# Patient Record
Sex: FEMALE | Race: WHITE | Employment: OTHER | URBAN - METROPOLITAN AREA
[De-identification: names, ages, dates, MRNs, and addresses within clinical notes are randomized per-mention and may not be internally consistent; named-entity substitution may affect disease eponyms.]

---

## 2017-01-16 RX ORDER — LINAGLIPTIN 5 MG/1
TABLET, FILM COATED ORAL
Qty: 90 TABLET | Refills: 0 | Status: SHIPPED | OUTPATIENT
Start: 2017-01-16 | End: 2017-01-18

## 2017-01-18 ENCOUNTER — TELEPHONE (OUTPATIENT)
Dept: FAMILY MEDICINE CLINIC | Age: 77
End: 2017-01-18

## 2017-01-24 ENCOUNTER — TELEPHONE (OUTPATIENT)
Dept: FAMILY MEDICINE CLINIC | Age: 77
End: 2017-01-24

## 2017-03-20 ENCOUNTER — TELEPHONE (OUTPATIENT)
Dept: FAMILY MEDICINE CLINIC | Age: 77
End: 2017-03-20

## 2017-03-27 RX ORDER — CALCITONIN SALMON 200 [IU]/.09ML
SPRAY, METERED NASAL
Qty: 3.7 ML | Refills: 0 | Status: SHIPPED | OUTPATIENT
Start: 2017-03-27 | End: 2017-12-06 | Stop reason: SDUPTHER

## 2017-03-29 ENCOUNTER — OFFICE VISIT (OUTPATIENT)
Dept: FAMILY MEDICINE CLINIC | Age: 77
End: 2017-03-29

## 2017-03-29 VITALS
BODY MASS INDEX: 38.57 KG/M2 | HEART RATE: 76 BPM | HEIGHT: 66 IN | DIASTOLIC BLOOD PRESSURE: 66 MMHG | WEIGHT: 240 LBS | RESPIRATION RATE: 20 BRPM | SYSTOLIC BLOOD PRESSURE: 128 MMHG

## 2017-03-29 DIAGNOSIS — E78.5 HYPERLIPIDEMIA, UNSPECIFIED HYPERLIPIDEMIA TYPE: ICD-10-CM

## 2017-03-29 DIAGNOSIS — M85.80 OSTEOPENIA: ICD-10-CM

## 2017-03-29 DIAGNOSIS — E66.9 OBESITY (BMI 30-39.9): ICD-10-CM

## 2017-03-29 DIAGNOSIS — Z99.89 OBSTRUCTIVE SLEEP APNEA ON CPAP: ICD-10-CM

## 2017-03-29 DIAGNOSIS — I10 ESSENTIAL HYPERTENSION: ICD-10-CM

## 2017-03-29 DIAGNOSIS — K21.9 GASTROESOPHAGEAL REFLUX DISEASE, ESOPHAGITIS PRESENCE NOT SPECIFIED: ICD-10-CM

## 2017-03-29 DIAGNOSIS — E11.9 CONTROLLED TYPE 2 DIABETES MELLITUS WITHOUT COMPLICATION, UNSPECIFIED LONG TERM INSULIN USE STATUS: ICD-10-CM

## 2017-03-29 DIAGNOSIS — J01.90 ACUTE RHINOSINUSITIS: Primary | ICD-10-CM

## 2017-03-29 DIAGNOSIS — G47.33 OBSTRUCTIVE SLEEP APNEA ON CPAP: ICD-10-CM

## 2017-03-29 DIAGNOSIS — Z78.0 POST-MENOPAUSAL: ICD-10-CM

## 2017-03-29 PROCEDURE — 3017F COLORECTAL CA SCREEN DOC REV: CPT | Performed by: FAMILY MEDICINE

## 2017-03-29 PROCEDURE — G8427 DOCREV CUR MEDS BY ELIG CLIN: HCPCS | Performed by: FAMILY MEDICINE

## 2017-03-29 PROCEDURE — 4040F PNEUMOC VAC/ADMIN/RCVD: CPT | Performed by: FAMILY MEDICINE

## 2017-03-29 PROCEDURE — G8417 CALC BMI ABV UP PARAM F/U: HCPCS | Performed by: FAMILY MEDICINE

## 2017-03-29 PROCEDURE — 1036F TOBACCO NON-USER: CPT | Performed by: FAMILY MEDICINE

## 2017-03-29 PROCEDURE — G8399 PT W/DXA RESULTS DOCUMENT: HCPCS | Performed by: FAMILY MEDICINE

## 2017-03-29 PROCEDURE — 1123F ACP DISCUSS/DSCN MKR DOCD: CPT | Performed by: FAMILY MEDICINE

## 2017-03-29 PROCEDURE — 3044F HG A1C LEVEL LT 7.0%: CPT | Performed by: FAMILY MEDICINE

## 2017-03-29 PROCEDURE — 1090F PRES/ABSN URINE INCON ASSESS: CPT | Performed by: FAMILY MEDICINE

## 2017-03-29 PROCEDURE — 99214 OFFICE O/P EST MOD 30 MIN: CPT | Performed by: FAMILY MEDICINE

## 2017-03-29 PROCEDURE — G8484 FLU IMMUNIZE NO ADMIN: HCPCS | Performed by: FAMILY MEDICINE

## 2017-03-29 RX ORDER — METOPROLOL SUCCINATE 25 MG/1
25 TABLET, EXTENDED RELEASE ORAL DAILY
Qty: 90 TABLET | Refills: 3 | Status: SHIPPED | OUTPATIENT
Start: 2017-03-29 | End: 2018-04-26 | Stop reason: SDUPTHER

## 2017-03-29 RX ORDER — AMLODIPINE BESYLATE AND BENAZEPRIL HYDROCHLORIDE 10; 20 MG/1; MG/1
1 CAPSULE ORAL DAILY
Qty: 90 CAPSULE | Refills: 3 | Status: SHIPPED | OUTPATIENT
Start: 2017-03-29 | End: 2018-03-19 | Stop reason: SDUPTHER

## 2017-03-29 RX ORDER — AMOXICILLIN AND CLAVULANATE POTASSIUM 875; 125 MG/1; MG/1
1 TABLET, FILM COATED ORAL 2 TIMES DAILY WITH MEALS
Qty: 14 TABLET | Refills: 0 | Status: SHIPPED | OUTPATIENT
Start: 2017-03-29 | End: 2017-04-05

## 2017-03-29 ASSESSMENT — ENCOUNTER SYMPTOMS
COUGH: 1
GASTROINTESTINAL NEGATIVE: 1
RHINORRHEA: 1
SINUS PRESSURE: 1

## 2017-04-11 RX ORDER — OMEPRAZOLE 40 MG/1
CAPSULE, DELAYED RELEASE ORAL
Qty: 90 CAPSULE | Refills: 0 | Status: SHIPPED | OUTPATIENT
Start: 2017-04-11 | End: 2018-10-01 | Stop reason: SDUPTHER

## 2017-04-13 RX ORDER — ALENDRONATE SODIUM 70 MG/1
70 TABLET ORAL
Qty: 4 TABLET | Refills: 11 | Status: SHIPPED | OUTPATIENT
Start: 2017-04-13 | End: 2017-12-20

## 2017-07-10 RX ORDER — OMEPRAZOLE 40 MG/1
CAPSULE, DELAYED RELEASE ORAL
Qty: 90 CAPSULE | Refills: 0 | Status: SHIPPED | OUTPATIENT
Start: 2017-07-10 | End: 2017-12-20

## 2017-08-16 ENCOUNTER — HOSPITAL ENCOUNTER (OUTPATIENT)
Age: 77
Discharge: HOME | End: 2017-08-16
Payer: MEDICARE

## 2017-08-16 ENCOUNTER — TELEPHONE (OUTPATIENT)
Dept: FAMILY MEDICINE CLINIC | Age: 77
End: 2017-08-16

## 2017-08-16 DIAGNOSIS — E11.9 CONTROLLED TYPE 2 DIABETES MELLITUS WITHOUT COMPLICATION, UNSPECIFIED LONG TERM INSULIN USE STATUS: Primary | ICD-10-CM

## 2017-08-16 DIAGNOSIS — E11.9 CONTROLLED TYPE 2 DIABETES MELLITUS WITHOUT COMPLICATION, UNSPECIFIED LONG TERM INSULIN USE STATUS: ICD-10-CM

## 2017-08-16 LAB
AVERAGE GLUCOSE: 114 MG/DL (ref 70–126)
HBA1C MFR BLD: 5.8 % (ref 4.4–6.4)

## 2017-08-16 PROCEDURE — 83036 HEMOGLOBIN GLYCOSYLATED A1C: CPT

## 2017-08-16 PROCEDURE — 36415 COLL VENOUS BLD VENIPUNCTURE: CPT

## 2017-09-26 RX ORDER — OMEPRAZOLE 40 MG/1
CAPSULE, DELAYED RELEASE ORAL
Qty: 90 CAPSULE | Refills: 0 | Status: SHIPPED | OUTPATIENT
Start: 2017-09-26 | End: 2017-11-03 | Stop reason: SDUPTHER

## 2017-09-29 ENCOUNTER — OFFICE VISIT (OUTPATIENT)
Dept: FAMILY MEDICINE CLINIC | Age: 77
End: 2017-09-29
Payer: MEDICARE

## 2017-09-29 ENCOUNTER — TELEPHONE (OUTPATIENT)
Dept: FAMILY MEDICINE CLINIC | Age: 77
End: 2017-09-29

## 2017-09-29 VITALS
WEIGHT: 242 LBS | SYSTOLIC BLOOD PRESSURE: 132 MMHG | RESPIRATION RATE: 16 BRPM | BODY MASS INDEX: 38.89 KG/M2 | HEART RATE: 60 BPM | DIASTOLIC BLOOD PRESSURE: 74 MMHG | HEIGHT: 66 IN

## 2017-09-29 DIAGNOSIS — G47.33 OBSTRUCTIVE SLEEP APNEA ON CPAP: ICD-10-CM

## 2017-09-29 DIAGNOSIS — E66.9 OBESITY (BMI 30-39.9): ICD-10-CM

## 2017-09-29 DIAGNOSIS — E11.42 DIABETIC POLYNEUROPATHY ASSOCIATED WITH TYPE 2 DIABETES MELLITUS (HCC): ICD-10-CM

## 2017-09-29 DIAGNOSIS — I10 ESSENTIAL HYPERTENSION: ICD-10-CM

## 2017-09-29 DIAGNOSIS — E78.5 HYPERLIPIDEMIA, UNSPECIFIED HYPERLIPIDEMIA TYPE: ICD-10-CM

## 2017-09-29 DIAGNOSIS — E11.9 CONTROLLED TYPE 2 DIABETES MELLITUS WITHOUT COMPLICATION, WITHOUT LONG-TERM CURRENT USE OF INSULIN (HCC): Primary | ICD-10-CM

## 2017-09-29 DIAGNOSIS — Z23 NEED FOR INFLUENZA VACCINATION: ICD-10-CM

## 2017-09-29 DIAGNOSIS — Z99.89 OBSTRUCTIVE SLEEP APNEA ON CPAP: ICD-10-CM

## 2017-09-29 DIAGNOSIS — K21.9 GASTROESOPHAGEAL REFLUX DISEASE, ESOPHAGITIS PRESENCE NOT SPECIFIED: ICD-10-CM

## 2017-09-29 PROCEDURE — 4040F PNEUMOC VAC/ADMIN/RCVD: CPT | Performed by: FAMILY MEDICINE

## 2017-09-29 PROCEDURE — G8399 PT W/DXA RESULTS DOCUMENT: HCPCS | Performed by: FAMILY MEDICINE

## 2017-09-29 PROCEDURE — 1036F TOBACCO NON-USER: CPT | Performed by: FAMILY MEDICINE

## 2017-09-29 PROCEDURE — G0008 ADMIN INFLUENZA VIRUS VAC: HCPCS | Performed by: FAMILY MEDICINE

## 2017-09-29 PROCEDURE — G8427 DOCREV CUR MEDS BY ELIG CLIN: HCPCS | Performed by: FAMILY MEDICINE

## 2017-09-29 PROCEDURE — 1123F ACP DISCUSS/DSCN MKR DOCD: CPT | Performed by: FAMILY MEDICINE

## 2017-09-29 PROCEDURE — 90662 IIV NO PRSV INCREASED AG IM: CPT | Performed by: FAMILY MEDICINE

## 2017-09-29 PROCEDURE — 99214 OFFICE O/P EST MOD 30 MIN: CPT | Performed by: FAMILY MEDICINE

## 2017-09-29 PROCEDURE — G8417 CALC BMI ABV UP PARAM F/U: HCPCS | Performed by: FAMILY MEDICINE

## 2017-09-29 PROCEDURE — 1090F PRES/ABSN URINE INCON ASSESS: CPT | Performed by: FAMILY MEDICINE

## 2017-09-29 ASSESSMENT — ENCOUNTER SYMPTOMS
GASTROINTESTINAL NEGATIVE: 1
RESPIRATORY NEGATIVE: 1

## 2017-09-29 NOTE — TELEPHONE ENCOUNTER
Pt requesting prescription for Nitro tablets, the ones she had are . Pt was not sure who initially prescribed them for her.  ([facility])

## 2017-10-01 RX ORDER — NITROGLYCERIN 0.4 MG/1
0.4 TABLET SUBLINGUAL EVERY 5 MIN PRN
Qty: 25 TABLET | Refills: 3 | Status: SHIPPED | OUTPATIENT
Start: 2017-10-01 | End: 2018-12-01 | Stop reason: SDUPTHER

## 2017-10-09 ENCOUNTER — TELEPHONE (OUTPATIENT)
Dept: FAMILY MEDICINE CLINIC | Age: 77
End: 2017-10-09

## 2017-10-10 ENCOUNTER — OFFICE VISIT (OUTPATIENT)
Dept: FAMILY MEDICINE CLINIC | Age: 77
End: 2017-10-10
Payer: MEDICARE

## 2017-10-10 DIAGNOSIS — Z23 NEED FOR PNEUMOCOCCAL VACCINATION: Primary | ICD-10-CM

## 2017-10-10 PROCEDURE — G0009 ADMIN PNEUMOCOCCAL VACCINE: HCPCS | Performed by: FAMILY MEDICINE

## 2017-10-10 PROCEDURE — 1036F TOBACCO NON-USER: CPT | Performed by: FAMILY MEDICINE

## 2017-10-10 PROCEDURE — 90732 PPSV23 VACC 2 YRS+ SUBQ/IM: CPT | Performed by: FAMILY MEDICINE

## 2017-10-10 NOTE — PROGRESS NOTES
After obtaining consent, and per orders of Dr. Suhas Reddy, injection of [id number] 0.5ml given in Right deltoid by Arina Aguirre CMA([facility]). Patient instructed to report any adverse reaction to me immediately. Pt tolerated injection well.

## 2017-10-25 ENCOUNTER — HOSPITAL ENCOUNTER (INPATIENT)
Age: 77
LOS: 2 days | Discharge: HOME | DRG: 310 | End: 2017-10-27
Attending: INTERNAL MEDICINE | Admitting: INTERNAL MEDICINE
Payer: MEDICARE

## 2017-10-25 PROBLEM — I48.19 PERSISTENT ATRIAL FIBRILLATION (HCC): Status: ACTIVE | Noted: 2017-10-25

## 2017-10-25 LAB
ALBUMIN SERPL-MCNC: 4.3 G/DL (ref 3.5–5.1)
ALP BLD-CCNC: 39 U/L (ref 38–126)
ALT SERPL-CCNC: 21 U/L (ref 11–66)
ANION GAP SERPL CALCULATED.3IONS-SCNC: 16 MEQ/L (ref 8–16)
APTT: 77.6 SECONDS (ref 22–38)
AST SERPL-CCNC: 22 U/L (ref 5–40)
BILIRUB SERPL-MCNC: 0.5 MG/DL (ref 0.3–1.2)
BILIRUBIN DIRECT: < 0.2 MG/DL (ref 0–0.3)
BUN BLDV-MCNC: 9 MG/DL (ref 7–22)
CALCIUM SERPL-MCNC: 9.7 MG/DL (ref 8.5–10.5)
CHLORIDE BLD-SCNC: 105 MEQ/L (ref 98–111)
CO2: 24 MEQ/L (ref 23–33)
CREAT SERPL-MCNC: 0.6 MG/DL (ref 0.4–1.2)
GFR SERPL CREATININE-BSD FRML MDRD: > 90 ML/MIN/1.73M2
GLUCOSE BLD-MCNC: 116 MG/DL (ref 70–108)
HCT VFR BLD CALC: 37.5 % (ref 37–47)
HEMOGLOBIN: 12.6 GM/DL (ref 12–16)
MCH RBC QN AUTO: 29.6 PG (ref 27–31)
MCHC RBC AUTO-ENTMCNC: 33.8 GM/DL (ref 33–37)
MCV RBC AUTO: 87.8 FL (ref 81–99)
PDW BLD-RTO: 14.7 % (ref 11.5–14.5)
PLATELET # BLD: 211 THOU/MM3 (ref 130–400)
PMV BLD AUTO: 9.9 MCM (ref 7.4–10.4)
POTASSIUM SERPL-SCNC: 5 MEQ/L (ref 3.5–5.2)
RBC # BLD: 4.27 MILL/MM3 (ref 4.2–5.4)
SODIUM BLD-SCNC: 145 MEQ/L (ref 135–145)
TOTAL PROTEIN: 7.4 G/DL (ref 6.1–8)
TROPONIN T: < 0.01 NG/ML
TROPONIN T: < 0.01 NG/ML
TSH SERPL DL<=0.05 MIU/L-ACNC: 1.16 UIU/ML (ref 0.4–4.2)
WBC # BLD: 8.2 THOU/MM3 (ref 4.8–10.8)

## 2017-10-25 PROCEDURE — 80053 COMPREHEN METABOLIC PANEL: CPT

## 2017-10-25 PROCEDURE — 36415 COLL VENOUS BLD VENIPUNCTURE: CPT

## 2017-10-25 PROCEDURE — 84443 ASSAY THYROID STIM HORMONE: CPT

## 2017-10-25 PROCEDURE — 85027 COMPLETE CBC AUTOMATED: CPT

## 2017-10-25 PROCEDURE — 6370000000 HC RX 637 (ALT 250 FOR IP): Performed by: INTERNAL MEDICINE

## 2017-10-25 PROCEDURE — 85730 THROMBOPLASTIN TIME PARTIAL: CPT

## 2017-10-25 PROCEDURE — 2580000003 HC RX 258: Performed by: INTERNAL MEDICINE

## 2017-10-25 PROCEDURE — 93005 ELECTROCARDIOGRAM TRACING: CPT

## 2017-10-25 PROCEDURE — 82248 BILIRUBIN DIRECT: CPT

## 2017-10-25 PROCEDURE — 2500000003 HC RX 250 WO HCPCS: Performed by: INTERNAL MEDICINE

## 2017-10-25 PROCEDURE — 1200000003 HC TELEMETRY R&B

## 2017-10-25 PROCEDURE — 6360000002 HC RX W HCPCS: Performed by: INTERNAL MEDICINE

## 2017-10-25 PROCEDURE — 84484 ASSAY OF TROPONIN QUANT: CPT

## 2017-10-25 RX ORDER — ASPIRIN 81 MG/1
81 TABLET, CHEWABLE ORAL DAILY
Status: DISCONTINUED | OUTPATIENT
Start: 2017-10-26 | End: 2017-10-27 | Stop reason: HOSPADM

## 2017-10-25 RX ORDER — POTASSIUM CHLORIDE 20MEQ/15ML
40 LIQUID (ML) ORAL PRN
Status: DISCONTINUED | OUTPATIENT
Start: 2017-10-25 | End: 2017-10-27 | Stop reason: HOSPADM

## 2017-10-25 RX ORDER — ONDANSETRON 2 MG/ML
4 INJECTION INTRAMUSCULAR; INTRAVENOUS EVERY 6 HOURS PRN
Status: DISCONTINUED | OUTPATIENT
Start: 2017-10-25 | End: 2017-10-27 | Stop reason: HOSPADM

## 2017-10-25 RX ORDER — POTASSIUM CHLORIDE 7.45 MG/ML
10 INJECTION INTRAVENOUS PRN
Status: DISCONTINUED | OUTPATIENT
Start: 2017-10-25 | End: 2017-10-27 | Stop reason: HOSPADM

## 2017-10-25 RX ORDER — HEPARIN SODIUM 1000 [USP'U]/ML
80 INJECTION, SOLUTION INTRAVENOUS; SUBCUTANEOUS ONCE
Status: COMPLETED | OUTPATIENT
Start: 2017-10-25 | End: 2017-10-25

## 2017-10-25 RX ORDER — SODIUM CHLORIDE 0.9 % (FLUSH) 0.9 %
10 SYRINGE (ML) INJECTION PRN
Status: DISCONTINUED | OUTPATIENT
Start: 2017-10-25 | End: 2017-10-27 | Stop reason: HOSPADM

## 2017-10-25 RX ORDER — METOPROLOL SUCCINATE 25 MG/1
25 TABLET, EXTENDED RELEASE ORAL DAILY
Status: DISCONTINUED | OUTPATIENT
Start: 2017-10-25 | End: 2017-10-25

## 2017-10-25 RX ORDER — CALCITONIN SALMON 200 [IU]/.09ML
1 SPRAY, METERED NASAL DAILY
Status: DISCONTINUED | OUTPATIENT
Start: 2017-10-25 | End: 2017-10-27 | Stop reason: HOSPADM

## 2017-10-25 RX ORDER — DILTIAZEM HYDROCHLORIDE 5 MG/ML
10 INJECTION INTRAVENOUS ONCE
Status: COMPLETED | OUTPATIENT
Start: 2017-10-25 | End: 2017-10-25

## 2017-10-25 RX ORDER — OMEGA-3 FATTY ACIDS/FISH OIL 300-1000MG
3 CAPSULE ORAL DAILY
Status: DISCONTINUED | OUTPATIENT
Start: 2017-10-25 | End: 2017-10-25 | Stop reason: RX

## 2017-10-25 RX ORDER — NITROGLYCERIN 0.4 MG/1
0.4 TABLET SUBLINGUAL EVERY 5 MIN PRN
Status: DISCONTINUED | OUTPATIENT
Start: 2017-10-25 | End: 2017-10-25 | Stop reason: SDUPTHER

## 2017-10-25 RX ORDER — NITROGLYCERIN 0.4 MG/1
0.4 TABLET SUBLINGUAL EVERY 5 MIN PRN
Status: DISCONTINUED | OUTPATIENT
Start: 2017-10-25 | End: 2017-10-27 | Stop reason: HOSPADM

## 2017-10-25 RX ORDER — ACETAMINOPHEN 325 MG/1
650 TABLET ORAL EVERY 4 HOURS PRN
Status: DISCONTINUED | OUTPATIENT
Start: 2017-10-25 | End: 2017-10-25 | Stop reason: SDUPTHER

## 2017-10-25 RX ORDER — ALBUTEROL SULFATE 90 UG/1
2 AEROSOL, METERED RESPIRATORY (INHALATION) EVERY 4 HOURS PRN
Status: DISCONTINUED | OUTPATIENT
Start: 2017-10-25 | End: 2017-10-27 | Stop reason: HOSPADM

## 2017-10-25 RX ORDER — HEPARIN SODIUM 1000 [USP'U]/ML
80 INJECTION, SOLUTION INTRAVENOUS; SUBCUTANEOUS PRN
Status: DISCONTINUED | OUTPATIENT
Start: 2017-10-25 | End: 2017-10-25 | Stop reason: ALTCHOICE

## 2017-10-25 RX ORDER — PANTOPRAZOLE SODIUM 40 MG/1
40 TABLET, DELAYED RELEASE ORAL
Status: DISCONTINUED | OUTPATIENT
Start: 2017-10-26 | End: 2017-10-27 | Stop reason: HOSPADM

## 2017-10-25 RX ORDER — SODIUM CHLORIDE 0.9 % (FLUSH) 0.9 %
10 SYRINGE (ML) INJECTION EVERY 12 HOURS SCHEDULED
Status: DISCONTINUED | OUTPATIENT
Start: 2017-10-25 | End: 2017-10-27 | Stop reason: HOSPADM

## 2017-10-25 RX ORDER — AMLODIPINE BESYLATE AND BENAZEPRIL HYDROCHLORIDE 10; 20 MG/1; MG/1
1 CAPSULE ORAL DAILY
Status: DISCONTINUED | OUTPATIENT
Start: 2017-10-25 | End: 2017-10-25

## 2017-10-25 RX ORDER — METOPROLOL TARTRATE 50 MG/1
50 TABLET, FILM COATED ORAL 2 TIMES DAILY
Status: DISCONTINUED | OUTPATIENT
Start: 2017-10-25 | End: 2017-10-27 | Stop reason: HOSPADM

## 2017-10-25 RX ORDER — PRAVASTATIN SODIUM 80 MG/1
80 TABLET ORAL DAILY
Status: DISCONTINUED | OUTPATIENT
Start: 2017-10-25 | End: 2017-10-27 | Stop reason: HOSPADM

## 2017-10-25 RX ORDER — SODIUM CHLORIDE 9 MG/ML
INJECTION, SOLUTION INTRAVENOUS CONTINUOUS
Status: DISCONTINUED | OUTPATIENT
Start: 2017-10-25 | End: 2017-10-27 | Stop reason: HOSPADM

## 2017-10-25 RX ORDER — ACETAMINOPHEN 325 MG/1
650 TABLET ORAL EVERY 4 HOURS PRN
Status: DISCONTINUED | OUTPATIENT
Start: 2017-10-25 | End: 2017-10-27 | Stop reason: HOSPADM

## 2017-10-25 RX ORDER — HEPARIN SODIUM 1000 [USP'U]/ML
40 INJECTION, SOLUTION INTRAVENOUS; SUBCUTANEOUS PRN
Status: DISCONTINUED | OUTPATIENT
Start: 2017-10-25 | End: 2017-10-25 | Stop reason: ALTCHOICE

## 2017-10-25 RX ORDER — HEPARIN SODIUM 10000 [USP'U]/100ML
18 INJECTION, SOLUTION INTRAVENOUS CONTINUOUS
Status: DISCONTINUED | OUTPATIENT
Start: 2017-10-25 | End: 2017-10-25

## 2017-10-25 RX ORDER — ALENDRONATE SODIUM 70 MG/1
70 TABLET ORAL
Status: DISCONTINUED | OUTPATIENT
Start: 2017-10-25 | End: 2017-10-25 | Stop reason: RX

## 2017-10-25 RX ORDER — POTASSIUM CHLORIDE 20 MEQ/1
40 TABLET, EXTENDED RELEASE ORAL PRN
Status: DISCONTINUED | OUTPATIENT
Start: 2017-10-25 | End: 2017-10-27 | Stop reason: HOSPADM

## 2017-10-25 RX ORDER — ASPIRIN 81 MG/1
81 TABLET ORAL DAILY
Status: DISCONTINUED | OUTPATIENT
Start: 2017-10-25 | End: 2017-10-25 | Stop reason: SDUPTHER

## 2017-10-25 RX ORDER — AMIODARONE HYDROCHLORIDE 200 MG/1
200 TABLET ORAL DAILY
Status: DISCONTINUED | OUTPATIENT
Start: 2017-10-25 | End: 2017-10-27 | Stop reason: HOSPADM

## 2017-10-25 RX ADMIN — DILTIAZEM HYDROCHLORIDE 10 MG/HR: 5 INJECTION INTRAVENOUS at 12:51

## 2017-10-25 RX ADMIN — HEPARIN SODIUM AND DEXTROSE 18 UNITS/KG/HR: 10000; 5 INJECTION INTRAVENOUS at 12:54

## 2017-10-25 RX ADMIN — APIXABAN 5 MG: 5 TABLET, FILM COATED ORAL at 15:28

## 2017-10-25 RX ADMIN — HEPARIN SODIUM 8600 UNITS: 1000 INJECTION, SOLUTION INTRAVENOUS; SUBCUTANEOUS at 12:56

## 2017-10-25 RX ADMIN — DILTIAZEM HYDROCHLORIDE 10 MG: 5 INJECTION, SOLUTION INTRAVENOUS at 12:47

## 2017-10-25 RX ADMIN — SODIUM CHLORIDE: 9 INJECTION, SOLUTION INTRAVENOUS at 12:47

## 2017-10-25 ASSESSMENT — PAIN SCALES - GENERAL
PAINLEVEL_OUTOF10: 0

## 2017-10-25 NOTE — FLOWSHEET NOTE
10/25/17 1401   Provider Notification   Reason for Communication Review case   Provider Name Dr. Bethany Belle   Provider Notification Physician   Method of Communication Call   Response See orders   Notification Time 80   RN phones Dr. Bethany Belle to let him know that patient is a hard stick and Cardizem and Heparin are not compatible. IV team suggested requesting a midline. Dr. Bethany Belle states to discontinue Heparin and start Eliquis 5 mg PO twice daily.

## 2017-10-25 NOTE — PROGRESS NOTES
Inpatient Cardiac Rehabilitation Consult    Received consult for Phase II Cardiac Rehabilitation. We will follow pt.

## 2017-10-26 LAB
CHOLESTEROL, TOTAL: 138 MG/DL (ref 100–199)
EKG ATRIAL RATE: 127 BPM
EKG Q-T INTERVAL: 286 MS
EKG QRS DURATION: 88 MS
EKG QTC CALCULATION (BAZETT): 436 MS
EKG R AXIS: 4 DEGREES
EKG T AXIS: -157 DEGREES
EKG VENTRICULAR RATE: 140 BPM
HDLC SERPL-MCNC: 60 MG/DL
LDL CHOLESTEROL CALCULATED: 66 MG/DL
TRIGL SERPL-MCNC: 58 MG/DL (ref 0–199)

## 2017-10-26 PROCEDURE — 6370000000 HC RX 637 (ALT 250 FOR IP): Performed by: INTERNAL MEDICINE

## 2017-10-26 PROCEDURE — 2500000003 HC RX 250 WO HCPCS: Performed by: INTERNAL MEDICINE

## 2017-10-26 PROCEDURE — 36415 COLL VENOUS BLD VENIPUNCTURE: CPT

## 2017-10-26 PROCEDURE — 93005 ELECTROCARDIOGRAM TRACING: CPT

## 2017-10-26 PROCEDURE — 2580000003 HC RX 258: Performed by: INTERNAL MEDICINE

## 2017-10-26 PROCEDURE — 1200000003 HC TELEMETRY R&B

## 2017-10-26 PROCEDURE — 80061 LIPID PANEL: CPT

## 2017-10-26 RX ADMIN — APIXABAN 5 MG: 5 TABLET, FILM COATED ORAL at 08:30

## 2017-10-26 RX ADMIN — LINAGLIPTIN 5 MG: 5 TABLET, FILM COATED ORAL at 08:29

## 2017-10-26 RX ADMIN — METOPROLOL TARTRATE 50 MG: 50 TABLET, FILM COATED ORAL at 00:35

## 2017-10-26 RX ADMIN — APIXABAN 5 MG: 5 TABLET, FILM COATED ORAL at 00:35

## 2017-10-26 RX ADMIN — APIXABAN 5 MG: 5 TABLET, FILM COATED ORAL at 23:23

## 2017-10-26 RX ADMIN — AMIODARONE HYDROCHLORIDE 200 MG: 200 TABLET ORAL at 08:29

## 2017-10-26 RX ADMIN — PANTOPRAZOLE SODIUM 40 MG: 40 TABLET, DELAYED RELEASE ORAL at 06:24

## 2017-10-26 RX ADMIN — METOPROLOL TARTRATE 50 MG: 50 TABLET, FILM COATED ORAL at 08:28

## 2017-10-26 RX ADMIN — VITAMIN E CAP 100 UNIT 100 UNITS: 100 CAP at 08:29

## 2017-10-26 RX ADMIN — AMIODARONE HYDROCHLORIDE 200 MG: 200 TABLET ORAL at 00:34

## 2017-10-26 RX ADMIN — ASPIRIN 81 MG: 81 TABLET, CHEWABLE ORAL at 08:29

## 2017-10-26 RX ADMIN — DILTIAZEM HYDROCHLORIDE 5 MG/HR: 5 INJECTION INTRAVENOUS at 02:36

## 2017-10-26 RX ADMIN — PRAVASTATIN SODIUM 80 MG: 80 TABLET ORAL at 08:29

## 2017-10-26 ASSESSMENT — PAIN SCALES - GENERAL
PAINLEVEL_OUTOF10: 0

## 2017-10-26 NOTE — PROGRESS NOTES
Pt arrived in 8B 24 as a direct admit from Dr. Amber Adams office for a-fib. Complaints: No complaints from patient. She states that she doesn't know why she is being admitted to the hospital. She lives at home with her , daughter and is raising two of her teenage grandchildren. Heart rate is 120s-150s per telemetry monitor.

## 2017-10-26 NOTE — PROGRESS NOTES
Pt alert and oriented x3. Speech clear and appropriate. MM pink and moist. Full ROM of upper and lower extremities. Skin is warm and dry. Skin turgor < 3 sec. Cap refill < 3 sec. Hand  is strong and equal bilaterally. No numbness or tingling reported. Radial pulse is strong, irregular bilaterally. Normal heartsounds heard at S1 S2. Irregular heart beat. Lung sounds clear bilaterally. Abd is soft and round. No masses or tenderness noted. Active bowel sounds x4. Lower extremities are warm and dry. Slight numbness and tingling reported related to diabetic neuropathy. No edema. Pedal push and pull equal and strong bilaterally. No areas of redness or wounds noted on bony prominences. Pt resting comfortably in her bed. Call light within reach. No additional wants or needs at this time.

## 2017-10-26 NOTE — PLAN OF CARE
Problem: HEMODYNAMIC STATUS  Goal: Patient has stable vital signs and fluid balance  Outcome: Ongoing  Blood pressure WNL. Vitals monitored and recorded. Patient hemodynamically stable.  Cardiac monitor in place with strips being read every four hours.'

## 2017-10-26 NOTE — PROGRESS NOTES
Admit Date: 10/25/2017  Hospital day 1    Subjective:     Patient has no complaint of chest pain . Canary Malady Medication side effects: none    Scheduled Meds:   sodium chloride flush  10 mL Intravenous 2 times per day    aspirin  81 mg Oral Daily    apixaban  5 mg Oral BID    calcitonin  1 spray Alternating Nares Daily    pravastatin  80 mg Oral Daily    linagliptin  5 mg Oral Daily    vitamin E  100 Units Oral Daily    pantoprazole  40 mg Oral QAM AC    amiodarone  200 mg Oral Daily    metoprolol tartrate  50 mg Oral BID     Continuous Infusions:   sodium chloride 20 mL/hr at 10/25/17 1247     PRN Meds:sodium chloride flush, potassium chloride **OR** potassium chloride **OR** potassium chloride, magnesium hydroxide, ondansetron, acetaminophen, nitroGLYCERIN, albuterol sulfate HFA    Review of Systems  Pertinent items are noted in HPI. Objective:     Patient Vitals for the past 8 hrs:   BP Temp Temp src Pulse Resp SpO2   10/26/17 1702 130/64 98.4 °F (36.9 °C) Oral (!) 48 18 95 %   10/26/17 1200 (!) 128/59 97.8 °F (36.6 °C) Oral - 16 100 %     I/O last 3 completed shifts:   In: 304 [I.V.:304]  Out: 250 [Urine:250]    /60   Pulse 57   Temp 98.1 °F (36.7 °C) (Oral)   Resp 16   Ht 5' 5.5\" (1.664 m)   Wt 238 lb 6.4 oz (108.1 kg)   SpO2 96%   BMI 39.07 kg/m²     General Appearance:    Alert, cooperative, no distress, appears stated age   Head:    Normocephalic, without obvious abnormality, atraumatic   Eyes:    PERRL, conjunctiva/corneas clear, EOM's intact, fundi     benign, both eyes   Ears:    Normal TM's and external ear canals, both ears   Nose:   Nares normal, septum midline, mucosa normal, no drainage    or sinus tenderness   Throat:   Lips, mucosa, and tongue normal; teeth and gums normal   Neck:   Supple, symmetrical, trachea midline, no adenopathy;     thyroid:  no enlargement/tenderness/nodules; no carotid    bruit or JVD   Back:     Symmetric, no curvature, ROM normal, no CVA tenderness Lungs:     Clear to auscultation bilaterally, respirations unlabored   Chest Wall:    No tenderness or deformity    Heart:    Regular rate and rhythm, S1 and S2 normal, no murmur, rub   or gallop   Breast Exam:    No tenderness, masses, or nipple abnormality   Abdomen:     Soft, non-tender, bowel sounds active all four quadrants,     no masses, no organomegaly   Genitalia:    Normal female without lesion, discharge or tenderness   Rectal:    Normal tone ;guaiac negative stool   Extremities:   Extremities normal, atraumatic, no cyanosis or edema   Pulses:   2+ and symmetric all extremities   Skin:   Skin color, texture, turgor normal, no rashes or lesions   Lymph nodes:   Cervical, supraclavicular, and axillary nodes normal   Neurologic:   CNII-XII intact, normal strength, sensation and reflexes     throughout         ECG: normal sinus rhythm, no blocks or conduction defects, no ischemic changes.    Data Review  CBC:   Lab Results   Component Value Date    WBC 8.2 10/25/2017    RBC 4.27 10/25/2017    RBC 4.00 05/30/2012    HEMOGLOBIN 11.9 05/30/2012     BMP:   Lab Results   Component Value Date    GLUCOSE 116 10/25/2017    GLUCOSE 114 05/30/2012    CO2 24 10/25/2017    BUN 9 10/25/2017    CREATININE 0.6 10/25/2017    CALCIUM 9.7 10/25/2017     Coagulation:   Lab Results   Component Value Date    INR 0.97 07/27/2012    APTT 77.6 10/25/2017     Cardiac markers:   Lab Results   Component Value Date    TROPONINT < 0.010 10/25/2017     ABG: No results found for: TVP7QAS, BEART, J3MXDVBL, PHART, THGBART, MDM3NSN, PO2ART, LTX4PFI    Assessment:   Principal Problem:    Persistent atrial fibrillation ([address]      Plan:   Converted to sr    will stop iv cardizem    ambulate    home soon    continue anticoagulation

## 2017-10-26 NOTE — PLAN OF CARE
Problem: Discharge Planning:  Goal: Discharged to appropriate level of care  Discharged to appropriate level of care   Outcome: Ongoing  Pt plans to be discharged back home with family     Problem: Falls - Risk of:  Goal: Will remain free from falls  Will remain free from falls   Outcome: Ongoing  Pt free from falls this shift. Pt educated on fall risk precautions. Bed alarm on. Call light within reach. Bed in lowest position. Side rails up X2. Nonskid footwear on. Hourly rounding in place will continue to monitor     Problem: PAIN  Goal: Patient's pain/discomfort is manageable  Outcome: Ongoing  Pt is not having any pain this shift will continue to monitor     Problem: HEMODYNAMIC STATUS  Goal: Patient has stable vital signs and fluid balance  Outcome: Ongoing  Stable vital signs will continue to monitor     Comments: Care plan reviewed with patient. Patient verbalize understanding of the plan of care and contribute to goal setting.

## 2017-10-26 NOTE — PROGRESS NOTES
Pt in bed waiting on lunch tray to be delivered. Resting comfortably. Call light within reach. No additional wants or needs at this time.

## 2017-10-26 NOTE — PROCEDURES
EKG was handed to [facility].   Charting done for Belem GUARDADO

## 2017-10-26 NOTE — PLAN OF CARE
Problem: Discharge Planning:  Goal: Discharged to appropriate level of care  Discharged to appropriate level of care   Outcome: Ongoing  Patient plans to return home at discharge. Patient will not need assistance at home. Problem: Falls - Risk of:  Goal: Will remain free from falls  Will remain free from falls   Outcome: Ongoing  Patient free of falls this shift. Patient on falling star program. Fall band intact. RN visually checks on patient with hourly rounds. Patient tolerates ambulation each shift.

## 2017-10-26 NOTE — CARE COORDINATION
10/26/17, 3:03 PM      [address] day: 1  Location: [facility]24/024A Reason for admit: Atrial fibrillation ([address] [I48.91]  Atrial fibrillation ([address] [I48.91]  Atrial fibrillation ([address] [I48.91]   Treatment Plan of Care: Remains on cardizem gtt. Telemetry monitoring- A-fib 42-92. Core Measures: vte  PCP: Pamela Pappas, DO  Discharge Plan: Home with . Has CPAP.

## 2017-10-26 NOTE — PROGRESS NOTES
Pt CPAP turning on but not blowing any air. Hoses/tubes checked and in place. Unit is powering on. Water is full. Screen reads \"warming up\" and \"cooling down\". Primary RN and another RN notified; still unable to resolve issues. [facility] called to trouble shoot. Advised to have unit taken to their office to have it looked at. If is a simple fix, they will fix. If unable to fix a loaner will be provided if available. Unit is 2011 model and it may need to be replaced. If needs to be replaced this will have to be done after discharge as insurance will not cover home health equipment while pt is in the hospital. Pt son taking unit to home health office. Primary nurse state O2 can be applied while patient naps/sleeps; pt chooses to do this. Nasal cannula at 2L/min applied to patient. Pt resting comfortably in bed, watching tv. Call light within reach. No additional needs or wants at this time. Reported off to primary RN.

## 2017-10-26 NOTE — PROGRESS NOTES
Pt watching tv. Resting comfortably. Call light within reach. No additional wants or needs at this time.

## 2017-10-27 VITALS
WEIGHT: 242.2 LBS | HEART RATE: 80 BPM | TEMPERATURE: 98.3 F | HEIGHT: 66 IN | BODY MASS INDEX: 38.92 KG/M2 | OXYGEN SATURATION: 97 % | SYSTOLIC BLOOD PRESSURE: 146 MMHG | DIASTOLIC BLOOD PRESSURE: 64 MMHG | RESPIRATION RATE: 18 BRPM

## 2017-10-27 PROBLEM — I48.0 PAROXYSMAL ATRIAL FIBRILLATION (HCC): Status: ACTIVE | Noted: 2017-10-27

## 2017-10-27 LAB
EKG ATRIAL RATE: 234 BPM
EKG ATRIAL RATE: 45 BPM
EKG P AXIS: 53 DEGREES
EKG P-R INTERVAL: 166 MS
EKG Q-T INTERVAL: 400 MS
EKG Q-T INTERVAL: 486 MS
EKG QRS DURATION: 84 MS
EKG QRS DURATION: 86 MS
EKG QTC CALCULATION (BAZETT): 420 MS
EKG QTC CALCULATION (BAZETT): 481 MS
EKG R AXIS: 18 DEGREES
EKG R AXIS: 21 DEGREES
EKG T AXIS: 20 DEGREES
EKG T AXIS: 22 DEGREES
EKG VENTRICULAR RATE: 45 BPM
EKG VENTRICULAR RATE: 87 BPM
GLUCOSE BLD-MCNC: 125 MG/DL (ref 70–108)

## 2017-10-27 PROCEDURE — 2580000003 HC RX 258: Performed by: INTERNAL MEDICINE

## 2017-10-27 PROCEDURE — 6370000000 HC RX 637 (ALT 250 FOR IP): Performed by: INTERNAL MEDICINE

## 2017-10-27 PROCEDURE — 82948 REAGENT STRIP/BLOOD GLUCOSE: CPT

## 2017-10-27 RX ORDER — AMIODARONE HYDROCHLORIDE 200 MG/1
200 TABLET ORAL DAILY
Qty: 30 TABLET | Refills: 3 | Status: SHIPPED | OUTPATIENT
Start: 2017-10-27 | End: 2018-06-11

## 2017-10-27 RX ADMIN — APIXABAN 5 MG: 5 TABLET, FILM COATED ORAL at 09:03

## 2017-10-27 RX ADMIN — LINAGLIPTIN 5 MG: 5 TABLET, FILM COATED ORAL at 09:03

## 2017-10-27 RX ADMIN — Medication 10 ML: at 09:03

## 2017-10-27 RX ADMIN — VITAMIN E CAP 100 UNIT 100 UNITS: 100 CAP at 09:03

## 2017-10-27 RX ADMIN — PANTOPRAZOLE SODIUM 40 MG: 40 TABLET, DELAYED RELEASE ORAL at 07:42

## 2017-10-27 RX ADMIN — PRAVASTATIN SODIUM 80 MG: 80 TABLET ORAL at 09:03

## 2017-10-27 RX ADMIN — ASPIRIN 81 MG: 81 TABLET, CHEWABLE ORAL at 09:03

## 2017-10-27 RX ADMIN — APIXABAN 5 MG: 5 TABLET, FILM COATED ORAL at 18:24

## 2017-10-27 RX ADMIN — AMIODARONE HYDROCHLORIDE 200 MG: 200 TABLET ORAL at 09:03

## 2017-10-27 ASSESSMENT — PAIN SCALES - GENERAL
PAINLEVEL_OUTOF10: 0

## 2017-10-27 NOTE — CARE COORDINATION
10/27/17, 10:10 AM    Home with . Denies needs. Discharge plan discussed by  and . Discharge plan reviewed with patient/ family. Patient/ family verbalize understanding of discharge plan and are in agreement with plan. Understanding was demonstrated using the teach back method.        IMM Letter  IMM Letter given to Patient/Family/Significant other/Guardian/POA/by[de-identified] CM  IMM Letter date given[de-identified] 10/25/17  IMM Letter time given[de-identified] 0006

## 2017-10-27 NOTE — PROGRESS NOTES
Discharge teaching and instructions for diagnosis/procedure of afib completed with patient using teachback method. AVS reviewed. Printed prescriptions given to patient. Patient voiced understanding regarding prescriptions, follow up appointments, and care of self at home.  Discharged in a wheelchair to  independent living per family

## 2017-10-27 NOTE — H&P
[id number] h/p dictated
neurological deficits. HEENT:  No discharge from the throat, ear or the nose. NECK:  She has no JVD. There are no carotid bruits. No thyromegaly. She has no lymphadenopathy. LUNGS:  Scattered expiratory rhonchi. There is no chest wall  tenderness. She has 2/6 systolic murmur. CARDIAC:  There is no S3. No pericardial rub. ABDOMEN:  Soft. GENITAL:  Deferred. RECTAL:  Deferred. LOWER EXTREMITIES:  There is no edema. LABORATORY DATA:  TSH is 1.1. Potassium 5, BUN 9, creatinine 6. IMPRESSION:  1. Atrial fibrillation, rapid ventricular response. 2.  History of hypertension. 3.  History of nonobstructive coronary artery disease. 4.  Obesity. 5.  Hypertension. 6.  Diabetes mellitus. PLAN:  The patient admitted to the telemetry bed, treated with IV  Cardizem drip and we will obtain serial cardiac enzymes. The patient  placed on anticoagulation. We will obtain thyroid function. We will  continue with the home medication and further recommendations pending  the results of the above tests. Thank you very much for allowing us to share in the management of this  patient.  _____ any further recommendations.         Simone Jackson M.D.    D: 10/27/2017 4:41:15       T: 10/27/2017 8:01:16     [email or web address]  Job#: [id number]     Doc#: [id number]    CC:  Referring Services

## 2017-10-27 NOTE — PLAN OF CARE
Problem: Discharge Planning:  Goal: Discharged to appropriate level of care  Discharged to appropriate level of care   Outcome: Ongoing  Pt plans to return to previous residence at discharge      Problem: Falls - Risk of:  Goal: Will remain free from falls  Will remain free from falls   Outcome: Ongoing  Pt is fall risk. Fall Armband in place. Bed in low position. Pt instructed to use call light when AMB. Problem: PAIN  Goal: Patient's pain/discomfort is manageable  Outcome: Ongoing  Pt does not c/o of pain at this time. Will continue to monitor and assess q4hr and on hourly rounding       Problem: HEMODYNAMIC STATUS  Goal: Patient has stable vital signs and fluid balance  Outcome: Ongoing  Pt assessed for cap refill/skin turgor 2x per shift. Will continue to monitor pt and encourage intake of fluids  Pt does not c/o calf tenderness at this time. Pulses palpable in all extremities. Skin color lower extremities appropriate. Will continue to monitor and assess q4hr    Comments: Care plan reviewed with patient. Patient verbalize understanding of the plan of care and contribute to goal setting.

## 2017-10-27 NOTE — DISCHARGE SUMMARY
[address] [place] [address]                              DISCHARGE SUMMARY    PATIENT NAME: Manuela Alcantar                  :             1940  MED REC NO:   [id number]                            ROOM:            0024  ACCOUNT NO:   [de-identified]                            ADMISSION DATE:  10/25/2017  PROVIDER:     Nav Cantu. Corbin Wayne M.D.                Finesse Hammsen:  10/27/2017      DATE OF ADMISSION:  10/25/2017    DATE OF DISCHARGE:  10/27/2017    FINAL DIAGNOSES:  1. Atrial fib with rapid ventricular response - paroxysmal.  2.  History of hypertensive cardiovascular disease. 3.  History of diabetes mellitus type 2.  4.  Dyslipidemia. 5.  Obesity. 6.  History of COPD. 7.  History of sleep apnea. BRIEF HISTORY:  A 69-year-old nice lady admitted to the hospital  through the office. The patient came to the office and she was found  to be in atrial fib with rapid ventricular response. She was admitted  to the hospital for that. The patient has no prior history of MI. For this H&P please refer to  the consultation note and hospitalization course. The patient  admitted to the telemetry bed. She was treated with the IV Cardizem. She was placed on the Eliquis. Cardiac enzymes were negative. TSH was within normal limits. The patient placed on p.o. amiodarone. She was then converted to a  sinus rhythm.  ______ bradycardia. IV medication discontinued. The  patient ambulated. The patient will be discharged home today. She  will follow up in the office. Medication adjusted and the patient to  seek medical attention if she has any change in clinical condition.         Jovan Cooney M.D.    D: 10/27/2017 4:44:29       T: 10/27/2017 4:46:10     [email or web address]  Job#: [id number]     Doc#: [id number]    CC:   Referring Services

## 2017-10-30 ENCOUNTER — TELEPHONE (OUTPATIENT)
Dept: FAMILY MEDICINE CLINIC | Age: 77
End: 2017-10-30

## 2017-10-30 NOTE — TELEPHONE ENCOUNTER
[facility] Transitions Initial Follow Up Call    Call within 2 business days of discharge: Yes    Patient: Sweetie Andre Patient : 1940   MRN: [id number]  Reason for Admission: There are no discharge diagnoses documented for the most recent discharge. Discharge Date: 10/27/17 RARS: Chiqui NUNEZ([id number])@     Spoke with: Patient    Facility: [unfilled]    Non-face-to-face services provided:  Scheduled appointment with PCP-11/3/17     Have the medications prescribed at discharge been filled? Yes  Does the patient understand what the medications are for? Yes  Has the patient experienced any new symptoms or have previous symptoms worsened? NO  Is the patient experiencing any pain? No If yes, is the pain well controlled? N/A  We want to be sure the patient has the best recovery possible. Does the patient understand all the discharge instructions? Yes  Did someone talk to the patient and/or family about the patient needs prior to being discharged? Yes  Did the patient's doctor communicate well? Yes  Did the patient's nurse communicate well? Yes  Was the patient satisfied with the services and care received at [address] [place] [address]?  Yes          Follow Up  Future Appointments  Date Time Provider Silvia Gustafson   11/3/2017 8:45 AM Bossman Paiz DO [address] [facility] [place] [address]   2017 11:00 AM Sherly Borges [facility]-C [facility] [address]   3/29/2018 10:15 AM Bossman Paiz DO [facility] [facility]       Laquita Benito LPN

## 2017-11-03 ENCOUNTER — OFFICE VISIT (OUTPATIENT)
Dept: FAMILY MEDICINE CLINIC | Age: 77
End: 2017-11-03
Payer: MEDICARE

## 2017-11-03 VITALS
HEART RATE: 56 BPM | SYSTOLIC BLOOD PRESSURE: 126 MMHG | HEIGHT: 66 IN | RESPIRATION RATE: 20 BRPM | BODY MASS INDEX: 38.67 KG/M2 | WEIGHT: 240.6 LBS | DIASTOLIC BLOOD PRESSURE: 68 MMHG

## 2017-11-03 DIAGNOSIS — E66.9 OBESITY (BMI 30-39.9): ICD-10-CM

## 2017-11-03 DIAGNOSIS — E78.49 OTHER HYPERLIPIDEMIA: ICD-10-CM

## 2017-11-03 DIAGNOSIS — G47.33 OBSTRUCTIVE SLEEP APNEA ON CPAP: ICD-10-CM

## 2017-11-03 DIAGNOSIS — I48.91 ATRIAL FIBRILLATION WITH RVR (HCC): Primary | ICD-10-CM

## 2017-11-03 DIAGNOSIS — I10 ESSENTIAL HYPERTENSION: ICD-10-CM

## 2017-11-03 DIAGNOSIS — Z99.89 OBSTRUCTIVE SLEEP APNEA ON CPAP: ICD-10-CM

## 2017-11-03 DIAGNOSIS — I48.91 ATRIAL FIBRILLATION, UNSPECIFIED TYPE (HCC): ICD-10-CM

## 2017-11-03 DIAGNOSIS — I48.0 PAROXYSMAL ATRIAL FIBRILLATION (HCC): ICD-10-CM

## 2017-11-03 DIAGNOSIS — E11.9 CONTROLLED TYPE 2 DIABETES MELLITUS WITHOUT COMPLICATION, UNSPECIFIED LONG TERM INSULIN USE STATUS: ICD-10-CM

## 2017-11-03 PROCEDURE — 99495 TRANSJ CARE MGMT MOD F2F 14D: CPT | Performed by: FAMILY MEDICINE

## 2017-11-03 ASSESSMENT — PATIENT HEALTH QUESTIONNAIRE - PHQ9
1. LITTLE INTEREST OR PLEASURE IN DOING THINGS: 0
2. FEELING DOWN, DEPRESSED OR HOPELESS: 1
SUM OF ALL RESPONSES TO PHQ9 QUESTIONS 1 & 2: 1
SUM OF ALL RESPONSES TO PHQ QUESTIONS 1-9: 1

## 2017-11-03 NOTE — PROGRESS NOTES
 nitroGLYCERIN (NITROSTAT) 0.4 MG SL tablet Place 1 tablet under the tongue every 5 minutes as needed for Chest pain up to max of 3 total doses. If no relief after 1 dose, call 911. 25 tablet 3    omeprazole (PRILOSEC) 40 MG delayed release capsule TAKE 1 CAPSULE BY MOUTH DAILY WITH BREAKFAST 90 capsule 0    alendronate (FOSAMAX) 70 MG tablet Take 1 tablet by mouth every 7 days Take with water on an empty stomach- wait 30 minutes before eating or taking other meds. 4 tablet 11    omeprazole (PRILOSEC) 40 MG delayed release capsule TAKE 1 CAPSULE BY MOUTH DAILY WITH BREAKFAST 90 capsule 0    metoprolol succinate (TOPROL XL) 25 MG extended release tablet Take 1 tablet by mouth daily 90 tablet 3    amLODIPine-benazepril (LOTREL) 10-20 MG per capsule Take 1 capsule by mouth daily 90 capsule 3    B Complex-C (SUPER B COMPLEX PO) Take by mouth      calcitonin (MIACALCIN) 200 UNIT/ACT nasal spray INSTILL 1 SPRAY INTO EACH NOSTRIL ONCE DAILY 3.7 mL 0    SITagliptin (JANUVIA) 100 MG tablet Take 1 tablet by mouth every morning (before breakfast) 90 tablet 3    pravastatin (PRAVACHOL) 80 MG tablet Take 1 tablet by mouth daily 90 tablet 3    aspirin 81 MG EC tablet Take 1 tablet by mouth daily 30 tablet 11    vitamin E 100 UNITS capsule Take 100 Units by mouth daily.  GLUCOSAMINE-CHONDROITIN-VIT D3 PO Take  by mouth.  acetaminophen (TYLENOL) 325 MG tablet Take 2 tablets by mouth every 4 hours as needed for Pain (For mild pain level 1-3 or for fever > 100.5). 120 tablet     Cyanocobalamin (VITAMIN B 12 PO) Take  by mouth Daily. Vitals:    11/03/17 0837   BP: 126/68   Site: Left Arm   Position: Sitting   Cuff Size: Large Adult   Pulse: 56   Resp: 20   Weight: 240 lb 9.6 oz (109.1 kg)   Height: 5' 5.5\" (1.664 m)     Body mass index is 39.43 kg/m².      Wt Readings from Last 3 Encounters:   11/03/17 240 lb 9.6 oz (109.1 kg)   10/27/17 242 lb 3.2 oz (109.9 kg)   09/29/17 242 lb (109.8 kg)     BP results reviewed: inpatient labs and EKG    Patient risk of morbidity and mortality: moderate    Medical Decision Making: moderate complexity     Plan:  -  Chronic medical problems stable  -  Continue current medications  -  Follow up with specialists as scheduled  -  A    Quality & Risk Score Accuracy - MEDICARE ADVANTAGE    Visit Dx:  Atrial fibrillation, unspecified type ([facility] [address] [facility]  Stable based upon review of recent symptoms and physical exam. Continue current treatment plan and follow up at least yearly.   Last edited 11/03/17 08:52 EDT by Crys Toribio DO

## 2017-11-03 NOTE — PROGRESS NOTES
Visit Information    Have you changed or started any medications since your last visit including any over-the-counter medicines, vitamins, or herbal medicines? no   Are you having any side effects from any of your medications? -  no  Have you stopped taking any of your medications? Is so, why? -  no    Have you seen any other physician or provider since your last visit? Yes - Records Obtained  Have you had any other diagnostic tests since your last visit? Yes - Records Obtained  Have you been seen in the emergency room and/or had an admission to a hospital since we last saw you? Yes - Records Obtained  Have you had your routine dental cleaning in the past 6 months? no    Have you activated your [employer] account? If not, what are your barriers?  Yes     Patient Care Team:  Coleman Argueta, DO as PCP - General  Coleman Argueta, DO as PCP - [facility] Attributed Provider    Medical History Review  Past Medical, Family, and Social History reviewed and does contribute to the patient presenting condition    Health Maintenance   Topic Date Due    DTaP/Tdap/Td vaccine (1 - Tdap) 09/08/1960    Lipid screen  10/26/2022    Zostavax vaccine  Addressed    DEXA (modify frequency per FRAX score)  Addressed    Flu vaccine  Completed    Pneumococcal low/med risk  Completed

## 2017-11-09 ENCOUNTER — OFFICE VISIT (OUTPATIENT)
Dept: PULMONOLOGY | Age: 77
End: 2017-11-09
Payer: MEDICARE

## 2017-11-09 VITALS
DIASTOLIC BLOOD PRESSURE: 64 MMHG | HEART RATE: 44 BPM | OXYGEN SATURATION: 96 % | WEIGHT: 238.4 LBS | BODY MASS INDEX: 38.31 KG/M2 | HEIGHT: 66 IN | SYSTOLIC BLOOD PRESSURE: 114 MMHG

## 2017-11-09 DIAGNOSIS — G47.33 OBSTRUCTIVE SLEEP APNEA ON CPAP: Primary | ICD-10-CM

## 2017-11-09 DIAGNOSIS — Z99.89 OBSTRUCTIVE SLEEP APNEA ON CPAP: Primary | ICD-10-CM

## 2017-11-09 PROCEDURE — G8417 CALC BMI ABV UP PARAM F/U: HCPCS | Performed by: PHYSICIAN ASSISTANT

## 2017-11-09 PROCEDURE — 1090F PRES/ABSN URINE INCON ASSESS: CPT | Performed by: PHYSICIAN ASSISTANT

## 2017-11-09 PROCEDURE — G8399 PT W/DXA RESULTS DOCUMENT: HCPCS | Performed by: PHYSICIAN ASSISTANT

## 2017-11-09 PROCEDURE — G8484 FLU IMMUNIZE NO ADMIN: HCPCS | Performed by: PHYSICIAN ASSISTANT

## 2017-11-09 PROCEDURE — 1111F DSCHRG MED/CURRENT MED MERGE: CPT | Performed by: PHYSICIAN ASSISTANT

## 2017-11-09 PROCEDURE — G8427 DOCREV CUR MEDS BY ELIG CLIN: HCPCS | Performed by: PHYSICIAN ASSISTANT

## 2017-11-09 PROCEDURE — 4040F PNEUMOC VAC/ADMIN/RCVD: CPT | Performed by: PHYSICIAN ASSISTANT

## 2017-11-09 PROCEDURE — 1123F ACP DISCUSS/DSCN MKR DOCD: CPT | Performed by: PHYSICIAN ASSISTANT

## 2017-11-09 PROCEDURE — 1036F TOBACCO NON-USER: CPT | Performed by: PHYSICIAN ASSISTANT

## 2017-11-09 PROCEDURE — 99213 OFFICE O/P EST LOW 20 MIN: CPT | Performed by: PHYSICIAN ASSISTANT

## 2017-11-09 NOTE — PROGRESS NOTES
[facility]      Adonis Harding                                         [id number]  11/9/2017   Chief Complaint   Patient presents with    Sleep Apnea     ANITA  1 yr sleep f/u [address] wanted her seen, she has A-Fib and is being treated, and she also needs new script for machine        Pt of Dr. Kuhn Cassette    PAP Download:   Original or initial  AHI: 39.8     Date of initial study: 10/11/10    [x] Compliant  100%   []  Noncompliant 0 %     PAP Type CPAP   Level  9   Avg Hrs/Day 9:30  AHI: 1.3   Recorded compliance dates , October 10, 2017  to November 8, 2017   Machine/Mfg: ResScan Interface: Nasal    Provider:  [x]-PHILIP  []Bob  []Edie  [][facility]         [][facility] []Other:   Neck Size: 15.5  Mallampati Mallampati 4  ESS:  8    Here is a scan of the most recent download:         Presentation:   Kim Nair presents for sleep medicine follow up for obstructive sleep apnea  Since the last visit, Kim Nair is doing reasonably well with their sleep machine. Other comments: She is doing well with PAP. She needs a replacement machine. Equipment issues: The pressure is  acceptable, the mask is acceptable and she  is  using the humidity. Sleep issues:  Do you feel better? Yes  More rested? Yes   Better concentration? yes    Progress History:   Since last visit any new medical issues? Yes Afib   New ER or hospitlal visits? Yes Afib  Any new or changes in medicines? Yes per cardiology  Any new sleep medicines?  No        Past Medical History:   Diagnosis Date    Cancer ([facility] [address]     skin ca    Diabetes mellitus ([facility] [address]     Hyperlipidemia     Hypertension     Osteoporosis 2017    Sleep apnea        Past Surgical History:   Procedure Laterality Date    APPENDECTOMY      CARDIAC CATHETERIZATION      two cath    COLONOSCOPY  01/08/2013    DILATION AND CURETTAGE OF UTERUS      SKIN CANCER EXCISION  03/2017    on face, left side       Social History   Substance Use tablets by mouth every 4 hours as needed for Pain (For mild pain level 1-3 or for fever > 100.5). 120 tablet     Cyanocobalamin (VITAMIN B 12 PO) Take  by mouth Daily. No current facility-administered medications for this visit. Family History   Problem Relation Age of Onset    Alzheimer's Disease Mother     Heart Disease Mother     Cancer Sister      Throat        Review of Systems -   General ROS: stable / unchanged  ENT ROS: negative for - nasal congestion, oral lesions or sore throat  Hematological and Lymphatic ROS: negative  Endocrine ROS: negative  Respiratory ROS: no cough, shortness of breath, or wheezing  Cardiovascular ROS: no chest pain   Gastrointestinal ROS: no abdominal pain, change in bowel habits, or black or bloody stools  Musculoskeletal ROS: negative  Neurological ROS: negative    Physical Exam:    BMI:  Body mass index is 39.07 kg/m². Wt Readings from Last 3 Encounters:   11/09/17 238 lb 6.4 oz (108.1 kg)   11/03/17 240 lb 9.6 oz (109.1 kg)   10/27/17 242 lb 3.2 oz (109.9 kg)     Vitals: /64 (Site: Left Arm, Position: Sitting)   Pulse (!) 44   Ht 5' 5.5\" (1.664 m)   Wt 238 lb 6.4 oz (108.1 kg)   SpO2 96%   BMI 39.07 kg/m²       General appearance: alert and oriented to person, place and time, well-developed and well-nourished, in no acute distress  Nose: Nares normal. Septum midline. Mucosa normal. No drainage or sinus tenderness. Oropharynx:  negative  Lungs: clear to auscultation bilaterally  Heart: regular rate and rhythm, S1, S2 normal, no murmur, click, rub or gallop  Extremities: extremities normal, atraumatic, no cyanosis or edema  Neurologic: Mental status: Alert, oriented, thought content appropriate      ASSESSMENT/DIAGNOSIS    1. Obstructive sleep apnea on CPAP              Plan   Do you need any equipment today? Yes she needs replacement machine. Their machine is obsolete and loud.      - She  was advised to continue current positive airway pressure therapy with above described pressure. - She  advised to keep good compliance with current recommended pressure to get optimal results and clinical improvement  - Recommend 7-9 hours of sleep with PAP  - She was advised to call [employer] company regarding supplies if needed. - She call my office for earlier appointment if needed for worsening of sleep symptoms.   - She was instructed on weight loss  - Bruna Salguero was educated about my impression and plan. Patient verbalizes understanding.   We will see Bruna Olea back in: 3 months with download    Cam Friday JEREMY MADRID  11/9/2017

## 2017-12-06 RX ORDER — CALCITONIN SALMON 200 [IU]/.09ML
SPRAY, METERED NASAL
Qty: 3.7 ML | Refills: 0 | Status: SHIPPED | OUTPATIENT
Start: 2017-12-06 | End: 2018-10-01 | Stop reason: SDUPTHER

## 2017-12-11 RX ORDER — PRAVASTATIN SODIUM 80 MG/1
80 TABLET ORAL DAILY
Qty: 90 TABLET | Refills: 0 | Status: SHIPPED | OUTPATIENT
Start: 2017-12-11 | End: 2018-03-08 | Stop reason: SDUPTHER

## 2017-12-20 ENCOUNTER — HOSPITAL ENCOUNTER (OUTPATIENT)
Dept: PULMONOLOGY | Age: 77
Discharge: HOME | End: 2017-12-20
Payer: MEDICARE

## 2017-12-20 ENCOUNTER — OFFICE VISIT (OUTPATIENT)
Dept: FAMILY MEDICINE CLINIC | Age: 77
End: 2017-12-20
Payer: MEDICARE

## 2017-12-20 VITALS
RESPIRATION RATE: 14 BRPM | BODY MASS INDEX: 39.53 KG/M2 | HEIGHT: 66 IN | HEART RATE: 60 BPM | OXYGEN SATURATION: 97 % | TEMPERATURE: 98.1 F | WEIGHT: 246 LBS | DIASTOLIC BLOOD PRESSURE: 66 MMHG | SYSTOLIC BLOOD PRESSURE: 134 MMHG

## 2017-12-20 DIAGNOSIS — J06.9 ACUTE URI: Primary | ICD-10-CM

## 2017-12-20 PROCEDURE — G8427 DOCREV CUR MEDS BY ELIG CLIN: HCPCS | Performed by: FAMILY MEDICINE

## 2017-12-20 PROCEDURE — 1123F ACP DISCUSS/DSCN MKR DOCD: CPT | Performed by: FAMILY MEDICINE

## 2017-12-20 PROCEDURE — 1036F TOBACCO NON-USER: CPT | Performed by: FAMILY MEDICINE

## 2017-12-20 PROCEDURE — G8417 CALC BMI ABV UP PARAM F/U: HCPCS | Performed by: FAMILY MEDICINE

## 2017-12-20 PROCEDURE — G8484 FLU IMMUNIZE NO ADMIN: HCPCS | Performed by: FAMILY MEDICINE

## 2017-12-20 PROCEDURE — G8399 PT W/DXA RESULTS DOCUMENT: HCPCS | Performed by: FAMILY MEDICINE

## 2017-12-20 PROCEDURE — 99213 OFFICE O/P EST LOW 20 MIN: CPT | Performed by: FAMILY MEDICINE

## 2017-12-20 PROCEDURE — 1090F PRES/ABSN URINE INCON ASSESS: CPT | Performed by: FAMILY MEDICINE

## 2017-12-20 PROCEDURE — 4040F PNEUMOC VAC/ADMIN/RCVD: CPT | Performed by: FAMILY MEDICINE

## 2017-12-20 RX ORDER — AMOXICILLIN AND CLAVULANATE POTASSIUM 875; 125 MG/1; MG/1
1 TABLET, FILM COATED ORAL 2 TIMES DAILY
Qty: 14 TABLET | Refills: 0 | Status: SHIPPED | OUTPATIENT
Start: 2017-12-20 | End: 2017-12-27

## 2017-12-20 ASSESSMENT — ENCOUNTER SYMPTOMS
GASTROINTESTINAL NEGATIVE: 1
SHORTNESS OF BREATH: 0
COUGH: 1
RHINORRHEA: 1
SINUS PRESSURE: 1
WHEEZING: 0

## 2017-12-20 NOTE — PROGRESS NOTES
Subjective:      Patient ID: Marc Sotelo is a 69 y.o. female. HPI:    Chief Complaint   Patient presents with    Sinusitis     lot of drainage, cough x 1 week, sore throat    Discuss Medications     Pt here for sinus congestion, pressure, cough for the last week. No fevers. No OTC meds. No wheezing. Blowing out yellow, green mucus. Patient Active Problem List   Diagnosis    Hyperlipidemia    HTN (hypertension)    Osteopenia    GERD (gastroesophageal reflux disease)    RAD (reactive airway disease)    OA (osteoarthritis)    Chest pain, atypical    Diabetes mellitus type II, controlled ([address]    Obesity (BMI 30-39. 9)    Chronic low back pain    Neuropathy (HCC)    Obstructive sleep apnea on CPAP    Controlled type 2 diabetes mellitus without complication (HCC)    Persistent atrial fibrillation (HCC)    Paroxysmal atrial fibrillation ([facility] [address]     Past Surgical History:   Procedure Laterality Date    APPENDECTOMY      CARDIAC CATHETERIZATION      two cath    COLONOSCOPY  01/08/2013    DILATION AND CURETTAGE OF UTERUS      SKIN CANCER EXCISION  03/2017    on face, left side     Prior to Admission medications    Medication Sig Start Date End Date Taking?  Authorizing Provider   pravastatin (PRAVACHOL) 80 MG tablet TAKE 1 TABLET BY MOUTH DAILY 12/11/17  Yes Sebastián Campbell, DO   calcitonin (MIACALCIN) 200 UNIT/ACT nasal spray INSTILL 1 SPRAY INTO EACH NOSTRIL ONCE DAILY 12/6/17  Yes Sebastián Campbell, DO   VENTOLIN  (16 Base) MCG/ACT inhaler Inhale 2 puffs into the lungs every 4 hours as needed for Wheezing 11/3/17  Yes Sebastián Campbell, DO   amiodarone (CORDARONE) 200 MG tablet Take 1 tablet by mouth daily 10/27/17  Yes Ro Gilliland MD   apixaban (ELIQUIS) 5 MG TABS tablet Take 1 tablet by mouth 2 times daily 10/27/17  Yes Ro Gilliland MD   nitroGLYCERIN (NITROSTAT) 0.4 MG SL tablet Place 1 tablet under the tongue every 5 minutes as needed for Chest pain up to max Normocephalic and atraumatic. Right Ear: A middle ear effusion is present. Left Ear: A middle ear effusion is present. Nose: Mucosal edema present. Right sinus exhibits no maxillary sinus tenderness and no frontal sinus tenderness. Left sinus exhibits no maxillary sinus tenderness and no frontal sinus tenderness. Mouth/Throat: Oropharynx is clear and moist and mucous membranes are normal.   Cardiovascular: Normal rate, regular rhythm and normal heart sounds. No murmur heard. Pulmonary/Chest: Effort normal and breath sounds normal. She has no decreased breath sounds. She has no wheezes. She has no rhonchi. Abdominal: Soft. Bowel sounds are normal.   Musculoskeletal: She exhibits no edema. Neurological: She is alert and oriented to person, place, and time. Skin: Skin is warm and dry. Psychiatric: She has a normal mood and affect. Her behavior is normal.   Nursing note and vitals reviewed. Assessment:      1.  Acute URI             Plan:      -  Viral nature discussed  -  Rest, fluids  -  Symptomatic care only at this time with Coricidin  -  Handwashing!!!  -  Rx Augmentin given, hold for worsening symptoms

## 2017-12-26 RX ORDER — OMEPRAZOLE 40 MG/1
CAPSULE, DELAYED RELEASE ORAL
Qty: 90 CAPSULE | Refills: 0 | Status: SHIPPED | OUTPATIENT
Start: 2017-12-26 | End: 2018-03-29 | Stop reason: SDUPTHER

## 2018-01-08 ENCOUNTER — HOSPITAL ENCOUNTER (OUTPATIENT)
Dept: PULMONOLOGY | Age: 78
Discharge: HOME | End: 2018-01-08
Payer: MEDICARE

## 2018-01-08 PROCEDURE — 94726 PLETHYSMOGRAPHY LUNG VOLUMES: CPT

## 2018-01-08 PROCEDURE — 94060 EVALUATION OF WHEEZING: CPT

## 2018-01-08 PROCEDURE — 94729 DIFFUSING CAPACITY: CPT

## 2018-01-24 RX ORDER — SITAGLIPTIN 100 MG/1
TABLET, FILM COATED ORAL
Qty: 90 TABLET | Refills: 0 | Status: SHIPPED | OUTPATIENT
Start: 2018-01-24 | End: 2018-04-26 | Stop reason: SDUPTHER

## 2018-02-07 ENCOUNTER — TELEPHONE (OUTPATIENT)
Dept: FAMILY MEDICINE CLINIC | Age: 78
End: 2018-02-07

## 2018-02-07 DIAGNOSIS — N64.4 BREAST PAIN, LEFT: Primary | ICD-10-CM

## 2018-02-07 DIAGNOSIS — N64.4 BREAST TENDERNESS IN FEMALE: Primary | ICD-10-CM

## 2018-02-08 ENCOUNTER — HOSPITAL ENCOUNTER (OUTPATIENT)
Dept: WOMENS IMAGING | Age: 78
Discharge: HOME | End: 2018-02-08
Payer: MEDICARE

## 2018-02-08 DIAGNOSIS — N64.4 BREAST PAIN, LEFT: ICD-10-CM

## 2018-02-08 DIAGNOSIS — N64.4 BREAST TENDERNESS IN FEMALE: ICD-10-CM

## 2018-02-08 PROCEDURE — G0279 TOMOSYNTHESIS, MAMMO: HCPCS

## 2018-02-08 PROCEDURE — 76642 ULTRASOUND BREAST LIMITED: CPT

## 2018-02-13 ENCOUNTER — OFFICE VISIT (OUTPATIENT)
Dept: PULMONOLOGY | Age: 78
End: 2018-02-13
Payer: MEDICARE

## 2018-02-13 VITALS
WEIGHT: 241.4 LBS | SYSTOLIC BLOOD PRESSURE: 124 MMHG | OXYGEN SATURATION: 97 % | DIASTOLIC BLOOD PRESSURE: 60 MMHG | HEART RATE: 51 BPM | HEIGHT: 66 IN | BODY MASS INDEX: 38.8 KG/M2

## 2018-02-13 DIAGNOSIS — Z99.89 OBSTRUCTIVE SLEEP APNEA ON CPAP: Primary | ICD-10-CM

## 2018-02-13 DIAGNOSIS — G47.33 OBSTRUCTIVE SLEEP APNEA ON CPAP: Primary | ICD-10-CM

## 2018-02-13 DIAGNOSIS — I48.0 PAROXYSMAL ATRIAL FIBRILLATION (HCC): ICD-10-CM

## 2018-02-13 PROCEDURE — G8484 FLU IMMUNIZE NO ADMIN: HCPCS | Performed by: PHYSICIAN ASSISTANT

## 2018-02-13 PROCEDURE — 1090F PRES/ABSN URINE INCON ASSESS: CPT | Performed by: PHYSICIAN ASSISTANT

## 2018-02-13 PROCEDURE — G8399 PT W/DXA RESULTS DOCUMENT: HCPCS | Performed by: PHYSICIAN ASSISTANT

## 2018-02-13 PROCEDURE — 1123F ACP DISCUSS/DSCN MKR DOCD: CPT | Performed by: PHYSICIAN ASSISTANT

## 2018-02-13 PROCEDURE — 4040F PNEUMOC VAC/ADMIN/RCVD: CPT | Performed by: PHYSICIAN ASSISTANT

## 2018-02-13 PROCEDURE — 99213 OFFICE O/P EST LOW 20 MIN: CPT | Performed by: PHYSICIAN ASSISTANT

## 2018-02-13 PROCEDURE — G8417 CALC BMI ABV UP PARAM F/U: HCPCS | Performed by: PHYSICIAN ASSISTANT

## 2018-02-13 PROCEDURE — 1036F TOBACCO NON-USER: CPT | Performed by: PHYSICIAN ASSISTANT

## 2018-02-13 PROCEDURE — G8427 DOCREV CUR MEDS BY ELIG CLIN: HCPCS | Performed by: PHYSICIAN ASSISTANT

## 2018-02-13 ASSESSMENT — ENCOUNTER SYMPTOMS
COUGH: 0
SINUS PAIN: 0
EYES NEGATIVE: 1
SHORTNESS OF BREATH: 0
GASTROINTESTINAL NEGATIVE: 1
SPUTUM PRODUCTION: 0
WHEEZING: 0
SORE THROAT: 0
HEARTBURN: 0
ORTHOPNEA: 0
NAUSEA: 0
RESPIRATORY NEGATIVE: 1

## 2018-02-27 RX ORDER — CALCITONIN SALMON 200 [IU]/.09ML
SPRAY, METERED NASAL
Qty: 3.7 ML | Refills: 0 | Status: SHIPPED | OUTPATIENT
Start: 2018-02-27 | End: 2018-03-29 | Stop reason: SDUPTHER

## 2018-03-08 RX ORDER — PRAVASTATIN SODIUM 80 MG/1
80 TABLET ORAL DAILY
Qty: 90 TABLET | Refills: 0 | Status: SHIPPED | OUTPATIENT
Start: 2018-03-08 | End: 2018-04-26 | Stop reason: SDUPTHER

## 2018-03-13 ENCOUNTER — HOSPITAL ENCOUNTER (OUTPATIENT)
Age: 78
Discharge: HOME | End: 2018-03-13
Payer: MEDICARE

## 2018-03-13 LAB
ALBUMIN SERPL-MCNC: 4.5 G/DL (ref 3.5–5.1)
ALP BLD-CCNC: 43 U/L (ref 38–126)
ALT SERPL-CCNC: 29 U/L (ref 11–66)
ANION GAP SERPL CALCULATED.3IONS-SCNC: 14 MEQ/L (ref 8–16)
AST SERPL-CCNC: 29 U/L (ref 5–40)
BILIRUB SERPL-MCNC: 0.6 MG/DL (ref 0.3–1.2)
BILIRUBIN DIRECT: < 0.2 MG/DL (ref 0–0.3)
BUN BLDV-MCNC: 13 MG/DL (ref 7–22)
CALCIUM SERPL-MCNC: 10.5 MG/DL (ref 8.5–10.5)
CHLORIDE BLD-SCNC: 101 MEQ/L (ref 98–111)
CHOLESTEROL, TOTAL: 149 MG/DL (ref 100–199)
CO2: 27 MEQ/L (ref 23–33)
CREAT SERPL-MCNC: 0.8 MG/DL (ref 0.4–1.2)
GFR SERPL CREATININE-BSD FRML MDRD: 70 ML/MIN/1.73M2
GLUCOSE BLD-MCNC: 125 MG/DL (ref 70–108)
HCT VFR BLD CALC: 38.8 % (ref 37–47)
HDLC SERPL-MCNC: 89 MG/DL
HEMOGLOBIN: 13.2 GM/DL (ref 12–16)
LDL CHOLESTEROL CALCULATED: 49 MG/DL
MCH RBC QN AUTO: 29.9 PG (ref 27–31)
MCHC RBC AUTO-ENTMCNC: 34 GM/DL (ref 33–37)
MCV RBC AUTO: 88 FL (ref 81–99)
PDW BLD-RTO: 14.9 % (ref 11.5–14.5)
PLATELET # BLD: 209 THOU/MM3 (ref 130–400)
PMV BLD AUTO: 9.7 FL (ref 7.4–10.4)
POTASSIUM SERPL-SCNC: 4.7 MEQ/L (ref 3.5–5.2)
RBC # BLD: 4.41 MILL/MM3 (ref 4.2–5.4)
SODIUM BLD-SCNC: 142 MEQ/L (ref 135–145)
TOTAL PROTEIN: 7.7 G/DL (ref 6.1–8)
TRIGL SERPL-MCNC: 57 MG/DL (ref 0–199)
WBC # BLD: 5.8 THOU/MM3 (ref 4.8–10.8)

## 2018-03-13 PROCEDURE — 85027 COMPLETE CBC AUTOMATED: CPT

## 2018-03-13 PROCEDURE — 80053 COMPREHEN METABOLIC PANEL: CPT

## 2018-03-13 PROCEDURE — 36415 COLL VENOUS BLD VENIPUNCTURE: CPT

## 2018-03-13 PROCEDURE — 80061 LIPID PANEL: CPT

## 2018-03-13 PROCEDURE — 82248 BILIRUBIN DIRECT: CPT

## 2018-03-20 ENCOUNTER — HOSPITAL ENCOUNTER (OUTPATIENT)
Age: 78
Discharge: HOME | End: 2018-03-20
Payer: MEDICARE

## 2018-03-20 DIAGNOSIS — K21.9 GASTROESOPHAGEAL REFLUX DISEASE, ESOPHAGITIS PRESENCE NOT SPECIFIED: ICD-10-CM

## 2018-03-20 DIAGNOSIS — E11.9 CONTROLLED TYPE 2 DIABETES MELLITUS WITHOUT COMPLICATION, WITHOUT LONG-TERM CURRENT USE OF INSULIN (HCC): ICD-10-CM

## 2018-03-20 LAB
AVERAGE GLUCOSE: 114 MG/DL (ref 70–126)
CREATININE, URINE: 65.5 MG/DL
HBA1C MFR BLD: 5.8 % (ref 4.4–6.4)
MAGNESIUM: 1.9 MG/DL (ref 1.6–2.4)
MICROALBUMIN UR-MCNC: < 1.2 MG/DL
MICROALBUMIN/CREAT UR-RTO: 18 MG/G (ref 0–30)

## 2018-03-20 PROCEDURE — 83735 ASSAY OF MAGNESIUM: CPT

## 2018-03-20 PROCEDURE — 83036 HEMOGLOBIN GLYCOSYLATED A1C: CPT

## 2018-03-20 PROCEDURE — 36415 COLL VENOUS BLD VENIPUNCTURE: CPT

## 2018-03-20 PROCEDURE — 82043 UR ALBUMIN QUANTITATIVE: CPT

## 2018-03-29 ENCOUNTER — OFFICE VISIT (OUTPATIENT)
Dept: FAMILY MEDICINE CLINIC | Age: 78
End: 2018-03-29
Payer: MEDICARE

## 2018-03-29 VITALS
RESPIRATION RATE: 12 BRPM | DIASTOLIC BLOOD PRESSURE: 70 MMHG | WEIGHT: 242.6 LBS | HEART RATE: 46 BPM | BODY MASS INDEX: 39.76 KG/M2 | SYSTOLIC BLOOD PRESSURE: 136 MMHG | TEMPERATURE: 97.8 F

## 2018-03-29 DIAGNOSIS — E13.42 DIABETIC POLYNEUROPATHY ASSOCIATED WITH OTHER SPECIFIED DIABETES MELLITUS (HCC): ICD-10-CM

## 2018-03-29 DIAGNOSIS — I48.0 PAROXYSMAL ATRIAL FIBRILLATION (HCC): ICD-10-CM

## 2018-03-29 DIAGNOSIS — I10 ESSENTIAL HYPERTENSION: ICD-10-CM

## 2018-03-29 DIAGNOSIS — G47.33 OBSTRUCTIVE SLEEP APNEA ON CPAP: ICD-10-CM

## 2018-03-29 DIAGNOSIS — E78.49 OTHER HYPERLIPIDEMIA: ICD-10-CM

## 2018-03-29 DIAGNOSIS — M85.80 OSTEOPENIA, UNSPECIFIED LOCATION: ICD-10-CM

## 2018-03-29 DIAGNOSIS — Z99.89 OBSTRUCTIVE SLEEP APNEA ON CPAP: ICD-10-CM

## 2018-03-29 DIAGNOSIS — E11.9 CONTROLLED TYPE 2 DIABETES MELLITUS WITHOUT COMPLICATION, WITHOUT LONG-TERM CURRENT USE OF INSULIN (HCC): Primary | ICD-10-CM

## 2018-03-29 DIAGNOSIS — E66.9 OBESITY (BMI 30-39.9): ICD-10-CM

## 2018-03-29 DIAGNOSIS — K21.9 GASTROESOPHAGEAL REFLUX DISEASE, ESOPHAGITIS PRESENCE NOT SPECIFIED: ICD-10-CM

## 2018-03-29 PROCEDURE — 1090F PRES/ABSN URINE INCON ASSESS: CPT | Performed by: FAMILY MEDICINE

## 2018-03-29 PROCEDURE — G8427 DOCREV CUR MEDS BY ELIG CLIN: HCPCS | Performed by: FAMILY MEDICINE

## 2018-03-29 PROCEDURE — 1123F ACP DISCUSS/DSCN MKR DOCD: CPT | Performed by: FAMILY MEDICINE

## 2018-03-29 PROCEDURE — G8482 FLU IMMUNIZE ORDER/ADMIN: HCPCS | Performed by: FAMILY MEDICINE

## 2018-03-29 PROCEDURE — 99214 OFFICE O/P EST MOD 30 MIN: CPT | Performed by: FAMILY MEDICINE

## 2018-03-29 PROCEDURE — G8399 PT W/DXA RESULTS DOCUMENT: HCPCS | Performed by: FAMILY MEDICINE

## 2018-03-29 PROCEDURE — 1036F TOBACCO NON-USER: CPT | Performed by: FAMILY MEDICINE

## 2018-03-29 PROCEDURE — G8417 CALC BMI ABV UP PARAM F/U: HCPCS | Performed by: FAMILY MEDICINE

## 2018-03-29 PROCEDURE — 4040F PNEUMOC VAC/ADMIN/RCVD: CPT | Performed by: FAMILY MEDICINE

## 2018-03-29 ASSESSMENT — ENCOUNTER SYMPTOMS
GASTROINTESTINAL NEGATIVE: 1
RESPIRATORY NEGATIVE: 1

## 2018-03-29 NOTE — PROGRESS NOTES
1053    K 4.7 03/13/2018 1053     03/13/2018 1053    CO2 27 03/13/2018 1053    BUN 13 03/13/2018 1053    CREATININE 0.8 03/13/2018 1053        Component Value Date/Time    CALCIUM 10.5 03/13/2018 1053    ALKPHOS 43 03/13/2018 1053    AST 29 03/13/2018 1053    ALT 29 03/13/2018 1053    BILITOT 0.6 03/13/2018 1053            Lab Results   Component Value Date    TSH 1.160 10/25/2017       Lab Results   Component Value Date    WBC 5.8 03/13/2018    HGB 13.2 03/13/2018    HCT 38.8 03/13/2018    MCV 88.0 03/13/2018     03/13/2018         Health Maintenance   Topic Date Due    DTaP/Tdap/Td vaccine (1 - Tdap) 09/08/1960    Shingles Vaccine (1 of 2 - 2 Dose Series) 09/08/1991    TSH testing  10/25/2018    Potassium monitoring  03/13/2019    Creatinine monitoring  03/13/2019    Lipid screen  03/13/2023    DEXA (modify frequency per FRAX score)  Addressed    Flu vaccine  Completed    Pneumococcal low/med risk  Completed       Immunization History   Administered Date(s) Administered    Influenza Vaccine, unspecified formulation 10/06/2014    Influenza Virus Vaccine 10/05/2011, 12/31/2012    Influenza Whole 09/10/2015    Influenza, High Dose 10/22/2016, 09/29/2017    Pneumococcal 13-valent Conjugate ([id number]) 03/29/2016    Pneumococcal Polysaccharide ([id number]) 10/10/2017           Review of Systems   Constitutional: Negative. HENT: Negative. Respiratory: Negative. Cardiovascular: Negative. Gastrointestinal: Negative. Musculoskeletal: Negative. All other systems reviewed and are negative. Objective:   Physical Exam   Constitutional: She is oriented to person, place, and time. She appears well-developed and well-nourished. HENT:   Head: Normocephalic and atraumatic. Right Ear: Tympanic membrane normal.   Left Ear: Tympanic membrane normal.   Mouth/Throat: Oropharynx is clear and moist and mucous membranes are normal.   Neck: Carotid bruit is not present. Cardiovascular: Normal rate, regular rhythm and normal heart sounds. No murmur heard. Pulmonary/Chest: Effort normal and breath sounds normal.   Abdominal: Soft. Bowel sounds are normal.   Musculoskeletal: She exhibits no edema. Neurological: She is alert and oriented to person, place, and time. Skin: Skin is warm and dry. Psychiatric: She has a normal mood and affect. Her behavior is normal.   Nursing note and vitals reviewed. Assessment:      1. Controlled type 2 diabetes mellitus without complication, without long-term current use of insulin (HCC)  Hemoglobin A1C   2. Gastroesophageal reflux disease, esophagitis presence not specified     3. Osteopenia, unspecified location     4. Essential hypertension     5. Other hyperlipidemia     6. Obesity (BMI 30-39.9)     7. Obstructive sleep apnea on CPAP     8. Paroxysmal atrial fibrillation (HCC)             Plan:      -  Chronic medical problems stable  -  Continue current medications  -  Follow up with specialists as scheduled, scheduled for heart cath next week  -  Labs reviewed, look fine  -  Recheck A1C 6 mos  -  RTO 6 mos    Alena received counseling on the following healthy behaviors: nutrition and exercise    Patient given educational materials on Diabetes    I have instructed Micheal Alicea to complete a self tracking handout on Blood Sugars  and instructed them to bring it with them to her next appointment. Discussed use, benefit, and side effects of prescribed medications. Barriers to medication compliance addressed. All patient questions answered. Pt voiced understanding. Quality & Risk Score Accuracy - MEDICARE ADVANTAGE    Visit Dx:  Diabetic polyneuropathy associated with other specified diabetes mellitus ([facility] [address]  Stable Diabetes type 2 and complications based on lab values and symptoms. Continue present treatment plan, control of risk factors, and recheck at least yearly.   Last edited 03/29/18 10:45 EDT by Maisha Arndt

## 2018-04-04 ENCOUNTER — NURSE TRIAGE (OUTPATIENT)
Dept: ADMINISTRATIVE | Age: 78
End: 2018-04-04

## 2018-04-05 ENCOUNTER — HOSPITAL ENCOUNTER (OUTPATIENT)
Dept: INPATIENT UNIT | Age: 78
Discharge: HOME | End: 2018-04-06
Attending: INTERNAL MEDICINE | Admitting: INTERNAL MEDICINE
Payer: MEDICARE

## 2018-04-05 VITALS
BODY MASS INDEX: 39.05 KG/M2 | WEIGHT: 243 LBS | RESPIRATION RATE: 26 BRPM | DIASTOLIC BLOOD PRESSURE: 50 MMHG | OXYGEN SATURATION: 96 % | HEART RATE: 59 BPM | SYSTOLIC BLOOD PRESSURE: 138 MMHG | TEMPERATURE: 97.3 F | HEIGHT: 66 IN

## 2018-04-05 PROBLEM — R94.39 ABNORMAL NUCLEAR STRESS TEST: Status: ACTIVE | Noted: 2018-04-05

## 2018-04-05 LAB
ABO: NORMAL
ALBUMIN SERPL-MCNC: 4.6 G/DL (ref 3.5–5.1)
ALP BLD-CCNC: 43 U/L (ref 38–126)
ALT SERPL-CCNC: 30 U/L (ref 11–66)
ANION GAP SERPL CALCULATED.3IONS-SCNC: 13 MEQ/L (ref 8–16)
ANTIBODY SCREEN: NORMAL
AST SERPL-CCNC: 27 U/L (ref 5–40)
BILIRUB SERPL-MCNC: 0.5 MG/DL (ref 0.3–1.2)
BUN BLDV-MCNC: 14 MG/DL (ref 7–22)
CALCIUM SERPL-MCNC: 10.2 MG/DL (ref 8.5–10.5)
CHLORIDE BLD-SCNC: 103 MEQ/L (ref 98–111)
CHOLESTEROL, TOTAL: 172 MG/DL (ref 100–199)
CO2: 27 MEQ/L (ref 23–33)
CREAT SERPL-MCNC: 0.7 MG/DL (ref 0.4–1.2)
EKG ATRIAL RATE: 61 BPM
EKG P AXIS: 119 DEGREES
EKG P-R INTERVAL: 180 MS
EKG Q-T INTERVAL: 478 MS
EKG QRS DURATION: 90 MS
EKG QTC CALCULATION (BAZETT): 481 MS
EKG R AXIS: 12 DEGREES
EKG T AXIS: 23 DEGREES
EKG VENTRICULAR RATE: 61 BPM
GFR SERPL CREATININE-BSD FRML MDRD: 81 ML/MIN/1.73M2
GLUCOSE BLD-MCNC: 127 MG/DL (ref 70–108)
HCT VFR BLD CALC: 37.9 % (ref 37–47)
HDLC SERPL-MCNC: 94 MG/DL
HEMOGLOBIN: 12.7 GM/DL (ref 12–16)
INR BLD: 1.08 (ref 0.85–1.13)
LDL CHOLESTEROL CALCULATED: 66 MG/DL
MCH RBC QN AUTO: 29.5 PG (ref 27–31)
MCHC RBC AUTO-ENTMCNC: 33.5 GM/DL (ref 33–37)
MCV RBC AUTO: 88.3 FL (ref 81–99)
PDW BLD-RTO: 15.3 % (ref 11.5–14.5)
PLATELET # BLD: 193 THOU/MM3 (ref 130–400)
PMV BLD AUTO: 9.7 FL (ref 7.4–10.4)
POTASSIUM REFLEX MAGNESIUM: 4.7 MEQ/L (ref 3.5–5.2)
RBC # BLD: 4.29 MILL/MM3 (ref 4.2–5.4)
RH FACTOR: NORMAL
SODIUM BLD-SCNC: 143 MEQ/L (ref 135–145)
TOTAL PROTEIN: 7.9 G/DL (ref 6.1–8)
TRIGL SERPL-MCNC: 58 MG/DL (ref 0–199)
WBC # BLD: 7.2 THOU/MM3 (ref 4.8–10.8)

## 2018-04-05 PROCEDURE — 93458 L HRT ARTERY/VENTRICLE ANGIO: CPT | Performed by: INTERNAL MEDICINE

## 2018-04-05 PROCEDURE — 80053 COMPREHEN METABOLIC PANEL: CPT

## 2018-04-05 PROCEDURE — 80061 LIPID PANEL: CPT

## 2018-04-05 PROCEDURE — 2780000010 HC IMPLANT OTHER

## 2018-04-05 PROCEDURE — 86901 BLOOD TYPING SEROLOGIC RH(D): CPT

## 2018-04-05 PROCEDURE — C1760 CLOSURE DEV, VASC: HCPCS

## 2018-04-05 PROCEDURE — 93010 ELECTROCARDIOGRAM REPORT: CPT | Performed by: INTERNAL MEDICINE

## 2018-04-05 PROCEDURE — 6360000002 HC RX W HCPCS

## 2018-04-05 PROCEDURE — C1894 INTRO/SHEATH, NON-LASER: HCPCS

## 2018-04-05 PROCEDURE — 86850 RBC ANTIBODY SCREEN: CPT

## 2018-04-05 PROCEDURE — 86900 BLOOD TYPING SEROLOGIC ABO: CPT

## 2018-04-05 PROCEDURE — 93005 ELECTROCARDIOGRAM TRACING: CPT | Performed by: INTERNAL MEDICINE

## 2018-04-05 PROCEDURE — 85027 COMPLETE CBC AUTOMATED: CPT

## 2018-04-05 PROCEDURE — 2500000003 HC RX 250 WO HCPCS

## 2018-04-05 PROCEDURE — 2580000003 HC RX 258: Performed by: INTERNAL MEDICINE

## 2018-04-05 PROCEDURE — 85610 PROTHROMBIN TIME: CPT

## 2018-04-05 PROCEDURE — 36415 COLL VENOUS BLD VENIPUNCTURE: CPT

## 2018-04-05 RX ORDER — SODIUM CHLORIDE 9 MG/ML
100 INJECTION, SOLUTION INTRAVENOUS CONTINUOUS
Status: DISCONTINUED | OUTPATIENT
Start: 2018-04-05 | End: 2018-04-06 | Stop reason: HOSPADM

## 2018-04-05 RX ORDER — ALPRAZOLAM 0.5 MG/1
0.5 TABLET ORAL
Status: ACTIVE | OUTPATIENT
Start: 2018-04-05 | End: 2018-04-05

## 2018-04-05 RX ORDER — SODIUM CHLORIDE 9 MG/ML
INJECTION, SOLUTION INTRAVENOUS CONTINUOUS
Status: DISCONTINUED | OUTPATIENT
Start: 2018-04-05 | End: 2018-04-05 | Stop reason: ALTCHOICE

## 2018-04-05 RX ORDER — NITROGLYCERIN 0.4 MG/1
0.4 TABLET SUBLINGUAL EVERY 5 MIN PRN
Status: DISCONTINUED | OUTPATIENT
Start: 2018-04-05 | End: 2018-04-06 | Stop reason: HOSPADM

## 2018-04-05 RX ORDER — SODIUM CHLORIDE 0.9 % (FLUSH) 0.9 %
10 SYRINGE (ML) INJECTION EVERY 12 HOURS SCHEDULED
Status: DISCONTINUED | OUTPATIENT
Start: 2018-04-05 | End: 2018-04-06 | Stop reason: HOSPADM

## 2018-04-05 RX ORDER — DIPHENHYDRAMINE HCL 25 MG
25 TABLET ORAL
Status: ACTIVE | OUTPATIENT
Start: 2018-04-05 | End: 2018-04-05

## 2018-04-05 RX ORDER — ACETAMINOPHEN 325 MG/1
650 TABLET ORAL EVERY 4 HOURS PRN
Status: DISCONTINUED | OUTPATIENT
Start: 2018-04-05 | End: 2018-04-06 | Stop reason: HOSPADM

## 2018-04-05 RX ORDER — SODIUM CHLORIDE 0.9 % (FLUSH) 0.9 %
10 SYRINGE (ML) INJECTION PRN
Status: DISCONTINUED | OUTPATIENT
Start: 2018-04-05 | End: 2018-04-06 | Stop reason: HOSPADM

## 2018-04-05 RX ORDER — ONDANSETRON 2 MG/ML
4 INJECTION INTRAMUSCULAR; INTRAVENOUS EVERY 6 HOURS PRN
Status: DISCONTINUED | OUTPATIENT
Start: 2018-04-05 | End: 2018-04-06 | Stop reason: HOSPADM

## 2018-04-05 RX ORDER — ASPIRIN 325 MG
325 TABLET ORAL ONCE
Status: DISCONTINUED | OUTPATIENT
Start: 2018-04-05 | End: 2018-04-06 | Stop reason: HOSPADM

## 2018-04-05 RX ORDER — SODIUM CHLORIDE 0.9 % (FLUSH) 0.9 %
10 SYRINGE (ML) INJECTION PRN
Status: DISCONTINUED | OUTPATIENT
Start: 2018-04-05 | End: 2018-04-05 | Stop reason: SDUPTHER

## 2018-04-05 RX ORDER — SODIUM CHLORIDE 0.9 % (FLUSH) 0.9 %
10 SYRINGE (ML) INJECTION EVERY 12 HOURS SCHEDULED
Status: DISCONTINUED | OUTPATIENT
Start: 2018-04-05 | End: 2018-04-05 | Stop reason: SDUPTHER

## 2018-04-05 RX ORDER — DIPHENHYDRAMINE HCL 25 MG
50 TABLET ORAL ONCE
Status: DISCONTINUED | OUTPATIENT
Start: 2018-04-05 | End: 2018-04-06 | Stop reason: HOSPADM

## 2018-04-05 RX ORDER — ATROPINE SULFATE 0.4 MG/ML
0.5 AMPUL (ML) INJECTION
Status: ACTIVE | OUTPATIENT
Start: 2018-04-05 | End: 2018-04-05

## 2018-04-05 RX ADMIN — SODIUM CHLORIDE: 9 INJECTION, SOLUTION INTRAVENOUS at 12:33

## 2018-04-06 ENCOUNTER — TELEPHONE (OUTPATIENT)
Dept: FAMILY MEDICINE CLINIC | Age: 78
End: 2018-04-06

## 2018-04-26 DIAGNOSIS — I10 ESSENTIAL HYPERTENSION: ICD-10-CM

## 2018-04-26 RX ORDER — PRAVASTATIN SODIUM 80 MG/1
80 TABLET ORAL DAILY
Qty: 90 TABLET | Refills: 0 | Status: SHIPPED | OUTPATIENT
Start: 2018-04-26 | End: 2018-10-01 | Stop reason: SDUPTHER

## 2018-04-26 RX ORDER — METOPROLOL SUCCINATE 25 MG/1
25 TABLET, EXTENDED RELEASE ORAL DAILY
Qty: 90 TABLET | Refills: 3 | Status: SHIPPED | OUTPATIENT
Start: 2018-04-26 | End: 2018-10-01 | Stop reason: SDUPTHER

## 2018-04-30 ENCOUNTER — TELEPHONE (OUTPATIENT)
Dept: FAMILY MEDICINE CLINIC | Age: 78
End: 2018-04-30

## 2018-06-08 RX ORDER — PRAVASTATIN SODIUM 80 MG/1
80 TABLET ORAL DAILY
Qty: 90 TABLET | Refills: 0 | Status: SHIPPED | OUTPATIENT
Start: 2018-06-08 | End: 2018-06-11

## 2018-06-11 ENCOUNTER — HOSPITAL ENCOUNTER (EMERGENCY)
Age: 78
Discharge: HOME | End: 2018-06-11
Payer: MEDICARE

## 2018-06-11 ENCOUNTER — APPOINTMENT (OUTPATIENT)
Dept: GENERAL RADIOLOGY | Age: 78
End: 2018-06-11
Payer: MEDICARE

## 2018-06-11 VITALS
OXYGEN SATURATION: 95 % | DIASTOLIC BLOOD PRESSURE: 72 MMHG | RESPIRATION RATE: 18 BRPM | TEMPERATURE: 97.6 F | HEIGHT: 66 IN | WEIGHT: 242 LBS | BODY MASS INDEX: 38.89 KG/M2 | SYSTOLIC BLOOD PRESSURE: 148 MMHG | HEART RATE: 68 BPM

## 2018-06-11 DIAGNOSIS — J06.9 VIRAL URI WITH COUGH: Primary | ICD-10-CM

## 2018-06-11 LAB
FLU A ANTIGEN: NEGATIVE
FLU B ANTIGEN: NEGATIVE

## 2018-06-11 PROCEDURE — 87804 INFLUENZA ASSAY W/OPTIC: CPT

## 2018-06-11 PROCEDURE — 99283 EMERGENCY DEPT VISIT LOW MDM: CPT

## 2018-06-11 PROCEDURE — 71046 X-RAY EXAM CHEST 2 VIEWS: CPT

## 2018-06-11 RX ORDER — PREDNISONE 10 MG/1
20 TABLET ORAL 2 TIMES DAILY
Qty: 20 TABLET | Refills: 0 | Status: SHIPPED | OUTPATIENT
Start: 2018-06-11 | End: 2018-06-16

## 2018-06-11 ASSESSMENT — ENCOUNTER SYMPTOMS
EYE DISCHARGE: 0
BACK PAIN: 0
EYE PAIN: 0
DIARRHEA: 0
RHINORRHEA: 1
VOMITING: 0
COUGH: 1
NAUSEA: 0
SHORTNESS OF BREATH: 1
WHEEZING: 0
SORE THROAT: 1
ABDOMINAL PAIN: 0

## 2018-06-12 ENCOUNTER — TELEPHONE (OUTPATIENT)
Dept: FAMILY MEDICINE CLINIC | Age: 78
End: 2018-06-12

## 2018-06-13 ENCOUNTER — OFFICE VISIT (OUTPATIENT)
Dept: FAMILY MEDICINE CLINIC | Age: 78
End: 2018-06-13
Payer: MEDICARE

## 2018-06-13 VITALS
OXYGEN SATURATION: 98 % | TEMPERATURE: 97.7 F | SYSTOLIC BLOOD PRESSURE: 126 MMHG | HEART RATE: 62 BPM | BODY MASS INDEX: 41.15 KG/M2 | WEIGHT: 247 LBS | DIASTOLIC BLOOD PRESSURE: 72 MMHG | HEIGHT: 65 IN | RESPIRATION RATE: 13 BRPM

## 2018-06-13 DIAGNOSIS — J40 TRACHEOBRONCHITIS: Primary | ICD-10-CM

## 2018-06-13 PROCEDURE — 99213 OFFICE O/P EST LOW 20 MIN: CPT | Performed by: FAMILY MEDICINE

## 2018-06-13 PROCEDURE — 1090F PRES/ABSN URINE INCON ASSESS: CPT | Performed by: FAMILY MEDICINE

## 2018-06-13 PROCEDURE — 1036F TOBACCO NON-USER: CPT | Performed by: FAMILY MEDICINE

## 2018-06-13 PROCEDURE — G8417 CALC BMI ABV UP PARAM F/U: HCPCS | Performed by: FAMILY MEDICINE

## 2018-06-13 PROCEDURE — 4040F PNEUMOC VAC/ADMIN/RCVD: CPT | Performed by: FAMILY MEDICINE

## 2018-06-13 PROCEDURE — G8399 PT W/DXA RESULTS DOCUMENT: HCPCS | Performed by: FAMILY MEDICINE

## 2018-06-13 PROCEDURE — 1123F ACP DISCUSS/DSCN MKR DOCD: CPT | Performed by: FAMILY MEDICINE

## 2018-06-13 PROCEDURE — G8427 DOCREV CUR MEDS BY ELIG CLIN: HCPCS | Performed by: FAMILY MEDICINE

## 2018-06-13 PROCEDURE — 3288F FALL RISK ASSESSMENT DOCD: CPT | Performed by: FAMILY MEDICINE

## 2018-06-13 RX ORDER — ALBUTEROL SULFATE 90 UG/1
2 AEROSOL, METERED RESPIRATORY (INHALATION) EVERY 4 HOURS PRN
Qty: 1 INHALER | Refills: 0 | Status: SHIPPED | OUTPATIENT
Start: 2018-06-13 | End: 2019-11-01 | Stop reason: SDUPTHER

## 2018-06-13 ASSESSMENT — ENCOUNTER SYMPTOMS
RHINORRHEA: 1
COUGH: 1
GASTROINTESTINAL NEGATIVE: 1
WHEEZING: 1
SHORTNESS OF BREATH: 1

## 2018-06-14 ENCOUNTER — CARE COORDINATION (OUTPATIENT)
Dept: CASE MANAGEMENT | Age: 78
End: 2018-06-14

## 2018-06-22 ENCOUNTER — TELEPHONE (OUTPATIENT)
Dept: FAMILY MEDICINE CLINIC | Age: 78
End: 2018-06-22

## 2018-07-18 ENCOUNTER — TELEPHONE (OUTPATIENT)
Dept: FAMILY MEDICINE CLINIC | Age: 78
End: 2018-07-18

## 2018-07-24 RX ORDER — SITAGLIPTIN 100 MG/1
TABLET, FILM COATED ORAL
Qty: 90 TABLET | Refills: 0 | Status: SHIPPED | OUTPATIENT
Start: 2018-07-24 | End: 2018-10-01 | Stop reason: SDUPTHER

## 2018-08-01 ENCOUNTER — TELEPHONE (OUTPATIENT)
Dept: FAMILY MEDICINE CLINIC | Age: 78
End: 2018-08-01

## 2018-09-04 ENCOUNTER — TELEPHONE (OUTPATIENT)
Dept: FAMILY MEDICINE CLINIC | Age: 78
End: 2018-09-04

## 2018-09-04 NOTE — TELEPHONE ENCOUNTER
Patient left vm message that her hair is thinning and it was recommended she take Biotin.   Please advise

## 2018-09-25 ENCOUNTER — TELEPHONE (OUTPATIENT)
Dept: FAMILY MEDICINE CLINIC | Age: 78
End: 2018-09-25

## 2018-09-25 NOTE — TELEPHONE ENCOUNTER
Spoke with pt regarding her upcoming appt on 10/1/18 at 10:15am.  Confirmed appt and reminded pt to get labs done.

## 2018-09-27 ENCOUNTER — HOSPITAL ENCOUNTER (OUTPATIENT)
Age: 78
Discharge: HOME | End: 2018-09-27
Payer: MEDICARE

## 2018-09-27 DIAGNOSIS — E11.9 CONTROLLED TYPE 2 DIABETES MELLITUS WITHOUT COMPLICATION, WITHOUT LONG-TERM CURRENT USE OF INSULIN (HCC): ICD-10-CM

## 2018-09-27 LAB
AVERAGE GLUCOSE: 105 MG/DL (ref 70–126)
HBA1C MFR BLD: 5.5 % (ref 4.4–6.4)

## 2018-09-27 PROCEDURE — 36415 COLL VENOUS BLD VENIPUNCTURE: CPT

## 2018-09-27 PROCEDURE — 83036 HEMOGLOBIN GLYCOSYLATED A1C: CPT

## 2018-10-01 ENCOUNTER — OFFICE VISIT (OUTPATIENT)
Dept: FAMILY MEDICINE CLINIC | Age: 78
End: 2018-10-01
Payer: MEDICARE

## 2018-10-01 VITALS
HEIGHT: 66 IN | HEART RATE: 72 BPM | RESPIRATION RATE: 16 BRPM | SYSTOLIC BLOOD PRESSURE: 118 MMHG | DIASTOLIC BLOOD PRESSURE: 60 MMHG | BODY MASS INDEX: 39.05 KG/M2 | WEIGHT: 243 LBS

## 2018-10-01 DIAGNOSIS — I48.0 PAROXYSMAL ATRIAL FIBRILLATION (HCC): Primary | ICD-10-CM

## 2018-10-01 DIAGNOSIS — J45.20 MILD INTERMITTENT REACTIVE AIRWAY DISEASE WITHOUT COMPLICATION: ICD-10-CM

## 2018-10-01 DIAGNOSIS — I10 ESSENTIAL HYPERTENSION: ICD-10-CM

## 2018-10-01 DIAGNOSIS — K21.9 GASTROESOPHAGEAL REFLUX DISEASE, ESOPHAGITIS PRESENCE NOT SPECIFIED: ICD-10-CM

## 2018-10-01 DIAGNOSIS — E78.00 PURE HYPERCHOLESTEROLEMIA: ICD-10-CM

## 2018-10-01 DIAGNOSIS — E66.9 OBESITY (BMI 30-39.9): ICD-10-CM

## 2018-10-01 DIAGNOSIS — Z99.89 OBSTRUCTIVE SLEEP APNEA ON CPAP: ICD-10-CM

## 2018-10-01 DIAGNOSIS — E11.9 CONTROLLED TYPE 2 DIABETES MELLITUS WITHOUT COMPLICATION, WITHOUT LONG-TERM CURRENT USE OF INSULIN (HCC): ICD-10-CM

## 2018-10-01 DIAGNOSIS — M85.80 OSTEOPENIA, UNSPECIFIED LOCATION: ICD-10-CM

## 2018-10-01 DIAGNOSIS — G47.33 OBSTRUCTIVE SLEEP APNEA ON CPAP: ICD-10-CM

## 2018-10-01 PROCEDURE — 1123F ACP DISCUSS/DSCN MKR DOCD: CPT | Performed by: FAMILY MEDICINE

## 2018-10-01 PROCEDURE — G8484 FLU IMMUNIZE NO ADMIN: HCPCS | Performed by: FAMILY MEDICINE

## 2018-10-01 PROCEDURE — G8399 PT W/DXA RESULTS DOCUMENT: HCPCS | Performed by: FAMILY MEDICINE

## 2018-10-01 PROCEDURE — G8427 DOCREV CUR MEDS BY ELIG CLIN: HCPCS | Performed by: FAMILY MEDICINE

## 2018-10-01 PROCEDURE — 99214 OFFICE O/P EST MOD 30 MIN: CPT | Performed by: FAMILY MEDICINE

## 2018-10-01 PROCEDURE — 4040F PNEUMOC VAC/ADMIN/RCVD: CPT | Performed by: FAMILY MEDICINE

## 2018-10-01 PROCEDURE — 1101F PT FALLS ASSESS-DOCD LE1/YR: CPT | Performed by: FAMILY MEDICINE

## 2018-10-01 PROCEDURE — G8417 CALC BMI ABV UP PARAM F/U: HCPCS | Performed by: FAMILY MEDICINE

## 2018-10-01 PROCEDURE — 1036F TOBACCO NON-USER: CPT | Performed by: FAMILY MEDICINE

## 2018-10-01 PROCEDURE — 1090F PRES/ABSN URINE INCON ASSESS: CPT | Performed by: FAMILY MEDICINE

## 2018-10-01 RX ORDER — OMEPRAZOLE 40 MG/1
40 CAPSULE, DELAYED RELEASE ORAL DAILY
Qty: 90 CAPSULE | Refills: 3 | Status: SHIPPED | OUTPATIENT
Start: 2018-10-01 | End: 2019-01-30 | Stop reason: SDUPTHER

## 2018-10-01 RX ORDER — CALCITONIN SALMON 200 [IU]/.09ML
1 SPRAY, METERED NASAL DAILY
Qty: 3.7 ML | Refills: 11 | Status: SHIPPED | OUTPATIENT
Start: 2018-10-01 | End: 2019-09-27 | Stop reason: SDUPTHER

## 2018-10-01 RX ORDER — AMLODIPINE BESYLATE AND BENAZEPRIL HYDROCHLORIDE 10; 20 MG/1; MG/1
1 CAPSULE ORAL DAILY
Qty: 90 CAPSULE | Refills: 3 | Status: ON HOLD | OUTPATIENT
Start: 2018-10-01 | End: 2019-05-22 | Stop reason: HOSPADM

## 2018-10-01 RX ORDER — PRAVASTATIN SODIUM 80 MG/1
80 TABLET ORAL DAILY
Qty: 90 TABLET | Refills: 3 | Status: SHIPPED | OUTPATIENT
Start: 2018-10-01 | End: 2019-09-27 | Stop reason: SDUPTHER

## 2018-10-01 RX ORDER — METOPROLOL SUCCINATE 25 MG/1
25 TABLET, EXTENDED RELEASE ORAL DAILY
Qty: 90 TABLET | Refills: 3 | Status: SHIPPED | OUTPATIENT
Start: 2018-10-01 | End: 2019-09-27 | Stop reason: SDUPTHER

## 2018-10-01 ASSESSMENT — PATIENT HEALTH QUESTIONNAIRE - PHQ9
SUM OF ALL RESPONSES TO PHQ9 QUESTIONS 1 & 2: 0
1. LITTLE INTEREST OR PLEASURE IN DOING THINGS: 0
2. FEELING DOWN, DEPRESSED OR HOPELESS: 0
SUM OF ALL RESPONSES TO PHQ QUESTIONS 1-9: 0
SUM OF ALL RESPONSES TO PHQ QUESTIONS 1-9: 0

## 2018-10-01 ASSESSMENT — ENCOUNTER SYMPTOMS
RESPIRATORY NEGATIVE: 1
GASTROINTESTINAL NEGATIVE: 1

## 2018-10-01 NOTE — PROGRESS NOTES
Devante Méndez,    metoprolol succinate (TOPROL XL) 25 MG extended release tablet Take 1 tablet by mouth daily 4/26/18  Yes Melinda Ha DO   amLODIPine-benazepril (LOTREL) 10-20 MG per capsule TAKE 1 CAPSULE BY MOUTH DAILY 3/19/18  Yes BOWEN Chandler CNP   calcitonin (MIACALCIN) 200 UNIT/ACT nasal spray INSTILL 1 SPRAY INTO EACH NOSTRIL ONCE DAILY 12/6/17  Yes Melinda Ha DO   VENTOLIN  (49 Base) MCG/ACT inhaler Inhale 2 puffs into the lungs every 4 hours as needed for Wheezing 11/3/17  Yes Melinda Ha DO   apixaban (ELIQUIS) 5 MG TABS tablet Take 1 tablet by mouth 2 times daily 10/27/17  Yes Jocelyn Fields MD   nitroGLYCERIN (NITROSTAT) 0.4 MG SL tablet Place 1 tablet under the tongue every 5 minutes as needed for Chest pain up to max of 3 total doses. If no relief after 1 dose, call 911. 10/1/17  Yes Melinda Ha DO   omeprazole (PRILOSEC) 40 MG delayed release capsule TAKE 1 CAPSULE BY MOUTH DAILY WITH BREAKFAST 4/11/17  Yes Melinda Ha DO   B Complex-C (SUPER B COMPLEX PO) Take by mouth   Yes Historical Provider, MD   aspirin 81 MG EC tablet Take 1 tablet by mouth daily 6/9/15  Yes Melinda Ha DO   GLUCOSAMINE-CHONDROITIN-VIT D3 PO Take by mouth daily    Yes Historical Provider, MD   acetaminophen (TYLENOL) 325 MG tablet Take 325 mg by mouth every 4 hours as needed for Pain (For mild pain level 1-3 or for fever > 100.5)  7/28/12  Yes Verneice Landau, MD   Cyanocobalamin (VITAMIN B 12 PO) Take  by mouth Daily.      Yes Historical Provider, MD       Lab Results   Component Value Date    LABA1C 5.5 09/27/2018     No results found for: EAG    No components found for: CHLPL  Lab Results   Component Value Date    TRIG 58 04/05/2018    TRIG 57 03/13/2018    TRIG 58 10/26/2017     Lab Results   Component Value Date    HDL 94 04/05/2018    HDL 89 03/13/2018    HDL 60 10/26/2017     Lab Results   Component Value Date    LDLCALC 66 04/05/2018    [facility] 49 03/13/2018    LDLCALC 66 10/26/2017     No results found for: LABVLDL      Chemistry        Component Value Date/Time     04/05/2018 1017    K 4.7 04/05/2018 1017     04/05/2018 1017    CO2 27 04/05/2018 1017    BUN 14 04/05/2018 1017    CREATININE 0.7 04/05/2018 1017        Component Value Date/Time    CALCIUM 10.2 04/05/2018 1017    ALKPHOS 43 04/05/2018 1017    AST 27 04/05/2018 1017    ALT 30 04/05/2018 1017    BILITOT 0.5 04/05/2018 1017            Lab Results   Component Value Date    TSH 1.160 10/25/2017       Lab Results   Component Value Date    WBC 7.2 04/05/2018    HGB 12.7 04/05/2018    HCT 37.9 04/05/2018    MCV 88.3 04/05/2018     04/05/2018         Health Maintenance   Topic Date Due    DTaP/Tdap/Td vaccine (1 - Tdap) 09/08/1960    Shingles Vaccine (1 of 2 - 2 Dose Series) 09/08/1991    Potassium monitoring  04/05/2019    Creatinine monitoring  04/05/2019    DEXA (modify frequency per FRAX score)  Addressed    Flu vaccine  Completed    Pneumococcal low/med risk  Completed       Immunization History   Administered Date(s) Administered    Influenza Vaccine, unspecified formulation 10/06/2014    Influenza Virus Vaccine 10/05/2011, 12/31/2012    Influenza Whole 09/10/2015    Influenza, High Dose (Fluzone 65 yrs and older) 10/22/2016, 09/29/2017    Pneumococcal 13-valent Conjugate ([id number]) 03/29/2016    Pneumococcal Polysaccharide ([id number]) 10/10/2017         Review of Systems   Constitutional: Negative. HENT: Negative. Respiratory: Negative. Cardiovascular: Negative. Gastrointestinal: Negative. Musculoskeletal: Negative. All other systems reviewed and are negative. Objective:   Physical Exam   Constitutional: She is oriented to person, place, and time. She appears well-developed and well-nourished. HENT:   Head: Normocephalic and atraumatic.    Right Ear: Tympanic membrane normal.   Left Ear: Tympanic membrane normal.   Mouth/Throat:

## 2018-10-06 ENCOUNTER — CARE COORDINATION (OUTPATIENT)
Dept: CASE MANAGEMENT | Age: 78
End: 2018-10-06

## 2018-10-29 RX ORDER — SITAGLIPTIN 100 MG/1
TABLET, FILM COATED ORAL
Qty: 90 TABLET | Refills: 0 | Status: ON HOLD | OUTPATIENT
Start: 2018-10-29 | End: 2019-03-16

## 2018-12-03 RX ORDER — NITROGLYCERIN 0.4 MG/1
TABLET SUBLINGUAL
Qty: 25 TABLET | Refills: 0 | Status: SHIPPED | OUTPATIENT
Start: 2018-12-03 | End: 2019-05-23 | Stop reason: SDUPTHER

## 2019-01-30 ENCOUNTER — TELEPHONE (OUTPATIENT)
Dept: FAMILY MEDICINE CLINIC | Age: 79
End: 2019-01-30

## 2019-01-30 RX ORDER — OMEPRAZOLE 40 MG/1
40 CAPSULE, DELAYED RELEASE ORAL DAILY
Qty: 90 CAPSULE | Refills: 3 | Status: SHIPPED | OUTPATIENT
Start: 2019-01-30 | End: 2020-04-22 | Stop reason: SDUPTHER

## 2019-02-07 ENCOUNTER — TELEPHONE (OUTPATIENT)
Dept: FAMILY MEDICINE CLINIC | Age: 79
End: 2019-02-07

## 2019-02-14 ENCOUNTER — OFFICE VISIT (OUTPATIENT)
Dept: PULMONOLOGY | Age: 79
End: 2019-02-14
Payer: MEDICARE

## 2019-02-14 VITALS
DIASTOLIC BLOOD PRESSURE: 66 MMHG | SYSTOLIC BLOOD PRESSURE: 122 MMHG | WEIGHT: 246.6 LBS | HEART RATE: 52 BPM | BODY MASS INDEX: 41.09 KG/M2 | HEIGHT: 65 IN | OXYGEN SATURATION: 98 %

## 2019-02-14 DIAGNOSIS — G47.33 OBSTRUCTIVE SLEEP APNEA ON CPAP: Primary | ICD-10-CM

## 2019-02-14 DIAGNOSIS — E66.9 OBESITY (BMI 30-39.9): ICD-10-CM

## 2019-02-14 DIAGNOSIS — Z99.89 OBSTRUCTIVE SLEEP APNEA ON CPAP: Primary | ICD-10-CM

## 2019-02-14 DIAGNOSIS — E66.01 MORBID OBESITY WITH BMI OF 40.0-44.9, ADULT (HCC): ICD-10-CM

## 2019-02-14 PROCEDURE — 1036F TOBACCO NON-USER: CPT | Performed by: PHYSICIAN ASSISTANT

## 2019-02-14 PROCEDURE — 1090F PRES/ABSN URINE INCON ASSESS: CPT | Performed by: PHYSICIAN ASSISTANT

## 2019-02-14 PROCEDURE — G8427 DOCREV CUR MEDS BY ELIG CLIN: HCPCS | Performed by: PHYSICIAN ASSISTANT

## 2019-02-14 PROCEDURE — 99213 OFFICE O/P EST LOW 20 MIN: CPT | Performed by: PHYSICIAN ASSISTANT

## 2019-02-14 PROCEDURE — G8417 CALC BMI ABV UP PARAM F/U: HCPCS | Performed by: PHYSICIAN ASSISTANT

## 2019-02-14 PROCEDURE — G8399 PT W/DXA RESULTS DOCUMENT: HCPCS | Performed by: PHYSICIAN ASSISTANT

## 2019-02-14 PROCEDURE — 1101F PT FALLS ASSESS-DOCD LE1/YR: CPT | Performed by: PHYSICIAN ASSISTANT

## 2019-02-14 PROCEDURE — G8484 FLU IMMUNIZE NO ADMIN: HCPCS | Performed by: PHYSICIAN ASSISTANT

## 2019-02-14 PROCEDURE — 4040F PNEUMOC VAC/ADMIN/RCVD: CPT | Performed by: PHYSICIAN ASSISTANT

## 2019-02-14 PROCEDURE — 1123F ACP DISCUSS/DSCN MKR DOCD: CPT | Performed by: PHYSICIAN ASSISTANT

## 2019-02-14 ASSESSMENT — ENCOUNTER SYMPTOMS
SHORTNESS OF BREATH: 0
ALLERGIC/IMMUNOLOGIC NEGATIVE: 1
COUGH: 0
BACK PAIN: 0
NAUSEA: 0
STRIDOR: 0
DIARRHEA: 0
WHEEZING: 0
CHEST TIGHTNESS: 0
EYES NEGATIVE: 1

## 2019-03-16 ENCOUNTER — HOSPITAL ENCOUNTER (INPATIENT)
Age: 79
LOS: 4 days | Discharge: HOME | DRG: 195 | End: 2019-03-20
Attending: INTERNAL MEDICINE | Admitting: INTERNAL MEDICINE
Payer: MEDICARE

## 2019-03-16 ENCOUNTER — APPOINTMENT (OUTPATIENT)
Dept: GENERAL RADIOLOGY | Age: 79
DRG: 195 | End: 2019-03-16
Payer: MEDICARE

## 2019-03-16 DIAGNOSIS — J10.1 INFLUENZA A: Primary | ICD-10-CM

## 2019-03-16 DIAGNOSIS — J11.1 INFLUENZA: ICD-10-CM

## 2019-03-16 DIAGNOSIS — R06.02 SHORTNESS OF BREATH: ICD-10-CM

## 2019-03-16 LAB
ALBUMIN SERPL-MCNC: 4.2 G/DL (ref 3.5–5.1)
ALP BLD-CCNC: 49 U/L (ref 38–126)
ALT SERPL-CCNC: 34 U/L (ref 11–66)
ANION GAP SERPL CALCULATED.3IONS-SCNC: 16 MEQ/L (ref 8–16)
AST SERPL-CCNC: 39 U/L (ref 5–40)
BASOPHILS # BLD: 0.3 %
BASOPHILS ABSOLUTE: 0 THOU/MM3 (ref 0–0.1)
BILIRUB SERPL-MCNC: 0.6 MG/DL (ref 0.3–1.2)
BILIRUBIN DIRECT: < 0.2 MG/DL (ref 0–0.3)
BILIRUBIN URINE: NEGATIVE
BLOOD, URINE: NEGATIVE
BUN BLDV-MCNC: 11 MG/DL (ref 7–22)
CALCIUM SERPL-MCNC: 9.1 MG/DL (ref 8.5–10.5)
CHARACTER, URINE: CLEAR
CHLORIDE BLD-SCNC: 96 MEQ/L (ref 98–111)
CO2: 20 MEQ/L (ref 23–33)
COLOR: YELLOW
CREAT SERPL-MCNC: 0.7 MG/DL (ref 0.4–1.2)
EKG ATRIAL RATE: 73 BPM
EKG Q-T INTERVAL: 424 MS
EKG QRS DURATION: 84 MS
EKG QTC CALCULATION (BAZETT): 470 MS
EKG R AXIS: 31 DEGREES
EKG T AXIS: 37 DEGREES
EKG VENTRICULAR RATE: 74 BPM
EOSINOPHIL # BLD: 1.6 %
EOSINOPHILS ABSOLUTE: 0.1 THOU/MM3 (ref 0–0.4)
ERYTHROCYTE [DISTWIDTH] IN BLOOD BY AUTOMATED COUNT: 13.6 % (ref 11.5–14.5)
ERYTHROCYTE [DISTWIDTH] IN BLOOD BY AUTOMATED COUNT: 46.4 FL (ref 35–45)
FLU A ANTIGEN: POSITIVE
FLU B ANTIGEN: NEGATIVE
GFR SERPL CREATININE-BSD FRML MDRD: 81 ML/MIN/1.73M2
GLUCOSE BLD-MCNC: 114 MG/DL (ref 70–108)
GLUCOSE BLD-MCNC: 139 MG/DL (ref 70–108)
GLUCOSE BLD-MCNC: 165 MG/DL (ref 70–108)
GLUCOSE URINE: NEGATIVE MG/DL
GROUP A STREP CULTURE, REFLEX: NEGATIVE
HCT VFR BLD CALC: 38.7 % (ref 37–47)
HEMOGLOBIN: 12.5 GM/DL (ref 12–16)
IMMATURE GRANS (ABS): 0.01 THOU/MM3 (ref 0–0.07)
IMMATURE GRANULOCYTES: 0.2 %
KETONES, URINE: NEGATIVE
LACTIC ACID, SEPSIS: 1.7 MMOL/L (ref 0.5–1.9)
LACTIC ACID, SEPSIS: 2.2 MMOL/L (ref 0.5–1.9)
LEUKOCYTE ESTERASE, URINE: ABNORMAL
LIPASE: 24.6 U/L (ref 5.6–51.3)
LYMPHOCYTES # BLD: 37.6 %
LYMPHOCYTES ABSOLUTE: 2.3 THOU/MM3 (ref 1–4.8)
MCH RBC QN AUTO: 29.8 PG (ref 26–33)
MCHC RBC AUTO-ENTMCNC: 32.3 GM/DL (ref 32.2–35.5)
MCV RBC AUTO: 92.4 FL (ref 81–99)
MONOCYTES # BLD: 14.4 %
MONOCYTES ABSOLUTE: 0.9 THOU/MM3 (ref 0.4–1.3)
NITRITE, URINE: NEGATIVE
NUCLEATED RED BLOOD CELLS: 0 /100 WBC
OSMOLALITY CALCULATION: 266.2 MOSMOL/KG (ref 275–300)
PH UA: 6.5 (ref 5–9)
PLATELET # BLD: 145 THOU/MM3 (ref 130–400)
PMV BLD AUTO: 11.6 FL (ref 9.4–12.4)
POTASSIUM SERPL-SCNC: 3.9 MEQ/L (ref 3.5–5.2)
PROTEIN UA: NEGATIVE
RBC # BLD: 4.19 MILL/MM3 (ref 4.2–5.4)
REFLEX THROAT C + S: NORMAL
SEG NEUTROPHILS: 45.9 %
SEGMENTED NEUTROPHILS ABSOLUTE COUNT: 2.8 THOU/MM3 (ref 1.8–7.7)
SODIUM BLD-SCNC: 132 MEQ/L (ref 135–145)
SPECIFIC GRAVITY, URINE: 1 (ref 1–1.03)
TOTAL PROTEIN: 7.5 G/DL (ref 6.1–8)
TROPONIN T: < 0.01 NG/ML
UROBILINOGEN, URINE: 0.2 EU/DL (ref 0–1)
WBC # BLD: 6.2 THOU/MM3 (ref 4.8–10.8)

## 2019-03-16 PROCEDURE — 6370000000 HC RX 637 (ALT 250 FOR IP): Performed by: INTERNAL MEDICINE

## 2019-03-16 PROCEDURE — 2580000003 HC RX 258: Performed by: INTERNAL MEDICINE

## 2019-03-16 PROCEDURE — 80048 BASIC METABOLIC PNL TOTAL CA: CPT

## 2019-03-16 PROCEDURE — 84484 ASSAY OF TROPONIN QUANT: CPT

## 2019-03-16 PROCEDURE — 94640 AIRWAY INHALATION TREATMENT: CPT

## 2019-03-16 PROCEDURE — 81003 URINALYSIS AUTO W/O SCOPE: CPT

## 2019-03-16 PROCEDURE — 85025 COMPLETE CBC W/AUTO DIFF WBC: CPT

## 2019-03-16 PROCEDURE — 71045 X-RAY EXAM CHEST 1 VIEW: CPT

## 2019-03-16 PROCEDURE — 36415 COLL VENOUS BLD VENIPUNCTURE: CPT

## 2019-03-16 PROCEDURE — 83690 ASSAY OF LIPASE: CPT

## 2019-03-16 PROCEDURE — 82948 REAGENT STRIP/BLOOD GLUCOSE: CPT

## 2019-03-16 PROCEDURE — 93005 ELECTROCARDIOGRAM TRACING: CPT | Performed by: INTERNAL MEDICINE

## 2019-03-16 PROCEDURE — 1200000003 HC TELEMETRY R&B

## 2019-03-16 PROCEDURE — 87804 INFLUENZA ASSAY W/OPTIC: CPT

## 2019-03-16 PROCEDURE — 80076 HEPATIC FUNCTION PANEL: CPT

## 2019-03-16 PROCEDURE — 87880 STREP A ASSAY W/OPTIC: CPT

## 2019-03-16 PROCEDURE — 87070 CULTURE OTHR SPECIMN AEROBIC: CPT

## 2019-03-16 PROCEDURE — 93010 ELECTROCARDIOGRAM REPORT: CPT | Performed by: INTERNAL MEDICINE

## 2019-03-16 PROCEDURE — 99285 EMERGENCY DEPT VISIT HI MDM: CPT

## 2019-03-16 PROCEDURE — 87040 BLOOD CULTURE FOR BACTERIA: CPT

## 2019-03-16 PROCEDURE — 83605 ASSAY OF LACTIC ACID: CPT

## 2019-03-16 RX ORDER — LANOLIN ALCOHOL/MO/W.PET/CERES
1000 CREAM (GRAM) TOPICAL DAILY
Status: DISCONTINUED | OUTPATIENT
Start: 2019-03-16 | End: 2019-03-20 | Stop reason: HOSPADM

## 2019-03-16 RX ORDER — DIPHENHYDRAMINE HYDROCHLORIDE 25 MG/1
1 TABLET ORAL DAILY
Status: DISCONTINUED | OUTPATIENT
Start: 2019-03-16 | End: 2019-03-16 | Stop reason: RX

## 2019-03-16 RX ORDER — ACETAMINOPHEN 325 MG/1
325 TABLET ORAL EVERY 4 HOURS PRN
Status: DISCONTINUED | OUTPATIENT
Start: 2019-03-16 | End: 2019-03-20 | Stop reason: HOSPADM

## 2019-03-16 RX ORDER — AMLODIPINE BESYLATE 10 MG/1
10 TABLET ORAL DAILY
Status: DISCONTINUED | OUTPATIENT
Start: 2019-03-16 | End: 2019-03-17

## 2019-03-16 RX ORDER — CALCITONIN SALMON 200 [IU]/.09ML
1 SPRAY, METERED NASAL DAILY
Status: DISCONTINUED | OUTPATIENT
Start: 2019-03-16 | End: 2019-03-20 | Stop reason: HOSPADM

## 2019-03-16 RX ORDER — LISINOPRIL 20 MG/1
20 TABLET ORAL DAILY
Status: DISCONTINUED | OUTPATIENT
Start: 2019-03-16 | End: 2019-03-20 | Stop reason: HOSPADM

## 2019-03-16 RX ORDER — SODIUM CHLORIDE 9 MG/ML
INJECTION, SOLUTION INTRAVENOUS CONTINUOUS
Status: DISCONTINUED | OUTPATIENT
Start: 2019-03-16 | End: 2019-03-20 | Stop reason: HOSPADM

## 2019-03-16 RX ORDER — NICOTINE POLACRILEX 4 MG
15 LOZENGE BUCCAL PRN
Status: DISCONTINUED | OUTPATIENT
Start: 2019-03-16 | End: 2019-03-20 | Stop reason: HOSPADM

## 2019-03-16 RX ORDER — OSELTAMIVIR PHOSPHATE 75 MG/1
75 CAPSULE ORAL ONCE
Status: COMPLETED | OUTPATIENT
Start: 2019-03-16 | End: 2019-03-16

## 2019-03-16 RX ORDER — NITROGLYCERIN 0.4 MG/1
0.4 TABLET SUBLINGUAL EVERY 5 MIN PRN
Status: DISCONTINUED | OUTPATIENT
Start: 2019-03-16 | End: 2019-03-20 | Stop reason: HOSPADM

## 2019-03-16 RX ORDER — ALBUTEROL SULFATE 90 UG/1
2 AEROSOL, METERED RESPIRATORY (INHALATION) EVERY 4 HOURS PRN
Status: DISCONTINUED | OUTPATIENT
Start: 2019-03-16 | End: 2019-03-20 | Stop reason: HOSPADM

## 2019-03-16 RX ORDER — DEXTROSE MONOHYDRATE 25 G/50ML
12.5 INJECTION, SOLUTION INTRAVENOUS PRN
Status: DISCONTINUED | OUTPATIENT
Start: 2019-03-16 | End: 2019-03-20 | Stop reason: HOSPADM

## 2019-03-16 RX ORDER — PRAVASTATIN SODIUM 80 MG/1
80 TABLET ORAL DAILY
Status: DISCONTINUED | OUTPATIENT
Start: 2019-03-16 | End: 2019-03-20 | Stop reason: HOSPADM

## 2019-03-16 RX ORDER — OSELTAMIVIR PHOSPHATE 30 MG/1
30 CAPSULE ORAL 2 TIMES DAILY
Status: DISCONTINUED | OUTPATIENT
Start: 2019-03-16 | End: 2019-03-20 | Stop reason: HOSPADM

## 2019-03-16 RX ORDER — METOPROLOL SUCCINATE 25 MG/1
25 TABLET, EXTENDED RELEASE ORAL DAILY
Status: DISCONTINUED | OUTPATIENT
Start: 2019-03-16 | End: 2019-03-17

## 2019-03-16 RX ORDER — IPRATROPIUM BROMIDE AND ALBUTEROL SULFATE 2.5; .5 MG/3ML; MG/3ML
1 SOLUTION RESPIRATORY (INHALATION) ONCE
Status: COMPLETED | OUTPATIENT
Start: 2019-03-16 | End: 2019-03-16

## 2019-03-16 RX ORDER — DEXTROSE MONOHYDRATE 50 MG/ML
100 INJECTION, SOLUTION INTRAVENOUS PRN
Status: DISCONTINUED | OUTPATIENT
Start: 2019-03-16 | End: 2019-03-20 | Stop reason: HOSPADM

## 2019-03-16 RX ORDER — AMLODIPINE BESYLATE AND BENAZEPRIL HYDROCHLORIDE 10; 20 MG/1; MG/1
1 CAPSULE ORAL DAILY
Status: DISCONTINUED | OUTPATIENT
Start: 2019-03-16 | End: 2019-03-16 | Stop reason: CLARIF

## 2019-03-16 RX ORDER — PANTOPRAZOLE SODIUM 40 MG/1
40 TABLET, DELAYED RELEASE ORAL
Status: DISCONTINUED | OUTPATIENT
Start: 2019-03-17 | End: 2019-03-20 | Stop reason: HOSPADM

## 2019-03-16 RX ORDER — ASPIRIN 81 MG/1
81 TABLET ORAL DAILY
Status: DISCONTINUED | OUTPATIENT
Start: 2019-03-16 | End: 2019-03-20 | Stop reason: HOSPADM

## 2019-03-16 RX ADMIN — ACETAMINOPHEN 325 MG: 325 TABLET ORAL at 23:06

## 2019-03-16 RX ADMIN — IPRATROPIUM BROMIDE AND ALBUTEROL SULFATE 1 AMPULE: .5; 3 SOLUTION RESPIRATORY (INHALATION) at 10:42

## 2019-03-16 RX ADMIN — APIXABAN 5 MG: 5 TABLET, FILM COATED ORAL at 21:21

## 2019-03-16 RX ADMIN — OSELTAMIVIR PHOSPHATE 75 MG: 75 CAPSULE ORAL at 11:42

## 2019-03-16 RX ADMIN — IPRATROPIUM BROMIDE AND ALBUTEROL SULFATE 1 AMPULE: .5; 3 SOLUTION RESPIRATORY (INHALATION) at 10:34

## 2019-03-16 RX ADMIN — OSELTAMIVIR PHOSPHATE 30 MG: 30 CAPSULE ORAL at 21:21

## 2019-03-16 RX ADMIN — SODIUM CHLORIDE: 9 INJECTION, SOLUTION INTRAVENOUS at 18:04

## 2019-03-16 ASSESSMENT — ENCOUNTER SYMPTOMS
EYE PAIN: 0
RHINORRHEA: 0
WHEEZING: 0
SORE THROAT: 1
EYE DISCHARGE: 0
VOMITING: 0
ABDOMINAL PAIN: 0
COUGH: 1
SHORTNESS OF BREATH: 0
BACK PAIN: 0
NAUSEA: 0
DIARRHEA: 0

## 2019-03-16 ASSESSMENT — PAIN DESCRIPTION - FREQUENCY: FREQUENCY: INTERMITTENT

## 2019-03-16 ASSESSMENT — PAIN DESCRIPTION - DESCRIPTORS: DESCRIPTORS: ACHING

## 2019-03-16 ASSESSMENT — PAIN SCALES - GENERAL
PAINLEVEL_OUTOF10: 0
PAINLEVEL_OUTOF10: 1

## 2019-03-16 ASSESSMENT — PAIN DESCRIPTION - PAIN TYPE: TYPE: ACUTE PAIN

## 2019-03-16 ASSESSMENT — PAIN DESCRIPTION - ONSET: ONSET: ON-GOING

## 2019-03-16 ASSESSMENT — PAIN - FUNCTIONAL ASSESSMENT: PAIN_FUNCTIONAL_ASSESSMENT: ACTIVITIES ARE NOT PREVENTED

## 2019-03-16 ASSESSMENT — PAIN DESCRIPTION - PROGRESSION: CLINICAL_PROGRESSION: GRADUALLY WORSENING

## 2019-03-16 ASSESSMENT — PAIN DESCRIPTION - LOCATION: LOCATION: GENERALIZED

## 2019-03-16 NOTE — ED NOTES
Patient transported to [facility]    in stable condition. Patient monitored on telemetry.          Michelle Jennings LPN  03/64/90 [id number]

## 2019-03-16 NOTE — ED PROVIDER NOTES
medical history of Cancer ([facility] [address] Diabetes mellitus ([facility] [address] Hyperlipidemia, Hypertension, Osteoporosis, and Sleep apnea. SURGICAL HISTORY    has a past surgical history that includes Appendectomy; Cardiac catheterization; Dilation and curettage of uterus; Colonoscopy (01/08/2013); and Skin cancer excision (03/2017). CURRENT MEDICATIONS       Previous Medications    ACETAMINOPHEN (TYLENOL) 325 MG TABLET    Take 325 mg by mouth every 4 hours as needed for Pain (For mild pain level 1-3 or for fever > 100.5)     ALBUTEROL SULFATE HFA (VENTOLIN HFA) 108 (90 BASE) MCG/ACT INHALER    Inhale 2 puffs into the lungs every 4 hours as needed for Wheezing    AMLODIPINE-BENAZEPRIL (LOTREL) 10-20 MG PER CAPSULE    Take 1 capsule by mouth daily    APIXABAN (ELIQUIS) 5 MG TABS TABLET    Take 1 tablet by mouth 2 times daily    ASPIRIN 81 MG EC TABLET    Take 1 tablet by mouth daily    B COMPLEX-C (SUPER B COMPLEX PO)    Take by mouth    BIOTIN PO    Take 1 tablet by mouth daily    CALCITONIN (MIACALCIN) 200 UNIT/ACT NASAL SPRAY    1 spray by Nasal route daily    CYANOCOBALAMIN (VITAMIN B 12 PO)    Take  by mouth Daily. GLUCOSAMINE-CHONDROITIN-VIT D3 PO    Take by mouth daily     JANUVIA 100 MG TABLET    TAKE 1 TABLET BY MOUTH EVERY MORNING BEFORE BREAKFAST    METOPROLOL SUCCINATE (TOPROL XL) 25 MG EXTENDED RELEASE TABLET    Take 1 tablet by mouth daily    NITROGLYCERIN (NITROSTAT) 0.4 MG SL TABLET    DISSOLVE ONE TABLET UNDER TONGUE AS NEEDED FOR CHEST PAIN EVERY 5 MINUTES. MAX OF 3 DOSES. IF NO RELIEF AFTER 1 DOSE, CALL 911. OMEPRAZOLE (PRILOSEC) 40 MG DELAYED RELEASE CAPSULE    Take 1 capsule by mouth daily    PRAVASTATIN (PRAVACHOL) 80 MG TABLET    Take 1 tablet by mouth daily    SITAGLIPTIN (JANUVIA) 100 MG TABLET    Take 1 tablet by mouth daily    VENTOLIN  (90 BASE) MCG/ACT INHALER    Inhale 2 puffs into the lungs every 4 hours as needed for Wheezing       ALLERGIES     is allergic to ranolazine.     FAMILY HISTORY     indicated that her mother is . She indicated that her father is . She indicated that her sister is . She indicated that all of her four brothers are . family history includes Alzheimer's Disease in her mother; Cancer in her sister; Heart Disease in her mother. SOCIAL HISTORY      reports that she has never smoked. She has never used smokeless tobacco. She reports that she does not drink alcohol or use drugs. PHYSICAL EXAM     INITIAL VITALS:  height is 5' 6\" (1.676 m) and weight is 243 lb (110.2 kg). Her oral temperature is 98.2 °F (36.8 °C). Her blood pressure is 125/60 and her pulse is 88. Her respiration is 19 and oxygen saturation is 98%. Physical Exam   Constitutional: She is oriented to person, place, and time. She appears well-developed and well-nourished. Non-toxic appearance. HENT:   Head: Normocephalic and atraumatic. Right Ear: Tympanic membrane and external ear normal.   Left Ear: Tympanic membrane and external ear normal.   Nose: Nose normal.   Mouth/Throat: Mucous membranes are normal. Posterior oropharyngeal erythema present. No oropharyngeal exudate or posterior oropharyngeal edema. Eyes: Conjunctivae and EOM are normal.   Neck: Normal range of motion. Neck supple. No JVD present. Cardiovascular: Normal rate, regular rhythm, normal heart sounds, intact distal pulses and normal pulses. Exam reveals no gallop and no friction rub. No murmur heard. Pulmonary/Chest: Effort normal. No respiratory distress. She has no decreased breath sounds. She has wheezes. She has no rhonchi. She has no rales. Expiratory wheezes. Abdominal: Soft. Bowel sounds are normal. She exhibits no distension. There is no tenderness. There is no rebound, no guarding and no CVA tenderness. Musculoskeletal: Normal range of motion. She exhibits no edema. Neurological: She is alert and oriented to person, place, and time. She exhibits normal muscle tone. Coordination normal.   Skin: Skin is warm and dry. No rash noted. She is not diaphoretic. Nursing note and vitals reviewed. DIAGNOSTIC RESULTS     EKG: All EKG's are interpreted by the Emergency Department Physician who either signs or Co-signs this chart in the absence of a cardiologist.  EKG interpreted by Mook Wells MD:    [place]. Rate: 74 bpm  WA interval: * ms  QRS duration: 84 ms  QTc: 470 ms  P-R-T axes: *, 31, 37  Atrial fibrillation with premature ventricular or aberrantly conducted complexes  Nonspecific ST and T wave abnormality  No STEMI. Compared to old EKG on 05-April-2018    RADIOLOGY: non-plain film images(s) such as CT, Ultrasound and MRI are read by the radiologist.    XR CHEST PORTABLE   Final Result   Mild cardiomegaly with no acute intrathoracic process. **This report has been created using voice recognition software. It may contain minor errors which are inherent in voice recognition technology. **      Final report electronically signed by Dr Evans Degroot on 3/16/2019 10:42 AM          LABS:   Labs Reviewed   RAPID INFLUENZA A/B ANTIGENS - Abnormal; Notable for the following components:       Result Value    Flu A Antigen POSITIVE (*)     All other components within normal limits   CBC WITH AUTO DIFFERENTIAL - Abnormal; Notable for the following components:    RBC 4.19 (*)     RDW-SD 46.4 (*)     All other components within normal limits   BASIC METABOLIC PANEL - Abnormal; Notable for the following components:    Sodium 132 (*)     Chloride 96 (*)     CO2 20 (*)     Glucose 139 (*)     All other components within normal limits   LACTATE, SEPSIS - Abnormal; Notable for the following components:    Lactic Acid, Sepsis 2.2 (*)     All other components within normal limits   URINE RT REFLEX TO CULTURE - Abnormal; Notable for the following components:    Leukocyte Esterase, Urine TRACE (*)     All other components within normal limits   GLOMERULAR FILTRATION RATE, ESTIMATED - Abnormal; Notable for the following components:    Est, Glom Filt Rate 81 (*)     All other components within normal limits   OSMOLALITY - Abnormal; Notable for the following components:    Osmolality Calc 266.2 (*)     All other components within normal limits   CULTURE BLOOD #2   CULTURE BLOOD #1   THROAT CULTURE    Narrative:     Source: throat       Site: swab          Current Antibiotics: not stated   HEPATIC FUNCTION PANEL   LIPASE   TROPONIN   GROUP A STREP, REFLEX   ANION GAP   LACTATE, SEPSIS       EMERGENCY DEPARTMENT COURSE:   Vitals:    Vitals:    03/16/19 0959 03/16/19 1040 03/16/19 1140 03/16/19 1241   BP: (!) 141/61 (!) 131/55 126/66 125/60   Pulse: 94 71 99 88   Resp: 20 20 20 19   Temp: 98.2 °F (36.8 °C)      TempSrc: Oral      SpO2: 98% 100% 99% 98%   Weight: 243 lb (110.2 kg)      Height: 5' 6\" (1.676 m)          10:39 AM: The patient was seen and evaluated. MDM:  Patient was started on breathing treatment and Tamiflu . Patient still was having issues with breathing. Because the patient's age and her sickness. It was decided that patient is to be admitted for further workup and management by Guido Acevedo did all of patient's lab the testing that reviewed discussed with the patient and also the admitting physician. CRITICAL CARE:   None     CONSULTS:  Dr Bass Appl:  None     FINAL IMPRESSION      1. Influenza A    2. Shortness of breath          DISPOSITION/PLAN   Admit     PATIENT REFERRED TO:  No follow-up provider specified.     DISCHARGE MEDICATIONS:  New Prescriptions    No medications on file       (Please note that portions of this note were completed with a voice recognition program.  Efforts were made to edit the dictations but occasionally words are mis-transcribed.)    Scribe:  Roberta Yang 3/16/19 10:39 AM Scribing for and in the presence of Vivi Arevalo MD.    Signed by: Yahir Landeros, 03/16/19 1:37 PM    Provider:  I personally performed the services described in the documentation, reviewed and edited the documentation which was dictated to the scribe in my presence, and it accurately records my words and actions.     Tatyana Jackson MD 3/16/19 1:37 PM      Tatyana Jackson MD  03/16/19 [id number]

## 2019-03-16 NOTE — ED NOTES
Pt resting in bed upon entering room pt informed of admission status and that we were currently waiting on an admission room. Vs reassessed and stable with family at the bedside.  Will continue to monitor     Barby Newberry RN  03/16/19 0992

## 2019-03-16 NOTE — ED NOTES
Patient resting in bed with no current complaints. Medication given per order. VSS. Updated patient on plan of care with verbalized understanding. Denies current needs.       José Miguel Bautista RN  03/16/19 5993

## 2019-03-16 NOTE — ED NOTES
Patient up to the restroom to void. Urine collected and sent to lab per order. VSS. Dr. Alis Hutchins to room to update patient on plan with verbalized understanding. Side rails up x1. Call light within reach.       Jesus De La O RN  03/16/19 6411

## 2019-03-16 NOTE — ED NOTES
Cultures drawn with IV start     Hillary Pickard  03/16/19 1107       Hillary Pickard  03/16/19 1107

## 2019-03-17 LAB
EKG ATRIAL RATE: 163 BPM
EKG ATRIAL RATE: 68 BPM
EKG P AXIS: 95 DEGREES
EKG P-R INTERVAL: 148 MS
EKG Q-T INTERVAL: 306 MS
EKG Q-T INTERVAL: 394 MS
EKG QRS DURATION: 82 MS
EKG QRS DURATION: 88 MS
EKG QTC CALCULATION (BAZETT): 418 MS
EKG QTC CALCULATION (BAZETT): 453 MS
EKG R AXIS: 13 DEGREES
EKG R AXIS: 8 DEGREES
EKG T AXIS: -170 DEGREES
EKG T AXIS: 27 DEGREES
EKG VENTRICULAR RATE: 132 BPM
EKG VENTRICULAR RATE: 68 BPM
GLUCOSE BLD-MCNC: 100 MG/DL (ref 70–108)
GLUCOSE BLD-MCNC: 103 MG/DL (ref 70–108)
GLUCOSE BLD-MCNC: 118 MG/DL (ref 70–108)
GLUCOSE BLD-MCNC: 132 MG/DL (ref 70–108)
TROPONIN T: < 0.01 NG/ML
TROPONIN T: < 0.01 NG/ML

## 2019-03-17 PROCEDURE — 6360000002 HC RX W HCPCS: Performed by: INTERNAL MEDICINE

## 2019-03-17 PROCEDURE — 2140000000 HC CCU INTERMEDIATE R&B

## 2019-03-17 PROCEDURE — 93010 ELECTROCARDIOGRAM REPORT: CPT | Performed by: INTERNAL MEDICINE

## 2019-03-17 PROCEDURE — 6370000000 HC RX 637 (ALT 250 FOR IP): Performed by: INTERNAL MEDICINE

## 2019-03-17 PROCEDURE — 2580000003 HC RX 258: Performed by: INTERNAL MEDICINE

## 2019-03-17 PROCEDURE — 93005 ELECTROCARDIOGRAM TRACING: CPT | Performed by: INTERNAL MEDICINE

## 2019-03-17 PROCEDURE — 84484 ASSAY OF TROPONIN QUANT: CPT

## 2019-03-17 PROCEDURE — 94640 AIRWAY INHALATION TREATMENT: CPT

## 2019-03-17 PROCEDURE — 82948 REAGENT STRIP/BLOOD GLUCOSE: CPT

## 2019-03-17 PROCEDURE — 36415 COLL VENOUS BLD VENIPUNCTURE: CPT

## 2019-03-17 PROCEDURE — 2709999900 HC NON-CHARGEABLE SUPPLY

## 2019-03-17 PROCEDURE — 2500000003 HC RX 250 WO HCPCS: Performed by: INTERNAL MEDICINE

## 2019-03-17 RX ORDER — LEVOFLOXACIN 5 MG/ML
500 INJECTION, SOLUTION INTRAVENOUS EVERY 24 HOURS
Status: DISCONTINUED | OUTPATIENT
Start: 2019-03-17 | End: 2019-03-20 | Stop reason: HOSPADM

## 2019-03-17 RX ORDER — METOPROLOL SUCCINATE 50 MG/1
50 TABLET, EXTENDED RELEASE ORAL DAILY
Status: DISCONTINUED | OUTPATIENT
Start: 2019-03-18 | End: 2019-03-18

## 2019-03-17 RX ORDER — HYDROCODONE POLISTIREX AND CHLORPHENIRAMINE POLISTIREX 10; 8 MG/5ML; MG/5ML
5 SUSPENSION, EXTENDED RELEASE ORAL EVERY 12 HOURS PRN
Status: DISCONTINUED | OUTPATIENT
Start: 2019-03-17 | End: 2019-03-20 | Stop reason: HOSPADM

## 2019-03-17 RX ADMIN — Medication 2 PUFF: at 21:18

## 2019-03-17 RX ADMIN — OSELTAMIVIR PHOSPHATE 30 MG: 30 CAPSULE ORAL at 21:02

## 2019-03-17 RX ADMIN — ASPIRIN 81 MG: 81 TABLET, COATED ORAL at 07:54

## 2019-03-17 RX ADMIN — APIXABAN 5 MG: 5 TABLET, FILM COATED ORAL at 22:21

## 2019-03-17 RX ADMIN — SODIUM CHLORIDE: 9 INJECTION, SOLUTION INTRAVENOUS at 14:09

## 2019-03-17 RX ADMIN — Medication 1000 MCG: at 07:53

## 2019-03-17 RX ADMIN — OSELTAMIVIR PHOSPHATE 30 MG: 30 CAPSULE ORAL at 07:53

## 2019-03-17 RX ADMIN — LISINOPRIL 20 MG: 20 TABLET ORAL at 07:54

## 2019-03-17 RX ADMIN — PRAVASTATIN SODIUM 80 MG: 80 TABLET ORAL at 07:53

## 2019-03-17 RX ADMIN — APIXABAN 5 MG: 5 TABLET, FILM COATED ORAL at 07:54

## 2019-03-17 RX ADMIN — METOPROLOL SUCCINATE 25 MG: 25 TABLET, FILM COATED, EXTENDED RELEASE ORAL at 07:53

## 2019-03-17 RX ADMIN — LEVOFLOXACIN 500 MG: 5 INJECTION, SOLUTION INTRAVENOUS at 11:51

## 2019-03-17 RX ADMIN — DILTIAZEM HYDROCHLORIDE 5 MG/HR: 5 INJECTION INTRAVENOUS at 14:01

## 2019-03-17 RX ADMIN — SODIUM CHLORIDE: 9 INJECTION, SOLUTION INTRAVENOUS at 03:11

## 2019-03-17 RX ADMIN — AMLODIPINE BESYLATE 10 MG: 10 TABLET ORAL at 07:54

## 2019-03-17 RX ADMIN — LINAGLIPTIN 5 MG: 5 TABLET, FILM COATED ORAL at 07:54

## 2019-03-17 RX ADMIN — PANTOPRAZOLE SODIUM 40 MG: 40 TABLET, DELAYED RELEASE ORAL at 07:54

## 2019-03-17 ASSESSMENT — PAIN SCALES - GENERAL
PAINLEVEL_OUTOF10: 0

## 2019-03-17 NOTE — PLAN OF CARE
Problem: Pain:  Description  Pain management should include both nonpharmacologic and pharmacologic interventions. Goal: Pain level will decrease  Description  Pain level will decrease   3/16/2019 2235 by Tanner Mendoza RN  Outcome: Ongoing  Note:   Pain goal is 0/10. Patient is denying pain at this time. 3/16/2019 2234 by Tanner Mendoza RN  Reactivated  3/16/2019 2234 by Tanner Mendoza RN  Outcome: Completed     Problem: Falls - Risk of:  Goal: Will remain free from falls  Description  Will remain free from falls  Outcome: Ongoing  Note:   Patient reminded to use call light before getting up. Bed alarm on. Goal: Absence of physical injury  Description  Absence of physical injury  Outcome: Ongoing  Note:   No sign of injury, hourly rounding continues. Problem: Cardiovascular  Goal: No DVT, peripheral vascular complications  Outcome: Ongoing  Note:   Patient on eliquis, denies chest pain. Problem: Respiratory  Goal: No pulmonary complications  Outcome: Ongoing  Note:   Patient denies SOB, continues with cough. Problem: GI  Goal: No bowel complications  Outcome: Ongoing  Note:   No bowel movement this shift. Problem:   Goal: Adequate urinary output  Outcome: Ongoing  Note:   Patient denies trouble voiding. Care plan reviewed with patient. Patient verbalize understanding of the plan of care and contribute to goal setting.

## 2019-03-17 NOTE — H&P
late.  Denies any easy bruisability. No blurry vision. Also, denies any  pleuritic chest pain or any abdominal discomfort, diarrhea or  constipation. No dysuria, urgency or hesitancy or any orthopnea or PND. PHYSICAL EXAMINATION:  GENERAL:  I found an elderly patient, sitting up on the bed this  morning. HEENT:  Head normocephalic. No icterus or pallor. VITAL SIGNS:  Blood pressure 115/60, pulse about 51, respirations 18,  temperature of 99.5. LUNGS:  Shows bilateral movement. No obvious rales or wheezes. CARDIOVASCULAR SYSTEM:  S1, S2 heard, but at this time appears to be  slightly tachycardic. More regular. ABDOMEN:  Obese, soft, positive bowel sounds, nontender. No palpable  enlarged organs. NEUROLOGIC:  The patient is alert, awake, responsive to command  appropriately. Moving all extremities without any focal deficits. EXTREMITIES:  Show no edema at this time. No digital clubbing or  cyanosis. AVAILABLE DIAGNOSTIC DATA:  Includes the viral testing confirming  influenza A virus positivity. Urinalysis was otherwise negative. CBC:   White count is 6.2, hemoglobin is 12.5, hematocrit is 38.7, platelet  count of 823. Electrolytes show a BUN of 11, creatinine is 0.7, sodium  is 132, potassium of 3.9, chloride 96, CO2 of 20, glucose of 139. Troponin less than 0.01. EKG, AFib with controlled rate. ASSESSMENT AND PLAN:  Acute viral syndrome, the patient already  commenced on Tamiflu, not quite certain if that window has elapsed, but  I think she may have some benefit with it. We will also consider  careful rehydration therapy as well. Atrial fibrillation, on beta  blocker which we will resumed as well. Diabetes mellitus, on oral  hypoglycemic agent, resume including Januvia and a sliding scale insulin  added. Anticipate short stay in the hospital, will be discharge soon. [address] [place] [address]  Noemi Levy M.D.    D: 03/17/2019 10:37:32       T: 03/17/2019 13:54:09     [email or web address]  Job#: [id number] Doc#: [id number]    CC:

## 2019-03-17 NOTE — H&P
Dr. Nidhi Metcalf ( [facility] )  H&P  3/17/2019  10:15 AM    Patient:  Bruce Cerda  YOB: 1940    MRN: [id number]   Acct:  [id number]   [id number]/[id number]  Primary Care Physician: Jesus Grider DO  dictated      Electronically signed by Quynh Ashraf MD on 3/17/2019 at 10:15 AM         Copy: Primary Care Physician: Jesus Grider DO

## 2019-03-17 NOTE — PLAN OF CARE
Problem: Physical Regulation:  Goal: Signs and symptoms of infection will decrease  Description  Signs and symptoms of infection will decrease  Outcome: Ongoing  Note:   Patient remains in droplet isolation for the flu

## 2019-03-18 LAB
GLUCOSE BLD-MCNC: 120 MG/DL (ref 70–108)
GLUCOSE BLD-MCNC: 122 MG/DL (ref 70–108)
GLUCOSE BLD-MCNC: 132 MG/DL (ref 70–108)
GLUCOSE BLD-MCNC: 141 MG/DL (ref 70–108)
THROAT/NOSE CULTURE: NORMAL
TSH SERPL DL<=0.05 MIU/L-ACNC: 2.79 UIU/ML (ref 0.4–4.2)

## 2019-03-18 PROCEDURE — 84443 ASSAY THYROID STIM HORMONE: CPT

## 2019-03-18 PROCEDURE — 36415 COLL VENOUS BLD VENIPUNCTURE: CPT

## 2019-03-18 PROCEDURE — 6370000000 HC RX 637 (ALT 250 FOR IP): Performed by: INTERNAL MEDICINE

## 2019-03-18 PROCEDURE — 2140000000 HC CCU INTERMEDIATE R&B

## 2019-03-18 PROCEDURE — 82948 REAGENT STRIP/BLOOD GLUCOSE: CPT

## 2019-03-18 PROCEDURE — 6360000002 HC RX W HCPCS: Performed by: INTERNAL MEDICINE

## 2019-03-18 PROCEDURE — 2580000003 HC RX 258: Performed by: INTERNAL MEDICINE

## 2019-03-18 RX ORDER — METOPROLOL SUCCINATE 25 MG/1
25 TABLET, EXTENDED RELEASE ORAL DAILY
Status: DISCONTINUED | OUTPATIENT
Start: 2019-03-19 | End: 2019-03-20 | Stop reason: HOSPADM

## 2019-03-18 RX ORDER — PROPAFENONE HYDROCHLORIDE 150 MG/1
150 TABLET, FILM COATED ORAL EVERY 8 HOURS SCHEDULED
Status: DISCONTINUED | OUTPATIENT
Start: 2019-03-18 | End: 2019-03-20 | Stop reason: HOSPADM

## 2019-03-18 RX ADMIN — PROPAFENONE HYDROCHLORIDE 150 MG: 150 TABLET, FILM COATED ORAL at 22:41

## 2019-03-18 RX ADMIN — PRAVASTATIN SODIUM 80 MG: 80 TABLET ORAL at 09:22

## 2019-03-18 RX ADMIN — PANTOPRAZOLE SODIUM 40 MG: 40 TABLET, DELAYED RELEASE ORAL at 06:26

## 2019-03-18 RX ADMIN — Medication 1000 MCG: at 06:26

## 2019-03-18 RX ADMIN — SODIUM CHLORIDE: 9 INJECTION, SOLUTION INTRAVENOUS at 03:16

## 2019-03-18 RX ADMIN — APIXABAN 5 MG: 5 TABLET, FILM COATED ORAL at 21:17

## 2019-03-18 RX ADMIN — LINAGLIPTIN 5 MG: 5 TABLET, FILM COATED ORAL at 09:22

## 2019-03-18 RX ADMIN — OSELTAMIVIR PHOSPHATE 30 MG: 30 CAPSULE ORAL at 21:17

## 2019-03-18 RX ADMIN — SODIUM CHLORIDE: 9 INJECTION, SOLUTION INTRAVENOUS at 19:03

## 2019-03-18 RX ADMIN — LEVOFLOXACIN 500 MG: 5 INJECTION, SOLUTION INTRAVENOUS at 13:31

## 2019-03-18 RX ADMIN — APIXABAN 5 MG: 5 TABLET, FILM COATED ORAL at 09:22

## 2019-03-18 RX ADMIN — OSELTAMIVIR PHOSPHATE 30 MG: 30 CAPSULE ORAL at 09:22

## 2019-03-18 RX ADMIN — LISINOPRIL 20 MG: 20 TABLET ORAL at 09:22

## 2019-03-18 RX ADMIN — ASPIRIN 81 MG: 81 TABLET, COATED ORAL at 09:22

## 2019-03-18 ASSESSMENT — PAIN SCALES - GENERAL
PAINLEVEL_OUTOF10: 0

## 2019-03-18 NOTE — PROGRESS NOTES
Dr. Camron Zarate Progress note             Patient:  Jamie Zavala  YOB: 1940    MRN: [id number]   Acct:  [de-identified]   3B-31/031-A  Primary Care Physician: Angy Crespo DO    Admit Date: 3/16/2019           Subjective: she feels better from the primary problem and the a fib is now in sinus. Objective:      Physical Exam:    Vitals:  Patient Vitals for the past 24 hrs:   BP Temp Temp src Pulse Resp SpO2   03/18/19 1331 -- -- -- 58 -- --   03/18/19 1153 136/63 98.2 °F (36.8 °C) Oral 53 18 98 %   03/18/19 0911 (!) 122/58 98.1 °F (36.7 °C) Oral 52 18 96 %   03/18/19 0315 122/66 98 °F (36.7 °C) Oral (!) 46 16 95 %   03/17/19 2058 126/68 98.3 °F (36.8 °C) Oral 61 16 96 %   03/17/19 1536 126/74 97.5 °F (36.4 °C) Oral 79 16 94 %     Weight: Weight: 243 lb (110.2 kg)     24 hour intake/output:    Intake/Output Summary (Last 24 hours) at 3/18/2019 1505  Last data filed at 3/18/2019 1435  Gross per 24 hour   Intake 2900.92 ml   Output 0 ml   Net 2900.92 ml       General appearance - alert, well appearing, and in no distress  Eyes - pupils equal and reactive, extraocular eye movements intact  Mouth - mucous membranes moist, pharynx normal without lesions  Neck - supple, no significant adenopathy  Chest - clear to auscultation, symmetrical air entry  Heart - normal rate, regular rhythm, normal S1, S2,   Abdomen - soft, nontender, nondistended, no masses or organomegaly, pos bs.   Neurological - alert, oriented, normal speech, no focal findings or movement disorder noted  Musculoskeletal - no joint tenderness, deformity or swelling  Extremities - peripheral pulses normal, no pedal edema, no clubbing or cyanosis  Skin - normal coloration and turgor, no rashes, no suspicious skin lesions noted    Review of Labs and Diagnostic Testing:    CBC:   Recent Labs     03/16/19  1105   WBC 6.2   HGB 12.5   HCT 38.7   MCV 92.4        BMP:   Recent Labs 03/16/19  1105   *   K 3.9   CL 96*   CO2 20*   BUN 11   CREATININE 0.7   CALCIUM 9.1   GLUCOSE 139*     PT/INR: No results for input(s): PROTIME, INR in the last 72 hours. APTT: No results for input(s): APTT in the last 72 hours. Lipids:   Recent Labs     03/16/19  1105   ALKPHOS 49   ALT 34   AST 39   BILITOT 0.6   BILIDIR <0.2   LABALBU 4.2   LIPASE 24.6     Troponin: No results for input(s): TROPONINI in the last 72 hours. Imaging:  Xr Chest Portable    Result Date: 3/16/2019  PROCEDURE: XR CHEST PORTABLE CLINICAL INFORMATION: 60-year-old female with cough. COMPARISON: Chest x-ray 6/11/2018 TECHNIQUE: AP upright view of the chest. FINDINGS: There is mild cardiomegaly. The mediastinum is not widened. There are no pulmonary infiltrates or effusions. The pulmonary vascularity is normal. No suspicious osseous lesions are present. Mild cardiomegaly with no acute intrathoracic process. **This report has been created using voice recognition software. It may contain minor errors which are inherent in voice recognition technology. ** Final report electronically signed by Dr Arpit Vazquez on 3/16/2019 10:42 AM      EKG:      Diet: DIET CARB CONTROL;        Data:   Scheduled Meds: Scheduled Meds:   levofloxacin  500 mg Intravenous Q24H    metoprolol succinate  50 mg Oral Daily    vitamin B-12  1,000 mcg Oral Daily    aspirin  81 mg Oral Daily    pravastatin  80 mg Oral Daily    linagliptin  5 mg Oral Daily    calcitonin  1 spray Alternating Nares Daily    apixaban  5 mg Oral BID    pantoprazole  40 mg Oral QAM AC    oseltamivir  30 mg Oral BID    insulin lispro  0-12 Units Subcutaneous TID WC    insulin lispro  0-6 Units Subcutaneous Nightly    lisinopril  20 mg Oral Daily     Continuous Infusions:   sodium chloride 75 mL/hr at 03/18/19 0316    dextrose       PRN Meds:.hydrocodone-chlorpheniramine, acetaminophen, albuterol sulfate HFA, albuterol sulfate HFA, nitroGLYCERIN, glucose, dextrose, glucagon (rDNA), dextrose  Continuous Infusions:   sodium chloride 75 mL/hr at 03/18/19 0316    dextrose           Assessment   Active Problems:    Paroxysmal atrial fibrillation (HCC)    Influenza  Resolved Problems:    * No resolved hospital problems. *        Patient Active Problem List   Diagnosis    Hyperlipidemia    HTN (hypertension)    Osteopenia    GERD (gastroesophageal reflux disease)    RAD (reactive airway disease)    OA (osteoarthritis)    Chest pain, atypical    Diabetes mellitus type II, controlled ([address]    Obesity (BMI 30-39. 9)    Chronic low back pain    Neuropathy    Obstructive sleep apnea on CPAP    Controlled type 2 diabetes mellitus without complication (HCC)    Persistent atrial fibrillation (HCC)    Paroxysmal atrial fibrillation (HCC)    Abnormal nuclear stress test    Influenza        Plan   Off the iv Cardizem and is yet to receive any beta blocker die to the set parameters. Will re consult dr Obinna Ventura. Cont hydration and the tamiflu.       Electronically signed by Aileen Burnett MD on 3/18/2019 at 3:05 PM

## 2019-03-18 NOTE — CARE COORDINATION
3/18/19, 9:39 AM      Oliva Olea       Admitted from: ED 3/16/2019/ [place] day: 2   Location: [facility]31/031-A Reason for admit: Influenza [J11.1] Status: IP  Admit order signed?: yes  PMH:  has a past medical history of Cancer ([facility] [address] Diabetes mellitus ([facility] [address] Hyperlipidemia, Hypertension, Osteoporosis, and Sleep apnea. Procedure: none  Pertinent abnormal Imaging:  3/16 CXR  Mild cardiomegaly with no acute intrathoracic process. Medications:  Scheduled Meds:   levofloxacin  500 mg Intravenous Q24H    metoprolol succinate  50 mg Oral Daily    vitamin B-12  1,000 mcg Oral Daily    aspirin  81 mg Oral Daily    pravastatin  80 mg Oral Daily    linagliptin  5 mg Oral Daily    calcitonin  1 spray Alternating Nares Daily    apixaban  5 mg Oral BID    pantoprazole  40 mg Oral QAM AC    oseltamivir  30 mg Oral BID    insulin lispro  0-12 Units Subcutaneous TID WC    insulin lispro  0-6 Units Subcutaneous Nightly    lisinopril  20 mg Oral Daily     Continuous Infusions:   sodium chloride 75 mL/hr at 03/18/19 0316    dextrose        Pertinent Info/Orders/Treatment Plan:  Internal med following. +flu. Droplet isolation. IVF. Inhaler. Tamiflu. IV levaquin. 96% room air. Diet: DIET CARB CONTROL;   Smoking status:  reports that she has never smoked.  She has never used smokeless tobacco.   PCP: Lisset Polk DO  Readmission: no  Readmission Risk Score: 11%    Discharge Planning  Current Residence:  Private Residence  Living Arrangements:  Spouse/Significant Other, Family Members, Children   Support Systems:  Family Members, Spouse/Significant Other, Children  Current Services PTA:     Potential Assistance Needed:  Durable Medical Equipment  Potential Assistance Purchasing Medications:  No  Does patient want to participate in local refill/ meds to beds program?  No  Type of Home Care Services:  None  Patient expects to be discharged to:  home  Expected Discharge date:  03/18/19  Follow Up Appointment: Best Day/ Time: Monday AM    Discharge Plan: Spoke with patient, hoping to be discharged home today. She lives with , does not use dme. Has a home cpap, no o2 through day. Denies need for services.       Evaluation: no

## 2019-03-19 ENCOUNTER — TELEPHONE (OUTPATIENT)
Dept: FAMILY MEDICINE CLINIC | Age: 79
End: 2019-03-19

## 2019-03-19 LAB
ANION GAP SERPL CALCULATED.3IONS-SCNC: 12 MEQ/L (ref 8–16)
BASOPHILS # BLD: 0.7 %
BASOPHILS ABSOLUTE: 0 THOU/MM3 (ref 0–0.1)
BUN BLDV-MCNC: 9 MG/DL (ref 7–22)
CALCIUM SERPL-MCNC: 8.6 MG/DL (ref 8.5–10.5)
CHLORIDE BLD-SCNC: 103 MEQ/L (ref 98–111)
CO2: 23 MEQ/L (ref 23–33)
CREAT SERPL-MCNC: 0.6 MG/DL (ref 0.4–1.2)
EOSINOPHIL # BLD: 4.7 %
EOSINOPHILS ABSOLUTE: 0.2 THOU/MM3 (ref 0–0.4)
ERYTHROCYTE [DISTWIDTH] IN BLOOD BY AUTOMATED COUNT: 13.4 % (ref 11.5–14.5)
ERYTHROCYTE [DISTWIDTH] IN BLOOD BY AUTOMATED COUNT: 44 FL (ref 35–45)
GFR SERPL CREATININE-BSD FRML MDRD: > 90 ML/MIN/1.73M2
GLUCOSE BLD-MCNC: 105 MG/DL (ref 70–108)
GLUCOSE BLD-MCNC: 116 MG/DL (ref 70–108)
GLUCOSE BLD-MCNC: 122 MG/DL (ref 70–108)
GLUCOSE BLD-MCNC: 126 MG/DL (ref 70–108)
GLUCOSE BLD-MCNC: 137 MG/DL (ref 70–108)
HCT VFR BLD CALC: 31.5 % (ref 37–47)
HEMOGLOBIN: 10.5 GM/DL (ref 12–16)
IMMATURE GRANS (ABS): 0.01 THOU/MM3 (ref 0–0.07)
IMMATURE GRANULOCYTES: 0.2 %
LYMPHOCYTES # BLD: 30.2 %
LYMPHOCYTES ABSOLUTE: 1.3 THOU/MM3 (ref 1–4.8)
MCH RBC QN AUTO: 29.6 PG (ref 26–33)
MCHC RBC AUTO-ENTMCNC: 33.3 GM/DL (ref 32.2–35.5)
MCV RBC AUTO: 88.7 FL (ref 81–99)
MONOCYTES # BLD: 8.7 %
MONOCYTES ABSOLUTE: 0.4 THOU/MM3 (ref 0.4–1.3)
NUCLEATED RED BLOOD CELLS: 0 /100 WBC
PLATELET # BLD: 156 THOU/MM3 (ref 130–400)
PMV BLD AUTO: 11.3 FL (ref 9.4–12.4)
POTASSIUM SERPL-SCNC: 4 MEQ/L (ref 3.5–5.2)
RBC # BLD: 3.55 MILL/MM3 (ref 4.2–5.4)
SEG NEUTROPHILS: 55.5 %
SEGMENTED NEUTROPHILS ABSOLUTE COUNT: 2.4 THOU/MM3 (ref 1.8–7.7)
SODIUM BLD-SCNC: 138 MEQ/L (ref 135–145)
WBC # BLD: 4.3 THOU/MM3 (ref 4.8–10.8)

## 2019-03-19 PROCEDURE — 2140000000 HC CCU INTERMEDIATE R&B

## 2019-03-19 PROCEDURE — 2709999900 HC NON-CHARGEABLE SUPPLY

## 2019-03-19 PROCEDURE — 6370000000 HC RX 637 (ALT 250 FOR IP): Performed by: INTERNAL MEDICINE

## 2019-03-19 PROCEDURE — 80048 BASIC METABOLIC PNL TOTAL CA: CPT

## 2019-03-19 PROCEDURE — 85025 COMPLETE CBC W/AUTO DIFF WBC: CPT

## 2019-03-19 PROCEDURE — 6360000002 HC RX W HCPCS: Performed by: INTERNAL MEDICINE

## 2019-03-19 PROCEDURE — 82948 REAGENT STRIP/BLOOD GLUCOSE: CPT

## 2019-03-19 PROCEDURE — 36415 COLL VENOUS BLD VENIPUNCTURE: CPT

## 2019-03-19 RX ADMIN — ACETAMINOPHEN 325 MG: 325 TABLET ORAL at 18:07

## 2019-03-19 RX ADMIN — LEVOFLOXACIN 500 MG: 5 INJECTION, SOLUTION INTRAVENOUS at 13:02

## 2019-03-19 RX ADMIN — LISINOPRIL 20 MG: 20 TABLET ORAL at 10:25

## 2019-03-19 RX ADMIN — PRAVASTATIN SODIUM 80 MG: 80 TABLET ORAL at 10:25

## 2019-03-19 RX ADMIN — LINAGLIPTIN 5 MG: 5 TABLET, FILM COATED ORAL at 10:25

## 2019-03-19 RX ADMIN — OSELTAMIVIR PHOSPHATE 30 MG: 30 CAPSULE ORAL at 21:23

## 2019-03-19 RX ADMIN — Medication 1000 MCG: at 05:26

## 2019-03-19 RX ADMIN — PROPAFENONE HYDROCHLORIDE 150 MG: 150 TABLET, FILM COATED ORAL at 21:23

## 2019-03-19 RX ADMIN — OSELTAMIVIR PHOSPHATE 30 MG: 30 CAPSULE ORAL at 10:25

## 2019-03-19 RX ADMIN — ASPIRIN 81 MG: 81 TABLET, COATED ORAL at 10:25

## 2019-03-19 RX ADMIN — PROPAFENONE HYDROCHLORIDE 150 MG: 150 TABLET, FILM COATED ORAL at 14:32

## 2019-03-19 RX ADMIN — APIXABAN 5 MG: 5 TABLET, FILM COATED ORAL at 10:25

## 2019-03-19 RX ADMIN — METOPROLOL SUCCINATE 25 MG: 25 TABLET, FILM COATED, EXTENDED RELEASE ORAL at 10:25

## 2019-03-19 RX ADMIN — PANTOPRAZOLE SODIUM 40 MG: 40 TABLET, DELAYED RELEASE ORAL at 05:26

## 2019-03-19 RX ADMIN — PROPAFENONE HYDROCHLORIDE 150 MG: 150 TABLET, FILM COATED ORAL at 05:26

## 2019-03-19 RX ADMIN — APIXABAN 5 MG: 5 TABLET, FILM COATED ORAL at 21:23

## 2019-03-19 ASSESSMENT — PAIN SCALES - GENERAL
PAINLEVEL_OUTOF10: 0
PAINLEVEL_OUTOF10: 5

## 2019-03-19 ASSESSMENT — PAIN DESCRIPTION - DESCRIPTORS: DESCRIPTORS: ACHING

## 2019-03-19 ASSESSMENT — PAIN DESCRIPTION - LOCATION: LOCATION: HEAD

## 2019-03-19 ASSESSMENT — PAIN DESCRIPTION - PAIN TYPE: TYPE: ACUTE PAIN

## 2019-03-19 NOTE — CONSULTS
[address] [id number]                                  CONSULTATION    PATIENT NAME: Bear TILLMAN                 :        1940  MED REC NO:   [id number]                           ROOM:       0031  ACCOUNT NO:   [de-identified]                           ADMIT DATE: 2019  PROVIDER:     Maryse Sandoval. NAVID Arzola Cam:  2019    REASON FOR CONSULTATION:  Atrial fib. HISTORY OF PRESENT ILLNESS:  This is a patient who is a 81-year-old lady  well known to me from prior encounters. She presented with flu-like  symptoms and she did have an atrial fib. She was placed on the Cardizem  drip, admitted to . This patient has known history of paroxysmal atrial fib. She did have a  heart catheterization not before long, which showed patent tortuous  coronary arteries. This patient has no history of congestive heart  failure. She has preserved systolic function of the left ventricle. The patient denied chest pain, paresthesia. Some palpitation, shortness  of breath. REVIEW OF SYSTEMS:  The patient has no history of CVA, no history of  thromboembolic phenomenon. She does have a history of diabetes mellitus  which is under control. She does have a history of sleep apnea. This  patient has history of dyslipidemia, history of hypertension. The  patient denied claudication. She is somewhat overweight. She has no  fever, chills, or rigors. The patient has history of hypertension,  cardiomegaly. The patient denied claudication. No GI bleed or bleeding  disorders. She had some shortness of breath and coughing. SOCIAL HISTORY:  She denies smoking or alcohol abuse. ALLERGIES:  The patient has no known allergy. CODE STATUS:  She is a full code. PHYSICAL EXAMINATION:  VITAL SIGNS:  The patient's exam showed her blood pressure is 128/80. Sinus bradycardia, afebrile.   Respiratory rate was 18.  NEUROLOGIC:  The patient has no focal neurological deficits. NECK:  She has no JVD. No carotid bruit. There was no thyromegaly. She has no neck lymphadenopathy. HEENT:  No discharge from the throat, the ears, or the nose. LUNGS:  Shows scattered rhonchi. Few basal crackles. HEART:  A 2/6 systolic murmur. ABDOMEN:  Soft. GENITAL/RECTAL:  Deferred. EXTREMITIES:  Lower limbs, there was no edema. DIAGNOSTIC DATA:  Her EKG showed sinus bradycardia. No acute changes. LABORATORY DATA:  Troponins were negative and the sodium was 132, BUN  11, creatinine 0.7. The hemoglobin 12.5, hematocrit was 38. IMPRESSION:  In summary:  1. The patient with paroxysmal atrial fib. She has been on the  aspirin, the Eliquis. The patient will be placed on the Rythmol 150  three times a day, decrease the dose of the Lopressor because of the  bradycardia. 2.  H flu positive. Admitting service is managing that part. 3.  Diabetes mellitus with history of diabetic neuropathy. 4.  History of sleep apnea. 5.  History of hypertension. 6.  History of arthritis. 7.  The patient otherwise cardiac wise stable. From the cardiac  standpoint, the patient can be treated as an outpatient. Discharge plan per the admitting service. Further recommendations  pending the results of the above tests and hospitalization course. We thank you very much for allowing us to share in the management of  this patient.         Gely Amato M.D.    D: 03/18/2019 98:92:57       T: 03/18/2019 22:39:38     [email or web address]  Job#: [id number]     Doc#: [id number]    CC:

## 2019-03-19 NOTE — PROGRESS NOTES
Dr. Edson Meckel Progress note             Patient:  Alcira Decker  YOB: 1940    MRN: [id number]   Acct:  [de-identified]   3B-31/031-A  Primary Care Physician: Lashay Ballesteros DO    Admit Date: 3/16/2019           Subjective: seen by the cardiologist who adjusted her med. Objective:      Physical Exam:    Vitals:  Patient Vitals for the past 24 hrs:   BP Temp Temp src Pulse Resp SpO2 Weight   03/19/19 0504 (!) 145/67 98.2 °F (36.8 °C) Oral (!) 47 16 95 % 197 lb 4.8 oz (89.5 kg)   03/18/19 2115 122/66 98.3 °F (36.8 °C) Oral (!) 47 16 96 % --   03/18/19 1614 133/63 97.9 °F (36.6 °C) Oral 51 18 96 % --   03/18/19 1331 -- -- -- 58 -- -- --   03/18/19 1153 136/63 98.2 °F (36.8 °C) Oral 53 18 98 % --     Weight: Weight: 197 lb 4.8 oz (89.5 kg)     24 hour intake/output:    Intake/Output Summary (Last 24 hours) at 3/19/2019 0933  Last data filed at 3/19/2019 0500  Gross per 24 hour   Intake 2465. 59 ml   Output 0 ml   Net 2465. 59 ml       General appearance - alert, well appearing, and in no distress  Eyes - pupils equal and reactive, extraocular eye movements intact  Mouth - mucous membranes moist, pharynx normal without lesions  Neck - supple, no significant adenopathy  Chest - clear to auscultation, symmetrical air entry  Heart - normal rate, regular rhythm, normal S1, S2,   Abdomen - soft, nontender, nondistended, no masses or organomegaly, pos bs.   Neurological - alert, oriented, normal speech, no focal findings or movement disorder noted  Musculoskeletal - no joint tenderness, deformity or swelling  Extremities - peripheral pulses normal, no pedal edema, no clubbing or cyanosis  Skin - normal coloration and turgor, no rashes, no suspicious skin lesions noted    Review of Labs and Diagnostic Testing:    CBC:   Recent Labs     03/19/19  0450   WBC 4.3*   HGB 10.5*   HCT 31.5*   MCV 88.7        BMP:   Recent Labs     03/19/19  0450    K 4.0      CO2 23   BUN 9   CREATININE 0.6   CALCIUM 8.6   GLUCOSE 126*     PT/INR: No results for input(s): PROTIME, INR in the last 72 hours. APTT: No results for input(s): APTT in the last 72 hours. Lipids:   Recent Labs     03/16/19  1105   ALKPHOS 49   ALT 34   AST 39   BILITOT 0.6   BILIDIR <0.2   LABALBU 4.2   LIPASE 24.6     Troponin: No results for input(s): TROPONINI in the last 72 hours. Imaging:  Xr Chest Portable    Result Date: 3/16/2019  PROCEDURE: XR CHEST PORTABLE CLINICAL INFORMATION: 80-year-old female with cough. COMPARISON: Chest x-ray 6/11/2018 TECHNIQUE: AP upright view of the chest. FINDINGS: There is mild cardiomegaly. The mediastinum is not widened. There are no pulmonary infiltrates or effusions. The pulmonary vascularity is normal. No suspicious osseous lesions are present. Mild cardiomegaly with no acute intrathoracic process. **This report has been created using voice recognition software. It may contain minor errors which are inherent in voice recognition technology. ** Final report electronically signed by Dr Lori Bennett on 3/16/2019 10:42 AM      EKG:      Diet: DIET CARB CONTROL;        Data:   Scheduled Meds: Scheduled Meds:   propafenone  150 mg Oral 3 times per day    metoprolol succinate  25 mg Oral Daily    levofloxacin  500 mg Intravenous Q24H    vitamin B-12  1,000 mcg Oral Daily    aspirin  81 mg Oral Daily    pravastatin  80 mg Oral Daily    linagliptin  5 mg Oral Daily    calcitonin  1 spray Alternating Nares Daily    apixaban  5 mg Oral BID    pantoprazole  40 mg Oral QAM AC    oseltamivir  30 mg Oral BID    insulin lispro  0-12 Units Subcutaneous TID WC    insulin lispro  0-6 Units Subcutaneous Nightly    lisinopril  20 mg Oral Daily     Continuous Infusions:   sodium chloride 75 mL/hr at 03/18/19 1903    dextrose       PRN Meds:.hydrocodone-chlorpheniramine, acetaminophen, albuterol sulfate HFA, albuterol sulfate HFA, nitroGLYCERIN, glucose, dextrose, glucagon (rDNA), dextrose  Continuous Infusions:   sodium chloride 75 mL/hr at 03/18/19 1903    dextrose           Assessment   Active Problems:    Paroxysmal atrial fibrillation (HCC)    Influenza  Resolved Problems:    * No resolved hospital problems. *        Patient Active Problem List   Diagnosis    Hyperlipidemia    HTN (hypertension)    Osteopenia    GERD (gastroesophageal reflux disease)    RAD (reactive airway disease)    OA (osteoarthritis)    Chest pain, atypical    Diabetes mellitus type II, controlled ([address]    Obesity (BMI 30-39. 9)    Chronic low back pain    Neuropathy    Obstructive sleep apnea on CPAP    Controlled type 2 diabetes mellitus without complication (HCC)    Persistent atrial fibrillation (HCC)    Paroxysmal atrial fibrillation (HCC)    Abnormal nuclear stress test    Influenza        Plan   Doing better  Ambulate today  Home tomorrow.         Electronically signed by Jessie Pinzon MD on 3/19/2019 at 9:33 AM

## 2019-03-19 NOTE — CARE COORDINATION
3/19/19, 2:37 PM      [place] [facility] day: 3  Location: [facility]31/031-A Reason for admit: Influenza [J11.1]   Procedure: none  Treatment Plan of Care: Internal med following. Cardiology seen. Placed on rythmol. Ambulate. 97% on RA. PCP: Brook Ruiz DO  Readmission Risk Score: 13%  Discharge Plan: Planning to return home with , denied needs. Per Dr. Ren Pichardo note, plan discharge tomorrow.

## 2019-03-19 NOTE — PROGRESS NOTES
Admit Date: 3/16/2019  Hospital day 2    Subjective:     Patient no chest pain. Medication side effects: none    Scheduled Meds:   propafenone  150 mg Oral 3 times per day    metoprolol succinate  25 mg Oral Daily    levofloxacin  500 mg Intravenous Q24H    vitamin B-12  1,000 mcg Oral Daily    aspirin  81 mg Oral Daily    pravastatin  80 mg Oral Daily    linagliptin  5 mg Oral Daily    calcitonin  1 spray Alternating Nares Daily    apixaban  5 mg Oral BID    pantoprazole  40 mg Oral QAM AC    oseltamivir  30 mg Oral BID    insulin lispro  0-12 Units Subcutaneous TID WC    insulin lispro  0-6 Units Subcutaneous Nightly    lisinopril  20 mg Oral Daily     Continuous Infusions:   sodium chloride 75 mL/hr at 03/18/19 1903    dextrose       PRN Meds:hydrocodone-chlorpheniramine, acetaminophen, albuterol sulfate HFA, albuterol sulfate HFA, nitroGLYCERIN, glucose, dextrose, glucagon (rDNA), dextrose    Review of Systems  Pertinent items are noted in HPI. Objective:     Patient Vitals for the past 8 hrs:   BP Temp Temp src Pulse Resp SpO2   03/19/19 1611 133/63 97.8 °F (36.6 °C) Oral (!) 43 18 93 %   03/19/19 1300 124/68 97.8 °F (36.6 °C) Oral 55 18 --     I/O last 3 completed shifts: In: 3113.5 [P.O.:1630;  I.V.:1483.5]  Out: 400 [Urine:400]    /63   Pulse (!) 43   Temp 97.8 °F (36.6 °C) (Oral)   Resp 18   Ht 5' 6\" (1.676 m)   Wt 197 lb 4.8 oz (89.5 kg)   LMP  (Exact Date)   SpO2 93%   BMI 31.85 kg/m²     General Appearance:    Alert, cooperative, no distress, appears stated age   Head:    Normocephalic, without obvious abnormality, atraumatic   Eyes:    PERRL, conjunctiva/corneas clear, EOM's intact, fundi     benign, both eyes   Ears:    Normal TM's and external ear canals, both ears   Nose:   Nares normal, septum midline, mucosa normal, no drainage    or sinus tenderness   Throat:   Lips, mucosa, and tongue normal; teeth and gums normal   Neck:   Supple, symmetrical, trachea midline, no adenopathy;     thyroid:  no enlargement/tenderness/nodules; no carotid    bruit or JVD   Back:     Symmetric, no curvature, ROM normal, no CVA tenderness   Lungs:     Clear to auscultation bilaterally, respirations unlabored   Chest Wall:    No tenderness or deformity    Heart:    Regular rate and rhythm, S1 and S2 normal, no murmur, rub   or gallop   Breast Exam:    No tenderness, masses, or nipple abnormality   Abdomen:     Soft, non-tender, bowel sounds active all four quadrants,     no masses, no organomegaly   Genitalia:    Normal female without lesion, discharge or tenderness   Rectal:    Normal tone ;guaiac negative stool   Extremities:   Extremities normal, atraumatic, no cyanosis or edema   Pulses:   2+ and symmetric all extremities   Skin:   Skin color, texture, turgor normal, no rashes or lesions   Lymph nodes:   Cervical, supraclavicular, and axillary nodes normal   Neurologic:   CNII-XII intact, normal strength, sensation and reflexes     throughout       ECG: normal sinus rhythm, no blocks or conduction defects, no ischemic changes. Data Review:   CBC:  Lab Results   Component Value Date    WBC 4.3 03/19/2019    RBC 3.55 03/19/2019    RBC 4.00 05/30/2012    HGB 10.5 03/19/2019    HCT 31.5 03/19/2019    MCV 88.7 03/19/2019    MCH 29.6 03/19/2019    MCHC 33.3 03/19/2019    RDW 15.3 04/05/2018     03/19/2019    MPV 11.3 03/19/2019     BMP  Lab Results   Component Value Date     03/19/2019    K 4.0 03/19/2019    K 4.7 04/05/2018     03/19/2019    CO2 23 03/19/2019    BUN 9 03/19/2019    CREATININE 0.6 03/19/2019    CALCIUM 8.6 03/19/2019      COAG PROFILE:  Lab Results   Component Value Date    APTT 77.6 10/25/2017    INR 1.08 04/05/2018       Assessment:     Active Problems:    Paroxysmal atrial fibrillation (HCC)    Influenza  Resolved Problems:    * No resolved hospital problems.  *      Plan:     Patient continue to be  Stable    ok for discharge from cardiac stand point    will

## 2019-03-20 VITALS
HEART RATE: 55 BPM | RESPIRATION RATE: 18 BRPM | HEIGHT: 66 IN | DIASTOLIC BLOOD PRESSURE: 58 MMHG | WEIGHT: 244.8 LBS | TEMPERATURE: 97.4 F | BODY MASS INDEX: 39.34 KG/M2 | SYSTOLIC BLOOD PRESSURE: 144 MMHG | OXYGEN SATURATION: 98 %

## 2019-03-20 LAB
GLUCOSE BLD-MCNC: 102 MG/DL (ref 70–108)
GLUCOSE BLD-MCNC: 126 MG/DL (ref 70–108)

## 2019-03-20 PROCEDURE — 6370000000 HC RX 637 (ALT 250 FOR IP): Performed by: INTERNAL MEDICINE

## 2019-03-20 PROCEDURE — 6360000002 HC RX W HCPCS: Performed by: INTERNAL MEDICINE

## 2019-03-20 PROCEDURE — 2709999900 HC NON-CHARGEABLE SUPPLY

## 2019-03-20 PROCEDURE — 82948 REAGENT STRIP/BLOOD GLUCOSE: CPT

## 2019-03-20 RX ORDER — OSELTAMIVIR PHOSPHATE 30 MG/1
30 CAPSULE ORAL 2 TIMES DAILY
Qty: 2 CAPSULE | Refills: 0 | Status: SHIPPED | OUTPATIENT
Start: 2019-03-20 | End: 2019-03-27

## 2019-03-20 RX ORDER — PROPAFENONE HYDROCHLORIDE 150 MG/1
150 TABLET, FILM COATED ORAL EVERY 8 HOURS SCHEDULED
Qty: 90 TABLET | Refills: 3 | Status: ON HOLD | OUTPATIENT
Start: 2019-03-20 | End: 2019-05-22 | Stop reason: HOSPADM

## 2019-03-20 RX ORDER — HYDROCODONE POLISTIREX AND CHLORPHENIRAMINE POLISTIREX 10; 8 MG/5ML; MG/5ML
5 SUSPENSION, EXTENDED RELEASE ORAL EVERY 12 HOURS PRN
Qty: 1 BOTTLE | Refills: 0 | Status: SHIPPED | OUTPATIENT
Start: 2019-03-20 | End: 2019-03-25

## 2019-03-20 RX ADMIN — METOPROLOL SUCCINATE 25 MG: 25 TABLET, FILM COATED, EXTENDED RELEASE ORAL at 10:22

## 2019-03-20 RX ADMIN — PROPAFENONE HYDROCHLORIDE 150 MG: 150 TABLET, FILM COATED ORAL at 10:21

## 2019-03-20 RX ADMIN — PRAVASTATIN SODIUM 80 MG: 80 TABLET ORAL at 10:21

## 2019-03-20 RX ADMIN — OSELTAMIVIR PHOSPHATE 30 MG: 30 CAPSULE ORAL at 10:21

## 2019-03-20 RX ADMIN — Medication 1000 MCG: at 05:29

## 2019-03-20 RX ADMIN — ASPIRIN 81 MG: 81 TABLET, COATED ORAL at 10:22

## 2019-03-20 RX ADMIN — PANTOPRAZOLE SODIUM 40 MG: 40 TABLET, DELAYED RELEASE ORAL at 05:29

## 2019-03-20 RX ADMIN — PROPAFENONE HYDROCHLORIDE 150 MG: 150 TABLET, FILM COATED ORAL at 14:31

## 2019-03-20 RX ADMIN — LEVOFLOXACIN 500 MG: 5 INJECTION, SOLUTION INTRAVENOUS at 12:46

## 2019-03-20 RX ADMIN — APIXABAN 5 MG: 5 TABLET, FILM COATED ORAL at 10:22

## 2019-03-20 RX ADMIN — LINAGLIPTIN 5 MG: 5 TABLET, FILM COATED ORAL at 10:22

## 2019-03-20 RX ADMIN — LISINOPRIL 20 MG: 20 TABLET ORAL at 10:21

## 2019-03-20 ASSESSMENT — PAIN SCALES - GENERAL
PAINLEVEL_OUTOF10: 0

## 2019-03-20 NOTE — FLOWSHEET NOTE
03/19/19 1925   Encounter Summary   Services provided to: Patient   Referral/Consult From: [facility];Spouse   Continue Visiting Yes  (3/19/19)   Complexity of Encounter High   Length of Encounter 1 hour   Spiritual Assessment Completed Yes   Grief and Life Adjustment   Type New Diagnosis; Adjustment to illness   Assessment Approachable   Intervention Discussed illness/injury and it's impact; Discussed relationship with God;Discussed death;Grief care;Prayer;Explored feelings, thoughts, concerns; Active listening   Outcome Receptive;Encouraged;Grieving;New perspective; Expressed gratitude;Engaged in conversation     Subjective: Patient was approachable as she was sitting in her chair. Patient was in isolation therefore this staff stayed across the room during the visit. Patient explained Danny Campa decided I now have afib and I thought maybe I had the flu. \"    Objective: Per report, the patient was brought in because of shortness of breath. Assessment: Patient was receptive to the  and engaged in our conversation. When asked about her personal life with God, she went on to explain about the death of her daughter. She shared all the details of that day as though it happened yesterday although the event was about 30 yrs ago. She cried during the story and asked why she would still be feeling this so intensely. We talked through some of the grief steps and this staff offered OP support if she felt it would be helpful. We talked about her relationship with God and how this has helped her over the years. - Patient is sustained by support from Protestant and her family. Her violet experience was discussed from various different directions as we shared scripture readings which helped her find comfort over the years. As we shared together she became more energized with the conversation.  We shared in prayer together prior to my leaving and she asked thanked me for the continued support and encouragement. - Patient is coping with her situation and her pain level appeared controlled. She is optimistic about the her recovery. Plan:  provided an empathetic listening presence for the patient during our encounter. Patient is not sure when she will be released therefore follow-up visits could continue to assist her.

## 2019-03-20 NOTE — PROGRESS NOTES
Patient given discharge instructions via teachback method. Patient verbalizes understanding of new medications and side effects. She verbalizes understanding of new medications and side effects. Patient taken to the discharge lobby in stable condition.

## 2019-03-20 NOTE — DISCHARGE SUMMARY
Dr. Kalina Morocho Discharge Summary  3/20/2019  3:43 PM    Patient:  Gerardo Hammans  YOB: 1940    MRN: [id number]   Acct: [de-identified]   [id number]   Primary Care Physician: Lisset Polk DO    Admit date:  3/16/2019    Discharge date:  3/20/2019    Discharge Diagnoses:    Patient Active Problem List   Diagnosis    Hyperlipidemia    HTN (hypertension)    Osteopenia    GERD (gastroesophageal reflux disease)    RAD (reactive airway disease)    OA (osteoarthritis)    Chest pain, atypical    Diabetes mellitus type II, controlled ([address]    Obesity (BMI 30-39. 9)    Chronic low back pain    Neuropathy    Obstructive sleep apnea on CPAP    Controlled type 2 diabetes mellitus without complication (HCC)    Persistent atrial fibrillation (HCC)    Paroxysmal atrial fibrillation (HCC)    Abnormal nuclear stress test    Influenza       Discharge Medications:       Ruth Atwood   [place] Medication Instructions DYU:[id number]    Printed on:03/20/19 7358   Medication Information                      acetaminophen (TYLENOL) 325 MG tablet  Take 325 mg by mouth every 4 hours as needed for Pain (For mild pain level 1-3 or for fever > 100.5)              albuterol sulfate HFA (VENTOLIN HFA) 108 (90 Base) MCG/ACT inhaler  Inhale 2 puffs into the lungs every 4 hours as needed for Wheezing             amLODIPine-benazepril (LOTREL) 10-20 MG per capsule  Take 1 capsule by mouth daily             apixaban (ELIQUIS) 5 MG TABS tablet  Take 1 tablet by mouth 2 times daily             aspirin 81 MG EC tablet  Take 1 tablet by mouth daily             B Complex-C (SUPER B COMPLEX PO)  Take by mouth             BIOTIN PO  Take 1 tablet by mouth daily             calcitonin (MIACALCIN) 200 UNIT/ACT nasal spray  1 spray by Nasal route daily             Cyanocobalamin (VITAMIN B 12 PO)  Take  by mouth Daily.                GLUCOSAMINE-CHONDROITIN-VIT D3 PO  Take by mouth daily [address] filed at 3/20/2019 1339  Gross per 24 hour   Intake 1160.93 ml   Output 2825 ml   Net -1664.07 ml       Diet:  DIET CARB CONTROL; Activity:  Up ad carlos (Patient can move independently)    Follow-up:  in the next few weeks with Jena Hartley DO,  in the next few weeks cardiology    Consultants:  cardiologist    Procedures:      Diagnostic Test:    Objective:  Lab Results   Component Value Date    WBC 4.3 03/19/2019    RBC 3.55 03/19/2019    RBC 4.00 05/30/2012    HGB 10.5 03/19/2019    HCT 31.5 03/19/2019    MCV 88.7 03/19/2019    MCH 29.6 03/19/2019    MCHC 33.3 03/19/2019    RDW 15.3 04/05/2018     03/19/2019    MPV 11.3 03/19/2019     Lab Results   Component Value Date     03/19/2019    K 4.0 03/19/2019    K 4.7 04/05/2018     03/19/2019    CO2 23 03/19/2019    BUN 9 03/19/2019    CREATININE 0.6 03/19/2019    GLUCOSE 126 03/19/2019    GLUCOSE 114 05/30/2012    CALCIUM 8.6 03/19/2019     Lab Results   Component Value Date    CALCIUM 8.6 03/19/2019     No results found for: IONCA  Lab Results   Component Value Date    MG 1.9 03/20/2018     Lab Results   Component Value Date    PHOS 3.7 04/22/2014     Lab Results   Component Value Date    BNP 42.1 05/03/2013     Lab Results   Component Value Date    ALKPHOS 49 03/16/2019    ALT 34 03/16/2019    AST 39 03/16/2019    PROT 7.5 03/16/2019    BILITOT 0.6 03/16/2019    BILIDIR <0.2 03/16/2019    LABALBU 4.2 03/16/2019    LABALBU 4.6 05/30/2012     No results found for: LACTA  No results found for: AMYLASE  Lab Results   Component Value Date    LIPASE 24.6 03/16/2019     Lab Results   Component Value Date    CHOL 172 04/05/2018    TRIG 58 04/05/2018    HDL 94 04/05/2018    LDLCALC 66 04/05/2018     Recent Labs     03/19/19  2007 03/20/19  0626 03/20/19  1111   POCGLU 137* 126* 102     No results for input(s): CKTOTAL, CKMB, TROPONINI in the last 72 hours.   Lab Results   Component Value Date    LABA1C 5.5 09/27/2018     Lab Results turgor, no rashes, no suspicious skin lesions noted      Disposition: home    Condition: Stable        Delfino Blankenship MD     Copy: Primary Care Physician: Dann Harada, DO  Internal Medicine  Over 30 mins spent.

## 2019-03-21 NOTE — CARE COORDINATION
3/21/19, 9:59 AM    Discharge plan discussed by  and . Discharge plan reviewed with patient/ family. Patient/ family verbalize understanding of discharge plan and are in agreement with plan. Understanding was demonstrated using the teach back method. Discharged in evening on 3/20/19. Planned to return home with , denied needs. No HH or DME orders.         IMM Letter  IMM Letter given to Patient/Family/Significant other/Guardian/POA/by[de-identified]   IMM Letter date given[de-identified] 03/19/19  IMM Letter time given[de-identified] 7221

## 2019-03-22 LAB
BLOOD CULTURE, ROUTINE: NORMAL
BLOOD CULTURE, ROUTINE: NORMAL

## 2019-03-27 ENCOUNTER — OFFICE VISIT (OUTPATIENT)
Dept: FAMILY MEDICINE CLINIC | Age: 79
End: 2019-03-27
Payer: MEDICARE

## 2019-03-27 VITALS
DIASTOLIC BLOOD PRESSURE: 82 MMHG | RESPIRATION RATE: 20 BRPM | HEIGHT: 66 IN | BODY MASS INDEX: 39.17 KG/M2 | HEART RATE: 92 BPM | SYSTOLIC BLOOD PRESSURE: 118 MMHG | WEIGHT: 243.7 LBS

## 2019-03-27 DIAGNOSIS — I48.91 ATRIAL FIBRILLATION WITH RVR (HCC): ICD-10-CM

## 2019-03-27 DIAGNOSIS — E86.0 DEHYDRATION: ICD-10-CM

## 2019-03-27 DIAGNOSIS — E11.9 CONTROLLED TYPE 2 DIABETES MELLITUS WITHOUT COMPLICATION, WITHOUT LONG-TERM CURRENT USE OF INSULIN (HCC): ICD-10-CM

## 2019-03-27 DIAGNOSIS — K21.9 GASTROESOPHAGEAL REFLUX DISEASE, ESOPHAGITIS PRESENCE NOT SPECIFIED: ICD-10-CM

## 2019-03-27 DIAGNOSIS — Z99.89 OBSTRUCTIVE SLEEP APNEA ON CPAP: ICD-10-CM

## 2019-03-27 DIAGNOSIS — J10.1 INFLUENZA A: Primary | ICD-10-CM

## 2019-03-27 DIAGNOSIS — I10 ESSENTIAL HYPERTENSION: ICD-10-CM

## 2019-03-27 DIAGNOSIS — G47.33 OBSTRUCTIVE SLEEP APNEA ON CPAP: ICD-10-CM

## 2019-03-27 DIAGNOSIS — E78.00 PURE HYPERCHOLESTEROLEMIA: ICD-10-CM

## 2019-03-27 DIAGNOSIS — E11.21 CONTROLLED TYPE 2 DIABETES MELLITUS WITH DIABETIC NEPHROPATHY, WITHOUT LONG-TERM CURRENT USE OF INSULIN (HCC): ICD-10-CM

## 2019-03-27 DIAGNOSIS — E66.9 OBESITY (BMI 30-39.9): ICD-10-CM

## 2019-03-27 PROCEDURE — G8399 PT W/DXA RESULTS DOCUMENT: HCPCS | Performed by: FAMILY MEDICINE

## 2019-03-27 PROCEDURE — G8427 DOCREV CUR MEDS BY ELIG CLIN: HCPCS | Performed by: FAMILY MEDICINE

## 2019-03-27 PROCEDURE — 1111F DSCHRG MED/CURRENT MED MERGE: CPT | Performed by: FAMILY MEDICINE

## 2019-03-27 PROCEDURE — 4040F PNEUMOC VAC/ADMIN/RCVD: CPT | Performed by: FAMILY MEDICINE

## 2019-03-27 PROCEDURE — 99214 OFFICE O/P EST MOD 30 MIN: CPT | Performed by: FAMILY MEDICINE

## 2019-03-27 PROCEDURE — G8417 CALC BMI ABV UP PARAM F/U: HCPCS | Performed by: FAMILY MEDICINE

## 2019-03-27 PROCEDURE — 1036F TOBACCO NON-USER: CPT | Performed by: FAMILY MEDICINE

## 2019-03-27 PROCEDURE — 1090F PRES/ABSN URINE INCON ASSESS: CPT | Performed by: FAMILY MEDICINE

## 2019-03-27 PROCEDURE — G8484 FLU IMMUNIZE NO ADMIN: HCPCS | Performed by: FAMILY MEDICINE

## 2019-03-27 PROCEDURE — 1123F ACP DISCUSS/DSCN MKR DOCD: CPT | Performed by: FAMILY MEDICINE

## 2019-03-27 ASSESSMENT — PATIENT HEALTH QUESTIONNAIRE - PHQ9
SUM OF ALL RESPONSES TO PHQ QUESTIONS 1-9: 0
SUM OF ALL RESPONSES TO PHQ QUESTIONS 1-9: 0
SUM OF ALL RESPONSES TO PHQ9 QUESTIONS 1 & 2: 0
2. FEELING DOWN, DEPRESSED OR HOPELESS: 0
1. LITTLE INTEREST OR PLEASURE IN DOING THINGS: 0

## 2019-05-17 ENCOUNTER — HOSPITAL ENCOUNTER (OUTPATIENT)
Age: 79
Setting detail: OUTPATIENT SURGERY
Discharge: HOME | End: 2019-05-17
Attending: INTERNAL MEDICINE | Admitting: INTERNAL MEDICINE
Payer: MEDICARE

## 2019-05-17 ENCOUNTER — ANESTHESIA EVENT (OUTPATIENT)
Dept: ENDOSCOPY | Age: 79
End: 2019-05-17
Payer: MEDICARE

## 2019-05-17 ENCOUNTER — ANESTHESIA (OUTPATIENT)
Dept: ENDOSCOPY | Age: 79
End: 2019-05-17
Payer: MEDICARE

## 2019-05-17 VITALS
WEIGHT: 239.4 LBS | RESPIRATION RATE: 20 BRPM | HEIGHT: 66 IN | BODY MASS INDEX: 38.47 KG/M2 | DIASTOLIC BLOOD PRESSURE: 72 MMHG | OXYGEN SATURATION: 99 % | TEMPERATURE: 97.4 F | HEART RATE: 92 BPM | SYSTOLIC BLOOD PRESSURE: 116 MMHG

## 2019-05-17 VITALS
OXYGEN SATURATION: 98 % | DIASTOLIC BLOOD PRESSURE: 68 MMHG | SYSTOLIC BLOOD PRESSURE: 110 MMHG | RESPIRATION RATE: 6 BRPM

## 2019-05-17 LAB
BASOPHILS # BLD: 0.7 %
BASOPHILS ABSOLUTE: 0 THOU/MM3 (ref 0–0.1)
EOSINOPHIL # BLD: 1.1 %
EOSINOPHILS ABSOLUTE: 0 THOU/MM3 (ref 0–0.4)
ERYTHROCYTE [DISTWIDTH] IN BLOOD BY AUTOMATED COUNT: 14.2 % (ref 11.5–14.5)
ERYTHROCYTE [DISTWIDTH] IN BLOOD BY AUTOMATED COUNT: 48.5 FL (ref 35–45)
HCT VFR BLD CALC: 35.4 % (ref 37–47)
HEMOGLOBIN: 11.6 GM/DL (ref 12–16)
IMMATURE GRANS (ABS): 0.01 THOU/MM3 (ref 0–0.07)
IMMATURE GRANULOCYTES: 0.2 %
LYMPHOCYTES # BLD: 23.2 %
LYMPHOCYTES ABSOLUTE: 1 THOU/MM3 (ref 1–4.8)
MCH RBC QN AUTO: 30.1 PG (ref 26–33)
MCHC RBC AUTO-ENTMCNC: 32.8 GM/DL (ref 32.2–35.5)
MCV RBC AUTO: 91.9 FL (ref 81–99)
MONOCYTES # BLD: 11.5 %
MONOCYTES ABSOLUTE: 0.5 THOU/MM3 (ref 0.4–1.3)
NUCLEATED RED BLOOD CELLS: 0 /100 WBC
PLATELET # BLD: 178 THOU/MM3 (ref 130–400)
PMV BLD AUTO: 10.8 FL (ref 9.4–12.4)
RBC # BLD: 3.85 MILL/MM3 (ref 4.2–5.4)
SEG NEUTROPHILS: 63.3 %
SEGMENTED NEUTROPHILS ABSOLUTE COUNT: 2.8 THOU/MM3 (ref 1.8–7.7)
WBC # BLD: 4.4 THOU/MM3 (ref 4.8–10.8)

## 2019-05-17 PROCEDURE — 2580000003 HC RX 258: Performed by: INTERNAL MEDICINE

## 2019-05-17 PROCEDURE — 6370000000 HC RX 637 (ALT 250 FOR IP): Performed by: NURSE ANESTHETIST, CERTIFIED REGISTERED

## 2019-05-17 PROCEDURE — 6360000002 HC RX W HCPCS: Performed by: NURSE ANESTHETIST, CERTIFIED REGISTERED

## 2019-05-17 PROCEDURE — 2500000003 HC RX 250 WO HCPCS: Performed by: NURSE ANESTHETIST, CERTIFIED REGISTERED

## 2019-05-17 PROCEDURE — 2709999900 HC NON-CHARGEABLE SUPPLY: Performed by: INTERNAL MEDICINE

## 2019-05-17 PROCEDURE — 85025 COMPLETE CBC W/AUTO DIFF WBC: CPT

## 2019-05-17 PROCEDURE — 3700000001 HC ADD 15 MINUTES (ANESTHESIA): Performed by: INTERNAL MEDICINE

## 2019-05-17 PROCEDURE — 3609009500 HC COLONOSCOPY DIAGNOSTIC OR SCREENING: Performed by: INTERNAL MEDICINE

## 2019-05-17 PROCEDURE — 88305 TISSUE EXAM BY PATHOLOGIST: CPT

## 2019-05-17 PROCEDURE — 7100000000 HC PACU RECOVERY - FIRST 15 MIN: Performed by: INTERNAL MEDICINE

## 2019-05-17 PROCEDURE — 36415 COLL VENOUS BLD VENIPUNCTURE: CPT

## 2019-05-17 PROCEDURE — 3700000000 HC ANESTHESIA ATTENDED CARE: Performed by: INTERNAL MEDICINE

## 2019-05-17 PROCEDURE — 7100000001 HC PACU RECOVERY - ADDTL 15 MIN: Performed by: INTERNAL MEDICINE

## 2019-05-17 PROCEDURE — 3609012400 HC EGD TRANSORAL BIOPSY SINGLE/MULTIPLE: Performed by: INTERNAL MEDICINE

## 2019-05-17 RX ORDER — LIDOCAINE HYDROCHLORIDE 20 MG/ML
INJECTION, SOLUTION INFILTRATION; PERINEURAL PRN
Status: DISCONTINUED | OUTPATIENT
Start: 2019-05-17 | End: 2019-05-17 | Stop reason: SDUPTHER

## 2019-05-17 RX ORDER — PROPOFOL 10 MG/ML
INJECTION, EMULSION INTRAVENOUS PRN
Status: DISCONTINUED | OUTPATIENT
Start: 2019-05-17 | End: 2019-05-17 | Stop reason: SDUPTHER

## 2019-05-17 RX ORDER — SODIUM CHLORIDE 450 MG/100ML
INJECTION, SOLUTION INTRAVENOUS CONTINUOUS
Status: DISCONTINUED | OUTPATIENT
Start: 2019-05-17 | End: 2019-05-17 | Stop reason: HOSPADM

## 2019-05-17 RX ADMIN — LIDOCAINE HYDROCHLORIDE 100 MG: 20 INJECTION, SOLUTION INFILTRATION; PERINEURAL at 10:11

## 2019-05-17 RX ADMIN — SODIUM CHLORIDE: 4.5 INJECTION, SOLUTION INTRAVENOUS at 09:33

## 2019-05-17 RX ADMIN — Medication 1 SPRAY: at 10:13

## 2019-05-17 RX ADMIN — PROPOFOL 350 MG: 10 INJECTION, EMULSION INTRAVENOUS at 10:11

## 2019-05-17 ASSESSMENT — PAIN SCALES - GENERAL
PAINLEVEL_OUTOF10: 0
PAINLEVEL_OUTOF10: 0

## 2019-05-17 ASSESSMENT — PAIN - FUNCTIONAL ASSESSMENT: PAIN_FUNCTIONAL_ASSESSMENT: 0-10

## 2019-05-17 NOTE — OP NOTE
[facility]    CONSCIOUS SEDATION      Patient: Sherry Lyons  : 1940  Acct#: [de-identified]    PLANNED PROCEDURE   [x]EGD  [x]Colonoscopy []Flex Sigmoid  []Other:  Indication: Pain control for GI procedure    Consent: I have discussed with the patient and/or the patient representative the indication, alternatives, and the possible risks and/or complications of the planned procedure and the anesthesia methods. The patient and/or patient representative appear to understand and agree to proceed. Pre-Sedation Documentation and Exam: I have personally completed a history, physical exam & review of systems for this patient (see notes). Airway Assessment: mac    Prior History of Anesthesia Complications: mac    ASA Classification: mac    Sedation/ Anesthesia Plan: mac      SEDATION  Planned agent:[x]Midazolam []Meperidine [x]Sublimaze []Morphine    Monitoring and Safety: The patient will be placed on a cardiac monitor and vital signs, pulse oximetry and level of consciousness will be continuously evaluated throughout the procedure. The patient will be closely monitored until recovery from the medications is complete and the patient has returned to baseline status. Respiratory therapy will be on standby during the procedure. I have reviewed with the patient and/or family the risks, benefits, and alternatives to the procedure.     Gemma Marshall MD, CNSP  2019, 10:08 AM    Post-Sedation Vital Signs: Vital signs were reviewed and were stable after the procedure (see flow sheet for vitals)            Post-Sedation Exam: Lungs: clear           Complications: none     Gemma Marshall MD, CNSP  2019,

## 2019-05-17 NOTE — BRIEF OP NOTE
Brief Postoperative Note  ______________________________________________________________    Patient: Sangeetha Dyson  YOB: 1940  MRN: [id number]  Date of Procedure: 5/17/2019    Pre-Op Diagnosis: RECTAL BLEEDING, ANEMIA    Post-Op Diagnosis: Same       Procedure(s):  COLONOSCOPY DIAGNOSTIC  EGD BIOPSY    Anesthesia: Monitor Anesthesia Care    Surgeon(s):  Christopher Guzman MD    Assistant: yes    Estimated Blood Loss (mL): less than 50     Complications: None    Specimens:   ID Type Source Tests Collected by Time Destination   A : GASTRIC POLYPS Tissue Stomach SURGICAL PATHOLOGY Christopher Guzman MD 5/17/2019 1020        Implants:  * No implants in log *      Drains: * No LDAs found *    Findings: gastric polyps bx, colon mild diverticulosis, large internal Hemorid.     Christopher Guzman MD  Date: 5/17/2019  Time: 10:50 AM

## 2019-05-17 NOTE — PROGRESS NOTES
Recovery mode, denies discomfort, passing gas. Dr. Horace Urban discussed findings and plan of care with pt and family.

## 2019-05-17 NOTE — PROCEDURES
[address] [place] [address]                                 PROCEDURE NOTE    PATIENT NAME: Rosendo Hope                 :        1940  MED REC NO:   [id number]                           ROOM:  ACCOUNT NO:   [de-identified]                           ADMIT DATE: 2019  PROVIDER:     NAVID Sandersyousifemily Dark OF PROCEDURE:  2019    PROCEDURES:  Esophagogastroduodenoscopy and colonoscopy. INDICATIONS:  A 69year-old pleasant female who has anemia, blood in the  stools. She is undergoing further evaluation. Please see my brief  history for details and for physical examination. SURGEON:  Libra Samayoa M.D. ASA CLASSIFICATION:  As per Anesthesia. Please see Anesthesia note for  details. INSTRUMENT:  PCF-H190AL pediatric colonoscope. PHOTOGRAPHS:  Yes. BIOPSIES:  Yes. CONSENT:  Procedure indications and complications including, but not  limited to perforation, bleeding, infection, adverse reaction to  medicine, very slight chance of missing significant lesions discussed  with the patient. The patient expressed her understanding of the  procedure and a written consent was obtained. DESCRIPTION OF PROCEDURE:  The patient was placed in the left lateral  decubitus position in the endo room #13. The patient was placed on  appropriate monitoring of vitals including blood pressure, heart rate,  and pulse ox. ESOPHAGOGASTRODUODENOSCOPY REPORT:  Oropharynx was sprayed with  Cetacaine spray and bite block was placed between maxilla and mandible. After the adequate total intravenous anesthesia was given by Anesthesia,  the PCF-H190AL pediatric colonoscope was inserted into the oropharynx  and the esophagus was intubated under direct vision. The scope was  advanced down through the stomach into the second portion of duodenum.     On careful inspection and on withdrawal of the scope, the duodenum looks  normal as shown in picture numbers A3 and A4. Antrum of the stomach  looks normal as shown in picture number A5. In the body of the stomach  and fundus, the patient had multiple gastric polyps noted as shown in  picture numbers A6, A7, and A8. Extensive biopsies obtained. On  retroflex, small hiatal hernia noted as shown in picture number A8. In  the distal esophagus, the patient had an intact squamocolumnar junction  at gastroesophageal junction noted as shown in picture #2. Mid and  proximal esophagus looks normal as shown in picture #1. Air was withdrawn as the scope was removed. The patient tolerated the  procedure well without any immediate complications. COLONOSCOPY REPORT:  The patient's position was changed after the rectal  examination, continued on total intravenous anesthesia. The PCF-H190AL  pediatric colonoscope was inserted into the rectum and advanced up to  the cecum with gentle sigmoid pressure. On careful inspection, the  preparation was good. On withdrawal of the scope, the appendiceal  orifice and cecum look normal as shown in picture number A4. Ileocecal  valve  and ascending colon looks normal as shown in picture numbers A2 and A3. Transverse colon looks normal as shown in picture number A5. Descending  colon looks normal as shown in picture numbers A6 and A7. In the  sigmoid colon, mild diverticulosis noted as shown in picture number A8. On retroflex in the rectum, moderate to large internal hemorrhoids noted  as shown in picture numbers A9 and A10. Air was withdrawn as the scope was removed. The patient tolerated the  procedure well without any immediate complications. Vitals including  blood pressure, heart rate, and pulse ox were stable during the  procedure and after the procedure. The patient was transferred to the  recovery room in a stable condition    IMPRESSION:  1.   Esophagogastroduodenoscopy to the second portion of duodenum:   Multiple fundic polyps noted. Biopsies obtained. 2.  Colonoscopy to distal ileum:  Mild left-sided diverticulosis. Large  internal hemorrhoids, which could be accounting for blood in the stool. RECOMMENDATIONS:  Antireflux instructions. Fiber supplements. Iron  supplements. Stop the omeprazole. Follow the biopsy results and  hemorrhoidal banding in my office in less than two weeks. Follow CBC if  she continue to have anemia and consider endo capsule of the small  bowel. Thank you Dr. Ashok Serrano, for letting me to do procedure on the patient. Do  not hesitate to call me if you have any questions. My recommendations  are above. Hortencia Mac M.D.    D: 05/17/2019 10:57:40       T: 05/17/2019 11:15:44     [id number]  Job#: [id number]     Doc#: [id number]    CC:  NEEL Sharp M.D.

## 2019-05-17 NOTE — ANESTHESIA POSTPROCEDURE EVALUATION
Department of Anesthesiology  Postprocedure Note    Patient: Nura Weaver  MRN: [id number]  YOB: 1940  Date of evaluation: 5/17/2019  Time:  10:45 AM     Procedure Summary     Date:  05/17/19 Room / Location:  [address] [facility] / [address] [facility]    Anesthesia Start:  1010 Anesthesia Stop:  1044    Procedures:       COLONOSCOPY DIAGNOSTIC (N/A )      EGD BIOPSY (N/A ) Diagnosis:  (RECTAL BLEEDING, ANEMIA)    Surgeon:  Anusha Herrera MD Responsible Provider:  Slime Quinonez MD    Anesthesia Type:  MAC ASA Status:  3          Anesthesia Type: MAC    Tiburcio Phase I: Tiburcio Score: 10    Tiburcio Phase II:      Last vitals: Reviewed and per EMR flowsheets.        Anesthesia Post Evaluation    Patient location during evaluation: bedside  Patient participation: complete - patient participated  Level of consciousness: awake  Airway patency: patent  Nausea & Vomiting: no vomiting and no nausea  Complications: no  Cardiovascular status: hemodynamically stable  Respiratory status: acceptable  Hydration status: stable

## 2019-05-17 NOTE — ANESTHESIA PRE PROCEDURE
Procedure Laterality Date    APPENDECTOMY      CARDIAC CATHETERIZATION      two cath    COLONOSCOPY  01/08/2013    DILATION AND CURETTAGE OF UTERUS      SKIN CANCER EXCISION  03/2017    on face, left side       Social History:    Social History     Tobacco Use    Smoking status: Never Smoker    Smokeless tobacco: Never Used   Substance Use Topics    Alcohol use: No     Alcohol/week: 0.0 oz                                Counseling given: Not Answered      Vital Signs (Current):   Vitals:    05/17/19 0933   BP: 136/77   Pulse: 121   Resp: 18   Temp: 36.3 °C (97.4 °F)   TempSrc: Temporal   SpO2: 98%   Weight: 239 lb 6.4 oz (108.6 kg)   Height: 5' 5.5\" (1.664 m)                                              BP Readings from Last 3 Encounters:   05/17/19 136/77   03/27/19 118/82   03/20/19 (!) 144/58       NPO Status: Time of last liquid consumption: 0900(sip water with med)                        Time of last solid consumption: 1800                        Date of last liquid consumption: 05/17/19                        Date of last solid food consumption: 05/15/19    BMI:   Wt Readings from Last 3 Encounters:   05/17/19 239 lb 6.4 oz (108.6 kg)   03/27/19 243 lb 11.2 oz (110.5 kg)   03/20/19 244 lb 12.8 oz (111 kg)     Body mass index is 39.23 kg/m².     CBC:   Lab Results   Component Value Date    WBC 4.3 03/19/2019    RBC 3.55 03/19/2019    RBC 4.00 05/30/2012    HGB 10.5 03/19/2019    HCT 31.5 03/19/2019    MCV 88.7 03/19/2019    RDW 15.3 04/05/2018     03/19/2019       CMP:   Lab Results   Component Value Date     03/19/2019    K 4.0 03/19/2019    K 4.7 04/05/2018     03/19/2019    CO2 23 03/19/2019    BUN 9 03/19/2019    CREATININE 0.6 03/19/2019    LABGLOM >90 03/19/2019    GLUCOSE 126 03/19/2019    GLUCOSE 114 05/30/2012    PROT 7.5 03/16/2019    CALCIUM 8.6 03/19/2019    BILITOT 0.6 03/16/2019    ALKPHOS 49 03/16/2019    AST 39 03/16/2019    ALT 34 03/16/2019       POC Tests: No results for input(s): POCGLU, POCNA, POCK, POCCL, POCBUN, POCHEMO, POCHCT in the last 72 hours. Coags:   Lab Results   Component Value Date    INR 1.08 04/05/2018    APTT 77.6 10/25/2017       HCG (If Applicable): No results found for: PREGTESTUR, PREGSERUM, HCG, HCGQUANT     ABGs: No results found for: PHART, PO2ART, CEG2KHY, OWL5AVK, BEART, D9BBAUSF     Type & Screen (If Applicable):  Lab Results   Component Value Date    [address] POS 04/05/2018       Anesthesia Evaluation  Patient summary reviewed  Airway: Mallampati: III       Mouth opening: > = 3 FB Dental:          Pulmonary:   (+) sleep apnea:                             Cardiovascular:    (+) hypertension:,                   Neuro/Psych:               GI/Hepatic/Renal:   (+) GERD:,           Endo/Other:    (+) Diabetes, . Abdominal:   (+) obese,         Vascular:                                        Anesthesia Plan      MAC     ASA 3       Induction: intravenous. Anesthetic plan and risks discussed with patient. Plan discussed with attending.                   Raine Hines, APRN - CRNA   5/17/2019

## 2019-05-17 NOTE — H&P
[address] [place] [address]                       PREOPERATIVE HISTORY AND PHYSICAL    PATIENT NAME: Aliza Anderson                 :        1940  MED REC NO:   [id number]                           ROOM:  ACCOUNT NO:   [de-identified]                           ADMIT DATE: 2019  PROVIDER:     NAVID Johnoll:  2019. PROCEDURE:  Esophagogastroduodenoscopy and colonoscopy. INDICATIONS:  A 69year-old pleasant female who has rectal bleeding,  bleeding off and on. Also, the patient has anemia. Denied any  abdominal pain. Denied any nausea, vomiting. Denied any dysphagia. She is undergoing further evaluation. PAST MEDICAL HISTORY:  Arthritis, skin cancer, diabetes mellitus,  hyperlipidemia, hypertension, osteoporosis, sleep apnea. PAST SURGICAL HISTORY:  Appendectomy, colonoscopy in , dilation and  curettage of the uterus, skin cancer excision. MEDICATIONS:  Reviewed. PHYSICAL EXAMINATION:  VITAL SIGNS:  Temperature 97.4, pulse 121, respiratory rate 18, blood  pressure 136/77. HEART:  Regular. Normal S1 and S2. No S3.  LUNGS:  Equal to expansion on inspection. Roland Marley air entry bilaterally. ABDOMEN:  Soft. Normoactive bowel sounds. Nontender, not distended. IMPRESSION:  A 69year-old pleasant female, anemia, blood in the stool. She is undergoing further evaluation. RECOMMENDATIONS:  Keep the patient nothing by mouth. Proceed with EGD  and colonoscopy as planned. The risks including, but not limited to  perforation, bleeding, infection, adverse reaction to medicine, very  slight chance of missing significant lesions. The patient expressed her  understanding. Further plans pending the above test results.         Ziyad Corbin M.D.    D: 2019 10:09:24       T: 2019 11:18:43     [email or web address]  Job#: [id number]     Doc#: [id number]    CC:

## 2019-05-19 ENCOUNTER — HOSPITAL ENCOUNTER (INPATIENT)
Age: 79
LOS: 3 days | Discharge: HOME | DRG: 310 | End: 2019-05-22
Attending: EMERGENCY MEDICINE | Admitting: INTERNAL MEDICINE
Payer: MEDICARE

## 2019-05-19 ENCOUNTER — APPOINTMENT (OUTPATIENT)
Dept: GENERAL RADIOLOGY | Age: 79
DRG: 310 | End: 2019-05-19
Payer: MEDICARE

## 2019-05-19 DIAGNOSIS — I48.91 ATRIAL FIBRILLATION WITH RAPID VENTRICULAR RESPONSE (HCC): Primary | ICD-10-CM

## 2019-05-19 LAB
ALBUMIN SERPL-MCNC: 4.1 G/DL (ref 3.5–5.1)
ALP BLD-CCNC: 54 U/L (ref 38–126)
ALT SERPL-CCNC: 46 U/L (ref 11–66)
ANION GAP SERPL CALCULATED.3IONS-SCNC: 13 MEQ/L (ref 8–16)
AST SERPL-CCNC: 56 U/L (ref 5–40)
BASOPHILS # BLD: 0.6 %
BASOPHILS ABSOLUTE: 0 THOU/MM3 (ref 0–0.1)
BILIRUB SERPL-MCNC: 0.4 MG/DL (ref 0.3–1.2)
BILIRUBIN DIRECT: < 0.2 MG/DL (ref 0–0.3)
BUN BLDV-MCNC: 11 MG/DL (ref 7–22)
CALCIUM SERPL-MCNC: 9.5 MG/DL (ref 8.5–10.5)
CHLORIDE BLD-SCNC: 102 MEQ/L (ref 98–111)
CO2: 22 MEQ/L (ref 23–33)
CREAT SERPL-MCNC: 0.8 MG/DL (ref 0.4–1.2)
EKG ATRIAL RATE: 115 BPM
EKG Q-T INTERVAL: 290 MS
EKG QRS DURATION: 82 MS
EKG QTC CALCULATION (BAZETT): 431 MS
EKG R AXIS: -17 DEGREES
EKG T AXIS: 167 DEGREES
EKG VENTRICULAR RATE: 133 BPM
EOSINOPHIL # BLD: 2.5 %
EOSINOPHILS ABSOLUTE: 0.1 THOU/MM3 (ref 0–0.4)
ERYTHROCYTE [DISTWIDTH] IN BLOOD BY AUTOMATED COUNT: 14.3 % (ref 11.5–14.5)
ERYTHROCYTE [DISTWIDTH] IN BLOOD BY AUTOMATED COUNT: 45.5 FL (ref 35–45)
GFR SERPL CREATININE-BSD FRML MDRD: 69 ML/MIN/1.73M2
GLUCOSE BLD-MCNC: 145 MG/DL (ref 70–108)
HCT VFR BLD CALC: 36.7 % (ref 37–47)
HEMOGLOBIN: 12.2 GM/DL (ref 12–16)
IMMATURE GRANS (ABS): 0.01 THOU/MM3 (ref 0–0.07)
IMMATURE GRANULOCYTES: 0.2 %
LYMPHOCYTES # BLD: 34.8 %
LYMPHOCYTES ABSOLUTE: 1.8 THOU/MM3 (ref 1–4.8)
MAGNESIUM: 1.9 MG/DL (ref 1.6–2.4)
MCH RBC QN AUTO: 29.3 PG (ref 26–33)
MCHC RBC AUTO-ENTMCNC: 33.2 GM/DL (ref 32.2–35.5)
MCV RBC AUTO: 88.2 FL (ref 81–99)
MONOCYTES # BLD: 12.5 %
MONOCYTES ABSOLUTE: 0.7 THOU/MM3 (ref 0.4–1.3)
NUCLEATED RED BLOOD CELLS: 0 /100 WBC
OSMOLALITY CALCULATION: 275.8 MOSMOL/KG (ref 275–300)
PLATELET # BLD: 209 THOU/MM3 (ref 130–400)
PMV BLD AUTO: 10.5 FL (ref 9.4–12.4)
POTASSIUM REFLEX MAGNESIUM: 3.9 MEQ/L (ref 3.5–5.2)
RBC # BLD: 4.16 MILL/MM3 (ref 4.2–5.4)
SEG NEUTROPHILS: 49.4 %
SEGMENTED NEUTROPHILS ABSOLUTE COUNT: 2.6 THOU/MM3 (ref 1.8–7.7)
SODIUM BLD-SCNC: 137 MEQ/L (ref 135–145)
T4 FREE: 1.3 NG/DL (ref 0.93–1.76)
TOTAL PROTEIN: 7 G/DL (ref 6.1–8)
TROPONIN T: < 0.01 NG/ML
TSH SERPL DL<=0.05 MIU/L-ACNC: 5.55 UIU/ML (ref 0.4–4.2)
WBC # BLD: 5.2 THOU/MM3 (ref 4.8–10.8)

## 2019-05-19 PROCEDURE — 99285 EMERGENCY DEPT VISIT HI MDM: CPT

## 2019-05-19 PROCEDURE — 2709999900 HC NON-CHARGEABLE SUPPLY

## 2019-05-19 PROCEDURE — 6360000002 HC RX W HCPCS: Performed by: INTERNAL MEDICINE

## 2019-05-19 PROCEDURE — 84484 ASSAY OF TROPONIN QUANT: CPT

## 2019-05-19 PROCEDURE — 2500000003 HC RX 250 WO HCPCS: Performed by: EMERGENCY MEDICINE

## 2019-05-19 PROCEDURE — 93005 ELECTROCARDIOGRAM TRACING: CPT | Performed by: EMERGENCY MEDICINE

## 2019-05-19 PROCEDURE — 36415 COLL VENOUS BLD VENIPUNCTURE: CPT

## 2019-05-19 PROCEDURE — 6370000000 HC RX 637 (ALT 250 FOR IP): Performed by: INTERNAL MEDICINE

## 2019-05-19 PROCEDURE — 84443 ASSAY THYROID STIM HORMONE: CPT

## 2019-05-19 PROCEDURE — 71045 X-RAY EXAM CHEST 1 VIEW: CPT

## 2019-05-19 PROCEDURE — 2580000003 HC RX 258: Performed by: EMERGENCY MEDICINE

## 2019-05-19 PROCEDURE — 93010 ELECTROCARDIOGRAM REPORT: CPT | Performed by: INTERNAL MEDICINE

## 2019-05-19 PROCEDURE — 96366 THER/PROPH/DIAG IV INF ADDON: CPT

## 2019-05-19 PROCEDURE — 96365 THER/PROPH/DIAG IV INF INIT: CPT

## 2019-05-19 PROCEDURE — 84439 ASSAY OF FREE THYROXINE: CPT

## 2019-05-19 PROCEDURE — 2140000000 HC CCU INTERMEDIATE R&B

## 2019-05-19 PROCEDURE — 83735 ASSAY OF MAGNESIUM: CPT

## 2019-05-19 PROCEDURE — 80048 BASIC METABOLIC PNL TOTAL CA: CPT

## 2019-05-19 PROCEDURE — 96376 TX/PRO/DX INJ SAME DRUG ADON: CPT

## 2019-05-19 PROCEDURE — 80076 HEPATIC FUNCTION PANEL: CPT

## 2019-05-19 PROCEDURE — 85025 COMPLETE CBC W/AUTO DIFF WBC: CPT

## 2019-05-19 RX ORDER — ACETAMINOPHEN 325 MG/1
325 TABLET ORAL EVERY 4 HOURS PRN
Status: DISCONTINUED | OUTPATIENT
Start: 2019-05-19 | End: 2019-05-22 | Stop reason: HOSPADM

## 2019-05-19 RX ORDER — AMLODIPINE BESYLATE AND BENAZEPRIL HYDROCHLORIDE 10; 20 MG/1; MG/1
1 CAPSULE ORAL DAILY
Status: DISCONTINUED | OUTPATIENT
Start: 2019-05-19 | End: 2019-05-19 | Stop reason: CLARIF

## 2019-05-19 RX ORDER — PRAVASTATIN SODIUM 80 MG/1
80 TABLET ORAL DAILY
Status: DISCONTINUED | OUTPATIENT
Start: 2019-05-19 | End: 2019-05-22 | Stop reason: HOSPADM

## 2019-05-19 RX ORDER — DILTIAZEM HYDROCHLORIDE 120 MG/1
120 CAPSULE, COATED, EXTENDED RELEASE ORAL DAILY
Status: DISCONTINUED | OUTPATIENT
Start: 2019-05-19 | End: 2019-05-22 | Stop reason: HOSPADM

## 2019-05-19 RX ORDER — METOPROLOL SUCCINATE 25 MG/1
25 TABLET, EXTENDED RELEASE ORAL DAILY
Status: DISCONTINUED | OUTPATIENT
Start: 2019-05-19 | End: 2019-05-19

## 2019-05-19 RX ORDER — ALBUTEROL SULFATE 90 UG/1
2 AEROSOL, METERED RESPIRATORY (INHALATION) EVERY 4 HOURS PRN
Status: DISCONTINUED | OUTPATIENT
Start: 2019-05-19 | End: 2019-05-22 | Stop reason: HOSPADM

## 2019-05-19 RX ORDER — LISINOPRIL 10 MG/1
10 TABLET ORAL DAILY
Status: DISCONTINUED | OUTPATIENT
Start: 2019-05-19 | End: 2019-05-19

## 2019-05-19 RX ORDER — AMLODIPINE BESYLATE 10 MG/1
10 TABLET ORAL DAILY
Status: DISCONTINUED | OUTPATIENT
Start: 2019-05-19 | End: 2019-05-19

## 2019-05-19 RX ORDER — PANTOPRAZOLE SODIUM 40 MG/1
40 TABLET, DELAYED RELEASE ORAL
Status: DISCONTINUED | OUTPATIENT
Start: 2019-05-19 | End: 2019-05-22 | Stop reason: HOSPADM

## 2019-05-19 RX ORDER — METOPROLOL SUCCINATE 50 MG/1
50 TABLET, EXTENDED RELEASE ORAL DAILY
Status: DISCONTINUED | OUTPATIENT
Start: 2019-05-19 | End: 2019-05-22 | Stop reason: HOSPADM

## 2019-05-19 RX ORDER — PROPAFENONE HYDROCHLORIDE 150 MG/1
150 TABLET, FILM COATED ORAL EVERY 8 HOURS
Status: DISCONTINUED | OUTPATIENT
Start: 2019-05-19 | End: 2019-05-19

## 2019-05-19 RX ORDER — ASPIRIN 81 MG/1
81 TABLET ORAL DAILY
Status: DISCONTINUED | OUTPATIENT
Start: 2019-05-19 | End: 2019-05-22 | Stop reason: HOSPADM

## 2019-05-19 RX ORDER — HYDROCORTISONE ACETATE 25 MG/1
25 SUPPOSITORY RECTAL 2 TIMES DAILY
Status: DISCONTINUED | OUTPATIENT
Start: 2019-05-19 | End: 2019-05-22 | Stop reason: HOSPADM

## 2019-05-19 RX ORDER — DILTIAZEM HYDROCHLORIDE 5 MG/ML
10 INJECTION INTRAVENOUS ONCE
Status: COMPLETED | OUTPATIENT
Start: 2019-05-19 | End: 2019-05-19

## 2019-05-19 RX ORDER — LEVOTHYROXINE SODIUM 0.03 MG/1
25 TABLET ORAL DAILY
Status: DISCONTINUED | OUTPATIENT
Start: 2019-05-19 | End: 2019-05-22 | Stop reason: HOSPADM

## 2019-05-19 RX ADMIN — LISINOPRIL 10 MG: 10 TABLET ORAL at 10:01

## 2019-05-19 RX ADMIN — DILTIAZEM HYDROCHLORIDE 120 MG: 120 CAPSULE, EXTENDED RELEASE ORAL at 18:15

## 2019-05-19 RX ADMIN — METOPROLOL SUCCINATE 50 MG: 50 TABLET, FILM COATED, EXTENDED RELEASE ORAL at 18:15

## 2019-05-19 RX ADMIN — ASPIRIN 81 MG: 81 TABLET, COATED ORAL at 10:01

## 2019-05-19 RX ADMIN — LEVOTHYROXINE SODIUM 25 MCG: 25 TABLET ORAL at 10:01

## 2019-05-19 RX ADMIN — PROPAFENONE HYDROCHLORIDE 150 MG: 150 TABLET, FILM COATED ORAL at 10:02

## 2019-05-19 RX ADMIN — AMLODIPINE BESYLATE 10 MG: 10 TABLET ORAL at 10:01

## 2019-05-19 RX ADMIN — METOPROLOL SUCCINATE 25 MG: 25 TABLET, FILM COATED, EXTENDED RELEASE ORAL at 10:02

## 2019-05-19 RX ADMIN — PANTOPRAZOLE SODIUM 40 MG: 40 TABLET, DELAYED RELEASE ORAL at 10:02

## 2019-05-19 RX ADMIN — DILTIAZEM HYDROCHLORIDE 10 MG: 5 INJECTION INTRAVENOUS at 02:17

## 2019-05-19 RX ADMIN — LINAGLIPTIN 5 MG: 5 TABLET, FILM COATED ORAL at 10:02

## 2019-05-19 RX ADMIN — ENOXAPARIN SODIUM 105 MG: 120 INJECTION SUBCUTANEOUS at 13:49

## 2019-05-19 RX ADMIN — DILTIAZEM HYDROCHLORIDE 5 MG/HR: 5 INJECTION INTRAVENOUS at 02:43

## 2019-05-19 RX ADMIN — PRAVASTATIN SODIUM 80 MG: 80 TABLET ORAL at 10:02

## 2019-05-19 ASSESSMENT — PAIN SCALES - GENERAL
PAINLEVEL_OUTOF10: 0
PAINLEVEL_OUTOF10: 0

## 2019-05-19 NOTE — ED PROVIDER NOTES
[facility]  eMERGENCY dEPARTMENT eNCOUnter          CHIEF COMPLAINT       Chief Complaint   Patient presents with    Tachycardia       Nurses Notes reviewed and I agreeexcept as noted in the HPI. HISTORY OF PRESENT ILLNESS    Leif Fink is a 69 y.o. female who presents to Emergency Department with palpitation which started from one hour ago which woke her up. She has history of atrial fibrillation and she was on Eliqus which was held because she had a colonoscopy on 5/17/2019. She is still holding Palacos for an impending hemorrhoid ligation. She has no fever or chills. She has no chest pain. She has no abdominal pain. She has no nausea or vomiting. No leg swelling. REVIEW OF SYSTEMS     Review of Systems   Cardiovascular: Positive for palpitations. PAST MEDICAL HISTORY    has a past medical history of Arthritis, Cancer ([facility] [address] Diabetes mellitus ([facility] [address] Hyperlipidemia, Hypertension, Osteoporosis, and Sleep apnea. SURGICAL HISTORY      has a past surgical history that includes Appendectomy; Cardiac catheterization; Dilation and curettage of uterus; Colonoscopy (01/08/2013); Skin cancer excision (03/2017); Colonoscopy (N/A, 5/17/2019); and Upper gastrointestinal endoscopy (N/A, 5/17/2019).     CURRENT MEDICATIONS       Previous Medications    ACETAMINOPHEN (TYLENOL) 325 MG TABLET    Take 325 mg by mouth every 4 hours as needed for Pain (For mild pain level 1-3 or for fever > 100.5)     ALBUTEROL SULFATE HFA (VENTOLIN HFA) 108 (90 BASE) MCG/ACT INHALER    Inhale 2 puffs into the lungs every 4 hours as needed for Wheezing    AMLODIPINE-BENAZEPRIL (LOTREL) 10-20 MG PER CAPSULE    Take 1 capsule by mouth daily    APIXABAN (ELIQUIS) 5 MG TABS TABLET    Take 1 tablet by mouth 2 times daily    ASPIRIN 81 MG EC TABLET    Take 1 tablet by mouth daily    B COMPLEX-C (SUPER B COMPLEX PO)    Take by mouth    BIOTIN PO    Take 1 tablet by mouth daily    CALCITONIN (MIACALCIN) 200 UNIT/ACT NASAL SPRAY    1 spray by Nasal route daily    CYANOCOBALAMIN (VITAMIN B 12 PO)    Take  by mouth Daily. ENOXAPARIN (LOVENOX) 100 MG/ML INJECTION    Inject 1 mg/kg into the skin 2 times daily    GLUCOSAMINE-CHONDROITIN-VIT D3 PO    Take by mouth daily     METOPROLOL SUCCINATE (TOPROL XL) 25 MG EXTENDED RELEASE TABLET    Take 1 tablet by mouth daily    NITROGLYCERIN (NITROSTAT) 0.4 MG SL TABLET    DISSOLVE ONE TABLET UNDER TONGUE AS NEEDED FOR CHEST PAIN EVERY 5 MINUTES. MAX OF 3 DOSES. IF NO RELIEF AFTER 1 DOSE, CALL 911. OMEPRAZOLE (PRILOSEC) 40 MG DELAYED RELEASE CAPSULE    Take 1 capsule by mouth daily    PRAVASTATIN (PRAVACHOL) 80 MG TABLET    Take 1 tablet by mouth daily    PROPAFENONE (RYTHMOL) 150 MG TABLET    Take 1 tablet by mouth every 8 hours    SITAGLIPTIN (JANUVIA) 100 MG TABLET    Take 1 tablet by mouth daily    VENTOLIN  (90 BASE) MCG/ACT INHALER    Inhale 2 puffs into the lungs every 4 hours as needed for Wheezing       ALLERGIES     is allergic to ranolazine. FAMILY HISTORY     indicated that her mother is . She indicated that her father is . She indicated that her sister is . She indicated that all of her four brothers are . family history includes Alzheimer's Disease in her mother; Cancer in her sister; Heart Disease in her mother. SOCIAL HISTORY      reports that she has never smoked. She has never used smokeless tobacco. She reports that she does not drink alcohol or use drugs. PHYSICAL EXAM     INITIAL VITALS:  height is 5' 6\" (1.676 m) and weight is 230 lb (104.3 kg). Her oral temperature is 98.3 °F (36.8 °C). Her blood pressure is 116/81 and her pulse is 99. Her respiration is 20 and oxygen saturation is 97%. Physical Exam   Constitutional: She is oriented to person, place, and time. She appears well-developed and well-nourished. HENT:   Head: Normocephalic and atraumatic.    Eyes: Pupils are equal, round, and reactive to light. Right eye exhibits no discharge. Left eye exhibits no discharge. No scleral icterus. Neck: Normal range of motion. Neck supple. No tracheal deviation present. Cardiovascular: Normal heart sounds. Exam reveals no gallop and no friction rub. No murmur heard. Tachycardic with irregularly irregular rhythm   Pulmonary/Chest: Effort normal. No stridor. No respiratory distress. She has no wheezes. Abdominal: Soft. There is no tenderness. There is no rebound and no guarding. Musculoskeletal: She exhibits no edema or tenderness. Neurological: She is alert and oriented to person, place, and time. No cranial nerve deficit. Coordination normal.   Skin: Skin is warm and dry. She is not diaphoretic. Psychiatric: She has a normal mood and affect. Her behavior is normal. Judgment and thought content normal.   Nursing note and vitals reviewed. DIFFERENTIAL DIAGNOSIS:   Afib with RVR, Bronchitis, pneumonia, COPD exacerbation, CHF, ACS, Asthma, PE, Anxiety, electrolyte imbalance    DIAGNOSTIC RESULTS     EKG: All EKG's are interpreted by the Emergency Department Physician who either signs or Co-signsthis chart in the absence of a cardiologist.  Interpreted by me  Atrial fibrillation with rapid ventricular response  Ventricular rate 133 BPM  QRS duration 82 ms  QTc 431 ms  No ST elevation or acute T-wave    RADIOLOGY: non-plain film images(s) such as CT, Ultrasound and MRI are read by the radiologist.    XR CHEST PORTABLE   Final Result      No acute cardiopulmonary disease. Mild cardiomegaly. **This report has been created using voice recognition software. It may contain minor errors which are inherent in voice recognition technology. **      Final report electronically signed by Dr. London Delgado on 5/19/2019 2:56 AM          []Visualized and interpreted by me   [] Radiologist's Wet Read Report Reviewed   [] Discussed with Radiologist.    Mary Grace Lawrence:   Results for orders placed or Free   Result Value Ref Range    T4 Free 1.30 0.93 - 1.76 ng/dL       EMERGENCY DEPARTMENT COURSE:   Vitals:    Vitals:    05/19/19 0347 05/19/19 0348 05/19/19 0349 05/19/19 0414   BP:    116/81   Pulse: 102 88 99    Resp: 21 19 20 20   Temp:       TempSrc:       SpO2: 95% 97% 94% 97%   Weight:       Height:           2:10 AM    Patient is seen and evaluated in a timely fashion. MedicalDecision Making    EKG shows atrial fibrillation with rapid ventricular response. Cardizem drip was started after 10 mg Cardizem bolus was given. Labs are reviewed which are reassuring. Normal troponin. Discussed and admitted by Dr. Lambert Angelucci. VR down to 88/min with Cardizem drip. CRITICAL CARE:   None    CONSULTS:  Dr. Helen Carbone:  None    FINAL IMPRESSION      1.  Atrial fibrillation with rapid ventricular response [facility])          DISPOSITION/PLAN   Admit    PATIENT REFERRED TO:  DO Fabricio Bhagat, [address] [place] [address]  [phone]            DISCHARGE MEDICATIONS:  New Prescriptions    No medications on file       (Please note that portions of this note were completed with a voice recognition program.  Efforts were made to edit the dictations but occasionally words aremis-transcribed.)    MD Carter Jason MD  05/19/19 3830

## 2019-05-19 NOTE — CONSULTS
sodium 137, potassium 3.9, chloride 102, CO2 of 22, BUN 11,  creatinine 0.8, ALT 46, AST 56, TSH 5.5, WBC 5.2, hemoglobin 12.2,  hematocrit 37.7, platelet count 239. The patient denied any bright red  blood per rectum or melena. PAST MEDICAL HISTORY:  Chronic atrial fibrillation, obstructive sleep  apnea, hypertension, hyperlipidemia, non-insulin-requiring diabetes  mellitus, skin cancer, osteoarthritis. PAST SURGICAL HISTORY:  EGD and colonoscopy by me on 05/17/2019. Skin  cancer excision, D and C, appendectomy. ALLERGIES:  RANOLAZINE. ADMITTING MEDICATIONS:  Rythmol, Prilosec, Ventolin, Pravachol, Toprol,  Januvia, Miacalcin, Eliquis, Lotrel, multivitamin, glucosamine and  chondroitin sulfate, cyanocobalamin, and Tylenol. FAMILY HISTORY:  Heart disease. Brother had heart disease, another  brother had open heart surgery for congenital heart disease. Both  parents had heart disease. SOCIAL HISTORY:  The patient is . Denied any tobacco, alcohol,  or illicit drug use. REVIEW OF SYSTEMS:  A 12-point review of systems was reviewed. Pertinent positives were mentioned in the HPI and past medical history,  otherwise noncontributory. PHYSICAL EXAMINATION:  VITAL SIGNS:  Weight 239 pounds, BMI 38.8 kg/sq. m., blood pressure  137/91, pulse 116, respiratory rate 18, temperature 97.5. GENERAL:  A 69year-old pleasant female, lying in bed, well-built,  well-nourished, appears to be in no acute distress. HEENT:  Normocephalic, atraumatic. Pupils are equal, round, and react  to light. Anicteric sclerae. No erythema of oropharynx. NECK:  Supple. No JVD. No thyromegaly. CHEST:  No axillary or supraclavicular adenopathy. LUNGS:  Equal to expansion on inspection. Fair air entry bilaterally on  percussion and auscultation. No added sounds. HEART:  Irregularly irregular. No S3 or murmurs. No gallops or venous  sounds. ABDOMEN:  Soft. Normoactive bowel sounds. Nontender.   No palpable  masses. No appreciable hernias. No rebound or guarding. RECTAL:  Deferred. EXTREMITIES:  No clubbing, cyanosis, or edema. SKIN:  No skin rash. NEUROLOGIC:  The patient is alert. Generalized weakness. No focal  neuro deficits. DIAGNOSTIC DATA:  As in HPI. IMPRESSION:  A 69year-old pleasant female who has history of anemia  with a drop in hemoglobin to 10.5. She underwent EGD and colonoscopy by  me most recently on 05/17/2019. EGD showed gastric polyps. Colonoscopy  showed diverticulosis and large internal hemorrhoids. No active  bleeding was noted and her hemoglobin is 12.2, hematocrit 37.7. No  evidence of any bleeding. I do not see any contraindication for any  anticoagulation or antiplatelet therapy at this time. RECOMMENDATIONS:  At this time is hold up any hemorrhoidal banding for  now, Anusol-hydrocortisone suppositories one rectally b.i.d.  Okay to  proceed with any anticoagulation or antiplatelet therapy as need. Advance diet. I will follow on an as-needed basis. Thank you Dr. Yasmine Doe and Dr. Sobeida Crespo for letting me to see the patient. Do not hesitate to call me if you have any questions. My  recommendations are above. Josh Guerrero M.D.    D: 05/19/2019 12:54:53       T: 05/19/2019 13:38:49     [email or web address]  Job#: [id number]     Doc#: [id number]    CC:  NEEL Mejia M.D. Olean Moat, M.D.

## 2019-05-19 NOTE — PLAN OF CARE
Problem: Falls - Risk of:  Goal: Absence of physical injury  Description  Absence of physical injury  Outcome: Met This Shift     Problem: Skin Integrity - Impaired:  Goal: Will show no infection signs and symptoms  Description  Will show no infection signs and symptoms  Outcome: Met This Shift

## 2019-05-19 NOTE — ED NOTES
Assisted patient to bedside commode with minimal assistance. Vitals obtained. Patient denies any further needs at this time. Will continue to monitor.       Basil Peabody, RN  05/19/19 1575

## 2019-05-19 NOTE — H&P
[address] [place] [address]                              HISTORY AND PHYSICAL    PATIENT NAME: Brian TILLMAN                 :        1940  MED REC NO:   [id number]                           ROOM:       [address]  ACCOUNT NO:   [de-identified]                           ADMIT DATE: 2019  PROVIDER:     Julián Rae M.D.    [address]:  Heart racing. HISTORY OF PRESENT ILLNESS:  This is a 60-year-old woman with known  history of AFib on chronic anticoagulation, who apparently underwent  colonoscopy about two days back and hence had Eliquis held and was on  Lovenox. She was advised to continue to hold her Eliquis after the  procedure as there were plans for doing a banding procedure for  hemorrhoids on the . The patient last night while laying in bed  noted that her heart was pounding. She did have some difficulty  breathing and hence presented to the ER. On workup at the ER, she was  noted to be in AFib with RVR. She did receive IV Cardizem. Her heart  rate is now in the 110 range. She denies having any chest pain. Denies  having any dizziness, loss of consciousness. No nausea or vomiting. PAST MEDICAL HISTORY:  Significant for history of AFib, history of  obstructive sleep apnea, hypertension, hyperlipidemia, diabetes, skin  cancer, osteoarthritis, no previous bleeding or clotting problems, no  asthma or emphysema. No seizures. No strokes. PAST SURGICAL HISTORY:  Had EGDs, colonoscopy with diuresis, colon polyp  removed, history of skin cancer excision, D and C, heart  catheterization, and appendectomy. ALLERGIES:  Listed as RANOLAZINE. HOME MEDICATIONS:  List included Rythmol, Prilosec, Ventolin, Pravachol,  Toprol, Januvia, Miacalcin, Eliquis, Lotrel, multivitamin, glucosamine,  cyanocobalamin, and Tylenol. FAMILY HISTORY:  Strong family history of heart disease.   Her brother  had heart disease at a young age, another brother had open heart surgery  for congenital heart disease at 6years old. Both parents had heart  disease. SOCIAL HISTORY:  She is  with children. She lost one child at  age of 26. She denies any smoking, alcohol, or illicit drug use. REVIEW OF SYSTEMS:  Positive for palpitations, discomfort with  breathing. No chest pain. No headache. No blurred vision. No  increased weight gain or weight loss. No unusual bleeding. No  diarrhea. PHYSICAL EXAMINATION:  VITAL SIGNS:  Blood pressure 137/91, pulse is 116, respirations 18,  temperature 97.5. HEENT:  Pupils are reacting to light. Tongue is moist.  Buccal mucosa  is moist.  NECK:  Supple. HEART:  S1 and S2 heard with an irregular rhythm, tachycardiac. LUNGS:  Air exchange is appreciated bilaterally with no rales or  rhonchi. ABDOMEN:  Soft. Bowel sounds heard. No guarding. No tenderness. EXTREMITIES:  Warm. NEUROLOGIC:  Oriented to person, place, and time. Moves all of her  extremities. LABORATORY DATA:  Sodium was 137, potassium 3.9, chloride of 102, CO2 is  22, BUN of 11, creatinine of 0.8. ALT was 46, AST was 56, TSH was 5.5,  free T4 was 1.3. WBC was 5.2, hemoglobin of 12.2, hematocrit of 37.7,  and platelets of 556. Chest x-ray showed no acute process. EKG, trying  to retrieve as it was not brought in the chart and in the computer. IMPRESSION:  1.  AFib with RVR. The patient's anticoagulation apparently was held  for her recent colonoscopy and now for her banding procedure for her  hemorrhoids. 2.  Diabetes. 3.  Status post recent colon polyp excised. 4.  Hypertension. 5.  Hyperlipidemia. 6.  History of skin cancer. RECOMMENDATIONS:  1. Continue to wean her Cardizem. Resume home antiarrhythmic agents. 2.  Restart Lovenox, if it is okay with Dr. Grecia Arredondo. 3.  Consult the cardiologist, Dr. Alfrieda Lesch. 4.  Resume home medication. 5.  Monitor her chems.   6.  We will check with Dr. Tonio Hammond, if plan for banding of hemorrhoid  is still in place. 7.  Monitor her cardiac enzymes. 8.  Full resuscitation.         Todd Cherry M.D.    D: 05/19/2019 6:22:21       T: 05/19/2019 7:54:03     [email or web address]  Job#: [id number]     Doc#: [id number]    CC:

## 2019-05-19 NOTE — PROGRESS NOTES
Patient arrived per cart to 3B. Heart monitor applied and vitals taken. Admission paperwork completed. Explained to patient that [facility] is not responsible for any lost or stolen items. Patient verbalized understanding. Oriented to room and use of call light and bed controls. Patient denies pain or needs. No signs of distress noted. Bed locked & in low position, side-rails up x2. Call light in reach. Reminded patient to call nurse if any needs arise.

## 2019-05-20 LAB
ANION GAP SERPL CALCULATED.3IONS-SCNC: 13 MEQ/L (ref 8–16)
BUN BLDV-MCNC: 11 MG/DL (ref 7–22)
CALCIUM SERPL-MCNC: 9.1 MG/DL (ref 8.5–10.5)
CHLORIDE BLD-SCNC: 102 MEQ/L (ref 98–111)
CO2: 21 MEQ/L (ref 23–33)
CREAT SERPL-MCNC: 0.6 MG/DL (ref 0.4–1.2)
EKG ATRIAL RATE: 67 BPM
EKG Q-T INTERVAL: 412 MS
EKG QRS DURATION: 86 MS
EKG QTC CALCULATION (BAZETT): 460 MS
EKG R AXIS: 26 DEGREES
EKG T AXIS: 40 DEGREES
EKG VENTRICULAR RATE: 75 BPM
GFR SERPL CREATININE-BSD FRML MDRD: > 90 ML/MIN/1.73M2
GLUCOSE BLD-MCNC: 135 MG/DL (ref 70–108)
POTASSIUM SERPL-SCNC: 4 MEQ/L (ref 3.5–5.2)
SODIUM BLD-SCNC: 136 MEQ/L (ref 135–145)

## 2019-05-20 PROCEDURE — 6370000000 HC RX 637 (ALT 250 FOR IP): Performed by: INTERNAL MEDICINE

## 2019-05-20 PROCEDURE — 2140000000 HC CCU INTERMEDIATE R&B

## 2019-05-20 PROCEDURE — 80048 BASIC METABOLIC PNL TOTAL CA: CPT

## 2019-05-20 PROCEDURE — 6360000002 HC RX W HCPCS: Performed by: INTERNAL MEDICINE

## 2019-05-20 PROCEDURE — 36415 COLL VENOUS BLD VENIPUNCTURE: CPT

## 2019-05-20 PROCEDURE — 93010 ELECTROCARDIOGRAM REPORT: CPT | Performed by: INTERNAL MEDICINE

## 2019-05-20 PROCEDURE — 2709999900 HC NON-CHARGEABLE SUPPLY

## 2019-05-20 PROCEDURE — 93005 ELECTROCARDIOGRAM TRACING: CPT | Performed by: INTERNAL MEDICINE

## 2019-05-20 RX ORDER — DOCUSATE SODIUM 100 MG/1
100 CAPSULE, LIQUID FILLED ORAL DAILY
Status: DISCONTINUED | OUTPATIENT
Start: 2019-05-20 | End: 2019-05-22 | Stop reason: HOSPADM

## 2019-05-20 RX ORDER — AMIODARONE HYDROCHLORIDE 200 MG/1
200 TABLET ORAL 2 TIMES DAILY
Status: DISCONTINUED | OUTPATIENT
Start: 2019-05-20 | End: 2019-05-22 | Stop reason: HOSPADM

## 2019-05-20 RX ORDER — AMIODARONE HYDROCHLORIDE 200 MG/1
200 TABLET ORAL DAILY
Status: DISCONTINUED | OUTPATIENT
Start: 2019-05-20 | End: 2019-05-20 | Stop reason: SDUPTHER

## 2019-05-20 RX ADMIN — AMIODARONE HYDROCHLORIDE 200 MG: 200 TABLET ORAL at 21:41

## 2019-05-20 RX ADMIN — LINAGLIPTIN 5 MG: 5 TABLET, FILM COATED ORAL at 10:25

## 2019-05-20 RX ADMIN — APIXABAN 5 MG: 5 TABLET, FILM COATED ORAL at 21:41

## 2019-05-20 RX ADMIN — ENOXAPARIN SODIUM 105 MG: 120 INJECTION SUBCUTANEOUS at 03:25

## 2019-05-20 RX ADMIN — METOPROLOL SUCCINATE 50 MG: 50 TABLET, FILM COATED, EXTENDED RELEASE ORAL at 10:25

## 2019-05-20 RX ADMIN — APIXABAN 5 MG: 5 TABLET, FILM COATED ORAL at 14:05

## 2019-05-20 RX ADMIN — LEVOTHYROXINE SODIUM 25 MCG: 25 TABLET ORAL at 10:25

## 2019-05-20 RX ADMIN — ASPIRIN 81 MG: 81 TABLET, COATED ORAL at 10:25

## 2019-05-20 RX ADMIN — DOCUSATE SODIUM 100 MG: 100 CAPSULE, LIQUID FILLED ORAL at 14:06

## 2019-05-20 RX ADMIN — PRAVASTATIN SODIUM 80 MG: 80 TABLET ORAL at 10:25

## 2019-05-20 RX ADMIN — PANTOPRAZOLE SODIUM 40 MG: 40 TABLET, DELAYED RELEASE ORAL at 10:25

## 2019-05-20 RX ADMIN — DILTIAZEM HYDROCHLORIDE 120 MG: 120 CAPSULE, EXTENDED RELEASE ORAL at 10:25

## 2019-05-20 ASSESSMENT — PAIN SCALES - GENERAL: PAINLEVEL_OUTOF10: 0

## 2019-05-20 NOTE — PROGRESS NOTES
5' 6\" (1.676 m)   Wt 243 lb 3.2 oz (110.3 kg)   LMP  (Exact Date)   SpO2 98%   BMI 39.25 kg/m²   HEENT: Head:pupils react  Neck: supple  Lungs: clear to auscultation  Heart: S1 S2 heard   Abdomen: soft BS heard   Extremities: warm no  edema  Neurologic:  Alert, oriented X3    Impression:   :   1.  AFib with RVR. HR improved  2. Diabetes. 3.  Status post recent colon polyp excised. 4.  Hypertension. 5.  Hyperlipidemia. 6.  History of skin cancer  7.  Hemorrhoids    Plan:    Cont B Blockers and CCB  Discharge when ok with cardio and outpt ablation planned per cardio  Restart ancelmo Gracia MD

## 2019-05-20 NOTE — CONSULTS
[address] [place] [address]                                  CONSULTATION    PATIENT NAME: Ibeth Brown                 :        1940  MED REC NO:   [id number]                           ROOM:       0033  ACCOUNT NO:   [de-identified]                           ADMIT DATE: 2019  PROVIDER:     Kacy Chowdhury. Glenn Yeh M.D.    Ribeiro Cooler:  2019    HISTORY OF PRESENT ILLNESS:  The patient is a 62-year-old lady with a  history of paroxysmal atrial fibrillation in the past.  She did have  prep for the colonoscopy and she underwent colonoscopy. She did develop  atrial fibrillation with rapid ventricular response. The patient has  been off Eliquis for her colonoscopy. The patient has a history of  patent coronary arteries and preserved systolic function of the left  ventricle. She does have a history of atrial fibrillation in the past  which is paroxysmal.  She had some racing in her heart. She denied  chest pain. She denied shortness of breath. REVIEW OF SYSTEMS:  The patient has no history of CVA or TIA. She does  have history of sleep apnea, history of dyslipidemia, hypertension,  history of diabetes mellitus. The patient has no fever. She did not notice any GI bleed. The patient  has no claudication and no significant change in her hemoglobin. ALLERGIES:  The patient is allergic to RANOLAZINE. PHYSICAL EXAMINATION:  VITAL SIGNS:  Showed her blood pressure is 112/60. She is in atrial  fibrillation, rapid ventricular response. She was afebrile. Respiratory rate was 18. NEUROLOGIC:  The patient has no focal neurological deficits. NECK:  She has no JVD. No carotid bruit. There was no thyromegaly. She has no neck lymphadenopathy. HEENT:  No discharge from the throat, ears, or nose. LUNGS:  Showed scattered rhonchi. HEART:  There was no S3.  ABDOMEN:  Soft. GENITAL/RECTAL:  Deferred.   EXTREMITIES: Lower limbs, there is no edema. LABORATORY DATA:  Her hemoglobin is 12.1. Potassium 3.7. BUN and  creatinine were normal.    SUMMARY:  1. The patient was in atrial fibrillation with rapid ventricular  response. The patient's medication adjusted. The patient placed on  Cardizem p.o. Toprol dose was increased. Rythmol has been  discontinued. The patient needs to be replaced or restarted again on  her Eliquis once it is okay with Dr. Elidia Crigler. 2.  History of hypertension. 3.  History of dyslipidemia. 4.  Diabetes mellitus. 5.  Sleep apnea. The patient needs to be restarted on her anticoagulation and continue  medication. Once her heart rate is under control, she can be discharged  home. She will be seen in the office. Arrangements will be made for  the patient to have ablation treatment once the GI issues are under  control. We thank you very much for allowing us to share in the management of  this patient.         Gareth Rob M.D.    D: 05/19/2019 17:22:07       T: 05/19/2019 20:53:34     AS/NAVJOT  Job#: [id number]     Doc#: [id number]    CC:

## 2019-05-20 NOTE — PLAN OF CARE
Problem: Falls - Risk of:  Goal: Absence of physical injury  Description  Absence of physical injury  Outcome: Met This Shift     Problem: Skin Integrity - Impaired:  Goal: Absence of new skin breakdown  Description  Absence of new skin breakdown  Outcome: Met This Shift     Problem: Falls - Risk of:  Goal: Will remain free from falls  Description  Will remain free from falls  Outcome: Ongoing  Note:   No falls this shift. Bed in locked and low position with side rails up x 2 and call light within reach. Problem: Cardiac:  Goal: Ability to maintain an adequate cardiac output will improve  Description  Ability to maintain an adequate cardiac output will improve  Outcome: Ongoing  Note:   Patient remains in At Fib in a controled rate except when she is walking in the hallway and then she gets in the 130s. Dr Nava Alexandra informed of this and med changes done     Problem: Discharge Planning:  Goal: Participates in care planning  Description  Participates in care planning  Outcome: Ongoing  Note:   Patient came from home with family and plans on returning there at discharge     Problem: Pain:  Goal: Pain level will decrease  Description  Pain level will decrease  Outcome: Ongoing  Note:   Patient has denied pain this shift     Problem: Skin Integrity - Impaired:  Goal: Will show no infection signs and symptoms  Description  Will show no infection signs and symptoms  Outcome: Ongoing  Note:   Patient afebrile.   No signs or symptoms of infection noted

## 2019-05-20 NOTE — CARE COORDINATION
19, 11:25 AM      Wilma Olea       Admitted from: ED 2019/ [address] day: 1   Location: [address] [place] [address] Reason for admit: Atrial fibrillation with rapid ventricular response ([facility] [address] [I48.91] Status: IP  Admit order signed?: no  PMH:  has a past medical history of Arthritis, Cancer ([facility] [address] Diabetes mellitus ([facility] [address] Hyperlipidemia, Hypertension, Osteoporosis, and Sleep apnea. Procedure: none  Pertinent abnormal Imagin/19 CXR  No acute cardiopulmonary disease. Mild cardiomegaly. Medications:  Scheduled Meds:   docusate sodium  100 mg Oral Daily    apixaban  5 mg Oral BID    aspirin  81 mg Oral Daily    pantoprazole  40 mg Oral QAM AC    pravastatin  80 mg Oral Daily    linagliptin  5 mg Oral Daily    levothyroxine  25 mcg Oral Daily    hydrocortisone  25 mg Rectal BID    diltiazem  120 mg Oral Daily    metoprolol succinate  50 mg Oral Daily     Continuous Infusions:   Pertinent Info/Orders/Treatment Plan:  Afib with RVR. Internal med following. GI and cardiology consult. Ambulate. (-) troponin. Telemetry. Room air. Diet: DIET CARB CONTROL;   Smoking status:  reports that she has never smoked. She has never used smokeless tobacco.   PCP: Delvin Back DO  Readmission: no  Readmission Risk Score: 12%    Discharge Planning  Current Residence:  Private Residence  Living Arrangements:  Spouse/Significant Other, Family Members   Support Systems:  Spouse/Significant Other, Family Members  Current Services PTA:     Potential Assistance Needed:  N/A  Potential Assistance Purchasing Medications:  No  Does patient want to participate in local refill/ meds to beds program?  No  Type of Home Care Services:  None  Patient expects to be discharged to:  home with family   Expected Discharge date: Follow Up Appointment: Best Day/ Time: Monday AM    Discharge Plan: Spoke with patient, plans to return home with family. HH offered, patient declines, reports she is not homebound.  Has home cpap. Denies need for DME.       Evaluation: no

## 2019-05-20 NOTE — PROGRESS NOTES
Admit Date: 5/19/2019  Hospital day 2    Subjective:     Patient sob with exertion . Medication side effects: none    Scheduled Meds:   docusate sodium  100 mg Oral Daily    apixaban  5 mg Oral BID    amiodarone  200 mg Oral Daily    amiodarone  200 mg Oral BID    aspirin  81 mg Oral Daily    pantoprazole  40 mg Oral QAM AC    pravastatin  80 mg Oral Daily    linagliptin  5 mg Oral Daily    levothyroxine  25 mcg Oral Daily    hydrocortisone  25 mg Rectal BID    diltiazem  120 mg Oral Daily    metoprolol succinate  50 mg Oral Daily     Continuous Infusions:  PRN Meds:acetaminophen, albuterol sulfate HFA, albuterol sulfate HFA    Review of Systems  Pertinent items are noted in HPI. Objective:     Patient Vitals for the past 8 hrs:   BP Temp Temp src Pulse Resp SpO2   05/20/19 1624 111/62 97.6 °F (36.4 °C) Oral 74 18 96 %   05/20/19 1118 (!) 107/55 97.5 °F (36.4 °C) Oral 73 18 100 %     I/O last 3 completed shifts: In: 340 [P.O.:340]  Out: 1900 [Urine:1900]    No change     ECG: af.   Data Review:   CBC:  Lab Results   Component Value Date    WBC 5.2 05/19/2019    RBC 4.16 05/19/2019    RBC 4.00 05/30/2012    HGB 12.2 05/19/2019    HCT 36.7 05/19/2019    MCV 88.2 05/19/2019    MCH 29.3 05/19/2019    MCHC 33.2 05/19/2019    RDW 15.3 04/05/2018     05/19/2019    MPV 10.5 05/19/2019     BMP  Lab Results   Component Value Date     05/20/2019    K 4.0 05/20/2019    K 3.9 05/19/2019     05/20/2019    CO2 21 05/20/2019    BUN 11 05/20/2019    CREATININE 0.6 05/20/2019    CALCIUM 9.1 05/20/2019      COAG PROFILE:  Lab Results   Component Value Date    APTT 77.6 10/25/2017    INR 1.08 04/05/2018       Assessment:     Active Problems:    Atrial fibrillation with rapid ventricular response ([address]  Resolved Problems:    * No resolved hospital problems.  *      Plan:   Patient in atrial fib , with RVR with activity    will add amiodarone    may consider cardioversion and or ablation rx    ambulate

## 2019-05-21 PROCEDURE — 2140000000 HC CCU INTERMEDIATE R&B

## 2019-05-21 PROCEDURE — 2709999900 HC NON-CHARGEABLE SUPPLY

## 2019-05-21 PROCEDURE — 6370000000 HC RX 637 (ALT 250 FOR IP): Performed by: INTERNAL MEDICINE

## 2019-05-21 RX ADMIN — LINAGLIPTIN 5 MG: 5 TABLET, FILM COATED ORAL at 09:11

## 2019-05-21 RX ADMIN — METOPROLOL SUCCINATE 50 MG: 50 TABLET, FILM COATED, EXTENDED RELEASE ORAL at 09:10

## 2019-05-21 RX ADMIN — LEVOTHYROXINE SODIUM 25 MCG: 25 TABLET ORAL at 05:33

## 2019-05-21 RX ADMIN — AMIODARONE HYDROCHLORIDE 200 MG: 200 TABLET ORAL at 21:22

## 2019-05-21 RX ADMIN — PANTOPRAZOLE SODIUM 40 MG: 40 TABLET, DELAYED RELEASE ORAL at 05:32

## 2019-05-21 RX ADMIN — ASPIRIN 81 MG: 81 TABLET, COATED ORAL at 09:10

## 2019-05-21 RX ADMIN — AMIODARONE HYDROCHLORIDE 200 MG: 200 TABLET ORAL at 09:11

## 2019-05-21 RX ADMIN — PRAVASTATIN SODIUM 80 MG: 80 TABLET ORAL at 09:10

## 2019-05-21 RX ADMIN — DILTIAZEM HYDROCHLORIDE 120 MG: 120 CAPSULE, EXTENDED RELEASE ORAL at 09:11

## 2019-05-21 RX ADMIN — APIXABAN 5 MG: 5 TABLET, FILM COATED ORAL at 09:11

## 2019-05-21 RX ADMIN — APIXABAN 5 MG: 5 TABLET, FILM COATED ORAL at 20:53

## 2019-05-21 RX ADMIN — DOCUSATE SODIUM 100 MG: 100 CAPSULE, LIQUID FILLED ORAL at 09:10

## 2019-05-21 NOTE — CARE COORDINATION
5/21/19, 2:32 PM      [facility] day: 2  Location: [facility] Reason for admit: Atrial fibrillation with rapid ventricular response ([facility] [address] [I48.91]   Treatment Plan of Care: Internal med and cardiology following. Afib. Amiodarone BID ordered on 5/20/19. Per Dr. Poppy Silva note, consider ablation or cardioversion. Room air, sats 96-99%. PCP: Lashay Ballesteros DO  Readmission Risk Score: 12%  Discharge Plan: Plans home with family at discharge. Denies need for services. Has a home cpap machine.

## 2019-05-21 NOTE — PROGRESS NOTES
Admit Date: 5/19/2019  Hospital day 4    Subjective:     Patient mild sob . Medication side effects: none    Scheduled Meds:   docusate sodium  100 mg Oral Daily    apixaban  5 mg Oral BID    amiodarone  200 mg Oral BID    aspirin  81 mg Oral Daily    pantoprazole  40 mg Oral QAM AC    pravastatin  80 mg Oral Daily    linagliptin  5 mg Oral Daily    levothyroxine  25 mcg Oral Daily    hydrocortisone  25 mg Rectal BID    diltiazem  120 mg Oral Daily    metoprolol succinate  50 mg Oral Daily     Continuous Infusions:  PRN Meds:acetaminophen, albuterol sulfate HFA, albuterol sulfate HFA    Review of Systems  Pertinent items are noted in HPI. Objective:     Patient Vitals for the past 8 hrs:   BP Temp Temp src Pulse Resp SpO2   05/21/19 1135 108/72 97.6 °F (36.4 °C) Oral 73 18 99 %     I/O last 3 completed shifts: In: 2085 [P.O.:2085]  Out: 2800 [Urine:2800]    No change     ECG: af.   Data Review:   CBC:  Lab Results   Component Value Date    WBC 5.2 05/19/2019    RBC 4.16 05/19/2019    RBC 4.00 05/30/2012    HGB 12.2 05/19/2019    HCT 36.7 05/19/2019    MCV 88.2 05/19/2019    MCH 29.3 05/19/2019    MCHC 33.2 05/19/2019    RDW 15.3 04/05/2018     05/19/2019    MPV 10.5 05/19/2019     BMP  Lab Results   Component Value Date     05/20/2019    K 4.0 05/20/2019    K 3.9 05/19/2019     05/20/2019    CO2 21 05/20/2019    BUN 11 05/20/2019    CREATININE 0.6 05/20/2019    CALCIUM 9.1 05/20/2019      COAG PROFILE:  Lab Results   Component Value Date    APTT 77.6 10/25/2017    INR 1.08 04/05/2018       Assessment:     Active Problems:    HTN (hypertension)    Diabetes mellitus type II, controlled ([address]    Atrial fibrillation with rapid ventricular response ([address]  Resolved Problems:    * No resolved hospital problems.  *      Plan:     patient in atrial fib    variable ventricular response  Ambulating    ok for discharge    continue supportive rx    will see at the office

## 2019-05-21 NOTE — PROGRESS NOTES
WBC 5.2   HGB 12.2   HCT 36.7*   MCV 88.2        BMP:   Recent Labs     05/20/19  0403      K 4.0      CO2 21*   BUN 11   CREATININE 0.6   CALCIUM 9.1   GLUCOSE 135*     PT/INR: No results for input(s): PROTIME, INR in the last 72 hours. APTT: No results for input(s): APTT in the last 72 hours. Lipids:   Recent Labs     05/19/19  0200   ALKPHOS 54   ALT 46   AST 56*   BILITOT 0.4   BILIDIR <0.2   LABALBU 4.1     Troponin: No results for input(s): TROPONINI in the last 72 hours. Imaging:  Xr Chest Portable    Result Date: 5/19/2019  PROCEDURE: XR CHEST PORTABLE CLINICAL INFORMATION: palpitation. COMPARISON: Chest x-ray dated 3/16/2019 TECHNIQUE: AP Portable chest xray FINDINGS: Lungs/pleura:  No pneumonia, pulmonary edema, or obvious mass. No pleural effusion. No pneumothorax. Heart: There is mild cardiomegaly. Mediastinum/vasquez: The aorta is tortuous, appropriate for age. Hilar and mediastinal silhouettes are otherwise unremarkable. Skeleton: There is multilevel vertebral endplate spondylosis. Lines/tubes/devices: none     No acute cardiopulmonary disease. Mild cardiomegaly. **This report has been created using voice recognition software. It may contain minor errors which are inherent in voice recognition technology. ** Final report electronically signed by Dr. Bibi Cam on 5/19/2019 2:56 AM      EKG:      Diet: DIET CARB CONTROL;        Data:   Scheduled Meds: Scheduled Meds:   docusate sodium  100 mg Oral Daily    apixaban  5 mg Oral BID    amiodarone  200 mg Oral BID    aspirin  81 mg Oral Daily    pantoprazole  40 mg Oral QAM AC    pravastatin  80 mg Oral Daily    linagliptin  5 mg Oral Daily    levothyroxine  25 mcg Oral Daily    hydrocortisone  25 mg Rectal BID    diltiazem  120 mg Oral Daily    metoprolol succinate  50 mg Oral Daily     Continuous Infusions:  PRN Meds:.acetaminophen, albuterol sulfate HFA, albuterol sulfate HFA  Continuous Infusions:      Assessment Active Problems:    HTN (hypertension)    Diabetes mellitus type II, controlled ([address]    Atrial fibrillation with rapid ventricular response ([address]  Resolved Problems:    * No resolved hospital problems. *        Patient Active Problem List   Diagnosis    Hyperlipidemia    HTN (hypertension)    Osteopenia    GERD (gastroesophageal reflux disease)    RAD (reactive airway disease)    OA (osteoarthritis)    Chest pain, atypical    Diabetes mellitus type II, controlled ([address]    Obesity (BMI 30-39. 9)    Chronic low back pain    Neuropathy    Obstructive sleep apnea on CPAP    Controlled type 2 diabetes mellitus without complication (HCC)    Persistent atrial fibrillation (HCC)    Paroxysmal atrial fibrillation (HCC)    Abnormal nuclear stress test    Influenza    Atrial fibrillation with rapid ventricular response [facility])        Plan   Cardiology seeing and will continue to adjust med  Will cont pt/ot  Home soon      Electronically signed by Smita Bowles MD on 5/21/2019 at 3:15 PM

## 2019-05-22 VITALS
OXYGEN SATURATION: 97 % | BODY MASS INDEX: 38.02 KG/M2 | HEART RATE: 69 BPM | DIASTOLIC BLOOD PRESSURE: 56 MMHG | RESPIRATION RATE: 16 BRPM | HEIGHT: 66 IN | WEIGHT: 236.6 LBS | SYSTOLIC BLOOD PRESSURE: 135 MMHG | TEMPERATURE: 97.5 F

## 2019-05-22 PROCEDURE — 2709999900 HC NON-CHARGEABLE SUPPLY

## 2019-05-22 PROCEDURE — 6370000000 HC RX 637 (ALT 250 FOR IP): Performed by: INTERNAL MEDICINE

## 2019-05-22 RX ORDER — AMIODARONE HYDROCHLORIDE 200 MG/1
200 TABLET ORAL 2 TIMES DAILY
Qty: 30 TABLET | Refills: 3 | Status: SHIPPED | OUTPATIENT
Start: 2019-05-22 | End: 2019-10-24

## 2019-05-22 RX ORDER — HYDROCORTISONE ACETATE 25 MG/1
25 SUPPOSITORY RECTAL 2 TIMES DAILY PRN
Qty: 10 SUPPOSITORY | Refills: 0 | Status: SHIPPED | OUTPATIENT
Start: 2019-05-22 | End: 2019-09-27

## 2019-05-22 RX ORDER — LEVOTHYROXINE SODIUM 0.03 MG/1
25 TABLET ORAL DAILY
Qty: 30 TABLET | Refills: 3 | Status: SHIPPED | OUTPATIENT
Start: 2019-05-23 | End: 2019-09-20 | Stop reason: SDUPTHER

## 2019-05-22 RX ORDER — PSEUDOEPHEDRINE HCL 30 MG
100 TABLET ORAL DAILY
Qty: 30 CAPSULE | Refills: 1 | Status: SHIPPED | OUTPATIENT
Start: 2019-05-23 | End: 2020-06-10 | Stop reason: DRUGHIGH

## 2019-05-22 RX ORDER — DILTIAZEM HYDROCHLORIDE 120 MG/1
120 CAPSULE, COATED, EXTENDED RELEASE ORAL DAILY
Qty: 30 CAPSULE | Refills: 3 | Status: SHIPPED | OUTPATIENT
Start: 2019-05-23 | End: 2019-09-27 | Stop reason: SDUPTHER

## 2019-05-22 RX ADMIN — APIXABAN 5 MG: 5 TABLET, FILM COATED ORAL at 09:09

## 2019-05-22 RX ADMIN — LINAGLIPTIN 5 MG: 5 TABLET, FILM COATED ORAL at 09:09

## 2019-05-22 RX ADMIN — DOCUSATE SODIUM 100 MG: 100 CAPSULE, LIQUID FILLED ORAL at 09:09

## 2019-05-22 RX ADMIN — PANTOPRAZOLE SODIUM 40 MG: 40 TABLET, DELAYED RELEASE ORAL at 06:42

## 2019-05-22 RX ADMIN — PRAVASTATIN SODIUM 80 MG: 80 TABLET ORAL at 09:09

## 2019-05-22 RX ADMIN — METOPROLOL SUCCINATE 50 MG: 50 TABLET, FILM COATED, EXTENDED RELEASE ORAL at 09:09

## 2019-05-22 RX ADMIN — AMIODARONE HYDROCHLORIDE 200 MG: 200 TABLET ORAL at 09:09

## 2019-05-22 RX ADMIN — LEVOTHYROXINE SODIUM 25 MCG: 25 TABLET ORAL at 06:42

## 2019-05-22 RX ADMIN — ASPIRIN 81 MG: 81 TABLET, COATED ORAL at 09:09

## 2019-05-22 RX ADMIN — DILTIAZEM HYDROCHLORIDE 120 MG: 120 CAPSULE, EXTENDED RELEASE ORAL at 09:09

## 2019-05-22 NOTE — DISCHARGE SUMMARY
Dr. Dinorah Luu Discharge Summary  5/22/2019  4:57 PM    Patient:  Car Casper  YOB: 1940    MRN: [id number]   Acct: [de-identified]   [id number]   Primary Care Physician: Tigist Zepeda DO    Admit date:  5/19/2019    Discharge date:  5/22/2019    Discharge Diagnoses:    Patient Active Problem List   Diagnosis    Hyperlipidemia    HTN (hypertension)    Osteopenia    GERD (gastroesophageal reflux disease)    RAD (reactive airway disease)    OA (osteoarthritis)    Chest pain, atypical    Diabetes mellitus type II, controlled ([facility] [address]    Obesity (BMI 30-39. 9)    Chronic low back pain    Neuropathy    Obstructive sleep apnea on CPAP    Controlled type 2 diabetes mellitus without complication (HCC)    Persistent atrial fibrillation (HCC)    Paroxysmal atrial fibrillation (HCC)    Abnormal nuclear stress test    Influenza    Atrial fibrillation with rapid ventricular response ([facility] [address]       Discharge Medications:       [facility] Medication Instructions ICO:[id number]    Printed on:05/22/19 4128   Medication Information                      acetaminophen (TYLENOL) 325 MG tablet  Take 325 mg by mouth every 4 hours as needed for Pain (For mild pain level 1-3 or for fever > 100.5)              albuterol sulfate HFA (VENTOLIN HFA) 108 (90 Base) MCG/ACT inhaler  Inhale 2 puffs into the lungs every 4 hours as needed for Wheezing             amiodarone (CORDARONE) 200 MG tablet  Take 1 tablet by mouth 2 times daily             apixaban (ELIQUIS) 5 MG TABS tablet  Take 1 tablet by mouth 2 times daily             aspirin 81 MG EC tablet  Take 1 tablet by mouth daily             B Complex-C (SUPER B COMPLEX PO)  Take by mouth             BIOTIN PO  Take 1 tablet by mouth daily             calcitonin (MIACALCIN) 200 UNIT/ACT nasal spray  1 spray by Nasal route daily             Cyanocobalamin (VITAMIN B 12 PO)  Take  by mouth Daily.                diltiazem (CARDIZEM CD) 120 MG extended release capsule  Take 1 capsule by mouth daily             docusate (COLACE, DULCOLAX) 100 MG CAPS  Take 100 mg by mouth daily             GLUCOSAMINE-CHONDROITIN-VIT D3 PO  Take by mouth daily              hydrocortisone (ANUSOL-HC) 25 MG suppository  Place 1 suppository rectally 2 times daily as needed for Hemorrhoids             levothyroxine (SYNTHROID) 25 MCG tablet  Take 1 tablet by mouth Daily             metoprolol succinate (TOPROL XL) 25 MG extended release tablet  Take 1 tablet by mouth daily             nitroGLYCERIN (NITROSTAT) 0.4 MG SL tablet  DISSOLVE ONE TABLET UNDER TONGUE AS NEEDED FOR CHEST PAIN EVERY 5 MINUTES. MAX OF 3 DOSES. IF NO RELIEF AFTER 1 DOSE, CALL 911.             omeprazole (PRILOSEC) 40 MG delayed release capsule  Take 1 capsule by mouth daily             pravastatin (PRAVACHOL) 80 MG tablet  Take 1 tablet by mouth daily             SITagliptin (JANUVIA) 100 MG tablet  Take 1 tablet by mouth daily               Scheduled Meds: Scheduled Meds:   docusate sodium  100 mg Oral Daily    apixaban  5 mg Oral BID    amiodarone  200 mg Oral BID    aspirin  81 mg Oral Daily    pantoprazole  40 mg Oral QAM AC    pravastatin  80 mg Oral Daily    linagliptin  5 mg Oral Daily    levothyroxine  25 mcg Oral Daily    hydrocortisone  25 mg Rectal BID    diltiazem  120 mg Oral Daily    metoprolol succinate  50 mg Oral Daily     Continuous Infusions:  PRN Meds:.acetaminophen, albuterol sulfate HFA, albuterol sulfate HFA  Continuous Infusions:    Intake/Output Summary (Last 24 hours) at 5/22/2019 1657  Last data filed at 5/22/2019 1349  Gross per 24 hour   Intake 1040 ml   Output 2000 ml   Net -960 ml       Diet:  DIET CARB CONTROL;     Activity:  Up ad carlos (Patient can move independently)    Follow-up:  in the next few weeks with Paul Boykin DO,       Consultants:  cardiology    Procedures:      Diagnostic Test:    Objective:  Lab Results   Component Value Date    WBC 5.2 05/19/2019    RBC 4.16 05/19/2019    RBC 4.00 05/30/2012    HGB 12.2 05/19/2019    HCT 36.7 05/19/2019    MCV 88.2 05/19/2019    MCH 29.3 05/19/2019    MCHC 33.2 05/19/2019    RDW 15.3 04/05/2018     05/19/2019    MPV 10.5 05/19/2019     Lab Results   Component Value Date     05/20/2019    K 4.0 05/20/2019    K 3.9 05/19/2019     05/20/2019    CO2 21 05/20/2019    BUN 11 05/20/2019    CREATININE 0.6 05/20/2019    GLUCOSE 135 05/20/2019    GLUCOSE 114 05/30/2012    CALCIUM 9.1 05/20/2019     Lab Results   Component Value Date    CALCIUM 9.1 05/20/2019     No results found for: IONCA  Lab Results   Component Value Date    MG 1.9 05/19/2019     Lab Results   Component Value Date    PHOS 3.7 04/22/2014     Lab Results   Component Value Date    BNP 42.1 05/03/2013     Lab Results   Component Value Date    ALKPHOS 54 05/19/2019    ALT 46 05/19/2019    AST 56 05/19/2019    PROT 7.0 05/19/2019    BILITOT 0.4 05/19/2019    BILIDIR <0.2 05/19/2019    LABALBU 4.1 05/19/2019    LABALBU 4.6 05/30/2012     No results found for: LACTA  No results found for: AMYLASE  Lab Results   Component Value Date    LIPASE 24.6 03/16/2019     Lab Results   Component Value Date    CHOL 172 04/05/2018    TRIG 58 04/05/2018    HDL 94 04/05/2018    LDLCALC 66 04/05/2018     No results for input(s): POCGLU in the last 72 hours. No results for input(s): CKTOTAL, CKMB, TROPONINI in the last 72 hours. Lab Results   Component Value Date    LABA1C 5.5 09/27/2018     Lab Results   Component Value Date    INR 1.08 04/05/2018     No results found for: PHART, PO2ART, JOX0ARP, BYW8SAQ, [facility] Course: clinical course has improved, with the medication adjustment. Seen by the cardiologist and started on amiodarone, Cardizem. Heart rate is better and she feels better and ready to go home. See order.       Vitals: BP (!) 135/56   Pulse 69   Temp 97.5 °F (36.4 °C) (Oral)   Resp 16   Ht 5' 6\" (1.676 m)   Wt 236 lb 9.6 oz (107.3 kg)   LMP  (Exact Date)   SpO2 97%   BMI 38.19 kg/m²   Physical Exam:  General appearance - alert, well appearing, and in no distress  Eyes - pupils equal and reactive, extraocular eye movements intact  Mouth - mucous membranes moist, pharynx normal without lesions  Neck - supple, no significant adenopathy  Chest - clear to auscultation, no wheezes, rales or rhonchi, symmetric air entry  Heart - s1, s2, heard but irregular. Abdomen - soft, nontender, nondistended, no masses or organomegaly, pos bs.   Neurological - alert, oriented, normal speech, no focal findings or movement disorder noted  Musculoskeletal - no joint tenderness, deformity or swelling  Extremities - peripheral pulses normal, no pedal edema, no clubbing or cyanosis  Skin - normal coloration and turgor, no rashes, no suspicious skin lesions noted          GDisposition: home    Condition: Stable        Blima MD Mojgan     Copy: Primary Care Physician: Jose Burgess DO  Internal Medicine

## 2019-05-22 NOTE — FLOWSHEET NOTE
05/21/19 2020   Encounter Summary   Services provided to: Patient   Referral/Consult From: [address] [facility]   Continue Visiting No  (5/21)   Complexity of Encounter Moderate   Length of Encounter 15 minutes   Spiritual Assessment Completed Yes   Grief and Life Adjustment   Type New Diagnosis   Assessment Approachable   Intervention Nurtured hope;Explored feelings, thoughts, concerns; Discussed illness/injury and it's impact; Discussed relationship with God   Outcome Receptive;Encouraged;New perspective; Expressed gratitude;Engaged in conversation     Assessment: Patient is a 69 yr old female who came in for heart issues. It ended up she will need to get a heart ablation at a later date. She explained that she believes she suffered from AFIB and is hoping it's controlled now. She did have to have a heart CATH while here and will wait for the next surgery as they are not keeping her in after tomorrow. - The patient has a great support system through her Taoism but had a lot of questions about Catholicism. Apparently her son converted recently and she doesn't feel as though she should ask him. I encouraged her to do so since the conversion may be vital to his formation in spirituality and being a part of that is a right to parenting. She agreed and laughed Caty Villa impressed her DIL will be. \"    Plan: No follow-up necessary as the patient is discharging tomorrow.

## 2019-05-22 NOTE — CARE COORDINATION
5/22/19, 3:04 PM    Discharge plan discussed by  and . Discharge plan reviewed with patient/ family. Patient/ family verbalize understanding of discharge plan and are in agreement with plan. Understanding was demonstrated using the teach back method. Spoke with patient, planning discharge later today. Plans home with family. Has home cpap, denies needs.        IMM Letter  IMM Letter given to Patient/Family/Significant other/Guardian/POA/by[de-identified]   IMM Letter date given[de-identified] 05/22/19  IMM Letter time given[de-identified] 1928

## 2019-05-22 NOTE — PROGRESS NOTES
Discharge teaching and instructions for diagnosis/procedure of afib, rapid rate completed with patient using teachback method. AVS reviewed. Printed prescriptions given to patient. Patient voiced understanding regarding prescriptions, follow up appointments, and care of self at home.  Discharged in a wheelchair to  home with support per family

## 2019-05-22 NOTE — PLAN OF CARE
Problem: Falls - Risk of:  Goal: Will remain free from falls  Description  Will remain free from falls  Outcome: Ongoing  Note:   Assessment & interventions provided throughout shift. Bed locked & in low position, call light in reach, side-rails up x2, non-slip socks on when ambulating, reminded patient to use call light to call for assistance. Problem: Cardiac:  Goal: Ability to maintain an adequate cardiac output will improve  Description  Ability to maintain an adequate cardiac output will improve  Outcome: Ongoing  Note:   Ongoing assessment & interventions provided throughout shift. Patient on continuous telemetry monitoring, heart tones, vitals & pulses checked at least 3 times per shift & PRN. Vitals within acceptable limits. Peripheral pulses palpable. Problem: Discharge Planning:  Goal: Participates in care planning  Description  Participates in care planning  Outcome: Ongoing  Note:   Patient plans to return home at discharge   Care plan reviewed with patient. Patient verbalizes understanding of the care plan and contributed to goal setting.

## 2019-05-23 ENCOUNTER — TELEPHONE (OUTPATIENT)
Dept: FAMILY MEDICINE CLINIC | Age: 79
End: 2019-05-23

## 2019-05-23 RX ORDER — NITROGLYCERIN 0.4 MG/1
TABLET SUBLINGUAL
Qty: 25 TABLET | Refills: 0 | Status: SHIPPED | OUTPATIENT
Start: 2019-05-23 | End: 2020-08-27 | Stop reason: SDUPTHER

## 2019-05-23 NOTE — TELEPHONE ENCOUNTER
05/23/2019   Alena Olea called requesting a refill on the following medications:  Requested Prescriptions     Pending Prescriptions Disp Refills    nitroGLYCERIN (NITROSTAT) 0.4 MG SL tablet 25 tablet 0     Sig: up to max of 3 total doses. If no relief after 1 dose, call 911.      Pharmacy verified:  .[email or web address]      Date of last visit: 10/01/2018  Date of next visit (if applicable): 8/38/7372

## 2019-05-23 NOTE — TELEPHONE ENCOUNTER
[facility] Transitions Initial Follow Up Call    Outreach made within 2 business days of discharge: Yes    Patient: Justin Jonas Patient : 1940   MRN: [id number]  Reason for Admission: There are no discharge diagnoses documented for the most recent discharge. Discharge Date: 19       Spoke with: LM to return call.      Discharge department/facility: [facility]      Follow Up  Future Appointments   Date Time Provider Silvia Gustafson   2019 10:45 AM Lake Banda DO [address] [facility]   2020 10:30 AM James Garcia [address] [place] [address]

## 2019-05-23 NOTE — TELEPHONE ENCOUNTER
05/23/2019 . Transition of Care visit scheduled.   5/30/2019  Patient is being discharged to home  Date of discharge [id number]  Discharge from facility [facility]  Reason for admission afib w/rvr

## 2019-05-30 ENCOUNTER — OFFICE VISIT (OUTPATIENT)
Dept: FAMILY MEDICINE CLINIC | Age: 79
End: 2019-05-30
Payer: MEDICARE

## 2019-05-30 VITALS
WEIGHT: 247.4 LBS | SYSTOLIC BLOOD PRESSURE: 112 MMHG | DIASTOLIC BLOOD PRESSURE: 62 MMHG | HEART RATE: 48 BPM | BODY MASS INDEX: 39.93 KG/M2

## 2019-05-30 DIAGNOSIS — Z99.89 OBSTRUCTIVE SLEEP APNEA ON CPAP: ICD-10-CM

## 2019-05-30 DIAGNOSIS — E66.9 OBESITY (BMI 30-39.9): ICD-10-CM

## 2019-05-30 DIAGNOSIS — I48.91 ATRIAL FIBRILLATION WITH RVR (HCC): Primary | ICD-10-CM

## 2019-05-30 DIAGNOSIS — K64.8 INTERNAL HEMORRHOIDS: ICD-10-CM

## 2019-05-30 DIAGNOSIS — E11.21 CONTROLLED TYPE 2 DIABETES MELLITUS WITH DIABETIC NEPHROPATHY, WITHOUT LONG-TERM CURRENT USE OF INSULIN (HCC): ICD-10-CM

## 2019-05-30 DIAGNOSIS — I10 ESSENTIAL HYPERTENSION: ICD-10-CM

## 2019-05-30 DIAGNOSIS — E78.00 PURE HYPERCHOLESTEROLEMIA: ICD-10-CM

## 2019-05-30 DIAGNOSIS — K21.9 GASTROESOPHAGEAL REFLUX DISEASE, ESOPHAGITIS PRESENCE NOT SPECIFIED: ICD-10-CM

## 2019-05-30 DIAGNOSIS — G47.33 OBSTRUCTIVE SLEEP APNEA ON CPAP: ICD-10-CM

## 2019-05-30 PROCEDURE — 99495 TRANSJ CARE MGMT MOD F2F 14D: CPT | Performed by: FAMILY MEDICINE

## 2019-05-30 RX ORDER — ACETAMINOPHEN AND CODEINE PHOSPHATE 300; 30 MG/1; MG/1
1 TABLET ORAL EVERY 4 HOURS PRN
Refills: 0 | COMMUNITY
Start: 2019-04-29 | End: 2019-06-16

## 2019-05-30 RX ORDER — CHLORHEXIDINE GLUCONATE 0.12 MG/ML
5 RINSE ORAL DAILY
Refills: 2 | COMMUNITY
Start: 2019-05-13 | End: 2020-06-10

## 2019-05-30 NOTE — PROGRESS NOTES
Visit Information    Have you changed or started any medications since your last visit including any over-the-counter medicines, vitamins, or herbal medicines? no   Are you having any side effects from any of your medications? -  no  Have you stopped taking any of your medications? Is so, why? -  no    Have you seen any other physician or provider since your last visit? Yes - Records Obtained  Have you had any other diagnostic tests since your last visit? Yes - Records Obtained  Have you been seen in the emergency room and/or had an admission to a hospital since we last saw you? Yes - Records Obtained  Have you had your routine dental cleaning in the past 6 months? n/a    Have you activated your [employer] account? If not, what are your barriers?  Yes     Patient Care Team:  Awilda Banks,  as PCP - General  Awilda Banks,  as PCP - [facility] Attributed Provider  Fracisco Ledbetter MD as Consulting Physician (Orthopedic Surgery)    Medical History Review  Past Medical, Family, and Social History reviewed and does contribute to the patient presenting condition    Health Maintenance   Topic Date Due    DTaP/Tdap/Td vaccine (1 - Tdap) 09/08/1960    Shingles Vaccine (1 of 2) 09/08/1991    TSH testing  05/19/2020    Flu vaccine  Completed    Pneumococcal 65+ years Vaccine  Completed    DEXA (modify frequency per FRAX score)  Addressed

## 2019-05-30 NOTE — PROGRESS NOTES
Post-Discharge Transitional Care Management Services      See Ramirez   YOB: 1940    Date of Visit:  5/30/2019  30 Day Post-Discharge Date: 6/22/19  Chief Complaint   Patient presents with    Follow-Up from [facility] - D/D'd from Psychiatric on 5/22/19 - Afib with RVR        Admit date:  5/19/2019                        Discharge date:  5/22/2019     Discharge Diagnoses:        Patient Active Problem List   Diagnosis    Hyperlipidemia    HTN (hypertension)    Osteopenia    GERD (gastroesophageal reflux disease)    RAD (reactive airway disease)    OA (osteoarthritis)    Chest pain, atypical    Diabetes mellitus type II, controlled ([address]    Obesity (BMI 30-39. 9)    Chronic low back pain    Neuropathy    Obstructive sleep apnea on CPAP    Controlled type 2 diabetes mellitus without complication (HCC)    Persistent atrial fibrillation (HCC)    Paroxysmal atrial fibrillation (HCC)    Abnormal nuclear stress test    Influenza    Atrial fibrillation with rapid ventricular response ([facility]     Follow up with Dr. Nava Alexandra on 6/6/19. Denies CP, chest tightness, SOB. No lightheadedness, dizziness. BP ok, pulse 48. Vitals:    05/30/19 1345   BP: 112/62   Pulse: (!) 48     Tolerating new meds. Recent EGD and colonoscopy. Plan was to address hemorrhoids but this is now on hold while pt is on Eliquis. Hgb stable. Allergies   Allergen Reactions    Ranolazine Hives     Pt was on brand Ranexa     Outpatient Medications Marked as Taking for the 5/30/19 encounter (Office Visit) with Jose Burgess, DO   Medication Sig Dispense Refill    acetaminophen-codeine (TYLENOL #3) 300-30 MG per tablet Take 1 tablet by mouth every 4 hours as needed. 0    chlorhexidine (PERIDEX) 0.12 % solution Take 5 mLs by mouth daily  2    nitroGLYCERIN (NITROSTAT) 0.4 MG SL tablet DISSOLVE ONE TABLET UNDER TONGUE AS NEEDED FOR CHEST PAIN EVERY 5 MINUTES. MAX OF 3 DOSES.  IF NO RELIEF AFTER 1 05/22/19 236 lb 9.6 oz (107.3 kg)   05/17/19 239 lb 6.4 oz (108.6 kg)     BP Readings from Last 3 Encounters:   05/30/19 112/62   05/22/19 (!) 135/56   05/17/19 116/72        Patient was admitted to [address] [place] [address] from 5/19/19 to 5/22/19 for a-fib w/ RVR. Inpatient course: Discharge summary reviewed- see chart. Current status: improved    Review of Systems:  A comprehensive review of systems was negative except for what was noted in the HPI. Physical Exam:  General Appearance: alert and oriented to person, place and time, well developed and well- nourished, in no acute distress  Skin: warm and dry, no rash or erythema  Head: normocephalic and atraumatic  Eyes: pupils equal, round, and reactive to light, extraocular eye movements intact, conjunctivae normal  ENT: tympanic membrane, external ear and ear canal normal bilaterally, nose without deformity, nasal mucosa and turbinates normal without polyps  Neck: supple and non-tender without mass, no thyromegaly or thyroid nodules, no cervical lymphadenopathy  Pulmonary/Chest: clear to auscultation bilaterally- no wheezes, rales or rhonchi, normal air movement, no respiratory distress  Cardiovascular: regular rhythm, bradycardic. No murmur. No carotid bruits  Abdomen: soft, non-tender, non-distended, normal bowel sounds, no masses or organomegaly  Extremities: no cyanosis, clubbing or edema      Initial post-discharge communication occurred between medical assistant and patient on 5/23/19- see documentation in chart: telephone encounter. Assessment/Plan:  Concepción Greer was seen today for follow-up from hospital.    Diagnoses and all orders for this visit:    Atrial fibrillation with RVR ([address]    Essential hypertension    Obstructive sleep apnea on CPAP    Obesity (BMI 30-39. 9)    Controlled type 2 diabetes mellitus with diabetic nephropathy, without long-term current use of insulin (HCC)    Gastroesophageal reflux disease, esophagitis presence not specified    Pure hypercholesterolemia    Internal hemorrhoids          Diagnostic test results reviewed: inpatient labs, EKG and chest x-ray    Patient risk of morbidity and mortality: moderate    Medical Decision Making: moderate complexity    -  Chronic medical problems stable  -  Continue current medications  -  Follow up with specialists as scheduled  -  KKA in September, labs at that time

## 2019-06-16 ENCOUNTER — APPOINTMENT (OUTPATIENT)
Dept: GENERAL RADIOLOGY | Age: 79
End: 2019-06-16
Payer: MEDICARE

## 2019-06-16 ENCOUNTER — HOSPITAL ENCOUNTER (EMERGENCY)
Age: 79
Discharge: HOME | End: 2019-06-17
Payer: MEDICARE

## 2019-06-16 DIAGNOSIS — I10 ESSENTIAL HYPERTENSION: Primary | ICD-10-CM

## 2019-06-16 DIAGNOSIS — F41.8 ANXIETY ABOUT HEALTH: ICD-10-CM

## 2019-06-16 LAB
EKG ATRIAL RATE: 70 BPM
EKG P AXIS: 57 DEGREES
EKG P-R INTERVAL: 180 MS
EKG Q-T INTERVAL: 440 MS
EKG QRS DURATION: 88 MS
EKG QTC CALCULATION (BAZETT): 475 MS
EKG R AXIS: 35 DEGREES
EKG T AXIS: 41 DEGREES
EKG VENTRICULAR RATE: 70 BPM

## 2019-06-16 PROCEDURE — 36415 COLL VENOUS BLD VENIPUNCTURE: CPT

## 2019-06-16 PROCEDURE — 71045 X-RAY EXAM CHEST 1 VIEW: CPT

## 2019-06-16 PROCEDURE — 99284 EMERGENCY DEPT VISIT MOD MDM: CPT

## 2019-06-16 PROCEDURE — 85025 COMPLETE CBC W/AUTO DIFF WBC: CPT

## 2019-06-16 PROCEDURE — 83880 ASSAY OF NATRIURETIC PEPTIDE: CPT

## 2019-06-16 PROCEDURE — 80048 BASIC METABOLIC PNL TOTAL CA: CPT

## 2019-06-16 PROCEDURE — 93005 ELECTROCARDIOGRAM TRACING: CPT | Performed by: NURSE PRACTITIONER

## 2019-06-17 ENCOUNTER — TELEPHONE (OUTPATIENT)
Dept: FAMILY MEDICINE CLINIC | Age: 79
End: 2019-06-17

## 2019-06-17 VITALS
RESPIRATION RATE: 18 BRPM | SYSTOLIC BLOOD PRESSURE: 172 MMHG | HEIGHT: 66 IN | DIASTOLIC BLOOD PRESSURE: 73 MMHG | OXYGEN SATURATION: 100 % | TEMPERATURE: 98.8 F | HEART RATE: 60 BPM | WEIGHT: 243 LBS | BODY MASS INDEX: 39.05 KG/M2

## 2019-06-17 LAB
ANION GAP SERPL CALCULATED.3IONS-SCNC: 10 MEQ/L (ref 8–16)
BACTERIA: ABNORMAL /HPF
BASOPHILS # BLD: 0.7 %
BASOPHILS ABSOLUTE: 0.1 THOU/MM3 (ref 0–0.1)
BILIRUBIN URINE: NEGATIVE
BLOOD, URINE: NEGATIVE
BUN BLDV-MCNC: 13 MG/DL (ref 7–22)
CALCIUM SERPL-MCNC: 9.4 MG/DL (ref 8.5–10.5)
CASTS 2: ABNORMAL /LPF
CASTS UA: ABNORMAL /LPF
CHARACTER, URINE: CLEAR
CHLORIDE BLD-SCNC: 100 MEQ/L (ref 98–111)
CO2: 24 MEQ/L (ref 23–33)
COLOR: YELLOW
CREAT SERPL-MCNC: 0.7 MG/DL (ref 0.4–1.2)
CRYSTALS, UA: ABNORMAL
EOSINOPHIL # BLD: 1.6 %
EOSINOPHILS ABSOLUTE: 0.1 THOU/MM3 (ref 0–0.4)
EPITHELIAL CELLS, UA: ABNORMAL /HPF
ERYTHROCYTE [DISTWIDTH] IN BLOOD BY AUTOMATED COUNT: 13.8 % (ref 11.5–14.5)
ERYTHROCYTE [DISTWIDTH] IN BLOOD BY AUTOMATED COUNT: 46.5 FL (ref 35–45)
GFR SERPL CREATININE-BSD FRML MDRD: 81 ML/MIN/1.73M2
GLUCOSE BLD-MCNC: 125 MG/DL (ref 70–108)
GLUCOSE URINE: NEGATIVE MG/DL
HCT VFR BLD CALC: 36.7 % (ref 37–47)
HEMOGLOBIN: 12 GM/DL (ref 12–16)
IMMATURE GRANS (ABS): 0.03 THOU/MM3 (ref 0–0.07)
IMMATURE GRANULOCYTES: 0.4 %
KETONES, URINE: NEGATIVE
LEUKOCYTE ESTERASE, URINE: ABNORMAL
LYMPHOCYTES # BLD: 18 %
LYMPHOCYTES ABSOLUTE: 1.3 THOU/MM3 (ref 1–4.8)
MCH RBC QN AUTO: 29.9 PG (ref 26–33)
MCHC RBC AUTO-ENTMCNC: 32.7 GM/DL (ref 32.2–35.5)
MCV RBC AUTO: 91.3 FL (ref 81–99)
MISCELLANEOUS 2: ABNORMAL
MONOCYTES # BLD: 11.1 %
MONOCYTES ABSOLUTE: 0.8 THOU/MM3 (ref 0.4–1.3)
NITRITE, URINE: NEGATIVE
NUCLEATED RED BLOOD CELLS: 0 /100 WBC
OSMOLALITY CALCULATION: 269.8 MOSMOL/KG (ref 275–300)
PH UA: 6 (ref 5–9)
PLATELET # BLD: 212 THOU/MM3 (ref 130–400)
PMV BLD AUTO: 11.2 FL (ref 9.4–12.4)
POTASSIUM SERPL-SCNC: 3.7 MEQ/L (ref 3.5–5.2)
PRO-BNP: 427.3 PG/ML (ref 0–1800)
PROTEIN UA: NEGATIVE
RBC # BLD: 4.02 MILL/MM3 (ref 4.2–5.4)
RBC URINE: ABNORMAL /HPF
RENAL EPITHELIAL, UA: ABNORMAL
SEG NEUTROPHILS: 68.2 %
SEGMENTED NEUTROPHILS ABSOLUTE COUNT: 5 THOU/MM3 (ref 1.8–7.7)
SODIUM BLD-SCNC: 134 MEQ/L (ref 135–145)
SPECIFIC GRAVITY, URINE: < 1.005 (ref 1–1.03)
UROBILINOGEN, URINE: 0.2 EU/DL (ref 0–1)
WBC # BLD: 7.3 THOU/MM3 (ref 4.8–10.8)
WBC UA: ABNORMAL /HPF
YEAST: ABNORMAL

## 2019-06-17 PROCEDURE — 81001 URINALYSIS AUTO W/SCOPE: CPT

## 2019-06-17 PROCEDURE — 93010 ELECTROCARDIOGRAM REPORT: CPT | Performed by: NUCLEAR MEDICINE

## 2019-06-17 ASSESSMENT — ENCOUNTER SYMPTOMS
VOMITING: 0
SHORTNESS OF BREATH: 0
DIARRHEA: 0
NAUSEA: 0

## 2019-06-17 NOTE — ED TRIAGE NOTES
PT arrives to the ED with a chief complaint of hypertension. Pt reports that her BP was up to 237 systolic. PT denies any SOB or pain. EKG performed. Provider to see patient.

## 2019-06-17 NOTE — ED PROVIDER NOTES
[facility]  Pt Name: Rios Conley  MRN: [id number]  [place] 1940  Date of evaluation: 6/16/2019  Provider: BOWEN Hill CNP    CHIEF COMPLAINT       Chief Complaint   Patient presents with    Hypertension       Nurses Notes reviewed and I agree except as noted in the HPI. HISTORY OF PRESENT ILLNESS    Rios Conley is a 69 y.o. female whopresents to the emergency department from home with hypertension at home. the patient reported that she had a 474 systolic reading but denies any headache, nausea, vomiting, diarrhea, blurred vision, chest pain or new shortness of breath. The patient has a history of A. Fib, hypertension, diabetes, hyperlipidemia, osteoporosis and arthritis. The patient takes diltiazem and metoprolol succinate. The patient denies any other symptoms or relevant history at this time. Triage notes and Nursing notes were reviewed by myself. Any discrepancies are addressed above. REVIEW OF SYSTEMS     Review of Systems   Constitutional: Negative for chills, fatigue and fever. HENT: Negative for congestion, ear discharge, ear pain, postnasal drip and rhinorrhea. Eyes: Negative for visual disturbance. Respiratory: Negative for cough, chest tightness and shortness of breath. Cardiovascular: Negative for chest pain, palpitations and leg swelling. Hypertension      Gastrointestinal: Negative for diarrhea, nausea and vomiting. Genitourinary: Negative for difficulty urinating, dysuria, enuresis, flank pain and hematuria. Musculoskeletal: Negative for back pain and joint swelling. Neurological: Negative for dizziness, light-headedness, numbness and headaches. Psychiatric/Behavioral: Negative for agitation, behavioral problems and confusion. All pertinent systems were reviewed and are negative unless indicated in the HPI.     PAST MEDICAL HISTORY    has a past medical history of Arthritis, Cancer ([facility] [address] Diabetes Disp-90 tablet, R-3Normal      calcitonin (MIACALCIN) 200 UNIT/ACT nasal spray 1 spray by Nasal route daily, Disp-3.7 mL, R-11Normal      albuterol sulfate HFA (VENTOLIN HFA) 108 (90 Base) MCG/ACT inhaler Inhale 2 puffs into the lungs every 4 hours as needed for Wheezing, Disp-1 Inhaler, R-0Normal      B Complex-C (SUPER B COMPLEX PO) Take by mouthHistorical Med      aspirin 81 MG EC tablet Take 1 tablet by mouth daily, Disp-30 tablet, R-11      GLUCOSAMINE-CHONDROITIN-VIT D3 PO Take by mouth daily Historical Med      acetaminophen (TYLENOL) 325 MG tablet Take 325 mg by mouth every 4 hours as needed for Pain (For mild pain level 1-3 or for fever > 100.5) , Disp-120 tabletOTC      Cyanocobalamin (VITAMIN B 12 PO) Take  by mouth Daily. ALLERGIES     is allergic to ranolazine. FAMILY HISTORY     indicated that her mother is . She indicated that her father is . She indicated that her sister is . She indicated that all of her four brothers are . family history includes Alzheimer's Disease in her mother; Cancer in her sister; Heart Disease in her mother. SOCIAL HISTORY      reports that she has never smoked. She has never used smokeless tobacco. She reports that she does not drink alcohol or use drugs. PHYSICAL EXAM     INITIAL VITALS:  height is 5' 6\" (1.676 m) and weight is 243 lb (110.2 kg). Her oral temperature is 98.8 °F (37.1 °C). Her blood pressure is 172/73 (abnormal) and her pulse is 60. Her respiration is 18 and oxygen saturation is 100%. Physical Exam   Constitutional: She is oriented to person, place, and time. She appears well-developed and well-nourished. Non-toxic appearance. HENT:   Head: Normocephalic and atraumatic.    Right Ear: Tympanic membrane and external ear normal.   Left Ear: Tympanic membrane and external ear normal.   Nose: Nose normal.   Mouth/Throat: Oropharynx is clear and moist and mucous membranes are normal. No oropharyngeal exudate, posterior oropharyngeal edema or posterior oropharyngeal erythema. Eyes: Conjunctivae and EOM are normal.   Neck: Normal range of motion. Neck supple. No JVD present. Cardiovascular: Normal rate, regular rhythm, normal heart sounds, intact distal pulses and normal pulses. Exam reveals no gallop and no friction rub. No murmur heard. Pulmonary/Chest: Effort normal and breath sounds normal. No respiratory distress. She has no decreased breath sounds. She has no wheezes. She has no rhonchi. She has no rales. Abdominal: Soft. Bowel sounds are normal. She exhibits no distension. There is no tenderness. There is no rebound, no guarding and no CVA tenderness. Musculoskeletal: Normal range of motion. She exhibits no edema. Neurological: She is alert and oriented to person, place, and time. She exhibits normal muscle tone. Coordination normal.   Skin: Skin is warm and dry. No rash noted. She is not diaphoretic. Nursing note and vitals reviewed. DIFFERENTIAL DIAGNOSIS:   Including but not limited to hypertensive episode, ACS, CAD    DIAGNOSTIC RESULTS     EKG: AllEKG's are interpreted by the Emergency Department Physician who either signs or Co-signs this chart in the absence of a cardiologist.    Bossman De Paz. Rate: 70 bpm  AL interval: 180 ms  QRS duration: 88 ms  QTc: 475 ms  P-R-T axes: 57, 35, 41  Sinus rhythm with premature atrial complexes   Nonspecific ST abnormality   No STEMI   When compared with ECG of 20-MAY-2019 06:07,  Sinus rhythm has replaced Atrial fibrillation  Non-specific change in ST segment in Anterior leads    RADIOLOGY: non-plain film images(s) such as CT, Ultrasound and MRI are read by the radiologist.  Plain radiographic images are visualized and preliminarily interpreted by the emergencyphysician unless otherwise stated below. XR CHEST PORTABLE   Final Result      No acute cardiopulmonary disease. Mild cardiomegaly.       **This report has been created using voice recognition software. It may contain minor errors which are inherent in voice recognition technology. **      Final report electronically signed by Dr. Yash Smith on 6/16/2019 11:49 PM            LABS:   Labs Reviewed   BASIC METABOLIC PANEL - Abnormal; Notable for the following components:       Result Value    Sodium 134 (*)     Glucose 125 (*)     All other components within normal limits   CBC WITH AUTO DIFFERENTIAL - Abnormal; Notable for the following components:    RBC 4.02 (*)     Hematocrit 36.7 (*)     RDW-SD 46.5 (*)     All other components within normal limits   GLOMERULAR FILTRATION RATE, ESTIMATED - Abnormal; Notable for the following components:    Est, Glom Filt Rate 81 (*)     All other components within normal limits   OSMOLALITY - Abnormal; Notable for the following components:    Osmolality Calc 269.8 (*)     All other components within normal limits   URINE WITH REFLEXED MICRO - Abnormal; Notable for the following components:    Leukocyte Esterase, Urine TRACE (*)     All other components within normal limits   BRAIN NATRIURETIC PEPTIDE   ANION GAP         EMERGENCYDEPARTMENT COURSE AND MEDICAL DECISION MAKING:   Vitals:    Vitals:    06/16/19 2317 06/16/19 2322 06/17/19 0019   BP: (!) 176/78  (!) 172/73   Pulse: 68  60   Resp: 19  18   Temp: 98.8 °F (37.1 °C)     TempSrc: Oral     SpO2: 97%  100%   Weight:  243 lb (110.2 kg)    Height:  5' 6\" (1.676 m)          Pertinent Labs & Imaging studies reviewed. (See chart for details)    Controlled Substances Monitoring:     No flowsheet data found. The patient was seen and evaluated in a timely manner for hypertension. The patients vital signs were stable within the department. During the physical exam I noted normal heart and lung sounds. I ordered appropriate labs and imaging and they were reassuring. His blood pressure is slightly elevated but she has no symptomology to go along with it.   I think it is a matter of the patient following up with her PCP and discussing with them any medication changes. Additionally the patient is encouraged to avoid high sodium things such as soda and prepackaged foods. Patient is amenable to the plan of care to discharge and follow-up with your family doctor. She is encouraged to have her blood pressure cuff checked by her family doctor when she goes the next time. They will check her blood pressure with a machine and then check her blood pressure with her just to make sure that it is recording correctly. Laboratory and imaging results were reviewed and discussed with the patient. Strict return precautions and follow up instructions were discussed with the patient with which the patient agrees     Physical assessment findings, diagnostic testing(s) if applicable, and vital signs reviewed with patient/patient representative. Questions answered. Medications asdirected, including OTC medications for supportive care. Education provided on medications. Differential diagnosis(s) discussed with patient/patient representative. Home care/self care instructions reviewed withpatient/patient representative. Patient is to follow-up with family care provider in 2-3 days if no improvement. Patient is to go to the emergency department if symptoms worsen. Patient/patient representative isaware of care plan, questions answered, verbalizes understanding and is in agreement. ED Medications administered this visit: Medications - No data to display      I have given the patient strict written and verbal instructions about care at home,follow-up, and signs and symptoms of worsening of condition and they did verbalize understanding. Patient was seen independently by myself. The Patient's final impression and disposition and plan was determined by myself. CRITICAL CARE:   None     CONSULTS:  None    PROCEDURES:  None    FINAL IMPRESSION      1. Essential hypertension    2.  Anxiety about health

## 2019-06-18 ENCOUNTER — TELEPHONE (OUTPATIENT)
Dept: FAMILY MEDICINE CLINIC | Age: 79
End: 2019-06-18

## 2019-06-18 ENCOUNTER — OFFICE VISIT (OUTPATIENT)
Dept: FAMILY MEDICINE CLINIC | Age: 79
End: 2019-06-18
Payer: MEDICARE

## 2019-06-18 VITALS
WEIGHT: 243.6 LBS | TEMPERATURE: 98 F | HEART RATE: 68 BPM | RESPIRATION RATE: 15 BRPM | BODY MASS INDEX: 39.32 KG/M2 | OXYGEN SATURATION: 98 % | DIASTOLIC BLOOD PRESSURE: 88 MMHG | SYSTOLIC BLOOD PRESSURE: 148 MMHG

## 2019-06-18 DIAGNOSIS — Z99.89 OBSTRUCTIVE SLEEP APNEA ON CPAP: ICD-10-CM

## 2019-06-18 DIAGNOSIS — I10 ESSENTIAL HYPERTENSION: Primary | ICD-10-CM

## 2019-06-18 DIAGNOSIS — G47.33 OBSTRUCTIVE SLEEP APNEA ON CPAP: ICD-10-CM

## 2019-06-18 DIAGNOSIS — E66.9 OBESITY (BMI 30-39.9): ICD-10-CM

## 2019-06-18 DIAGNOSIS — I48.91 ATRIAL FIBRILLATION, UNSPECIFIED TYPE (HCC): ICD-10-CM

## 2019-06-18 DIAGNOSIS — E11.21 CONTROLLED TYPE 2 DIABETES MELLITUS WITH DIABETIC NEPHROPATHY, WITHOUT LONG-TERM CURRENT USE OF INSULIN (HCC): ICD-10-CM

## 2019-06-18 PROCEDURE — G8427 DOCREV CUR MEDS BY ELIG CLIN: HCPCS | Performed by: FAMILY MEDICINE

## 2019-06-18 PROCEDURE — G8399 PT W/DXA RESULTS DOCUMENT: HCPCS | Performed by: FAMILY MEDICINE

## 2019-06-18 PROCEDURE — 1036F TOBACCO NON-USER: CPT | Performed by: FAMILY MEDICINE

## 2019-06-18 PROCEDURE — 1123F ACP DISCUSS/DSCN MKR DOCD: CPT | Performed by: FAMILY MEDICINE

## 2019-06-18 PROCEDURE — 99214 OFFICE O/P EST MOD 30 MIN: CPT | Performed by: FAMILY MEDICINE

## 2019-06-18 PROCEDURE — 1111F DSCHRG MED/CURRENT MED MERGE: CPT | Performed by: FAMILY MEDICINE

## 2019-06-18 PROCEDURE — 1090F PRES/ABSN URINE INCON ASSESS: CPT | Performed by: FAMILY MEDICINE

## 2019-06-18 PROCEDURE — G8417 CALC BMI ABV UP PARAM F/U: HCPCS | Performed by: FAMILY MEDICINE

## 2019-06-18 PROCEDURE — 4040F PNEUMOC VAC/ADMIN/RCVD: CPT | Performed by: FAMILY MEDICINE

## 2019-06-18 RX ORDER — BLOOD PRESSURE TEST KIT
KIT MISCELLANEOUS
Qty: 1 KIT | Refills: 0 | Status: SHIPPED | OUTPATIENT
Start: 2019-06-18 | End: 2020-01-20

## 2019-06-18 RX ORDER — RAMIPRIL 5 MG/1
5 CAPSULE ORAL DAILY
Qty: 30 CAPSULE | Refills: 11 | Status: SHIPPED | OUTPATIENT
Start: 2019-06-18 | End: 2019-09-27

## 2019-06-18 ASSESSMENT — ENCOUNTER SYMPTOMS
COUGH: 0
BACK PAIN: 0
RESPIRATORY NEGATIVE: 1
GASTROINTESTINAL NEGATIVE: 1
RHINORRHEA: 0
CHEST TIGHTNESS: 0

## 2019-06-18 NOTE — PROGRESS NOTES
Visit Information    Have you changed or started any medications since your last visit including any over-the-counter medicines, vitamins, or herbal medicines? no   Are you having any side effects from any of your medications? -  no  Have you stopped taking any of your medications? Is so, why? -  no    Have you seen any other physician or provider since your last visit? Yes - Records Obtained  Have you had any other diagnostic tests since your last visit? Yes - Records Obtained  Have you been seen in the emergency room and/or had an admission to a hospital since we last saw you? Yes - Records Obtained  Have you had your routine dental cleaning in the past 6 months? na    Have you activated your [employer] account? If not, what are your barriers?  Yes     Patient Care Team:  Kendra Klein DO as PCP - General  Kendra Klein DO as PCP - [facility] Provider  Kim Puente MD as Consulting Physician (Orthopedic Surgery)    Medical History Review  Past Medical, Family, and Social History reviewed and does contribute to the patient presenting condition    Health Maintenance   Topic Date Due    DTaP/Tdap/Td vaccine (1 - Tdap) 09/08/1960    Shingles Vaccine (1 of 2) 09/08/1991    TSH testing  05/19/2020    Flu vaccine  Completed    Pneumococcal 65+ years Vaccine  Completed    DEXA (modify frequency per FRAX score)  Addressed

## 2019-06-18 NOTE — PROGRESS NOTES
Subjective:      Patient ID: Ivan Alicea is a 69 y.o. female. HPI:    Chief Complaint   Patient presents with   [facility] ED Follow-up     [facility] 6/16/19    Orders     for BP kit      ED follow up for elevated BP. HISTORY OF PRESENT ILLNESS    Ivan Alicea is a 69 y.o. female whopresents to the emergency department from home with hypertension at home. the patient reported that she had a 923 systolic reading but denies any headache, nausea, vomiting, diarrhea, blurred vision, chest pain or new shortness of breath. The patient has a history of A. Fib, hypertension, diabetes, hyperlipidemia, osteoporosis and arthritis. The patient takes diltiazem and metoprolol succinate. The patient denies any other symptoms or relevant history at this time. Pt is compliant with medications. BP doing a little bit better today. BP Readings from Last 3 Encounters:   06/18/19 (!) 148/88   06/17/19 (!) 172/73   05/30/19 112/62     She continues to check her BP on a regular basis and all are above 170. Pt denies CP, chest tightness, SOB. Denies heart palpitations. Pulse stable. At one point pt was on an ACE for her BP and DM. This was stopped in the hospital, no intolerance documented. Likely stopped due to addition of multiple other meds to control a-fib. Weights are stable. Wt Readings from Last 3 Encounters:   06/18/19 243 lb 9.6 oz (110.5 kg)   06/16/19 243 lb (110.2 kg)   05/30/19 247 lb 6.4 oz (112.2 kg)     Patient Active Problem List   Diagnosis    Hyperlipidemia    HTN (hypertension)    Osteopenia    GERD (gastroesophageal reflux disease)    RAD (reactive airway disease)    OA (osteoarthritis)    Chest pain, atypical    Diabetes mellitus type II, controlled ([address]    Obesity (BMI 30-39. 9)    Chronic low back pain    Neuropathy    Obstructive sleep apnea on CPAP    Controlled type 2 diabetes mellitus without complication (HCC)    Persistent atrial fibrillation (HCC)    Paroxysmal atrial fibrillation ([address]    Abnormal nuclear stress test    Influenza    Atrial fibrillation with rapid ventricular response [facility])     Past Surgical History:   Procedure Laterality Date    APPENDECTOMY      CARDIAC CATHETERIZATION      two cath    COLONOSCOPY  01/08/2013    COLONOSCOPY N/A 5/17/2019    COLONOSCOPY DIAGNOSTIC performed by Wendy Barajas MD at [address]  03/2017    on face, left side    UPPER GASTROINTESTINAL ENDOSCOPY N/A 5/17/2019    EGD BIOPSY performed by Wendy Barajas MD at [facility]     Prior to Admission medications    Medication Sig Start Date End Date Taking? Authorizing Provider   ramipril (ALTACE) 5 MG capsule Take 1 capsule by mouth daily 6/18/19  Yes Lisset Polk DO   Blood Pressure Monitor KIT Dx: HTN 6/18/19  Yes Lisset Polk DO   chlorhexidine (PERIDEX) 0.12 % solution Take 5 mLs by mouth daily 5/13/19  Yes Historical Provider, MD   nitroGLYCERIN (NITROSTAT) 0.4 MG SL tablet DISSOLVE ONE TABLET UNDER TONGUE AS NEEDED FOR CHEST PAIN EVERY 5 MINUTES. MAX OF 3 DOSES.  IF NO RELIEF AFTER 1 DOSE, CALL 911. 5/23/19  Yes Lisset Polk DO   amiodarone (CORDARONE) 200 MG tablet Take 1 tablet by mouth 2 times daily 5/22/19  Yes Azalea Sanchez MD   diltiazem (CARDIZEM CD) 120 MG extended release capsule Take 1 capsule by mouth daily 5/23/19  Yes Azalea Sanchez MD   docusate (COLACE, DULCOLAX) 100 MG CAPS Take 100 mg by mouth daily 5/23/19  Yes Azalea Sanchez MD   hydrocortisone (ANUSOL-HC) 25 MG suppository Place 1 suppository rectally 2 times daily as needed for Hemorrhoids 5/22/19  Yes Azalea Sanchez MD   levothyroxine (SYNTHROID) 25 MCG tablet Take 1 tablet by mouth Daily 5/23/19  Yes Azalea Sanchez MD   omeprazole (PRILOSEC) 40 MG delayed release capsule Take 1 capsule by mouth daily 1/30/19  Yes Lisset Polk DO   BIOTIN PO Take 1 tablet by mouth daily   Yes Historical Provider, MD pravastatin (PRAVACHOL) 80 MG tablet Take 1 tablet by mouth daily 10/1/18  Yes Florencia Mean, DO   metoprolol succinate (TOPROL XL) 25 MG extended release tablet Take 1 tablet by mouth daily 10/1/18  Yes Florencia Mean, DO   SITagliptin (JANUVIA) 100 MG tablet Take 1 tablet by mouth daily 10/1/18  Yes Florencia Mean, DO   calcitonin (MIACALCIN) 200 UNIT/ACT nasal spray 1 spray by Nasal route daily 10/1/18  Yes Florencia Mean, DO   apixaban (ELIQUIS) 5 MG TABS tablet Take 1 tablet by mouth 2 times daily 10/1/18  Yes Florencia Mean, DO   albuterol sulfate HFA (VENTOLIN HFA) 108 (90 Base) MCG/ACT inhaler Inhale 2 puffs into the lungs every 4 hours as needed for Wheezing 6/13/18  Yes Florencia Mean, DO   B Complex-C (SUPER B COMPLEX PO) Take by mouth   Yes Historical Provider, MD   aspirin 81 MG EC tablet Take 1 tablet by mouth daily 6/9/15  Yes Florencia Mean, DO   GLUCOSAMINE-CHONDROITIN-VIT D3 PO Take by mouth daily    Yes Historical Provider, MD   acetaminophen (TYLENOL) 325 MG tablet Take 325 mg by mouth every 4 hours as needed for Pain (For mild pain level 1-3 or for fever > 100.5)  7/28/12  Yes Nirav Muller MD   Cyanocobalamin (VITAMIN B 12 PO) Take  by mouth Daily. Yes Historical Provider, MD         Review of Systems   Constitutional: Negative. HENT: Negative. Respiratory: Negative. Cardiovascular: Negative. Elevated BP   Gastrointestinal: Negative. Musculoskeletal: Negative. All other systems reviewed and are negative. Objective:   Physical Exam   Constitutional: She is oriented to person, place, and time. She appears well-developed and well-nourished. HENT:   Head: Normocephalic and atraumatic. Right Ear: Tympanic membrane normal.   Left Ear: Tympanic membrane normal.   Mouth/Throat: Oropharynx is clear and moist and mucous membranes are normal.   Neck: Carotid bruit is not present.    Cardiovascular: Normal rate, regular rhythm and normal heart sounds. No murmur heard. Pulmonary/Chest: Effort normal and breath sounds normal.   Abdominal: Soft. Bowel sounds are normal.   Musculoskeletal: She exhibits no edema. Neurological: She is alert and oriented to person, place, and time. Skin: Skin is warm and dry. Psychiatric: She has a normal mood and affect. Her behavior is normal.   Nursing note and vitals reviewed. Assessment:       Diagnosis Orders   1. Essential hypertension  ramipril (ALTACE) 5 MG capsule    Blood Pressure Monitor KIT   2. Atrial fibrillation, unspecified type ([address]     3. Controlled type 2 diabetes mellitus with diabetic nephropathy, without long-term current use of insulin ([facility])     4. Obesity (BMI 30-39.9)     5.  Obstructive sleep apnea on CPAP             Plan:       -  ED reports reviewed  -  Chronic medical problems stable  -  Continue current medications  -  Follow up with specialists as scheduled  -  Will add ramipril  -  RTO 2 weeks        Micha Neal DO

## 2019-06-18 NOTE — TELEPHONE ENCOUNTER
Received a call from Dr. General Perez stating that he has been seeing the patient for 15 years and she was in his office today and he has not seen her in as much pain as she was in today. He stated that the patient told him he had an appt here today and did not mention her pain. Dr. Bailey Alcocer is thinking an MRI of the lumbar spine would be appropriate. He stated her pain is low back going into the left glut. Please advise. The patient prefers [facility].

## 2019-06-19 ENCOUNTER — CARE COORDINATION (OUTPATIENT)
Dept: CASE MANAGEMENT | Age: 79
End: 2019-06-19

## 2019-06-20 ENCOUNTER — OFFICE VISIT (OUTPATIENT)
Dept: FAMILY MEDICINE CLINIC | Age: 79
End: 2019-06-20
Payer: MEDICARE

## 2019-06-20 VITALS
RESPIRATION RATE: 18 BRPM | DIASTOLIC BLOOD PRESSURE: 80 MMHG | WEIGHT: 242 LBS | HEART RATE: 80 BPM | SYSTOLIC BLOOD PRESSURE: 146 MMHG | BODY MASS INDEX: 39.06 KG/M2

## 2019-06-20 DIAGNOSIS — M51.36 DDD (DEGENERATIVE DISC DISEASE), LUMBAR: ICD-10-CM

## 2019-06-20 DIAGNOSIS — M53.3 SACROILIAC PAIN: Primary | ICD-10-CM

## 2019-06-20 PROCEDURE — G8427 DOCREV CUR MEDS BY ELIG CLIN: HCPCS | Performed by: FAMILY MEDICINE

## 2019-06-20 PROCEDURE — 1123F ACP DISCUSS/DSCN MKR DOCD: CPT | Performed by: FAMILY MEDICINE

## 2019-06-20 PROCEDURE — 1090F PRES/ABSN URINE INCON ASSESS: CPT | Performed by: FAMILY MEDICINE

## 2019-06-20 PROCEDURE — 1036F TOBACCO NON-USER: CPT | Performed by: FAMILY MEDICINE

## 2019-06-20 PROCEDURE — G8417 CALC BMI ABV UP PARAM F/U: HCPCS | Performed by: FAMILY MEDICINE

## 2019-06-20 PROCEDURE — 1111F DSCHRG MED/CURRENT MED MERGE: CPT | Performed by: FAMILY MEDICINE

## 2019-06-20 PROCEDURE — 4040F PNEUMOC VAC/ADMIN/RCVD: CPT | Performed by: FAMILY MEDICINE

## 2019-06-20 PROCEDURE — 99213 OFFICE O/P EST LOW 20 MIN: CPT | Performed by: FAMILY MEDICINE

## 2019-06-20 PROCEDURE — G8399 PT W/DXA RESULTS DOCUMENT: HCPCS | Performed by: FAMILY MEDICINE

## 2019-06-20 ASSESSMENT — ENCOUNTER SYMPTOMS
GASTROINTESTINAL NEGATIVE: 1
RESPIRATORY NEGATIVE: 1
BACK PAIN: 1

## 2019-06-20 NOTE — PATIENT INSTRUCTIONS
You may receive a survey regarding the care you received during your visit. Your input is valuable to us. We encourage you to complete and return your survey. We hope you will choose us in the future for your healthcare needs.       Salonpas patch with 4% lidocaine

## 2019-06-20 NOTE — PROGRESS NOTES
Visit Information    Have you changed or started any medications since your last visit including any over-the-counter medicines, vitamins, or herbal medicines? no   Are you having any side effects from any of your medications? -  no  Have you stopped taking any of your medications? Is so, why? -  no    Have you seen any other physician or provider since your last visit? No  Have you had any other diagnostic tests since your last visit? No  Have you been seen in the emergency room and/or had an admission to a hospital since we last saw you? No  Have you had your routine dental cleaning in the past 6 months? n/a    Have you activated your [employer] account? If not, what are your barriers?  Yes     Patient Care Team:  Yarely Tariq DO as PCP - General  Yarely Tariq DO as PCP - [facility] Provider  Abundio Anna MD as Consulting Physician (Orthopedic Surgery)    Medical History Review  Past Medical, Family, and Social History reviewed and does contribute to the patient presenting condition    Health Maintenance   Topic Date Due    DTaP/Tdap/Td vaccine (1 - Tdap) 09/08/1960    Shingles Vaccine (1 of 2) 09/08/1991    Annual Wellness Visit (AWV)  03/29/2017    TSH testing  05/19/2020    Potassium monitoring  06/16/2020    Creatinine monitoring  06/16/2020    Flu vaccine  Completed    Pneumococcal 65+ years Vaccine  Completed    DEXA (modify frequency per FRAX score)  Addressed

## 2019-06-20 NOTE — PROGRESS NOTES
Subjective:      Patient ID: Ivan Alicea is a 69 y.o. female. HPI:    Chief Complaint   Patient presents with    Follow-up    Back Pain     Pt here at my request to discuss back pain. Received a call from her Chiropractor requesting a MRI for her back due to severe pain. The pain is located on the left side only, no radicular symptoms. XRs were taken which showed \"spurring\" per pt. She is taking Tylenol with some relief. Patient Active Problem List   Diagnosis    Hyperlipidemia    HTN (hypertension)    Osteopenia    GERD (gastroesophageal reflux disease)    RAD (reactive airway disease)    OA (osteoarthritis)    Chest pain, atypical    Diabetes mellitus type II, controlled ([address]    Obesity (BMI 30-39. 9)    Chronic low back pain    Neuropathy    Obstructive sleep apnea on CPAP    Controlled type 2 diabetes mellitus without complication (HCC)    Persistent atrial fibrillation (HCC)    Paroxysmal atrial fibrillation (HCC)    Abnormal nuclear stress test    Influenza    Atrial fibrillation with rapid ventricular response [facility])     Past Surgical History:   Procedure Laterality Date    APPENDECTOMY      CARDIAC CATHETERIZATION      two cath    COLONOSCOPY  01/08/2013    COLONOSCOPY N/A 5/17/2019    COLONOSCOPY DIAGNOSTIC performed by Vy Jerome MD at [facility] [address]  03/2017    on face, left side    UPPER GASTROINTESTINAL ENDOSCOPY N/A 5/17/2019    EGD BIOPSY performed by Vy Jerome MD at [facility]     Prior to Admission medications    Medication Sig Start Date End Date Taking?  Authorizing Provider   ramipril (ALTACE) 5 MG capsule Take 1 capsule by mouth daily 6/18/19  Yes Adrián Shipman DO   Blood Pressure Monitor KIT Dx: HTN 6/18/19  Yes Adrián Shipman DO   chlorhexidine (PERIDEX) 0.12 % solution Take 5 mLs by mouth daily 5/13/19  Yes Historical Provider, MD   nitroGLYCERIN (NITROSTAT) acetaminophen (TYLENOL) 325 MG tablet Take 325 mg by mouth every 4 hours as needed for Pain (For mild pain level 1-3 or for fever > 100.5)  7/28/12  Yes Harrison Malik MD   Cyanocobalamin (VITAMIN B 12 PO) Take  by mouth Daily. Yes Historical Provider, MD         Review of Systems   Constitutional: Negative. HENT: Negative. Respiratory: Negative. Cardiovascular: Negative. Gastrointestinal: Negative. Musculoskeletal: Positive for back pain. All other systems reviewed and are negative. Objective:   Physical Exam   Constitutional: She is oriented to person, place, and time. She appears well-developed and well-nourished. HENT:   Head: Normocephalic and atraumatic. Right Ear: Tympanic membrane normal.   Left Ear: Tympanic membrane normal.   Mouth/Throat: Oropharynx is clear and moist and mucous membranes are normal.   Cardiovascular: Normal rate, regular rhythm and normal heart sounds. No murmur heard. Pulmonary/Chest: Effort normal and breath sounds normal.   Abdominal: Soft. Bowel sounds are normal.   Musculoskeletal: She exhibits no edema. Lumbar back: She exhibits decreased range of motion, tenderness and pain. Back:    Neurological: She is alert and oriented to person, place, and time. Skin: Skin is warm and dry. Psychiatric: She has a normal mood and affect. Her behavior is normal.   Nursing note and vitals reviewed. Assessment:       Diagnosis Orders   1. Sacroiliac pain  External Referral To Physical Therapy   2.  DDD (degenerative disc disease), lumbar  External Referral To Physical Therapy           Plan:      -  Refer to PT  -  Stressed weight loss  -  Continue Tylenol prn  -  RTO prn        Sonu Aguilar DO

## 2019-06-21 ENCOUNTER — CARE COORDINATION (OUTPATIENT)
Dept: CASE MANAGEMENT | Age: 79
End: 2019-06-21

## 2019-06-21 NOTE — CARE COORDINATION
Care Transition  ED Follow up Call    Second and final attempt to reach patient for ED follow up call. Message received, has calling restrictions, unable to complete call, and disconnected.      FU appts/Provider:    Future Appointments   Date Time Provider Silvia Gustafson   7/1/2019 10:15 AM Steve Hunter DO [address] [facility]   9/27/2019 10:45 AM Steve Hunter DO [address] [facility]   2/17/2020 10:30 AM [facility] [place], [place]  [phone]

## 2019-07-01 ENCOUNTER — OFFICE VISIT (OUTPATIENT)
Dept: FAMILY MEDICINE CLINIC | Age: 79
End: 2019-07-01
Payer: MEDICARE

## 2019-07-01 VITALS
WEIGHT: 245.5 LBS | BODY MASS INDEX: 39.62 KG/M2 | SYSTOLIC BLOOD PRESSURE: 128 MMHG | HEART RATE: 52 BPM | RESPIRATION RATE: 20 BRPM | DIASTOLIC BLOOD PRESSURE: 64 MMHG

## 2019-07-01 DIAGNOSIS — I10 ESSENTIAL HYPERTENSION: Primary | ICD-10-CM

## 2019-07-01 DIAGNOSIS — M53.3 SACROILIAC PAIN: ICD-10-CM

## 2019-07-01 DIAGNOSIS — M51.36 DDD (DEGENERATIVE DISC DISEASE), LUMBAR: ICD-10-CM

## 2019-07-01 PROCEDURE — 4040F PNEUMOC VAC/ADMIN/RCVD: CPT | Performed by: FAMILY MEDICINE

## 2019-07-01 PROCEDURE — 99213 OFFICE O/P EST LOW 20 MIN: CPT | Performed by: FAMILY MEDICINE

## 2019-07-01 PROCEDURE — G8427 DOCREV CUR MEDS BY ELIG CLIN: HCPCS | Performed by: FAMILY MEDICINE

## 2019-07-01 PROCEDURE — G8399 PT W/DXA RESULTS DOCUMENT: HCPCS | Performed by: FAMILY MEDICINE

## 2019-07-01 PROCEDURE — 1090F PRES/ABSN URINE INCON ASSESS: CPT | Performed by: FAMILY MEDICINE

## 2019-07-01 PROCEDURE — 1036F TOBACCO NON-USER: CPT | Performed by: FAMILY MEDICINE

## 2019-07-01 PROCEDURE — G8417 CALC BMI ABV UP PARAM F/U: HCPCS | Performed by: FAMILY MEDICINE

## 2019-07-01 PROCEDURE — 1123F ACP DISCUSS/DSCN MKR DOCD: CPT | Performed by: FAMILY MEDICINE

## 2019-07-01 ASSESSMENT — ENCOUNTER SYMPTOMS
GASTROINTESTINAL NEGATIVE: 1
RESPIRATORY NEGATIVE: 1
BACK PAIN: 1

## 2019-07-01 NOTE — PROGRESS NOTES
Subjective:      Patient ID: Edison Marsh is a 69 y.o. female. HPI:    Chief Complaint   Patient presents with    Follow-up     2 week    Hypertension     2 week eval.  BP doing much better on the ramipril. BP Readings from Last 3 Encounters:   07/01/19 128/64   06/20/19 (!) 146/80   06/18/19 (!) 148/88     She has gone to PT x 1. She has noticed some improvement after the first visit. Patient Active Problem List   Diagnosis    Hyperlipidemia    HTN (hypertension)    Osteopenia    GERD (gastroesophageal reflux disease)    RAD (reactive airway disease)    OA (osteoarthritis)    Chest pain, atypical    Diabetes mellitus type II, controlled ([facility]    Obesity (BMI 30-39. 9)    Chronic low back pain    Neuropathy    Obstructive sleep apnea on CPAP    Controlled type 2 diabetes mellitus without complication (HCC)    Persistent atrial fibrillation (HCC)    Paroxysmal atrial fibrillation (HCC)    Abnormal nuclear stress test    Influenza    Atrial fibrillation with rapid ventricular response [facility])     Past Surgical History:   Procedure Laterality Date    APPENDECTOMY      CARDIAC CATHETERIZATION      two cath    COLONOSCOPY  01/08/2013    COLONOSCOPY N/A 5/17/2019    COLONOSCOPY DIAGNOSTIC performed by Rola Espinosa MD at [address]  03/2017    on face, left side    UPPER GASTROINTESTINAL ENDOSCOPY N/A 5/17/2019    EGD BIOPSY performed by Rola Espinosa MD at [facility]     Prior to Admission medications    Medication Sig Start Date End Date Taking?  Authorizing Provider   ramipril (ALTACE) 5 MG capsule Take 1 capsule by mouth daily 6/18/19  Yes Rose Miranda DO   Blood Pressure Monitor KIT Dx: HTN 6/18/19  Yes Rose Miranda DO   chlorhexidine (PERIDEX) 0.12 % solution Take 5 mLs by mouth daily 5/13/19  Yes Historical Provider, MD   nitroGLYCERIN (NITROSTAT) 0.4 MG SL tablet DISSOLVE ONE TABLET UNDER

## 2019-07-08 ENCOUNTER — TELEPHONE (OUTPATIENT)
Dept: FAMILY MEDICINE CLINIC | Age: 79
End: 2019-07-08

## 2019-07-13 ENCOUNTER — APPOINTMENT (OUTPATIENT)
Dept: GENERAL RADIOLOGY | Age: 79
End: 2019-07-13
Payer: MEDICARE

## 2019-07-13 ENCOUNTER — HOSPITAL ENCOUNTER (EMERGENCY)
Age: 79
Discharge: HOME | End: 2019-07-13
Attending: INTERNAL MEDICINE
Payer: MEDICARE

## 2019-07-13 VITALS
BODY MASS INDEX: 39.05 KG/M2 | TEMPERATURE: 98.4 F | SYSTOLIC BLOOD PRESSURE: 142 MMHG | OXYGEN SATURATION: 97 % | DIASTOLIC BLOOD PRESSURE: 56 MMHG | HEART RATE: 55 BPM | WEIGHT: 243 LBS | HEIGHT: 66 IN | RESPIRATION RATE: 16 BRPM

## 2019-07-13 DIAGNOSIS — J06.9 ACUTE UPPER RESPIRATORY INFECTION: Primary | ICD-10-CM

## 2019-07-13 LAB
ALBUMIN SERPL-MCNC: 4 G/DL (ref 3.5–5.1)
ALP BLD-CCNC: 51 U/L (ref 38–126)
ALT SERPL-CCNC: 19 U/L (ref 11–66)
ANION GAP SERPL CALCULATED.3IONS-SCNC: 14 MEQ/L (ref 8–16)
AST SERPL-CCNC: 21 U/L (ref 5–40)
BACTERIA: ABNORMAL /HPF
BASOPHILS # BLD: 0.4 %
BASOPHILS ABSOLUTE: 0 THOU/MM3 (ref 0–0.1)
BILIRUB SERPL-MCNC: 0.8 MG/DL (ref 0.3–1.2)
BILIRUBIN DIRECT: < 0.2 MG/DL (ref 0–0.3)
BILIRUBIN URINE: NEGATIVE
BLOOD, URINE: ABNORMAL
BUN BLDV-MCNC: 9 MG/DL (ref 7–22)
CALCIUM SERPL-MCNC: 9.3 MG/DL (ref 8.5–10.5)
CASTS 2: ABNORMAL /LPF
CASTS UA: ABNORMAL /LPF
CHARACTER, URINE: CLEAR
CHLORIDE BLD-SCNC: 100 MEQ/L (ref 98–111)
CO2: 23 MEQ/L (ref 23–33)
COLOR: YELLOW
CREAT SERPL-MCNC: 0.8 MG/DL (ref 0.4–1.2)
CRYSTALS, UA: ABNORMAL
D-DIMER QUANTITATIVE: 325 NG/ML FEU (ref 0–500)
EKG ATRIAL RATE: 67 BPM
EKG P AXIS: 57 DEGREES
EKG P-R INTERVAL: 188 MS
EKG Q-T INTERVAL: 438 MS
EKG QRS DURATION: 86 MS
EKG QTC CALCULATION (BAZETT): 462 MS
EKG R AXIS: 25 DEGREES
EKG T AXIS: 26 DEGREES
EKG VENTRICULAR RATE: 67 BPM
EOSINOPHIL # BLD: 2.9 %
EOSINOPHILS ABSOLUTE: 0.2 THOU/MM3 (ref 0–0.4)
EPITHELIAL CELLS, UA: ABNORMAL /HPF
ERYTHROCYTE [DISTWIDTH] IN BLOOD BY AUTOMATED COUNT: 13.7 % (ref 11.5–14.5)
ERYTHROCYTE [DISTWIDTH] IN BLOOD BY AUTOMATED COUNT: 45.3 FL (ref 35–45)
FLU A ANTIGEN: NEGATIVE
FLU B ANTIGEN: NEGATIVE
GFR SERPL CREATININE-BSD FRML MDRD: 69 ML/MIN/1.73M2
GLUCOSE BLD-MCNC: 179 MG/DL (ref 70–108)
GLUCOSE URINE: NEGATIVE MG/DL
HCT VFR BLD CALC: 33.9 % (ref 37–47)
HEMOGLOBIN: 11.1 GM/DL (ref 12–16)
IMMATURE GRANS (ABS): 0.03 THOU/MM3 (ref 0–0.07)
IMMATURE GRANULOCYTES: 0.4 %
INR BLD: 1.66 (ref 0.85–1.13)
KETONES, URINE: NEGATIVE
LEUKOCYTE ESTERASE, URINE: ABNORMAL
LIPASE: 17.5 U/L (ref 5.6–51.3)
LYMPHOCYTES # BLD: 12 %
LYMPHOCYTES ABSOLUTE: 0.9 THOU/MM3 (ref 1–4.8)
MCH RBC QN AUTO: 29.9 PG (ref 26–33)
MCHC RBC AUTO-ENTMCNC: 32.7 GM/DL (ref 32.2–35.5)
MCV RBC AUTO: 91.4 FL (ref 81–99)
MISCELLANEOUS 2: ABNORMAL
MONOCYTES # BLD: 9 %
MONOCYTES ABSOLUTE: 0.7 THOU/MM3 (ref 0.4–1.3)
NITRITE, URINE: NEGATIVE
NUCLEATED RED BLOOD CELLS: 0 /100 WBC
OSMOLALITY CALCULATION: 277 MOSMOL/KG (ref 275–300)
PH UA: 6 (ref 5–9)
PLATELET # BLD: 187 THOU/MM3 (ref 130–400)
PMV BLD AUTO: 11.1 FL (ref 9.4–12.4)
POTASSIUM SERPL-SCNC: 3.8 MEQ/L (ref 3.5–5.2)
PRO-BNP: 700.1 PG/ML (ref 0–1800)
PROTEIN UA: NEGATIVE
RBC # BLD: 3.71 MILL/MM3 (ref 4.2–5.4)
RBC URINE: ABNORMAL /HPF
RENAL EPITHELIAL, UA: ABNORMAL
SEG NEUTROPHILS: 75.3 %
SEGMENTED NEUTROPHILS ABSOLUTE COUNT: 5.8 THOU/MM3 (ref 1.8–7.7)
SODIUM BLD-SCNC: 137 MEQ/L (ref 135–145)
SPECIFIC GRAVITY, URINE: <= 1.005 (ref 1–1.03)
TOTAL PROTEIN: 7.1 G/DL (ref 6.1–8)
TROPONIN T: < 0.01 NG/ML
UROBILINOGEN, URINE: 0.2 EU/DL (ref 0–1)
WBC # BLD: 7.7 THOU/MM3 (ref 4.8–10.8)
WBC UA: ABNORMAL /HPF
YEAST: ABNORMAL

## 2019-07-13 PROCEDURE — 87086 URINE CULTURE/COLONY COUNT: CPT

## 2019-07-13 PROCEDURE — 81001 URINALYSIS AUTO W/SCOPE: CPT

## 2019-07-13 PROCEDURE — 85379 FIBRIN DEGRADATION QUANT: CPT

## 2019-07-13 PROCEDURE — 82248 BILIRUBIN DIRECT: CPT

## 2019-07-13 PROCEDURE — 87077 CULTURE AEROBIC IDENTIFY: CPT

## 2019-07-13 PROCEDURE — 83880 ASSAY OF NATRIURETIC PEPTIDE: CPT

## 2019-07-13 PROCEDURE — 85610 PROTHROMBIN TIME: CPT

## 2019-07-13 PROCEDURE — 99285 EMERGENCY DEPT VISIT HI MDM: CPT

## 2019-07-13 PROCEDURE — 87186 SC STD MICRODIL/AGAR DIL: CPT

## 2019-07-13 PROCEDURE — 71045 X-RAY EXAM CHEST 1 VIEW: CPT

## 2019-07-13 PROCEDURE — 36415 COLL VENOUS BLD VENIPUNCTURE: CPT

## 2019-07-13 PROCEDURE — 84484 ASSAY OF TROPONIN QUANT: CPT

## 2019-07-13 PROCEDURE — 93005 ELECTROCARDIOGRAM TRACING: CPT | Performed by: INTERNAL MEDICINE

## 2019-07-13 PROCEDURE — 87040 BLOOD CULTURE FOR BACTERIA: CPT

## 2019-07-13 PROCEDURE — 85025 COMPLETE CBC W/AUTO DIFF WBC: CPT

## 2019-07-13 PROCEDURE — 6370000000 HC RX 637 (ALT 250 FOR IP): Performed by: INTERNAL MEDICINE

## 2019-07-13 PROCEDURE — 83690 ASSAY OF LIPASE: CPT

## 2019-07-13 PROCEDURE — 80053 COMPREHEN METABOLIC PANEL: CPT

## 2019-07-13 PROCEDURE — 87804 INFLUENZA ASSAY W/OPTIC: CPT

## 2019-07-13 RX ORDER — FLUTICASONE PROPIONATE 50 MCG
1 SPRAY, SUSPENSION (ML) NASAL DAILY
Qty: 1 BOTTLE | Refills: 0 | Status: SHIPPED | OUTPATIENT
Start: 2019-07-13 | End: 2020-06-10 | Stop reason: DRUGHIGH

## 2019-07-13 RX ORDER — CETIRIZINE HYDROCHLORIDE 10 MG/1
10 TABLET ORAL DAILY
Qty: 30 TABLET | Refills: 0 | Status: SHIPPED | OUTPATIENT
Start: 2019-07-13 | End: 2019-08-12

## 2019-07-13 RX ORDER — PREDNISONE 20 MG/1
40 TABLET ORAL ONCE
Status: COMPLETED | OUTPATIENT
Start: 2019-07-13 | End: 2019-07-13

## 2019-07-13 RX ORDER — AZITHROMYCIN 250 MG/1
500 TABLET, FILM COATED ORAL ONCE
Status: COMPLETED | OUTPATIENT
Start: 2019-07-13 | End: 2019-07-13

## 2019-07-13 RX ORDER — PREDNISONE 10 MG/1
TABLET ORAL
Qty: 20 TABLET | Refills: 0 | Status: SHIPPED | OUTPATIENT
Start: 2019-07-13 | End: 2019-07-23

## 2019-07-13 RX ORDER — CETIRIZINE HYDROCHLORIDE 10 MG/1
10 TABLET ORAL DAILY
Status: DISCONTINUED | OUTPATIENT
Start: 2019-07-13 | End: 2019-07-13 | Stop reason: HOSPADM

## 2019-07-13 RX ORDER — ONDANSETRON 2 MG/ML
4 INJECTION INTRAMUSCULAR; INTRAVENOUS ONCE
Status: DISCONTINUED | OUTPATIENT
Start: 2019-07-13 | End: 2019-07-13

## 2019-07-13 RX ORDER — AZITHROMYCIN 250 MG/1
TABLET, FILM COATED ORAL
Qty: 1 PACKET | Refills: 0 | Status: SHIPPED | OUTPATIENT
Start: 2019-07-13 | End: 2019-07-23

## 2019-07-13 RX ADMIN — AZITHROMYCIN 500 MG: 250 TABLET, FILM COATED ORAL at 09:53

## 2019-07-13 RX ADMIN — CETIRIZINE HYDROCHLORIDE 10 MG: 10 TABLET, FILM COATED ORAL at 08:10

## 2019-07-13 RX ADMIN — PREDNISONE 40 MG: 20 TABLET ORAL at 09:53

## 2019-07-13 ASSESSMENT — ENCOUNTER SYMPTOMS
RHINORRHEA: 0
COUGH: 1
EYE PAIN: 0
VOMITING: 0
ABDOMINAL PAIN: 0
DIARRHEA: 0
SHORTNESS OF BREATH: 1
NAUSEA: 0
WHEEZING: 0
BACK PAIN: 0
SORE THROAT: 0
EYE DISCHARGE: 0

## 2019-07-13 NOTE — ED PROVIDER NOTES
[facility]  eMERGENCY dEPARTMENT eNCOUnter          [address] [facility]       Chief Complaint   Patient presents with    Hemoptysis    Shortness of Breath       Nurses Notes reviewed and I agree except as noted in the HPI. HISTORY OF PRESENT ILLNESS    Michaela Aguilar is a 69 y.o. female who presents to the Emergency Department for the evaluation of shortness of breath. The patient states she began feeling intermittently short of breath three weeks ago without any pain. She developed a cold three weeks ago, for which she has been taking an over the counter medication, Corcidin. Associated symptoms include a productive cough that worsens at night and sinus drainage. She denies any sore throat, fever, leg swelling, chest pain, or antibiotic usage. She is a regular CPAP user and washes the mask every 2-3 days, but notes she rarely washes the water tank. The hose and mask have not been replaced regularly. The patient has a dog but notes she has no known allergy to her dog. The patient has a past medical history of arthritis, cancer, DM, HLD, DTN, osteoporosis, and sleep apnea. The patient has no further concerns or symptoms at this time. The HPI was provided by the patient. REVIEW OF SYSTEMS     Review of Systems   Constitutional: Negative for appetite change, chills, fatigue and fever. HENT: Positive for postnasal drip. Negative for congestion, ear pain, rhinorrhea and sore throat. Eyes: Negative for pain, discharge and visual disturbance. Respiratory: Positive for cough (Productive; worsens at night) and shortness of breath. Negative for wheezing. Cardiovascular: Negative for chest pain, palpitations and leg swelling. Gastrointestinal: Negative for abdominal pain, diarrhea, nausea and vomiting. Genitourinary: Negative for difficulty urinating, dysuria, hematuria and vaginal discharge. Musculoskeletal: Negative for arthralgias, back pain, joint swelling and neck pain. ms  P-R-T axes: 57, 25, 26  Normal sinus rhythm. Nonspecific ST abnormality. Abnormal EKG. Compared to old EKG on 06-      RADIOLOGY: non-plain film images(s) such as CT, Ultrasound and MRI are read by the radiologist.    XR CHEST PORTABLE   Final Result   No acute cardiopulmonary process            **This report has been created using voice recognition software. It may contain minor errors which are inherent in voice recognition technology. **      Final report electronically signed by Dr. Stella Barboza on 7/13/2019 10:38 AM           LABS:   Labs Reviewed   CBC WITH AUTO DIFFERENTIAL - Abnormal; Notable for the following components:       Result Value    RBC 3.71 (*)     Hemoglobin 11.1 (*)     Hematocrit 33.9 (*)     RDW-SD 45.3 (*)     Lymphocytes # 0.9 (*)     All other components within normal limits   PROTIME-INR - Abnormal; Notable for the following components:    INR 1.66 (*)     All other components within normal limits   BASIC METABOLIC PANEL - Abnormal; Notable for the following components:    Glucose 179 (*)     All other components within normal limits   GLOMERULAR FILTRATION RATE, ESTIMATED - Abnormal; Notable for the following components:    Est, Glom Filt Rate 69 (*)     All other components within normal limits   URINE WITH REFLEXED MICRO - Abnormal; Notable for the following components:    Blood, Urine SMALL (*)     Leukocyte Esterase, Urine SMALL (*)     All other components within normal limits   RAPID INFLUENZA A/B ANTIGENS   CULTURE BLOOD #2   CULTURE BLOOD #1   URINE CULTURE, REFLEXED   D-DIMER, QUANTITATIVE   BRAIN NATRIURETIC PEPTIDE   HEPATIC FUNCTION PANEL   LIPASE   TROPONIN   ANION GAP   OSMOLALITY       EMERGENCY DEPARTMENT COURSE:   Vitals:    Vitals:    07/13/19 0752 07/13/19 0955   BP: (!) 154/57 (!) 142/56   Pulse: 66 55   Resp: 14 16   Temp: 98.4 °F (36.9 °C)    TempSrc: Oral    SpO2: 97% 97%   Weight: 243 lb (110.2 kg)    Height: 5' 6\" (1.676 m)        7:56 AM: The services described in the documentation, reviewed and edited the documentation which was dictated to the scribe in my presence, and it accurately records my words and actions.     Eugene Malcolm MD 7/13/19 11:19 AM        Eugene Malcolm MD  07/13/19 7235

## 2019-07-13 NOTE — ED NOTES
Pt ambulated to bathroom and provided urine sample. Sample sent to lab.        Magdalena Arnett RN  07/13/19 1016

## 2019-07-14 PROCEDURE — 93010 ELECTROCARDIOGRAM REPORT: CPT | Performed by: INTERNAL MEDICINE

## 2019-07-15 ENCOUNTER — TELEPHONE (OUTPATIENT)
Dept: FAMILY MEDICINE CLINIC | Age: 79
End: 2019-07-15

## 2019-07-15 ENCOUNTER — OFFICE VISIT (OUTPATIENT)
Dept: FAMILY MEDICINE CLINIC | Age: 79
End: 2019-07-15
Payer: MEDICARE

## 2019-07-15 VITALS
SYSTOLIC BLOOD PRESSURE: 128 MMHG | RESPIRATION RATE: 16 BRPM | HEART RATE: 60 BPM | OXYGEN SATURATION: 98 % | TEMPERATURE: 97.8 F | WEIGHT: 246.5 LBS | BODY MASS INDEX: 39.79 KG/M2 | DIASTOLIC BLOOD PRESSURE: 64 MMHG

## 2019-07-15 DIAGNOSIS — J40 BRONCHITIS: Primary | ICD-10-CM

## 2019-07-15 DIAGNOSIS — N39.0 URINARY TRACT INFECTION WITHOUT HEMATURIA, SITE UNSPECIFIED: ICD-10-CM

## 2019-07-15 LAB
ORGANISM: ABNORMAL
URINE CULTURE REFLEX: ABNORMAL

## 2019-07-15 PROCEDURE — G8427 DOCREV CUR MEDS BY ELIG CLIN: HCPCS | Performed by: FAMILY MEDICINE

## 2019-07-15 PROCEDURE — 1036F TOBACCO NON-USER: CPT | Performed by: FAMILY MEDICINE

## 2019-07-15 PROCEDURE — G8399 PT W/DXA RESULTS DOCUMENT: HCPCS | Performed by: FAMILY MEDICINE

## 2019-07-15 PROCEDURE — 99213 OFFICE O/P EST LOW 20 MIN: CPT | Performed by: FAMILY MEDICINE

## 2019-07-15 PROCEDURE — G8417 CALC BMI ABV UP PARAM F/U: HCPCS | Performed by: FAMILY MEDICINE

## 2019-07-15 PROCEDURE — 4040F PNEUMOC VAC/ADMIN/RCVD: CPT | Performed by: FAMILY MEDICINE

## 2019-07-15 PROCEDURE — 1123F ACP DISCUSS/DSCN MKR DOCD: CPT | Performed by: FAMILY MEDICINE

## 2019-07-15 PROCEDURE — 1090F PRES/ABSN URINE INCON ASSESS: CPT | Performed by: FAMILY MEDICINE

## 2019-07-15 ASSESSMENT — ENCOUNTER SYMPTOMS
GASTROINTESTINAL NEGATIVE: 1
RESPIRATORY NEGATIVE: 1

## 2019-07-15 NOTE — PROGRESS NOTES
Subjective:      Patient ID: Felton Sheffield is a 69 y.o. female. HPI:   Chief Complaint   Patient presents with   [facility] ED Follow-up     [facility] 7/13/19    Medication Refill     ED follow up. CHIEF COMPLAINT            Chief Complaint   Patient presents with    Hemoptysis    Shortness of Breath         Nurses Notes reviewed and I agree except as noted in the HPI.     HISTORY OF PRESENT ILLNESS    [facility] LAYNE Olea is a 69 y.o. female who presents to the Emergency Department for the evaluation of shortness of breath. The patient states she began feeling intermittently short of breath three weeks ago without any pain. She developed a cold three weeks ago, for which she has been taking an over the counter medication, Corcidin. Associated symptoms include a productive cough that worsens at night and sinus drainage. She denies any sore throat, fever, leg swelling, chest pain, or antibiotic usage. She is a regular CPAP user and washes the mask every 2-3 days, but notes she rarely washes the water tank. The hose and mask have not been replaced regularly. The patient has a dog but notes she has no known allergy to her dog. The patient has a past medical history of arthritis, cancer, DM, HLD, DTN, osteoporosis, and sleep apnea. The patient has no further concerns or symptoms at this time.     Pt overall is doing much better. She is still on prednisone and Zpak. No fevers. CXR was clear. Oddly enough her urine culture grew E coli. She does admit to some urinary frequency but no urgency or dysuria. No hematuria. Vitals:    07/15/19 1257   BP: 128/64   Pulse: 60   Resp: 16   Temp: 97.8 °F (36.6 °C)   SpO2: 98%     Patient Active Problem List   Diagnosis    Hyperlipidemia    HTN (hypertension)    Osteopenia    GERD (gastroesophageal reflux disease)    RAD (reactive airway disease)    OA (osteoarthritis)    Chest pain, atypical    Diabetes mellitus type II, controlled ([address]    Obesity (BMI 30-39. 9)

## 2019-07-16 ENCOUNTER — CARE COORDINATION (OUTPATIENT)
Dept: CASE MANAGEMENT | Age: 79
End: 2019-07-16

## 2019-07-16 ENCOUNTER — TELEPHONE (OUTPATIENT)
Dept: PHARMACY | Age: 79
End: 2019-07-16

## 2019-07-16 RX ORDER — NITROFURANTOIN 25; 75 MG/1; MG/1
100 CAPSULE ORAL 2 TIMES DAILY
Qty: 14 CAPSULE | Refills: 0 | Status: SHIPPED | OUTPATIENT
Start: 2019-07-16 | End: 2019-07-23

## 2019-07-16 NOTE — CARE COORDINATION
Care Transition  ED Follow up Call    Reason for ED visit:  SOB, URI, UTI   How are you feeling? \"okay\"  Status:     improved      Do you have any questions related to your discharge instructions? no    Review of Instructions:    Discharged with new prescription? yes - Prednisone, zpak     Review Medications:  Yes   Do you have any questions related to your medications? no    Understands what to report/when to return?:  Yes   Do you have a follow up appointment scheduled? Yes  Specific review if indicated (symptom, services, etc): Spoke with [facility], introduced self/role. Reports breathing better, not quite back to baseline yet, productive green sputum. Increasing water intake and resting. Reports one episode of burning yesterday, none since. Reports leaking urine more than normal with coughing. Urinating more often but also increasing fluids. Reviewed s/s of worsening to report, verbalized understanding. Educated on early symptom recognition and reporting to prevent ED visits/hospitalizations, verbalized understanding. Denies further needs/assistance/concerns/quesitons at this time.        Do you have any needs or concerns I can assist you with? no     FU appts/Provider:    Future Appointments   Date Time Provider Silvia Gustafson   9/27/2019 10:45 AM Rodger Salcedo DO [address] [place] [address] - [place]   2/17/2020 10:30 AM [facility]   ED Care Transition Coordinator  [phone]

## 2019-07-16 NOTE — CARE COORDINATION
Spoke with Pharmacist in ED regarding Urine culture. Patient will need additional antibiotic for UTI. Patient informed, pharmacy [facility]. Pharmacist e-scribed. Patient advised to monitor for ongoing or worsening symptoms. Advised to contact myself/PCP for repeat UA once antibiotic completed, verbalized understanding. Denies further needs/assistance/questions/concerns.

## 2019-07-17 ENCOUNTER — HOSPITAL ENCOUNTER (EMERGENCY)
Age: 79
Discharge: HOME | End: 2019-07-17
Payer: MEDICARE

## 2019-07-17 VITALS
TEMPERATURE: 97.8 F | OXYGEN SATURATION: 96 % | HEART RATE: 60 BPM | DIASTOLIC BLOOD PRESSURE: 70 MMHG | BODY MASS INDEX: 39.05 KG/M2 | HEIGHT: 66 IN | WEIGHT: 243 LBS | SYSTOLIC BLOOD PRESSURE: 161 MMHG | RESPIRATION RATE: 16 BRPM

## 2019-07-17 DIAGNOSIS — K59.00 CONSTIPATION, UNSPECIFIED CONSTIPATION TYPE: ICD-10-CM

## 2019-07-17 DIAGNOSIS — K64.9 BLEEDING HEMORRHOIDS: Primary | ICD-10-CM

## 2019-07-17 LAB
ANION GAP SERPL CALCULATED.3IONS-SCNC: 13 MEQ/L (ref 8–16)
APTT: 33.6 SECONDS (ref 22–38)
BASOPHILS # BLD: 0.5 %
BASOPHILS ABSOLUTE: 0 THOU/MM3 (ref 0–0.1)
BUN BLDV-MCNC: 13 MG/DL (ref 7–22)
CALCIUM SERPL-MCNC: 9.1 MG/DL (ref 8.5–10.5)
CHLORIDE BLD-SCNC: 101 MEQ/L (ref 98–111)
CO2: 25 MEQ/L (ref 23–33)
CREAT SERPL-MCNC: 0.7 MG/DL (ref 0.4–1.2)
EKG ATRIAL RATE: 65 BPM
EKG P AXIS: 59 DEGREES
EKG P-R INTERVAL: 168 MS
EKG Q-T INTERVAL: 440 MS
EKG QRS DURATION: 88 MS
EKG QTC CALCULATION (BAZETT): 457 MS
EKG R AXIS: 15 DEGREES
EKG T AXIS: 25 DEGREES
EKG VENTRICULAR RATE: 65 BPM
EOSINOPHIL # BLD: 0.7 %
EOSINOPHILS ABSOLUTE: 0.1 THOU/MM3 (ref 0–0.4)
ERYTHROCYTE [DISTWIDTH] IN BLOOD BY AUTOMATED COUNT: 13.3 % (ref 11.5–14.5)
ERYTHROCYTE [DISTWIDTH] IN BLOOD BY AUTOMATED COUNT: 43.5 FL (ref 35–45)
GFR SERPL CREATININE-BSD FRML MDRD: 81 ML/MIN/1.73M2
GLUCOSE BLD-MCNC: 119 MG/DL (ref 70–108)
HCT VFR BLD CALC: 33.6 % (ref 37–47)
HEMOGLOBIN: 11.2 GM/DL (ref 12–16)
IMMATURE GRANS (ABS): 0.08 THOU/MM3 (ref 0–0.07)
IMMATURE GRANULOCYTES: 1 %
INR BLD: 1.24 (ref 0.85–1.13)
LYMPHOCYTES # BLD: 13 %
LYMPHOCYTES ABSOLUTE: 1.1 THOU/MM3 (ref 1–4.8)
MCH RBC QN AUTO: 29.9 PG (ref 26–33)
MCHC RBC AUTO-ENTMCNC: 33.3 GM/DL (ref 32.2–35.5)
MCV RBC AUTO: 89.6 FL (ref 81–99)
MONOCYTES # BLD: 10.6 %
MONOCYTES ABSOLUTE: 0.9 THOU/MM3 (ref 0.4–1.3)
NUCLEATED RED BLOOD CELLS: 0 /100 WBC
OSMOLALITY CALCULATION: 278.8 MOSMOL/KG (ref 275–300)
PLATELET # BLD: 199 THOU/MM3 (ref 130–400)
PMV BLD AUTO: 10.6 FL (ref 9.4–12.4)
POTASSIUM SERPL-SCNC: 4.4 MEQ/L (ref 3.5–5.2)
RBC # BLD: 3.75 MILL/MM3 (ref 4.2–5.4)
SEG NEUTROPHILS: 74.2 %
SEGMENTED NEUTROPHILS ABSOLUTE COUNT: 6.1 THOU/MM3 (ref 1.8–7.7)
SODIUM BLD-SCNC: 139 MEQ/L (ref 135–145)
WBC # BLD: 8.2 THOU/MM3 (ref 4.8–10.8)

## 2019-07-17 PROCEDURE — 85025 COMPLETE CBC W/AUTO DIFF WBC: CPT

## 2019-07-17 PROCEDURE — 93005 ELECTROCARDIOGRAM TRACING: CPT | Performed by: NURSE PRACTITIONER

## 2019-07-17 PROCEDURE — 93010 ELECTROCARDIOGRAM REPORT: CPT | Performed by: NUCLEAR MEDICINE

## 2019-07-17 PROCEDURE — 36415 COLL VENOUS BLD VENIPUNCTURE: CPT

## 2019-07-17 PROCEDURE — 99284 EMERGENCY DEPT VISIT MOD MDM: CPT

## 2019-07-17 PROCEDURE — 85730 THROMBOPLASTIN TIME PARTIAL: CPT

## 2019-07-17 PROCEDURE — 80048 BASIC METABOLIC PNL TOTAL CA: CPT

## 2019-07-17 PROCEDURE — 85610 PROTHROMBIN TIME: CPT

## 2019-07-17 RX ORDER — POLYETHYLENE GLYCOL 3350 17 G/17G
17 POWDER, FOR SOLUTION ORAL DAILY
Qty: 1 BOTTLE | Refills: 0 | Status: SHIPPED | OUTPATIENT
Start: 2019-07-17 | End: 2019-07-24

## 2019-07-17 ASSESSMENT — ENCOUNTER SYMPTOMS
CONSTIPATION: 1
COLOR CHANGE: 0
CHEST TIGHTNESS: 0
DIARRHEA: 0
SHORTNESS OF BREATH: 0
PHOTOPHOBIA: 0
NAUSEA: 0
COUGH: 0
ABDOMINAL PAIN: 0
VOICE CHANGE: 0
RECTAL PAIN: 0
SINUS PRESSURE: 0
EYE REDNESS: 0
VOMITING: 0
SORE THROAT: 0
BACK PAIN: 0
ABDOMINAL DISTENTION: 0
WHEEZING: 0
RHINORRHEA: 0
BLOOD IN STOOL: 1

## 2019-07-17 NOTE — CARE COORDINATION
09/08/1991    Annual Wellness Visit (AWV)  09/08/2004     Andrew Strauss RN, BSN  [phone]  [phone]

## 2019-07-17 NOTE — ED PROVIDER NOTES
discharged home with Miralax, hydrocortisone cream and tucks, and will need to follow-up with Brad Mccloud MD in 2 weeks. The patient will need to come back to the ER if their symptoms worsen, or become more severe in nature. Medications - No data to display    Patient was seen independently by myself. The patient's final impression and disposition and plan was determined by myself. CRITICAL CARE:   None    CONSULTS:  Dr. Aby Anders, GI    PROCEDURES:  None    FINAL IMPRESSION      1. Bleeding hemorrhoids    2. Constipation, unspecified constipation type          DISPOSITION/PLAN   Patient was discharged in stable condition. Will return if symptoms change or worsen, or for any sign or symptom deemed emergent by the patient or family members. Follow up as an outpatient, sooner if symptoms warrant. PATIENT REFERRED TO:  Brad Mccloud MD  [address] [facility]    Call   For follow up and evaluation      DISCHARGEMEDICATIONS:  New Prescriptions    HYDROCORTISONE (ANUSOL-HC) 2.5 % RECTAL CREAM    Place rectally 2 times daily. POLYETHYLENE GLYCOL (MIRALAX) POWDER    Take 17 g by mouth daily for 7 days PRN constipation    TUCKS (TUCKS) 50 % PADS    Apply 1 each topically as needed for Hemorrhoids       (Please note that portions of this note were completedwith a voice recognition program.  Efforts were made to edit the dictations but occasionally words are mis-transcribed.)    Scribe:  Alina Smith 12/23/16 9:04 AM Scribing for and in the presence of Aleksandr Acharya CNP. Signed by: Tobi Katz 07/17/19 11:05 AM    Provider:  I personally performed the services described in the documentation, reviewedand edited the documentation which was dictated to the scribe in my presence, and it accurately records my words and actions.     Aleksandr Acharya CNP 07/17/19 11:05 AM    BOWEN Garcia - SERAFIN  07/17/19 2049

## 2019-07-18 ENCOUNTER — TELEPHONE (OUTPATIENT)
Dept: FAMILY MEDICINE CLINIC | Age: 79
End: 2019-07-18

## 2019-07-18 LAB
BLOOD CULTURE, ROUTINE: NORMAL
BLOOD CULTURE, ROUTINE: NORMAL

## 2019-07-22 ENCOUNTER — CARE COORDINATION (OUTPATIENT)
Dept: CASE MANAGEMENT | Age: 79
End: 2019-07-22

## 2019-07-22 NOTE — CARE COORDINATION
[facility] ED Follow Up Call    2019    Patient: Adam Reinoso Patient : 1940   MRN: [id number]      Reason for ED visit:  Rectal Bleeding  How are you feeling? \"Better\"  Status:     Improved    Do you have any questions related to your discharge instructions? No    Review of Instructions:   Discharged with new prescription? Yes       - Hydrocortisone 2.5% - Place rectally BID       - Glycolax 17g QD for 7 Days PRN Constipation       - Tucks 50% Pads - Apply 1 topically PRN Hemorrhoids  Review Medications: Yes  Do you have any questions related to your medications? No  Understands what to report/when to return?: Yes  Do you have a follow up appointment scheduled? Yes  Specific review if indicated (symptom, services, etc):     Spoke with [facility] for Care Transition ED follow up. Identified self/role. Patient states she is doing better. Patient was seen in the ED on 2019 for report of rectal bleeding with history of hemorrhoids. Dr. Douglas Raya consults and advised for patient to hold Eliquis x 2 days and follow up with him in 2 weeks. Patient also seen in ED on 2019 for URI/UTI. Patient reports no further rectal bleeding episodes. Patient is using hydrocortisone cream with relief. Patient has a follow up appointment with Dr. Douglas Raya next week. Patient states SOB has resolved, cough is improving. Patient denies urinary frequency, dysuria, or hematuria. Patient has two days left of her ATB. Patient denies additional needs or concerns at this time. Patient instructed to notify Pre-Service or CTN for any new or worsening symptoms and when to return to the ED, contact information for CTN provided. Patient verbalized understanding. Do you have any needs or concerns I can assist you with?  No    Future Appointments   Date Time Provider Silvia Gustafson   2019 10:45 AM Ameena Tilley DO [address] [facility] Portia Rudy   2020 10:30 AM [facility]

## 2019-09-20 RX ORDER — LEVOTHYROXINE SODIUM 0.03 MG/1
25 TABLET ORAL DAILY
Qty: 30 TABLET | Refills: 3 | Status: SHIPPED | OUTPATIENT
Start: 2019-09-20 | End: 2020-01-17

## 2019-09-20 NOTE — TELEPHONE ENCOUNTER
Alena Olea called requesting a refill on the following medications:  Requested Prescriptions     Pending Prescriptions Disp Refills    levothyroxine (SYNTHROID) 25 MCG tablet 30 tablet 3     Sig: Take 1 tablet by mouth Daily     Pharmacy verified:  .pv    [facility]    Date of last visit: 07/15/19  Date of next visit (if applicable): 9/13/0863

## 2019-09-27 ENCOUNTER — OFFICE VISIT (OUTPATIENT)
Dept: FAMILY MEDICINE CLINIC | Age: 79
End: 2019-09-27
Payer: MEDICARE

## 2019-09-27 ENCOUNTER — HOSPITAL ENCOUNTER (OUTPATIENT)
Age: 79
Discharge: HOME | End: 2019-09-27
Payer: MEDICARE

## 2019-09-27 VITALS
BODY MASS INDEX: 39.77 KG/M2 | HEART RATE: 44 BPM | RESPIRATION RATE: 20 BRPM | HEIGHT: 66 IN | DIASTOLIC BLOOD PRESSURE: 78 MMHG | WEIGHT: 247.5 LBS | SYSTOLIC BLOOD PRESSURE: 146 MMHG

## 2019-09-27 DIAGNOSIS — E66.01 MORBID OBESITY (HCC): ICD-10-CM

## 2019-09-27 DIAGNOSIS — K21.9 GASTROESOPHAGEAL REFLUX DISEASE, ESOPHAGITIS PRESENCE NOT SPECIFIED: ICD-10-CM

## 2019-09-27 DIAGNOSIS — I48.91 ATRIAL FIBRILLATION, UNSPECIFIED TYPE (HCC): ICD-10-CM

## 2019-09-27 DIAGNOSIS — Z99.89 OBSTRUCTIVE SLEEP APNEA ON CPAP: ICD-10-CM

## 2019-09-27 DIAGNOSIS — G47.33 OBSTRUCTIVE SLEEP APNEA ON CPAP: ICD-10-CM

## 2019-09-27 DIAGNOSIS — E78.00 PURE HYPERCHOLESTEROLEMIA: ICD-10-CM

## 2019-09-27 DIAGNOSIS — M54.16 LUMBAR RADICULITIS: ICD-10-CM

## 2019-09-27 DIAGNOSIS — M53.3 SACROILIAC PAIN: ICD-10-CM

## 2019-09-27 DIAGNOSIS — E03.9 HYPOTHYROIDISM, UNSPECIFIED TYPE: ICD-10-CM

## 2019-09-27 DIAGNOSIS — E11.21 CONTROLLED TYPE 2 DIABETES MELLITUS WITH DIABETIC NEPHROPATHY, WITHOUT LONG-TERM CURRENT USE OF INSULIN (HCC): ICD-10-CM

## 2019-09-27 DIAGNOSIS — M51.36 DDD (DEGENERATIVE DISC DISEASE), LUMBAR: ICD-10-CM

## 2019-09-27 DIAGNOSIS — I10 ESSENTIAL HYPERTENSION: Primary | ICD-10-CM

## 2019-09-27 DIAGNOSIS — Z00.00 ROUTINE GENERAL MEDICAL EXAMINATION AT A HEALTH CARE FACILITY: ICD-10-CM

## 2019-09-27 DIAGNOSIS — G62.9 NEUROPATHY: ICD-10-CM

## 2019-09-27 DIAGNOSIS — I48.0 PAROXYSMAL ATRIAL FIBRILLATION (HCC): ICD-10-CM

## 2019-09-27 LAB
AVERAGE GLUCOSE: 117 MG/DL (ref 70–126)
CHOLESTEROL, TOTAL: 149 MG/DL (ref 100–199)
CREATININE, URINE: 37 MG/DL
HBA1C MFR BLD: 5.9 % (ref 4.4–6.4)
HDLC SERPL-MCNC: 70 MG/DL
LDL CHOLESTEROL CALCULATED: 66 MG/DL
MICROALBUMIN UR-MCNC: < 1.2 MG/DL
MICROALBUMIN/CREAT UR-RTO: 32 MG/G (ref 0–30)
TRIGL SERPL-MCNC: 63 MG/DL (ref 0–199)
TSH SERPL DL<=0.05 MIU/L-ACNC: 2.84 UIU/ML (ref 0.4–4.2)

## 2019-09-27 PROCEDURE — 82043 UR ALBUMIN QUANTITATIVE: CPT

## 2019-09-27 PROCEDURE — 1090F PRES/ABSN URINE INCON ASSESS: CPT | Performed by: FAMILY MEDICINE

## 2019-09-27 PROCEDURE — 36415 COLL VENOUS BLD VENIPUNCTURE: CPT

## 2019-09-27 PROCEDURE — 1036F TOBACCO NON-USER: CPT | Performed by: FAMILY MEDICINE

## 2019-09-27 PROCEDURE — G8399 PT W/DXA RESULTS DOCUMENT: HCPCS | Performed by: FAMILY MEDICINE

## 2019-09-27 PROCEDURE — 83036 HEMOGLOBIN GLYCOSYLATED A1C: CPT

## 2019-09-27 PROCEDURE — G8417 CALC BMI ABV UP PARAM F/U: HCPCS | Performed by: FAMILY MEDICINE

## 2019-09-27 PROCEDURE — 4040F PNEUMOC VAC/ADMIN/RCVD: CPT | Performed by: FAMILY MEDICINE

## 2019-09-27 PROCEDURE — 1123F ACP DISCUSS/DSCN MKR DOCD: CPT | Performed by: FAMILY MEDICINE

## 2019-09-27 PROCEDURE — 84443 ASSAY THYROID STIM HORMONE: CPT

## 2019-09-27 PROCEDURE — G0438 PPPS, INITIAL VISIT: HCPCS | Performed by: FAMILY MEDICINE

## 2019-09-27 PROCEDURE — 80061 LIPID PANEL: CPT

## 2019-09-27 PROCEDURE — 99214 OFFICE O/P EST MOD 30 MIN: CPT | Performed by: FAMILY MEDICINE

## 2019-09-27 PROCEDURE — G8427 DOCREV CUR MEDS BY ELIG CLIN: HCPCS | Performed by: FAMILY MEDICINE

## 2019-09-27 RX ORDER — DILTIAZEM HYDROCHLORIDE 120 MG/1
120 CAPSULE, COATED, EXTENDED RELEASE ORAL DAILY
Qty: 90 CAPSULE | Refills: 3 | Status: SHIPPED | OUTPATIENT
Start: 2019-09-27 | End: 2019-09-27

## 2019-09-27 RX ORDER — PRAVASTATIN SODIUM 80 MG/1
80 TABLET ORAL DAILY
Qty: 90 TABLET | Refills: 3 | Status: SHIPPED | OUTPATIENT
Start: 2019-09-27 | End: 2020-08-27 | Stop reason: SDUPTHER

## 2019-09-27 RX ORDER — CALCITONIN SALMON 200 [IU]/.09ML
1 SPRAY, METERED NASAL DAILY
Qty: 3.7 ML | Refills: 11 | Status: SHIPPED | OUTPATIENT
Start: 2019-09-27 | End: 2020-08-27 | Stop reason: SDUPTHER

## 2019-09-27 RX ORDER — RAMIPRIL 10 MG/1
10 CAPSULE ORAL DAILY
Qty: 90 CAPSULE | Refills: 3 | Status: SHIPPED | OUTPATIENT
Start: 2019-09-27 | End: 2019-10-17

## 2019-09-27 RX ORDER — METOPROLOL SUCCINATE 25 MG/1
25 TABLET, EXTENDED RELEASE ORAL DAILY
Qty: 90 TABLET | Refills: 3 | Status: SHIPPED | OUTPATIENT
Start: 2019-09-27 | End: 2020-01-17

## 2019-09-27 ASSESSMENT — PATIENT HEALTH QUESTIONNAIRE - PHQ9
SUM OF ALL RESPONSES TO PHQ QUESTIONS 1-9: 0
SUM OF ALL RESPONSES TO PHQ QUESTIONS 1-9: 0

## 2019-09-27 ASSESSMENT — LIFESTYLE VARIABLES: HOW OFTEN DO YOU HAVE A DRINK CONTAINING ALCOHOL: 0

## 2019-09-27 NOTE — PROGRESS NOTES
Visit Information    Have you changed or started any medications since your last visit including any over-the-counter medicines, vitamins, or herbal medicines? no   Are you having any side effects from any of your medications? -  no  Have you stopped taking any of your medications? Is so, why? -  no    Have you seen any other physician or provider since your last visit? Yes - Records Requested  Have you had any other diagnostic tests since your last visit? Yes - Records Requested  Have you been seen in the emergency room and/or had an admission to a hospital since we last saw you? No  Have you had your routine dental cleaning in the past 6 months? n/a    Have you activated your [employer] account? If not, what are your barriers? Yes     Patient Care Team:  Lakshmi Meredith DO as PCP - General  Lakshmi Meredith DO as PCP - [facility]  Ivanna Young MD as Consulting Physician (Orthopedic Surgery)    Medical History Review  Past Medical, Family, and Social History reviewed and does contribute to the patient presenting condition    Health Maintenance   Topic Date Due    DTaP/Tdap/Td vaccine (1 - Tdap) 09/08/1960    Shingles Vaccine (1 of 2) 09/08/1991    Lipid screen  04/05/2019    Annual Wellness Visit (AWV)  05/29/2019    TSH testing  05/19/2020    Potassium monitoring  07/17/2020    Creatinine monitoring  07/17/2020    Flu vaccine  Completed    Pneumococcal 65+ years Vaccine  Completed    DEXA (modify frequency per FRAX score)  Addressed         The patient is scheduled for her MRI on 10/4/19 at 8pm with an arrival time of 7:30pm at main radiology. No prep needed. The patient was updated and voided understanding. Called [facility] and cancelled the Cardizem that has been D/C'd.

## 2019-09-27 NOTE — PROGRESS NOTES
Subjective:      Patient ID: Ricardo Encinas is a 67 y.o. female. HPI:    Chief Complaint   Patient presents with    Medicare AWV    6 Month Follow-Up     needs a A1C and a lipid panel    Diabetes    Medication Refill     6 month eval.      Sugars   Lab Results   Component Value Date    LABA1C 5.5 09/27/2018     BP is elevated today. She is under a lot of stress with her  and her general health. She has also been out of her Cardizem for a few weeks, \"ran out of refills\". BP Readings from Last 3 Encounters:   09/27/19 (!) 146/78   07/17/19 (!) 161/70   07/15/19 128/64     Weight is stable. Wt Readings from Last 3 Encounters:   09/27/19 247 lb 8 oz (112.3 kg)   07/17/19 243 lb (110.2 kg)   07/15/19 246 lb 8 oz (111.8 kg)     Pt was seen by Cardio in July, has follow up appt in October. Denies CP, chest tightness, SOB. Since last visit, was seen by Dr. Ronda Ridley for hemorrhoids, this went very well. She is still on omeprazole. Pt continues to have issues with her back. She completed PT, still with pain and numbness in both legs. Patient Active Problem List   Diagnosis    Hyperlipidemia    HTN (hypertension)    Osteopenia    GERD (gastroesophageal reflux disease)    RAD (reactive airway disease)    OA (osteoarthritis)    Chest pain, atypical    Diabetes mellitus type II, controlled ([address]    Obesity (BMI 30-39. 9)    Chronic low back pain    Neuropathy    Obstructive sleep apnea on CPAP    Controlled type 2 diabetes mellitus without complication (HCC)    Persistent atrial fibrillation (HCC)    Paroxysmal atrial fibrillation (HCC)    Abnormal nuclear stress test    Influenza    Atrial fibrillation with rapid ventricular response [facility])     Past Surgical History:   Procedure Laterality Date    APPENDECTOMY      CARDIAC CATHETERIZATION      two cath    COLONOSCOPY  01/08/2013    COLONOSCOPY N/A 5/17/2019    [facility]ST[facility] Value Date    TSH 5.550 (H) 05/19/2019       Lab Results   Component Value Date    WBC 8.2 07/17/2019    HGB 11.2 (L) 07/17/2019    HCT 33.6 (L) 07/17/2019    MCV 89.6 07/17/2019     07/17/2019         Health Maintenance   Topic Date Due    DTaP/Tdap/Td vaccine (1 - Tdap) 09/08/1960    Shingles Vaccine (1 of 2) 09/08/1991    Lipid screen  04/05/2019    Annual Wellness Visit (AWV)  05/29/2019    TSH testing  05/19/2020    Potassium monitoring  07/17/2020    Creatinine monitoring  07/17/2020    Flu vaccine  Completed    Pneumococcal 65+ years Vaccine  Completed    DEXA (modify frequency per FRAX score)  Addressed       Immunization History   Administered Date(s) Administered    Influenza 10/05/2011    Influenza Vaccine, unspecified formulation 10/06/2014    Influenza Virus Vaccine 12/31/2012    Influenza Whole 09/10/2015    Influenza, High Dose (Fluzone 65 yrs and older) 10/22/2016, 09/29/2017, 10/01/2018, 09/21/2019    Pneumococcal Conjugate 13-valent ([id number]) 03/29/2016    Pneumococcal Polysaccharide ([id number]) 10/10/2017         Review of Systems   Constitutional: Negative. HENT: Negative. Respiratory: Negative. Negative for chest tightness and shortness of breath. Cardiovascular: Negative. Negative for chest pain. Gastrointestinal: Negative. Musculoskeletal: Positive for back pain and gait problem. Neurological: Positive for numbness (bl legs). Negative for dizziness and light-headedness. All other systems reviewed and are negative. Objective:   Physical Exam   Constitutional: She is oriented to person, place, and time. She appears well-developed and well-nourished. HENT:   Head: Normocephalic and atraumatic. Right Ear: Tympanic membrane normal.   Left Ear: Tympanic membrane normal.   Mouth/Throat: Oropharynx is clear and moist and mucous membranes are normal.   Neck: Carotid bruit is not present. Cardiovascular: Regular rhythm and normal heart sounds. 65+ years Vaccine  Completed    DEXA (modify frequency per FRAX score)  Addressed     Recommendations for Preventive Services Due: see orders and patient instructions/AVS.  . Recommended screening schedule for the next 5-10 years is provided to the patient in written form: see Patient Adonis Gibbs was seen today for medicare aw, 6 month follow-up, diabetes and medication refill. Diagnoses and all orders for this visit:    Essential hypertension    Atrial fibrillation, unspecified type ([facility] [facility]    Controlled type 2 diabetes mellitus with diabetic nephropathy, without long-term current use of insulin (HCC)  -     Hemoglobin A1C; Future  -     Microalbumin / Creatinine Urine Ratio; Future    Obstructive sleep apnea on CPAP    Gastroesophageal reflux disease, esophagitis presence not specified    Neuropathy    Morbid obesity (HCC)    Paroxysmal atrial fibrillation (HCC)    Pure hypercholesterolemia  -     Lipid Panel; Future  -     Hemoglobin A1C; Future    Hypothyroidism, unspecified type  -     TSH with Reflex; Future    Sacroiliac pain  -     MRI Lumbar Spine WO Contrast; Future    DDD (degenerative disc disease), lumbar  -     MRI Lumbar Spine WO Contrast; Future    Lumbar radiculitis  -     MRI Lumbar Spine WO Contrast; Future    Routine general medical examination at a health care facility    Other orders  -     pravastatin (PRAVACHOL) 80 MG tablet; Take 1 tablet by mouth daily  -     metoprolol succinate (TOPROL XL) 25 MG extended release tablet; Take 1 tablet by mouth daily  -     SITagliptin (JANUVIA) 100 MG tablet; Take 1 tablet by mouth daily  -     calcitonin (MIACALCIN) 200 UNIT/ACT nasal spray; 1 spray by Nasal route daily  -     apixaban (ELIQUIS) 5 MG TABS tablet; Take 1 tablet by mouth 2 times daily  -     ramipril (ALTACE) 10 MG capsule;  Take 1 capsule by mouth daily

## 2019-09-27 NOTE — PATIENT INSTRUCTIONS
You may receive a survey regarding the care you received during your visit. Your input is valuable to us. We encourage you to complete and return your survey. We hope you will choose us in the future for your healthcare needs. Personalized Preventive Plan for Beulah He - 9/27/2019  Medicare offers a range of preventive health benefits. Some of the tests and screenings are paid in full while other may be subject to a deductible, co-insurance, and/or copay. Some of these benefits include a comprehensive review of your medical history including lifestyle, illnesses that may run in your family, and various assessments and screenings as appropriate. After reviewing your medical record and screening and assessments performed today your provider may have ordered immunizations, labs, imaging, and/or referrals for you. A list of these orders (if applicable) as well as your Preventive Care list are included within your After Visit Summary for your review. Other Preventive Recommendations:    · A preventive eye exam performed by an eye specialist is recommended every 1-2 years to screen for glaucoma; cataracts, macular degeneration, and other eye disorders. · A preventive dental visit is recommended every 6 months. · Try to get at least 150 minutes of exercise per week or 10,000 steps per day on a pedometer . · Order or download the FREE \"Exercise & Physical Activity: Your Everyday Guide\" from [employer]. Call 9-278.232.9205 or search [employer] online. · You need 4201-7794 mg of calcium and 3877-6395 IU of vitamin D per day. It is possible to meet your calcium requirement with diet alone, but a vitamin D supplement is usually necessary to meet this goal.  · When exposed to the sun, use a sunscreen that protects against both UVA and UVB radiation with an SPF of 30 or greater.  Reapply every 2 to 3 hours or after sweating, drying off with a towel, or

## 2019-09-28 ENCOUNTER — APPOINTMENT (OUTPATIENT)
Dept: GENERAL RADIOLOGY | Age: 79
End: 2019-09-28
Payer: MEDICARE

## 2019-09-28 ENCOUNTER — HOSPITAL ENCOUNTER (EMERGENCY)
Age: 79
Discharge: HOME | End: 2019-09-28
Attending: FAMILY MEDICINE
Payer: MEDICARE

## 2019-09-28 VITALS
HEIGHT: 65 IN | DIASTOLIC BLOOD PRESSURE: 68 MMHG | HEART RATE: 69 BPM | RESPIRATION RATE: 16 BRPM | SYSTOLIC BLOOD PRESSURE: 125 MMHG | TEMPERATURE: 98 F | BODY MASS INDEX: 24.49 KG/M2 | OXYGEN SATURATION: 100 % | WEIGHT: 147 LBS

## 2019-09-28 DIAGNOSIS — I10 ESSENTIAL HYPERTENSION: Primary | ICD-10-CM

## 2019-09-28 LAB
ANION GAP SERPL CALCULATED.3IONS-SCNC: 13 MEQ/L (ref 8–16)
BASOPHILS # BLD: 0.7 %
BASOPHILS ABSOLUTE: 0 THOU/MM3 (ref 0–0.1)
BUN BLDV-MCNC: 14 MG/DL (ref 7–22)
CALCIUM SERPL-MCNC: 9.9 MG/DL (ref 8.5–10.5)
CHLORIDE BLD-SCNC: 100 MEQ/L (ref 98–111)
CO2: 24 MEQ/L (ref 23–33)
CREAT SERPL-MCNC: 0.7 MG/DL (ref 0.4–1.2)
EKG ATRIAL RATE: 50 BPM
EKG P AXIS: 33 DEGREES
EKG P-R INTERVAL: 172 MS
EKG Q-T INTERVAL: 458 MS
EKG QRS DURATION: 84 MS
EKG QTC CALCULATION (BAZETT): 417 MS
EKG R AXIS: 10 DEGREES
EKG T AXIS: 22 DEGREES
EKG VENTRICULAR RATE: 50 BPM
EOSINOPHIL # BLD: 2 %
EOSINOPHILS ABSOLUTE: 0.1 THOU/MM3 (ref 0–0.4)
ERYTHROCYTE [DISTWIDTH] IN BLOOD BY AUTOMATED COUNT: 14.4 % (ref 11.5–14.5)
ERYTHROCYTE [DISTWIDTH] IN BLOOD BY AUTOMATED COUNT: 49.9 FL (ref 35–45)
GFR SERPL CREATININE-BSD FRML MDRD: 81 ML/MIN/1.73M2
GLUCOSE BLD-MCNC: 115 MG/DL (ref 70–108)
HCT VFR BLD CALC: 37.7 % (ref 37–47)
HEMOGLOBIN: 11.9 GM/DL (ref 12–16)
IMMATURE GRANS (ABS): 0.01 THOU/MM3 (ref 0–0.07)
IMMATURE GRANULOCYTES: 0.2 %
LYMPHOCYTES # BLD: 25.3 %
LYMPHOCYTES ABSOLUTE: 1.4 THOU/MM3 (ref 1–4.8)
MCH RBC QN AUTO: 29.8 PG (ref 26–33)
MCHC RBC AUTO-ENTMCNC: 31.6 GM/DL (ref 32.2–35.5)
MCV RBC AUTO: 94.3 FL (ref 81–99)
MONOCYTES # BLD: 11 %
MONOCYTES ABSOLUTE: 0.6 THOU/MM3 (ref 0.4–1.3)
NUCLEATED RED BLOOD CELLS: 0 /100 WBC
OSMOLALITY CALCULATION: 275.2 MOSMOL/KG (ref 275–300)
PLATELET # BLD: 181 THOU/MM3 (ref 130–400)
PMV BLD AUTO: 11 FL (ref 9.4–12.4)
POTASSIUM SERPL-SCNC: 4.3 MEQ/L (ref 3.5–5.2)
PRO-BNP: 315 PG/ML (ref 0–1800)
RBC # BLD: 4 MILL/MM3 (ref 4.2–5.4)
SEG NEUTROPHILS: 60.8 %
SEGMENTED NEUTROPHILS ABSOLUTE COUNT: 3.3 THOU/MM3 (ref 1.8–7.7)
SODIUM BLD-SCNC: 137 MEQ/L (ref 135–145)
TROPONIN T: < 0.01 NG/ML
WBC # BLD: 5.4 THOU/MM3 (ref 4.8–10.8)

## 2019-09-28 PROCEDURE — 83880 ASSAY OF NATRIURETIC PEPTIDE: CPT

## 2019-09-28 PROCEDURE — 36415 COLL VENOUS BLD VENIPUNCTURE: CPT

## 2019-09-28 PROCEDURE — 85025 COMPLETE CBC W/AUTO DIFF WBC: CPT

## 2019-09-28 PROCEDURE — 93005 ELECTROCARDIOGRAM TRACING: CPT | Performed by: FAMILY MEDICINE

## 2019-09-28 PROCEDURE — 84484 ASSAY OF TROPONIN QUANT: CPT

## 2019-09-28 PROCEDURE — 80048 BASIC METABOLIC PNL TOTAL CA: CPT

## 2019-09-28 PROCEDURE — 71046 X-RAY EXAM CHEST 2 VIEWS: CPT

## 2019-09-28 PROCEDURE — 6370000000 HC RX 637 (ALT 250 FOR IP): Performed by: FAMILY MEDICINE

## 2019-09-28 PROCEDURE — 99284 EMERGENCY DEPT VISIT MOD MDM: CPT

## 2019-09-28 RX ORDER — CLONIDINE HYDROCHLORIDE 0.2 MG/1
0.2 TABLET ORAL ONCE
Status: COMPLETED | OUTPATIENT
Start: 2019-09-28 | End: 2019-09-28

## 2019-09-28 RX ADMIN — CLONIDINE HYDROCHLORIDE 0.2 MG: 0.2 TABLET ORAL at 19:37

## 2019-09-28 ASSESSMENT — ENCOUNTER SYMPTOMS
SHORTNESS OF BREATH: 0
COUGH: 0
EYE DISCHARGE: 0
EYE PAIN: 0
SORE THROAT: 0
WHEEZING: 0
BACK PAIN: 0
VOMITING: 0
DIARRHEA: 0
NAUSEA: 0
ABDOMINAL PAIN: 0
RHINORRHEA: 0

## 2019-09-30 ENCOUNTER — TELEPHONE (OUTPATIENT)
Dept: FAMILY MEDICINE CLINIC | Age: 79
End: 2019-09-30

## 2019-09-30 PROCEDURE — 93010 ELECTROCARDIOGRAM REPORT: CPT | Performed by: INTERNAL MEDICINE

## 2019-09-30 ASSESSMENT — ENCOUNTER SYMPTOMS
GASTROINTESTINAL NEGATIVE: 1
RESPIRATORY NEGATIVE: 1
SHORTNESS OF BREATH: 0
CHEST TIGHTNESS: 0
BACK PAIN: 1

## 2019-09-30 NOTE — TELEPHONE ENCOUNTER
ED F/u letter sent through [address] [place] [address] [address]. Left ear injury s/p punch into left ear with a breast knuckle, bleeding and pain reported.

## 2019-10-01 ENCOUNTER — CARE COORDINATION (OUTPATIENT)
Dept: CASE MANAGEMENT | Age: 79
End: 2019-10-01

## 2019-10-03 ENCOUNTER — TELEPHONE (OUTPATIENT)
Dept: FAMILY MEDICINE CLINIC | Age: 79
End: 2019-10-03

## 2019-10-04 ENCOUNTER — HOSPITAL ENCOUNTER (OUTPATIENT)
Dept: MRI IMAGING | Age: 79
Discharge: HOME | End: 2019-10-04
Payer: MEDICARE

## 2019-10-04 DIAGNOSIS — M54.16 LUMBAR RADICULITIS: ICD-10-CM

## 2019-10-04 DIAGNOSIS — M51.36 DDD (DEGENERATIVE DISC DISEASE), LUMBAR: ICD-10-CM

## 2019-10-04 DIAGNOSIS — M53.3 SACROILIAC PAIN: ICD-10-CM

## 2019-10-04 PROCEDURE — 72148 MRI LUMBAR SPINE W/O DYE: CPT

## 2019-10-07 DIAGNOSIS — M48.062 SPINAL STENOSIS OF LUMBAR REGION WITH NEUROGENIC CLAUDICATION: Primary | ICD-10-CM

## 2019-10-17 ENCOUNTER — TELEPHONE (OUTPATIENT)
Dept: FAMILY MEDICINE CLINIC | Age: 79
End: 2019-10-17

## 2019-10-17 RX ORDER — RAMIPRIL 5 MG/1
5 CAPSULE ORAL DAILY
Qty: 90 CAPSULE | Refills: 3 | Status: SHIPPED | OUTPATIENT
Start: 2019-10-17 | End: 2019-10-24

## 2019-10-24 ENCOUNTER — OFFICE VISIT (OUTPATIENT)
Dept: PHYSICAL MEDICINE AND REHAB | Age: 79
End: 2019-10-24
Payer: MEDICARE

## 2019-10-24 VITALS
SYSTOLIC BLOOD PRESSURE: 132 MMHG | WEIGHT: 147.05 LBS | DIASTOLIC BLOOD PRESSURE: 84 MMHG | HEART RATE: 80 BPM | HEIGHT: 65 IN | BODY MASS INDEX: 24.5 KG/M2

## 2019-10-24 DIAGNOSIS — M54.50 LUMBAR PAIN: ICD-10-CM

## 2019-10-24 DIAGNOSIS — M48.062 LUMBAR STENOSIS WITH NEUROGENIC CLAUDICATION: Primary | ICD-10-CM

## 2019-10-24 DIAGNOSIS — M54.16 LUMBAR RADICULOPATHY: ICD-10-CM

## 2019-10-24 DIAGNOSIS — G89.4 CHRONIC PAIN SYNDROME: ICD-10-CM

## 2019-10-24 DIAGNOSIS — M53.3 SACROILIAC DYSFUNCTION: ICD-10-CM

## 2019-10-24 DIAGNOSIS — M47.819 FACET ARTHROPATHY OF SPINE: ICD-10-CM

## 2019-10-24 PROCEDURE — 1123F ACP DISCUSS/DSCN MKR DOCD: CPT | Performed by: NURSE PRACTITIONER

## 2019-10-24 PROCEDURE — G8399 PT W/DXA RESULTS DOCUMENT: HCPCS | Performed by: NURSE PRACTITIONER

## 2019-10-24 PROCEDURE — 4040F PNEUMOC VAC/ADMIN/RCVD: CPT | Performed by: NURSE PRACTITIONER

## 2019-10-24 PROCEDURE — G8482 FLU IMMUNIZE ORDER/ADMIN: HCPCS | Performed by: NURSE PRACTITIONER

## 2019-10-24 PROCEDURE — G8420 CALC BMI NORM PARAMETERS: HCPCS | Performed by: NURSE PRACTITIONER

## 2019-10-24 PROCEDURE — 1036F TOBACCO NON-USER: CPT | Performed by: NURSE PRACTITIONER

## 2019-10-24 PROCEDURE — 99204 OFFICE O/P NEW MOD 45 MIN: CPT | Performed by: NURSE PRACTITIONER

## 2019-10-24 PROCEDURE — 1090F PRES/ABSN URINE INCON ASSESS: CPT | Performed by: NURSE PRACTITIONER

## 2019-10-24 PROCEDURE — G8427 DOCREV CUR MEDS BY ELIG CLIN: HCPCS | Performed by: NURSE PRACTITIONER

## 2019-10-24 RX ORDER — CALCIPOTRIENE 50 UG/G
CREAM TOPICAL
COMMUNITY
Start: 2019-10-10 | End: 2020-06-10

## 2019-10-24 RX ORDER — SPIRONOLACTONE 50 MG/1
50 TABLET, FILM COATED ORAL DAILY
COMMUNITY
Start: 2019-10-23 | End: 2022-10-19 | Stop reason: SDUPTHER

## 2019-10-24 RX ORDER — RAMIPRIL 10 MG/1
10 CAPSULE ORAL DAILY
COMMUNITY
Start: 2019-09-27 | End: 2020-06-10 | Stop reason: DRUGHIGH

## 2019-10-24 RX ORDER — DILTIAZEM HYDROCHLORIDE 120 MG/1
CAPSULE, COATED, EXTENDED RELEASE ORAL
Refills: 1 | COMMUNITY
Start: 2019-09-30 | End: 2020-03-25 | Stop reason: SDUPTHER

## 2019-10-24 RX ORDER — DIFLORASONE DIACETATE 0.5 MG/G
OINTMENT TOPICAL
COMMUNITY
Start: 2019-10-10 | End: 2020-06-10 | Stop reason: ALTCHOICE

## 2019-10-24 ASSESSMENT — ENCOUNTER SYMPTOMS
EYE ITCHING: 1
VOMITING: 0
SHORTNESS OF BREATH: 1
CHEST TIGHTNESS: 0
BACK PAIN: 1
EYE REDNESS: 0
COLOR CHANGE: 0
COUGH: 0
CONSTIPATION: 1
SINUS PRESSURE: 0
EYE PAIN: 0
DIARRHEA: 0
SORE THROAT: 0
NAUSEA: 0
ABDOMINAL PAIN: 0
RHINORRHEA: 0
SINUS PAIN: 0

## 2019-10-30 ENCOUNTER — TELEPHONE (OUTPATIENT)
Dept: PULMONOLOGY | Age: 79
End: 2019-10-30

## 2019-11-01 DIAGNOSIS — J40 TRACHEOBRONCHITIS: ICD-10-CM

## 2019-11-01 RX ORDER — ALBUTEROL SULFATE 90 UG/1
2 AEROSOL, METERED RESPIRATORY (INHALATION) EVERY 4 HOURS PRN
Qty: 1 INHALER | Refills: 0 | Status: SHIPPED | OUTPATIENT
Start: 2019-11-01 | End: 2019-12-23 | Stop reason: SDUPTHER

## 2019-11-26 ENCOUNTER — PREP FOR PROCEDURE (OUTPATIENT)
Dept: PHYSICAL MEDICINE AND REHAB | Age: 79
End: 2019-11-26

## 2019-12-02 ENCOUNTER — TELEPHONE (OUTPATIENT)
Dept: PHYSICAL MEDICINE AND REHAB | Age: 79
End: 2019-12-02

## 2019-12-23 DIAGNOSIS — J40 TRACHEOBRONCHITIS: ICD-10-CM

## 2019-12-23 RX ORDER — ALBUTEROL SULFATE 90 UG/1
2 AEROSOL, METERED RESPIRATORY (INHALATION) EVERY 4 HOURS PRN
Qty: 1 INHALER | Refills: 0 | Status: SHIPPED | OUTPATIENT
Start: 2019-12-23 | End: 2020-06-10

## 2020-01-06 ENCOUNTER — PREP FOR PROCEDURE (OUTPATIENT)
Dept: PHYSICAL MEDICINE AND REHAB | Age: 80
End: 2020-01-06

## 2020-01-06 NOTE — H&P
Jennifer Farr is an 67 y.o. female.     ChiefComplaint: Low back pain           PROCEDURE: MRI LUMBAR SPINE WO CONTRAST       CLINICAL INFORMATION: Sacroiliac pain, DDD (degenerative disc disease), lumbar, Lumbar radiculitis. Low back pain and right hip pain for one year. Multiple recent falls.       COMPARISON: Lumbar spine radiographs dated 10/11/2016 and MRI lumbar spine dated 12/1/2007.       TECHNIQUE: Sagittal and axial T1 and T2-weighted images were obtained through the lumbar spine. Coronal STIR images of the sacrum.       FINDINGS:   There is anatomic vertebral body height and alignment. There is focal edema at the anterior-inferior endplate of L3 with small osteophytes as evidence for Modic 1 degenerative change. There are few scattered focal areas of hyperintense T1 and T2 signal    as evidence for hemangiomas. Marrow signal otherwise appears within normal limits. The conus terminates in a normal fashion at the L1-2 level. There are scattered hyperintense T2 signal cysts in the bilateral kidneys. Paraspinal soft tissues are    otherwise unremarkable. At T12-L1 there is no significant spinal canal or neuroforaminal stenosis. At L1-L2 there is no significant spinal canal or neuroforaminal stenosis. At L2-3 there is a minimal disc bulge without significant spinal canal or neuroforaminal stenosis. At L3-4 there is a shallow disc bulge which causes mild spinal canal stenosis in association with facet hypertrophy and ligament flavum thickening and appears to contact traversing L4 nerve roots bilaterally. There is mild to moderate bilateral neural    foraminal stenosis. At L4-5 there is a shallow disc bulge which contributes to mild spinal canal stenosis in association with facet hypertrophy and ligament flavum thickening. There is mild to moderate bilateral neural foraminal stenosis. At L5-S1 there is a shallow broad-based protrusion without significant spinal canal stenosis.  There is moderate Disp: 3.7 mL, Rfl: 11    apixaban (ELIQUIS) 5 MG TABS tablet, Take 1 tablet by mouth 2 times daily, Disp: 180 tablet, Rfl: 3    levothyroxine (SYNTHROID) 25 MCG tablet, Take 1 tablet by mouth Daily, Disp: 30 tablet, Rfl: 3    hydrocortisone (ANUSOL-HC) 2.5 % rectal cream, Place rectally 2 times daily. , Disp: 1 Tube, Rfl: 0    fluticasone (FLONASE) 50 MCG/ACT nasal spray, 1 spray by Each Nostril route daily, Disp: 1 Bottle, Rfl: 0    Blood Pressure Monitor KIT, Dx: HTN, Disp: 1 kit, Rfl: 0    chlorhexidine (PERIDEX) 0.12 % solution, Take 5 mLs by mouth daily, Disp: , Rfl: 2    nitroGLYCERIN (NITROSTAT) 0.4 MG SL tablet, DISSOLVE ONE TABLET UNDER TONGUE AS NEEDED FOR CHEST PAIN EVERY 5 MINUTES. MAX OF 3 DOSES. IF NO RELIEF AFTER 1 DOSE, CALL 911., Disp: 25 tablet, Rfl: 0    docusate (COLACE, DULCOLAX) 100 MG CAPS, Take 100 mg by mouth daily, Disp: 30 capsule, Rfl: 1    omeprazole (PRILOSEC) 40 MG delayed release capsule, Take 1 capsule by mouth daily, Disp: 90 capsule, Rfl: 3    BIOTIN PO, Take 1 tablet by mouth daily, Disp: , Rfl:     albuterol sulfate HFA (VENTOLIN HFA) 108 (90 Base) MCG/ACT inhaler, Inhale 2 puffs into the lungs every 4 hours as needed for Wheezing, Disp: 1 Inhaler, Rfl: 0    B Complex-C (SUPER B COMPLEX PO), Take by mouth, Disp: , Rfl:     aspirin 81 MG EC tablet, Take 1 tablet by mouth daily, Disp: 30 tablet, Rfl: 11    GLUCOSAMINE-CHONDROITIN-VIT D3 PO, Take by mouth daily , Disp: , Rfl:     acetaminophen (TYLENOL) 325 MG tablet, Take 325 mg by mouth every 4 hours as needed for Pain (For mild pain level 1-3 or for fever > 100.5) , Disp: 120 tablet, Rfl:     Cyanocobalamin (VITAMIN B 12 PO), Take  by mouth Daily.  , Disp: , Rfl:          Subjective:      Review of Systems   Constitutional: Positive for activity change. Negative for chills, diaphoresis, fatigue and fever. HENT: Positive for postnasal drip.  Negative for congestion, ear discharge, ear pain, mouth Musculoskeletal: She exhibits tenderness. She exhibits no edema.        Lumbar back: She exhibits decreased range of motion, tenderness, pain and spasm.        Back:    Neurological: She is alert and oriented to person, place, and time. No cranial nerve deficit.   Cautious gait   Skin: Skin is warm and dry. No rash noted. She is not diaphoretic. No pallor. Psychiatric: She has a normal mood and affect. Her speech is normal and behavior is normal. Judgment and thought content normal. She is not actively hallucinating. Cognition and memory are normal. She is attentive.   Vitals reviewed.     JASON test: + left  Yeoman's test: + left  Gaenslen test: + left  Assessment:      1. Lumbar stenosis with neurogenic claudication    2. Lumbar pain    3. Lumbar radiculopathy    4. Facet arthropathy of spine    5. Chronic pain syndrome    6.  Sacroiliac dysfunction               Plan:  LEFT SI MBB #1 - NO STEROID       BOWEN Weaver - CNP  1/6/2020

## 2020-01-06 NOTE — H&P (VIEW-ONLY)
to severe left and moderate right neural foraminal stenosis in association with facet hypertrophy and ligament flavum thickening.           Impression    Multilevel degenerative changes with areas of mild spinal canal stenosis at L3-4 and L4-5 and up to moderate to severe neural foraminal stenosis at L5-S1.            The patient is allergic to ranolazine.     PastMedical History  Alena  has a past medical history of Arthritis, Cancer ([facility] [address] Diabetes mellitus ([facility] Hyperlipidemia, Hypertension, Osteoporosis, and Sleep apnea.     Past Surgical History  The patient  has a past surgical history that includes Appendectomy; Cardiac catheterization; Dilation and curettage of uterus; Colonoscopy (01/08/2013); Skin cancer excision (03/2017); Colonoscopy (N/A, 5/17/2019); and Upper gastrointestinal endoscopy (N/A, 5/17/2019).    Family History  This patient's family history includes Alzheimer's Disease in her mother; Cancer in her sister; Heart Disease in her mother.     Social History  Alena  reports that she has never smoked.  She has never used smokeless tobacco. She reports that she does not drink alcohol or use drugs.     Medications     Current Medication      Current Outpatient Medications:     spironolactone (ALDACTONE) 50 MG tablet, , Disp: , Rfl:     calcipotriene (DOVONEX) 0.005 % cream, , Disp: , Rfl:     diflorasone (PSORCON) 0.05 % ointment, , Disp: , Rfl:     diltiazem (CARDIZEM CD) 120 MG extended release capsule, TK 1 C PO QD, Disp: , Rfl: 1    ramipril (ALTACE) 10 MG capsule, Take by mouth, Disp: , Rfl:     pravastatin (PRAVACHOL) 80 MG tablet, Take 1 tablet by mouth daily, Disp: 90 tablet, Rfl: 3    metoprolol succinate (TOPROL XL) 25 MG extended release tablet, Take 1 tablet by mouth daily, Disp: 90 tablet, Rfl: 3    SITagliptin (JANUVIA) 100 MG tablet, Take 1 tablet by mouth daily, Disp: 90 tablet, Rfl: 3    calcitonin (MIACALCIN) 200 UNIT/ACT nasal spray, 1 spray by Nasal route daily, Disp: 3.7 mL, Rfl: 11    apixaban (ELIQUIS) 5 MG TABS tablet, Take 1 tablet by mouth 2 times daily, Disp: 180 tablet, Rfl: 3    levothyroxine (SYNTHROID) 25 MCG tablet, Take 1 tablet by mouth Daily, Disp: 30 tablet, Rfl: 3    hydrocortisone (ANUSOL-HC) 2.5 % rectal cream, Place rectally 2 times daily. , Disp: 1 Tube, Rfl: 0    fluticasone (FLONASE) 50 MCG/ACT nasal spray, 1 spray by Each Nostril route daily, Disp: 1 Bottle, Rfl: 0    Blood Pressure Monitor KIT, Dx: HTN, Disp: 1 kit, Rfl: 0    chlorhexidine (PERIDEX) 0.12 % solution, Take 5 mLs by mouth daily, Disp: , Rfl: 2    nitroGLYCERIN (NITROSTAT) 0.4 MG SL tablet, DISSOLVE ONE TABLET UNDER TONGUE AS NEEDED FOR CHEST PAIN EVERY 5 MINUTES. MAX OF 3 DOSES. IF NO RELIEF AFTER 1 DOSE, CALL 911., Disp: 25 tablet, Rfl: 0    docusate (COLACE, DULCOLAX) 100 MG CAPS, Take 100 mg by mouth daily, Disp: 30 capsule, Rfl: 1    omeprazole (PRILOSEC) 40 MG delayed release capsule, Take 1 capsule by mouth daily, Disp: 90 capsule, Rfl: 3    BIOTIN PO, Take 1 tablet by mouth daily, Disp: , Rfl:     albuterol sulfate HFA (VENTOLIN HFA) 108 (90 Base) MCG/ACT inhaler, Inhale 2 puffs into the lungs every 4 hours as needed for Wheezing, Disp: 1 Inhaler, Rfl: 0    B Complex-C (SUPER B COMPLEX PO), Take by mouth, Disp: , Rfl:     aspirin 81 MG EC tablet, Take 1 tablet by mouth daily, Disp: 30 tablet, Rfl: 11    GLUCOSAMINE-CHONDROITIN-VIT D3 PO, Take by mouth daily , Disp: , Rfl:     acetaminophen (TYLENOL) 325 MG tablet, Take 325 mg by mouth every 4 hours as needed for Pain (For mild pain level 1-3 or for fever > 100.5) , Disp: 120 tablet, Rfl:     Cyanocobalamin (VITAMIN B 12 PO), Take  by mouth Daily.  , Disp: , Rfl:          Subjective:      Review of Systems   Constitutional: Positive for activity change. Negative for chills, diaphoresis, fatigue and fever. HENT: Positive for postnasal drip.  Negative for congestion, ear discharge, ear pain, mouth Musculoskeletal: She exhibits tenderness. She exhibits no edema.        Lumbar back: She exhibits decreased range of motion, tenderness, pain and spasm.        Back:    Neurological: She is alert and oriented to person, place, and time. No cranial nerve deficit.   Cautious gait   Skin: Skin is warm and dry. No rash noted. She is not diaphoretic. No pallor. Psychiatric: She has a normal mood and affect. Her speech is normal and behavior is normal. Judgment and thought content normal. She is not actively hallucinating. Cognition and memory are normal. She is attentive.   Vitals reviewed.     JASON test: + left  Yeoman's test: + left  Gaenslen test: + left  Assessment:      1. Lumbar stenosis with neurogenic claudication    2. Lumbar pain    3. Lumbar radiculopathy    4. Facet arthropathy of spine    5. Chronic pain syndrome    6.  Sacroiliac dysfunction               Plan:  LEFT SI MBB #1 - NO STEROID       BOWEN Neil - CNP  1/6/2020

## 2020-01-09 ENCOUNTER — TELEPHONE (OUTPATIENT)
Dept: PHYSICAL MEDICINE AND REHAB | Age: 80
End: 2020-01-09

## 2020-01-09 NOTE — TELEPHONE ENCOUNTER
Pt called to see how late she could eat for tomorrow. She asked which medications to take and what not to take. I explained to make sure not to take her blood thinners as she should have been holding them. Patient stated she did not know she was to hold these and took Eliquis 1-8-20 HS and ASA 81 in morning. R/S patient's procedure to 1-23-20 and told her I would send pre proc instructions in the mail again as she stated she did not have them anymore. Pt copy of pre proc inst in media tab and signed by patient on 10-24-19. New inst. Scanned in media tab.

## 2020-01-17 RX ORDER — SITAGLIPTIN 100 MG/1
TABLET, FILM COATED ORAL
Qty: 90 TABLET | Refills: 3 | Status: SHIPPED | OUTPATIENT
Start: 2020-01-17 | End: 2020-11-02

## 2020-01-17 RX ORDER — METOPROLOL SUCCINATE 25 MG/1
25 TABLET, EXTENDED RELEASE ORAL DAILY
Qty: 90 TABLET | Refills: 3 | Status: ON HOLD | OUTPATIENT
Start: 2020-01-17 | End: 2020-10-26 | Stop reason: HOSPADM

## 2020-01-17 RX ORDER — LEVOTHYROXINE SODIUM 0.03 MG/1
25 TABLET ORAL DAILY
Qty: 30 TABLET | Refills: 3 | Status: SHIPPED | OUTPATIENT
Start: 2020-01-17 | End: 2020-05-08

## 2020-01-20 ENCOUNTER — TELEPHONE (OUTPATIENT)
Dept: PHYSICAL MEDICINE AND REHAB | Age: 80
End: 2020-01-20

## 2020-01-20 NOTE — PROGRESS NOTES
Office Reschedule Alena's procedure for Friday 1/24/20; I spoke with Estee Trevizo and she verbalized understanding of instructions;    NPO after midnight  [employer] and drivers license  Wear comfortable clean clothing  Do not bring jewelry  Shower night before and morning of surgery with a liquid antibacterial soap  Bring list of medications with dosage and how often taken  Follow all instructions given by your physician   needed at discharge  Please call Dr. Ofe Dudley office if you develop and illness, have a fever, or start antibiotics or steroids  Call [phone] for any questions    No need to bring CPAP machine.

## 2020-01-24 ENCOUNTER — HOSPITAL ENCOUNTER (OUTPATIENT)
Age: 80
Setting detail: OUTPATIENT SURGERY
Discharge: HOME | End: 2020-01-24
Attending: PAIN MEDICINE | Admitting: PAIN MEDICINE
Payer: MEDICARE

## 2020-01-24 ENCOUNTER — ANESTHESIA EVENT (OUTPATIENT)
Dept: OPERATING ROOM | Age: 80
End: 2020-01-24
Payer: MEDICARE

## 2020-01-24 ENCOUNTER — ANESTHESIA (OUTPATIENT)
Dept: OPERATING ROOM | Age: 80
End: 2020-01-24
Payer: MEDICARE

## 2020-01-24 ENCOUNTER — APPOINTMENT (OUTPATIENT)
Dept: GENERAL RADIOLOGY | Age: 80
End: 2020-01-24
Attending: PAIN MEDICINE
Payer: MEDICARE

## 2020-01-24 VITALS
DIASTOLIC BLOOD PRESSURE: 67 MMHG | RESPIRATION RATE: 18 BRPM | SYSTOLIC BLOOD PRESSURE: 160 MMHG | TEMPERATURE: 97.1 F | HEART RATE: 50 BPM | HEIGHT: 66 IN | BODY MASS INDEX: 38.86 KG/M2 | WEIGHT: 241.8 LBS | OXYGEN SATURATION: 98 %

## 2020-01-24 LAB — GLUCOSE BLD-MCNC: 147 MG/DL (ref 70–108)

## 2020-01-24 PROCEDURE — 6360000004 HC RX CONTRAST MEDICATION: Performed by: PAIN MEDICINE

## 2020-01-24 PROCEDURE — 82948 REAGENT STRIP/BLOOD GLUCOSE: CPT

## 2020-01-24 PROCEDURE — 2500000003 HC RX 250 WO HCPCS: Performed by: PAIN MEDICINE

## 2020-01-24 PROCEDURE — 3600000054 HC PAIN LEVEL 3 BASE: Performed by: PAIN MEDICINE

## 2020-01-24 PROCEDURE — 27096 INJECT SACROILIAC JOINT: CPT | Performed by: PAIN MEDICINE

## 2020-01-24 PROCEDURE — 7100000010 HC PHASE II RECOVERY - FIRST 15 MIN: Performed by: PAIN MEDICINE

## 2020-01-24 PROCEDURE — 3209999900 FLUORO FOR SURGICAL PROCEDURES

## 2020-01-24 PROCEDURE — 2709999900 HC NON-CHARGEABLE SUPPLY: Performed by: PAIN MEDICINE

## 2020-01-24 PROCEDURE — 7100000011 HC PHASE II RECOVERY - ADDTL 15 MIN: Performed by: PAIN MEDICINE

## 2020-01-24 RX ORDER — LIDOCAINE HYDROCHLORIDE 10 MG/ML
INJECTION, SOLUTION INFILTRATION; PERINEURAL PRN
Status: DISCONTINUED | OUTPATIENT
Start: 2020-01-24 | End: 2020-01-24 | Stop reason: ALTCHOICE

## 2020-01-24 RX ORDER — BUPIVACAINE HYDROCHLORIDE 2.5 MG/ML
INJECTION, SOLUTION EPIDURAL; INFILTRATION; INTRACAUDAL PRN
Status: DISCONTINUED | OUTPATIENT
Start: 2020-01-24 | End: 2020-01-24 | Stop reason: ALTCHOICE

## 2020-01-24 ASSESSMENT — PAIN SCALES - GENERAL: PAINLEVEL_OUTOF10: 0

## 2020-01-24 NOTE — INTERVAL H&P NOTE
H&P Update    Patient's History and Physical from January 6, 2020 was reviewed. Patient examined. There has been no change.     Electronically signed by Kang Montiel MD on 1/24/20 at 7:57 AM

## 2020-01-24 NOTE — ANESTHESIA POSTPROCEDURE EVALUATION
Department of Anesthesiology  Postprocedure Note    Patient: Damien Morris  MRN: [id number]  YOB: 1940  Date of evaluation: 1/24/2020  Time:  8:16 AM     Procedure Summary     Date:  01/24/20 Room / Location:  [address] [place] [address] 03 / [address] [facility]    Anesthesia Start:   Anesthesia Stop:      Procedure:  LEFT SI MBB #1 - NO STEROID (Left ) Diagnosis:  (Sacroiliac dysfunction)    Surgeon:  Zoila Chilel MD Responsible Provider:      Anesthesia Type:  MAC ASA Status:  3          Anesthesia Type: NO ANESTHETIC ADMINISTERED. CASE DONE AS LOCAL ONLY. Tiburcio Phase I:      [place]cio Phase II:      Last vitals: Reviewed and per EMR flowsheets.        Anesthesia Post Evaluation

## 2020-01-24 NOTE — ANESTHESIA PRE PROCEDURE
Department of Anesthesiology  Preprocedure Note       Name:  Dominic Murphy   Age:  67 y.o.  :  1940                                          MRN:  [id number]         Date:  2020      Surgeon: Raya Esteban):  Azam Jacob MD    Procedure: LEFT SI MBB #1 - NO STEROID (Left )    Medications prior to admission:   Prior to Admission medications    Medication Sig Start Date End Date Taking? Authorizing Provider   JANUVIA 100 MG tablet TAKE 1 TABLET BY MOUTH DAILY 20   Eli Grayson DO   metoprolol succinate (TOPROL XL) 25 MG extended release tablet TAKE 1 TABLET BY MOUTH DAILY 20   Eli Grayson DO   levothyroxine (SYNTHROID) 25 MCG tablet TAKE 1 TABLET BY MOUTH DAILY 20   Eli Grayson DO   albuterol sulfate HFA (VENTOLIN HFA) 108 (90 Base) MCG/ACT inhaler Inhale 2 puffs into the lungs every 4 hours as needed for Wheezing 19   Eli Grayson DO   spironolactone (ALDACTONE) 50 MG tablet Take 50 mg by mouth daily  10/23/19   Historical Provider, MD   calcipotriene (DOVONEX) 0.005 % cream  10/10/19   Historical Provider, MD   diflorasone (PSORCON) 0.05 % ointment  10/10/19   Historical Provider, MD   diltiazem (CARDIZEM CD) 120 MG extended release capsule TK 1 C PO QD 19   Historical Provider, MD   ramipril (ALTACE) 10 MG capsule Take 10 mg by mouth daily  19   Historical Provider, MD   pravastatin (PRAVACHOL) 80 MG tablet Take 1 tablet by mouth daily 19   Eli Grayson DO   calcitonin (MIACALCIN) 200 UNIT/ACT nasal spray 1 spray by Nasal route daily 19   Eli Grayson DO   apixaban Donalynn Hunting) 5 MG TABS tablet Take 1 tablet by mouth 2 times daily 19   Eli Grayson DO   hydrocortisone (ANUSOL-HC) 2.5 % rectal cream Place rectally 2 times daily.  19   Aleksandr Counts, APRN - CNP   fluticasone (FLONASE) 50 MCG/ACT nasal spray 1 spray by Each Nostril route daily 19   Sharlene Ramirez MD   chlorhexidine ([facility]) fibrillation ([facility] I48.0    Abnormal nuclear stress test R94.39    Influenza J11.1    Atrial fibrillation with rapid ventricular response (HCC) I48.91       Past Medical History:        Diagnosis Date    Arthritis     Cancer ([facility]     skin ca    Diabetes mellitus ([facility]     Hyperlipidemia     Hypertension     Osteoporosis 2017    Sleep apnea        Past Surgical History:        Procedure Laterality Date    APPENDECTOMY      CARDIAC CATHETERIZATION      two cath    COLONOSCOPY  01/08/2013    COLONOSCOPY N/A 5/17/2019    COLONOSCOPY DIAGNOSTIC performed by [facility]  [address]  03/2017    on face, left side    UPPER GASTROINTESTINAL ENDOSCOPY N/A 5/17/2019    EGD BIOPSY performed by Linda Schilling MD at [facility]       Social History:    Social History     Tobacco Use    Smoking status: Never Smoker    Smokeless tobacco: Never Used   Substance Use Topics    Alcohol use: No     Alcohol/week: 0.0 standard drinks                                Counseling given: Not Answered      Vital Signs (Current): There were no vitals filed for this visit.                                            BP Readings from Last 3 Encounters:   10/24/19 132/84   09/28/19 125/68   09/27/19 (!) 146/78       NPO Status:                                                                                 BMI:   Wt Readings from Last 3 Encounters:   10/24/19 147 lb 0.8 oz (66.7 kg)   09/28/19 147 lb (66.7 kg)   09/27/19 247 lb 8 oz (112.3 kg)     There is no height or weight on file to calculate BMI.    CBC:   Lab Results   Component Value Date    WBC 5.4 09/28/2019    RBC 4.00 09/28/2019    RBC 4.00 05/30/2012    HGB 11.9 09/28/2019    HCT 37.7 09/28/2019    MCV 94.3 09/28/2019    RDW 15.3 04/05/2018     09/28/2019       CMP:   Lab Results   Component Value Date     09/28/2019    K 4.3 09/28/2019    K 3.9 05/19/2019     09/28/2019

## 2020-01-24 NOTE — OP NOTE
the joint space was optimized. Then skin and deep tissues over the caudal aspect of the joint were infiltrated with 3 ml of 1% lidocaine. The #22-gauge, 3-1/2 inch spinal needle was introduced through the skin wheal and directed such that the tip of the needle lies in the joint space. This was confirmed by injecting 0.25 ml of Omnipaque-180 through the needle and observing the spread of the contrast along the joint space. Then after negative aspiration a total of 1 ml of  0.25% Marcaine was injected through the needle. The needle is removed and a Band-Aid was placed over the needle insertion site. EBL-0    The patient tolerated the procedure well and vital signs remained stable. The patient was discharged home in stable condition and will be followed in the pain clinic in the next few weeks or further planning.     Electronically signed by Anitha Vicente MD on 1/24/20 at 8:18 AM

## 2020-02-17 ENCOUNTER — OFFICE VISIT (OUTPATIENT)
Dept: PULMONOLOGY | Age: 80
End: 2020-02-17
Payer: MEDICARE

## 2020-02-17 VITALS
OXYGEN SATURATION: 97 % | BODY MASS INDEX: 38.8 KG/M2 | DIASTOLIC BLOOD PRESSURE: 62 MMHG | WEIGHT: 241.4 LBS | HEIGHT: 66 IN | HEART RATE: 125 BPM | SYSTOLIC BLOOD PRESSURE: 118 MMHG

## 2020-02-17 PROCEDURE — 4040F PNEUMOC VAC/ADMIN/RCVD: CPT | Performed by: PHYSICIAN ASSISTANT

## 2020-02-17 PROCEDURE — G8417 CALC BMI ABV UP PARAM F/U: HCPCS | Performed by: PHYSICIAN ASSISTANT

## 2020-02-17 PROCEDURE — 95012 NITRIC OXIDE EXP GAS DETER: CPT | Performed by: PHYSICIAN ASSISTANT

## 2020-02-17 PROCEDURE — G8482 FLU IMMUNIZE ORDER/ADMIN: HCPCS | Performed by: PHYSICIAN ASSISTANT

## 2020-02-17 PROCEDURE — 1123F ACP DISCUSS/DSCN MKR DOCD: CPT | Performed by: PHYSICIAN ASSISTANT

## 2020-02-17 PROCEDURE — G8427 DOCREV CUR MEDS BY ELIG CLIN: HCPCS | Performed by: PHYSICIAN ASSISTANT

## 2020-02-17 PROCEDURE — G8399 PT W/DXA RESULTS DOCUMENT: HCPCS | Performed by: PHYSICIAN ASSISTANT

## 2020-02-17 PROCEDURE — 1090F PRES/ABSN URINE INCON ASSESS: CPT | Performed by: PHYSICIAN ASSISTANT

## 2020-02-17 PROCEDURE — 99214 OFFICE O/P EST MOD 30 MIN: CPT | Performed by: PHYSICIAN ASSISTANT

## 2020-02-17 PROCEDURE — 1036F TOBACCO NON-USER: CPT | Performed by: PHYSICIAN ASSISTANT

## 2020-02-17 ASSESSMENT — ENCOUNTER SYMPTOMS
BACK PAIN: 1
SHORTNESS OF BREATH: 1
CHEST TIGHTNESS: 0
DIARRHEA: 0
WHEEZING: 1
COUGH: 0
STRIDOR: 0
ALLERGIC/IMMUNOLOGIC NEGATIVE: 1
NAUSEA: 0
EYES NEGATIVE: 1

## 2020-02-17 NOTE — PROGRESS NOTES
[place] [facility]         [id number]  2/17/2020   Chief Complaint   Patient presents with    Follow-up     1 year f.u with download         Pt of Dr. Femi Morillo    PAP Download:   Original or initial AHI: 39.8     Date of initial study: 10/11/10      Compliant  100%     Noncompliant 0 %     PAP Type cpapLevel  9   Avg Hrs/Day [id number]  AHI: 3.1   Recorded compliance dates , 1/14/20  to 2/12/20   Machine/Mfg: Resmed Interface: nasal    Provider:    _[email or web address]           Allyne Pastures        __ [employer]    __ [facility]            __[facility]   __[place]:       __Other    Neck Size: 16 Mallampati 3  ESS:  9      Here is a scan of the most recent download:        Presentation:   Bacilio Jeff presents for sleep medicine follow up for obstructive sleep apnea  Since the last visit, Bacilio Jeff is doing well with PAP. She is sleeping pretty well. She feels rested. She is having mask leak and dry mouth. She is having some SOB and wheezing. She has albuterol and uses it occ. She has some wheezing at night. She has not been treated for pulm issues other than using albuterol. Equipment issues: The pressure is  acceptable, the mask is acceptable     Sleep issues:  Do you feel better? Yes  More rested? Yes   Better concentration? yes    Progress History:   Since last visit any new medical issues? Injection in back  New ER or hospitlal visits? No  Any new or changes in medicines? No  Any new sleep medicines?  No        Past Medical History:   Diagnosis Date    Arthritis     Cancer ([facility] [address]     skin ca    Diabetes mellitus ([facility] [address]     Hyperlipidemia     Hypertension     Osteoporosis 2017    Sleep apnea        Past Surgical History:   Procedure Laterality Date    APPENDECTOMY      CARDIAC CATHETERIZATION      two cath    COLONOSCOPY  01/08/2013    COLONOSCOPY N/A 5/17/2019    COLONOSCOPY DIAGNOSTIC performed by Hilton Gibson MD at [address] [place] [address] AND CURETTAGE OF UTERUS      HC INJECTION PROCEDURE FOR SACROILIAC JOINT Left 1/24/2020    LEFT SI MBB #1 - NO STEROID performed by Wendy Gooden MD at [place] [address]  03/2017    on face, left side    UPPER GASTROINTESTINAL ENDOSCOPY N/A 5/17/2019    EGD BIOPSY performed by Eliceo Smith MD at [facility]       Social History     Tobacco Use    Smoking status: Never Smoker    Smokeless tobacco: Never Used   Substance Use Topics    Alcohol use: No     Alcohol/week: 0.0 standard drinks    Drug use: No       Allergies   Allergen Reactions    Ranolazine Hives     Pt was on brand Ranexa       Current Outpatient Medications   Medication Sig Dispense Refill    JANUVIA 100 MG tablet TAKE 1 TABLET BY MOUTH DAILY 90 tablet 3    metoprolol succinate (TOPROL XL) 25 MG extended release tablet TAKE 1 TABLET BY MOUTH DAILY 90 tablet 3    levothyroxine (SYNTHROID) 25 MCG tablet TAKE 1 TABLET BY MOUTH DAILY 30 tablet 3    albuterol sulfate HFA (VENTOLIN HFA) 108 (90 Base) MCG/ACT inhaler Inhale 2 puffs into the lungs every 4 hours as needed for Wheezing 1 Inhaler 0    spironolactone (ALDACTONE) 50 MG tablet Take 50 mg by mouth daily       calcipotriene (DOVONEX) 0.005 % cream       diflorasone (PSORCON) 0.05 % ointment       diltiazem (CARDIZEM CD) 120 MG extended release capsule TK 1 C PO QD  1    ramipril (ALTACE) 10 MG capsule Take 10 mg by mouth daily       pravastatin (PRAVACHOL) 80 MG tablet Take 1 tablet by mouth daily 90 tablet 3    calcitonin (MIACALCIN) 200 UNIT/ACT nasal spray 1 spray by Nasal route daily 3.7 mL 11    apixaban (ELIQUIS) 5 MG TABS tablet Take 1 tablet by mouth 2 times daily 180 tablet 3    hydrocortisone (ANUSOL-HC) 2.5 % rectal cream Place rectally 2 times daily.  1 Tube 0    fluticasone (FLONASE) 50 MCG/ACT nasal spray 1 spray by Each Nostril route daily 1 Bottle 0    chlorhexidine (PERIDEX) 0.12 % solution Take 5 mLs by mouth daily  2 (109.7 kg)   10/24/19 147 lb 0.8 oz (66.7 kg)     Weight stable / unchanged  Vitals: /62 (Site: Left Lower Arm, Position: Sitting, Cuff Size: Medium Adult)   Pulse 125   Ht 5' 5.5\" (1.664 m)   Wt 241 lb 6.4 oz (109.5 kg)   LMP  (Exact Date)   SpO2 97% Comment: room air at rest  BMI 39.56 kg/m²       Physical Exam  Constitutional:       Appearance: She is well-developed. HENT:      Head: Normocephalic and atraumatic. Right Ear: External ear normal.      Left Ear: External ear normal.   Eyes:      Conjunctiva/sclera: Conjunctivae normal.      Pupils: Pupils are equal, round, and reactive to light. Neck:      Musculoskeletal: Normal range of motion and neck supple. Cardiovascular:      Rate and Rhythm: Normal rate and regular rhythm. Heart sounds: Normal heart sounds. Pulmonary:      Effort: Pulmonary effort is normal.      Breath sounds: Normal breath sounds. Musculoskeletal: Normal range of motion. Skin:     General: Skin is warm and dry. Neurological:      Mental Status: She is alert and oriented to person, place, and time. Psychiatric:         Behavior: Behavior normal.         Thought Content: Thought content normal.         Judgment: Judgment normal.       PFT's no obstruction    ASSESSMENT/DIAGNOSIS     Diagnosis Orders   1. Obstructive sleep apnea on CPAP  DME Order for CPAP as OP   2. Obesity (BMI 30-39.9)     3. SOB (shortness of breath)  POCT Nitric Oxide   4. Wheezing  POCT Nitric Oxide            Plan   Do you need any equipment today? Yes update supplies- add chin strap  - Will get Feno to eval for asthma inflammation  - No evidence of inflammation to suggest asthma on Feno  - SOB likely cardiac and weight related  - PFT's show no obstruction or restriction  - She  was advised to continue current positive airway pressure therapy with above described pressure.    - She  advised to keep good compliance with current recommended pressure to get optimal results and clinical

## 2020-02-28 ENCOUNTER — HOSPITAL ENCOUNTER (OUTPATIENT)
Age: 80
Discharge: HOME | End: 2020-02-28
Payer: MEDICARE

## 2020-02-28 LAB
ANION GAP SERPL CALCULATED.3IONS-SCNC: 14 MEQ/L (ref 8–16)
BASOPHILS # BLD: 0.9 %
BASOPHILS ABSOLUTE: 0.1 THOU/MM3 (ref 0–0.1)
BUN BLDV-MCNC: 19 MG/DL (ref 7–22)
CALCIUM SERPL-MCNC: 10.2 MG/DL (ref 8.5–10.5)
CHLORIDE BLD-SCNC: 103 MEQ/L (ref 98–111)
CO2: 21 MEQ/L (ref 23–33)
CREAT SERPL-MCNC: 1 MG/DL (ref 0.4–1.2)
EOSINOPHIL # BLD: 2.1 %
EOSINOPHILS ABSOLUTE: 0.1 THOU/MM3 (ref 0–0.4)
ERYTHROCYTE [DISTWIDTH] IN BLOOD BY AUTOMATED COUNT: 12.8 % (ref 11.5–14.5)
ERYTHROCYTE [DISTWIDTH] IN BLOOD BY AUTOMATED COUNT: 43.8 FL (ref 35–45)
GFR SERPL CREATININE-BSD FRML MDRD: 54 ML/MIN/1.73M2
GLUCOSE BLD-MCNC: 135 MG/DL (ref 70–108)
HCT VFR BLD CALC: 40.2 % (ref 37–47)
HEMOGLOBIN: 13.2 GM/DL (ref 12–16)
IMMATURE GRANS (ABS): 0.02 THOU/MM3 (ref 0–0.07)
IMMATURE GRANULOCYTES: 0.3 %
LYMPHOCYTES # BLD: 27.8 %
LYMPHOCYTES ABSOLUTE: 1.6 THOU/MM3 (ref 1–4.8)
MCH RBC QN AUTO: 30.7 PG (ref 26–33)
MCHC RBC AUTO-ENTMCNC: 32.8 GM/DL (ref 32.2–35.5)
MCV RBC AUTO: 93.5 FL (ref 81–99)
MONOCYTES # BLD: 11.6 %
MONOCYTES ABSOLUTE: 0.7 THOU/MM3 (ref 0.4–1.3)
NUCLEATED RED BLOOD CELLS: 0 /100 WBC
PLATELET # BLD: 255 THOU/MM3 (ref 130–400)
PMV BLD AUTO: 10.5 FL (ref 9.4–12.4)
POTASSIUM SERPL-SCNC: 4.8 MEQ/L (ref 3.5–5.2)
RBC # BLD: 4.3 MILL/MM3 (ref 4.2–5.4)
SEG NEUTROPHILS: 57.3 %
SEGMENTED NEUTROPHILS ABSOLUTE COUNT: 3.3 THOU/MM3 (ref 1.8–7.7)
SODIUM BLD-SCNC: 138 MEQ/L (ref 135–145)
WBC # BLD: 5.8 THOU/MM3 (ref 4.8–10.8)

## 2020-02-28 PROCEDURE — 85025 COMPLETE CBC W/AUTO DIFF WBC: CPT

## 2020-02-28 PROCEDURE — 80048 BASIC METABOLIC PNL TOTAL CA: CPT

## 2020-02-28 PROCEDURE — 36415 COLL VENOUS BLD VENIPUNCTURE: CPT

## 2020-03-09 ENCOUNTER — OFFICE VISIT (OUTPATIENT)
Dept: PHYSICAL MEDICINE AND REHAB | Age: 80
End: 2020-03-09
Payer: MEDICARE

## 2020-03-09 VITALS
BODY MASS INDEX: 38.8 KG/M2 | WEIGHT: 241.4 LBS | SYSTOLIC BLOOD PRESSURE: 118 MMHG | HEIGHT: 66 IN | DIASTOLIC BLOOD PRESSURE: 76 MMHG

## 2020-03-09 PROCEDURE — G8417 CALC BMI ABV UP PARAM F/U: HCPCS | Performed by: NURSE PRACTITIONER

## 2020-03-09 PROCEDURE — G8399 PT W/DXA RESULTS DOCUMENT: HCPCS | Performed by: NURSE PRACTITIONER

## 2020-03-09 PROCEDURE — 99213 OFFICE O/P EST LOW 20 MIN: CPT | Performed by: NURSE PRACTITIONER

## 2020-03-09 PROCEDURE — 1036F TOBACCO NON-USER: CPT | Performed by: NURSE PRACTITIONER

## 2020-03-09 PROCEDURE — 4040F PNEUMOC VAC/ADMIN/RCVD: CPT | Performed by: NURSE PRACTITIONER

## 2020-03-09 PROCEDURE — G8427 DOCREV CUR MEDS BY ELIG CLIN: HCPCS | Performed by: NURSE PRACTITIONER

## 2020-03-09 PROCEDURE — 1090F PRES/ABSN URINE INCON ASSESS: CPT | Performed by: NURSE PRACTITIONER

## 2020-03-09 PROCEDURE — G8482 FLU IMMUNIZE ORDER/ADMIN: HCPCS | Performed by: NURSE PRACTITIONER

## 2020-03-09 PROCEDURE — 1123F ACP DISCUSS/DSCN MKR DOCD: CPT | Performed by: NURSE PRACTITIONER

## 2020-03-09 ASSESSMENT — ENCOUNTER SYMPTOMS
COLOR CHANGE: 0
SHORTNESS OF BREATH: 1
BACK PAIN: 1
NAUSEA: 0
CONSTIPATION: 1
DIARRHEA: 0

## 2020-03-09 NOTE — PROGRESS NOTES
tablet, Take 1 tablet by mouth 2 times daily, Disp: 180 tablet, Rfl: 3    hydrocortisone (ANUSOL-HC) 2.5 % rectal cream, Place rectally 2 times daily. , Disp: 1 Tube, Rfl: 0    fluticasone (FLONASE) 50 MCG/ACT nasal spray, 1 spray by Each Nostril route daily, Disp: 1 Bottle, Rfl: 0    chlorhexidine (PERIDEX) 0.12 % solution, Take 5 mLs by mouth daily, Disp: , Rfl: 2    nitroGLYCERIN (NITROSTAT) 0.4 MG SL tablet, DISSOLVE ONE TABLET UNDER TONGUE AS NEEDED FOR CHEST PAIN EVERY 5 MINUTES. MAX OF 3 DOSES. IF NO RELIEF AFTER 1 DOSE, CALL 911., Disp: 25 tablet, Rfl: 0    docusate (COLACE, DULCOLAX) 100 MG CAPS, Take 100 mg by mouth daily, Disp: 30 capsule, Rfl: 1    omeprazole (PRILOSEC) 40 MG delayed release capsule, Take 1 capsule by mouth daily, Disp: 90 capsule, Rfl: 3    BIOTIN PO, Take 1 tablet by mouth daily, Disp: , Rfl:     B Complex-C (SUPER B COMPLEX PO), Take by mouth, Disp: , Rfl:     aspirin 81 MG EC tablet, Take 1 tablet by mouth daily, Disp: 30 tablet, Rfl: 11    GLUCOSAMINE-CHONDROITIN-VIT D3 PO, Take by mouth daily , Disp: , Rfl:     acetaminophen (TYLENOL) 325 MG tablet, Take 325 mg by mouth every 4 hours as needed for Pain (For mild pain level 1-3 or for fever > 100.5) , Disp: 120 tablet, Rfl:     Cyanocobalamin (VITAMIN B 12 PO), Take  by mouth Daily. , Disp: , Rfl:     Subjective:      Review of Systems   Constitutional: Positive for activity change. Negative for chills, diaphoresis, fatigue and fever. Respiratory: Positive for shortness of breath (with activity). Cardiovascular: Negative for leg swelling. Gastrointestinal: Positive for constipation. Negative for diarrhea and nausea. Endocrine: Positive for cold intolerance. Negative for heat intolerance. Genitourinary: Positive for urgency. Musculoskeletal: Positive for arthralgias, back pain, gait problem and myalgias. Negative for neck pain and neck stiffness. Skin: Negative for color change, rash and wound. Allergic/Immunologic: Positive for environmental allergies. Negative for food allergies. Neurological: Positive for numbness (bilateral foot) and headaches. Negative for dizziness, seizures and light-headedness. Hematological: Does not bruise/bleed easily. Psychiatric/Behavioral: Positive for sleep disturbance. The patient is nervous/anxious. Objective:     Vitals:    03/09/20 1427   BP: 118/76   Weight: 241 lb 6.5 oz (109.5 kg)   Height: 5' 5.51\" (1.664 m)       Physical Exam  Vitals signs reviewed. Constitutional:       General: She is not in acute distress. Appearance: She is well-developed. She is not diaphoretic. HENT:      Head: Normocephalic and atraumatic. Not macrocephalic and not microcephalic. Right Ear: External ear normal.      Left Ear: External ear normal.   Eyes:      General:         Right eye: No discharge. Left eye: No discharge. Conjunctiva/sclera: Conjunctivae normal.   Neck:      Trachea: No tracheal deviation. Pulmonary:      Effort: Pulmonary effort is normal. No respiratory distress. Musculoskeletal:         General: Tenderness present. Lumbar back: She exhibits decreased range of motion, tenderness, pain and spasm. Back:    Skin:     General: Skin is warm and dry. Coloration: Skin is not pale. Findings: No rash. Neurological:      Mental Status: She is alert and oriented to person, place, and time. Cranial Nerves: No cranial nerve deficit. Comments: Cautious gait   Psychiatric:         Attention and Perception: She is attentive. Speech: Speech normal.         Behavior: Behavior normal.         Thought Content: Thought content normal.         Judgment: Judgment normal.            Assessment:     1. Sacroiliac dysfunction    2. Lumbar stenosis with neurogenic claudication    3. Facet arthropathy of spine    4. Lumbar pain    5. Lumbar radiculopathy    6.  Chronic pain syndrome            Plan:      · OARRS reviewed. Current MED: 0  · Patient was not offered naloxone for home. · Discussed long term side effects of medications, tolerance, dependency and addiction. · Previous UDS reviewed  · Patient told can not receive any pain medications from any other source. · No evidence of abuse, diversion or aberrant behavior.  Medications and/or procedures to improve function and quality of life- patient understanding with this and that may not be pain free   Discussed with patient about safe storage of medications at home   Discussed possible weaning of medication dosing dependent on treatment/procedure results.  Testing: none   Procedures: LEFT SI MBB #2 - no sedation - just really numb!  Discussed with patient about risks with procedure including infection, reaction to medication, increased pain, or bleeding.  Medications: none      Meds. Prescribed:   No orders of the defined types were placed in this encounter. Return for LEFT SI MBB #2 - no sedation - just really numb!, follow up after procedure.          Electronically signed by BOWEN Frank CNP on3/9/2020 at 2:52 PM

## 2020-03-09 NOTE — PATIENT INSTRUCTIONS
 Testing: none   Procedures: LEFT SI MBB #2 - no sedation - just really numb!    Medications: none

## 2020-03-19 ENCOUNTER — TELEPHONE (OUTPATIENT)
Dept: PHYSICAL MEDICINE AND REHAB | Age: 80
End: 2020-03-19

## 2020-03-24 ENCOUNTER — TELEPHONE (OUTPATIENT)
Dept: FAMILY MEDICINE CLINIC | Age: 80
End: 2020-03-24

## 2020-03-25 RX ORDER — DILTIAZEM HYDROCHLORIDE 120 MG/1
CAPSULE, COATED, EXTENDED RELEASE ORAL
Qty: 90 CAPSULE | Refills: 3 | Status: SHIPPED | OUTPATIENT
Start: 2020-03-25 | End: 2021-07-07 | Stop reason: DRUGHIGH

## 2020-03-25 NOTE — TELEPHONE ENCOUNTER
[facility] called requesting a refill on the following medications:  Requested Prescriptions     Pending Prescriptions Disp Refills    dilTIAZem (CARDIZEM CD) 120 MG extended release capsule 30 capsule 1     Pharmacy verified:  [email or web address]      Date of last visit:   Date of next visit (if applicable): 1/48/6576

## 2020-03-30 NOTE — TELEPHONE ENCOUNTER
Dr Martinez Huggins office will be removing a lesion on left dorsal hand. They want to know if she can d/c her eliquis and aspirin. Please advise Miguel A Russo @ Dr Martinez Huggins.

## 2020-04-22 RX ORDER — OMEPRAZOLE 40 MG/1
40 CAPSULE, DELAYED RELEASE ORAL DAILY
Qty: 90 CAPSULE | Refills: 3 | Status: SHIPPED | OUTPATIENT
Start: 2020-04-22 | End: 2020-11-24 | Stop reason: DRUGHIGH

## 2020-05-08 RX ORDER — LEVOTHYROXINE SODIUM 0.03 MG/1
25 TABLET ORAL DAILY
Qty: 30 TABLET | Refills: 3 | Status: SHIPPED | OUTPATIENT
Start: 2020-05-08 | End: 2020-09-14

## 2020-05-27 ENCOUNTER — TELEPHONE (OUTPATIENT)
Dept: PHYSICAL MEDICINE AND REHAB | Age: 80
End: 2020-05-27

## 2020-05-27 NOTE — TELEPHONE ENCOUNTER
Pt. Contacted to reschedule procedure. Does not want to schedule at this time. Will call back at a later date.

## 2020-05-28 ENCOUNTER — HOSPITAL ENCOUNTER (EMERGENCY)
Age: 80
Discharge: HOME | End: 2020-05-28
Attending: FAMILY MEDICINE
Payer: COMMERCIAL

## 2020-05-28 ENCOUNTER — APPOINTMENT (OUTPATIENT)
Dept: GENERAL RADIOLOGY | Age: 80
End: 2020-05-28
Payer: COMMERCIAL

## 2020-05-28 VITALS
DIASTOLIC BLOOD PRESSURE: 87 MMHG | SYSTOLIC BLOOD PRESSURE: 148 MMHG | WEIGHT: 243 LBS | HEIGHT: 65 IN | HEART RATE: 100 BPM | BODY MASS INDEX: 40.48 KG/M2 | TEMPERATURE: 97.4 F | OXYGEN SATURATION: 96 % | RESPIRATION RATE: 17 BRPM

## 2020-05-28 PROCEDURE — 2709999900 HC NON-CHARGEABLE SUPPLY

## 2020-05-28 PROCEDURE — 71101 X-RAY EXAM UNILAT RIBS/CHEST: CPT

## 2020-05-28 PROCEDURE — 99283 EMERGENCY DEPT VISIT LOW MDM: CPT

## 2020-05-28 ASSESSMENT — ENCOUNTER SYMPTOMS
ABDOMINAL PAIN: 0
VOMITING: 0
SHORTNESS OF BREATH: 0
NAUSEA: 0

## 2020-05-28 ASSESSMENT — PAIN SCALES - GENERAL: PAINLEVEL_OUTOF10: 3

## 2020-05-28 ASSESSMENT — PAIN DESCRIPTION - LOCATION: LOCATION: CHEST

## 2020-05-28 ASSESSMENT — PAIN DESCRIPTION - DESCRIPTORS: DESCRIPTORS: ACHING

## 2020-05-28 ASSESSMENT — PAIN DESCRIPTION - PAIN TYPE: TYPE: ACUTE PAIN

## 2020-05-28 NOTE — ED PROVIDER NOTES
R-0Normal      docusate (COLACE, DULCOLAX) 100 MG CAPS Take 100 mg by mouth daily, Disp-30 capsule, R-1Normal      BIOTIN PO Take 1 tablet by mouth dailyHistorical Med      B Complex-C (SUPER B COMPLEX PO) Take by mouthHistorical Med      aspirin 81 MG EC tablet Take 1 tablet by mouth daily, Disp-30 tablet, R-11      GLUCOSAMINE-CHONDROITIN-VIT D3 PO Take by mouth daily Historical Med      acetaminophen (TYLENOL) 325 MG tablet Take 325 mg by mouth every 4 hours as needed for Pain (For mild pain level 1-3 or for fever > 100.5) , Disp-120 tabletOTC      Cyanocobalamin (VITAMIN B 12 PO) Take  by mouth Daily. ALLERGIES     is allergic to ranolazine. FAMILY HISTORY     She indicated that her mother is . She indicated that her father is . She indicated that her sister is . She indicated that all of her four brothers are . family history includes Alzheimer's Disease in her mother; Cancer in her sister; Heart Disease in her mother. SOCIAL HISTORY      reports that she has never smoked. She has never used smokeless tobacco. She reports that she does not drink alcohol or use drugs. PHYSICAL EXAM     INITIAL VITALS:  height is 5' 5\" (1.651 m) and weight is 243 lb (110.2 kg). Her oral temperature is 97.4 °F (36.3 °C). Her blood pressure is 148/87 (abnormal) and her pulse is 100. Her respiration is 17 and oxygen saturation is 96%. Physical Exam  Vitals signs and nursing note reviewed. Constitutional:       Appearance: She is well-developed. She is not toxic-appearing or diaphoretic. HENT:      Head: Normocephalic and atraumatic. Right Ear: Tympanic membrane and external ear normal.      Left Ear: Tympanic membrane and external ear normal.      Nose: Nose normal.      Mouth/Throat:      Pharynx: No oropharyngeal exudate or posterior oropharyngeal erythema.    Eyes:      Conjunctiva/sclera: Conjunctivae normal.   Neck:      Musculoskeletal: Normal range of motion and neck supple. Vascular: No JVD. Cardiovascular:      Rate and Rhythm: Normal rate and regular rhythm. Pulses: Normal pulses. Heart sounds: Normal heart sounds. No murmur. No friction rub. No gallop. Pulmonary:      Effort: Pulmonary effort is normal. No respiratory distress. Breath sounds: Normal breath sounds. No decreased breath sounds, wheezing, rhonchi or rales. Chest:      Chest wall: No deformity or swelling. Comments: Contusion to right chest wall without step off, deformity, bruising, or swelling. Abdominal:      General: Bowel sounds are normal. There is no distension. Palpations: Abdomen is soft. Tenderness: There is no abdominal tenderness. There is no guarding or rebound. Musculoskeletal: Normal range of motion. Skin:     General: Skin is warm and dry. Findings: No rash. Neurological:      General: No focal deficit present. Mental Status: She is alert and oriented to person, place, and time. Cranial Nerves: Cranial nerves are intact. Sensory: Sensation is intact. Motor: Motor function is intact. No abnormal muscle tone. Coordination: Coordination normal.      Comments: No focal deficits. Patient is neurologically intact.           DIFFERENTIAL DIAGNOSIS:   Differential Dx Lists - I consider the following to be a partial list of the possible etiologies for the patient's symptoms and based on my clinical findings as well and are part of my medical decision making:    Trauma: pneumothorax, hemothorax, extremity fracture, head injury, loss of consciousness, rib fractures, pelvic fractures, SDH, SAH, and others    DIAGNOSTIC RESULTS     EKG: All EKG's are interpreted by the Emergency Department Physician who either signs or Co-signs this chart in the absence of a cardiologist.  EKG interpreted by Trena Reyes MD:    None     RADIOLOGY: non-plain film images(s) such as CT, Ultrasound and MRI are read by the
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
## 2020-06-01 ENCOUNTER — TELEPHONE (OUTPATIENT)
Dept: FAMILY MEDICINE CLINIC | Age: 80
End: 2020-06-01

## 2020-06-10 ENCOUNTER — HOSPITAL ENCOUNTER (OUTPATIENT)
Age: 80
Discharge: HOME | End: 2020-06-10
Payer: MEDICARE

## 2020-06-10 LAB
ANION GAP SERPL CALCULATED.3IONS-SCNC: 11 MEQ/L (ref 8–16)
AVERAGE GLUCOSE: 102 MG/DL (ref 70–126)
BUN BLDV-MCNC: 15 MG/DL (ref 7–22)
CALCIUM SERPL-MCNC: 10.2 MG/DL (ref 8.5–10.5)
CHLORIDE BLD-SCNC: 107 MEQ/L (ref 98–111)
CO2: 24 MEQ/L (ref 23–33)
CREAT SERPL-MCNC: 1 MG/DL (ref 0.4–1.2)
EKG ATRIAL RATE: 113 BPM
EKG Q-T INTERVAL: 320 MS
EKG QRS DURATION: 76 MS
EKG QTC CALCULATION (BAZETT): 410 MS
EKG R AXIS: 3 DEGREES
EKG T AXIS: 53 DEGREES
EKG VENTRICULAR RATE: 99 BPM
ERYTHROCYTE [DISTWIDTH] IN BLOOD BY AUTOMATED COUNT: 13 % (ref 11.5–14.5)
ERYTHROCYTE [DISTWIDTH] IN BLOOD BY AUTOMATED COUNT: 45.7 FL (ref 35–45)
GFR SERPL CREATININE-BSD FRML MDRD: 54 ML/MIN/1.73M2
GLUCOSE BLD-MCNC: 120 MG/DL (ref 70–108)
HBA1C MFR BLD: 5.4 % (ref 4.4–6.4)
HCT VFR BLD CALC: 37 % (ref 37–47)
HEMOGLOBIN: 12.1 GM/DL (ref 12–16)
MCH RBC QN AUTO: 31.4 PG (ref 26–33)
MCHC RBC AUTO-ENTMCNC: 32.7 GM/DL (ref 32.2–35.5)
MCV RBC AUTO: 96.1 FL (ref 81–99)
PLATELET # BLD: 235 THOU/MM3 (ref 130–400)
PMV BLD AUTO: 10.5 FL (ref 9.4–12.4)
POTASSIUM SERPL-SCNC: 4.8 MEQ/L (ref 3.5–5.2)
RBC # BLD: 3.85 MILL/MM3 (ref 4.2–5.4)
SODIUM BLD-SCNC: 142 MEQ/L (ref 135–145)
WBC # BLD: 5.7 THOU/MM3 (ref 4.8–10.8)

## 2020-06-10 PROCEDURE — 93005 ELECTROCARDIOGRAM TRACING: CPT | Performed by: SPECIALIST

## 2020-06-10 PROCEDURE — U0002 COVID-19 LAB TEST NON-CDC: HCPCS

## 2020-06-10 PROCEDURE — 83036 HEMOGLOBIN GLYCOSYLATED A1C: CPT

## 2020-06-10 PROCEDURE — 36415 COLL VENOUS BLD VENIPUNCTURE: CPT

## 2020-06-10 PROCEDURE — 85027 COMPLETE CBC AUTOMATED: CPT

## 2020-06-10 PROCEDURE — 80048 BASIC METABOLIC PNL TOTAL CA: CPT

## 2020-06-10 RX ORDER — CHOLECALCIFEROL (VITAMIN D3) 125 MCG
500 CAPSULE ORAL DAILY
COMMUNITY
End: 2021-12-15

## 2020-06-10 RX ORDER — SENNOSIDES 8.6 MG
650 CAPSULE ORAL EVERY 8 HOURS PRN
COMMUNITY

## 2020-06-10 RX ORDER — TRIAMCINOLONE ACETONIDE 1 MG/G
CREAM TOPICAL 2 TIMES DAILY PRN
COMMUNITY
Start: 2020-03-10

## 2020-06-10 RX ORDER — ACETAMINOPHEN 160 MG
TABLET,DISINTEGRATING ORAL DAILY
COMMUNITY

## 2020-06-10 RX ORDER — DOCUSATE SODIUM 100 MG/1
100 CAPSULE, LIQUID FILLED ORAL DAILY PRN
COMMUNITY

## 2020-06-10 RX ORDER — RAMIPRIL 5 MG/1
5 CAPSULE ORAL DAILY
COMMUNITY
End: 2020-07-13

## 2020-06-10 NOTE — PROGRESS NOTES
NPO after midnight  [employer] and drivers license  Wear comfortable clean clothing  Do not bring jewelry  Shower night before and morning of surgery with a liquid antibacterial soap  Bring list of medications with dosage and how often taken  Follow all instructions given by your physician   needed at discharge  Call [phone] for any questions    Due to the covid 19 pandemic, the surgery center would like you to follow the ensuing steps:  When you arrive at the surgery center please remain in your car and call [phone]  The  will call you back when you can come into the surgery center to register. The  will let you know at that time if your family member can come in with you.   Bring and wear a mask

## 2020-06-11 LAB
PERFORMING LAB: NORMAL
REPORT: NORMAL
SARS-COV-2: NOT DETECTED

## 2020-06-11 PROCEDURE — 93010 ELECTROCARDIOGRAM REPORT: CPT | Performed by: INTERNAL MEDICINE

## 2020-06-17 ENCOUNTER — ANESTHESIA (OUTPATIENT)
Dept: OPERATING ROOM | Age: 80
End: 2020-06-17
Payer: MEDICARE

## 2020-06-17 ENCOUNTER — ANESTHESIA EVENT (OUTPATIENT)
Dept: OPERATING ROOM | Age: 80
End: 2020-06-17
Payer: MEDICARE

## 2020-06-17 ENCOUNTER — HOSPITAL ENCOUNTER (OUTPATIENT)
Age: 80
Setting detail: OUTPATIENT SURGERY
Discharge: HOME | End: 2020-06-17
Attending: SPECIALIST | Admitting: SPECIALIST
Payer: MEDICARE

## 2020-06-17 VITALS
DIASTOLIC BLOOD PRESSURE: 78 MMHG | HEART RATE: 111 BPM | SYSTOLIC BLOOD PRESSURE: 135 MMHG | HEIGHT: 65 IN | OXYGEN SATURATION: 98 % | BODY MASS INDEX: 40.32 KG/M2 | TEMPERATURE: 96.9 F | WEIGHT: 242 LBS | RESPIRATION RATE: 10 BRPM

## 2020-06-17 LAB — GLUCOSE BLD-MCNC: 115 MG/DL (ref 70–108)

## 2020-06-17 PROCEDURE — 2709999900 HC NON-CHARGEABLE SUPPLY: Performed by: SPECIALIST

## 2020-06-17 PROCEDURE — 2500000003 HC RX 250 WO HCPCS: Performed by: SPECIALIST

## 2020-06-17 PROCEDURE — 7100000011 HC PHASE II RECOVERY - ADDTL 15 MIN: Performed by: SPECIALIST

## 2020-06-17 PROCEDURE — 3600000002 HC SURGERY LEVEL 2 BASE: Performed by: SPECIALIST

## 2020-06-17 PROCEDURE — 7100000010 HC PHASE II RECOVERY - FIRST 15 MIN: Performed by: SPECIALIST

## 2020-06-17 PROCEDURE — 82948 REAGENT STRIP/BLOOD GLUCOSE: CPT

## 2020-06-17 PROCEDURE — 3600000012 HC SURGERY LEVEL 2 ADDTL 15MIN: Performed by: SPECIALIST

## 2020-06-17 RX ORDER — LIDOCAINE HYDROCHLORIDE AND EPINEPHRINE 10; 10 MG/ML; UG/ML
INJECTION, SOLUTION INFILTRATION; PERINEURAL PRN
Status: DISCONTINUED | OUTPATIENT
Start: 2020-06-17 | End: 2020-06-17 | Stop reason: ALTCHOICE

## 2020-06-17 RX ORDER — SODIUM CHLORIDE 9 MG/ML
INJECTION, SOLUTION INTRAVENOUS CONTINUOUS
Status: DISCONTINUED | OUTPATIENT
Start: 2020-06-17 | End: 2020-06-17 | Stop reason: HOSPADM

## 2020-06-17 ASSESSMENT — PAIN - FUNCTIONAL ASSESSMENT: PAIN_FUNCTIONAL_ASSESSMENT: 0-10

## 2020-06-17 NOTE — PROGRESS NOTES
1422-  Patient arrived to phase II via cart. Spontaneous respirations even and unlabored. VSS. Report received from [facility].   2634-  Assessment completed. Patient is alert and oriented x4. Patient denies pain--will monitor. ACE wrap in place to left arm-- clean, dry, and intact. Left fingers pink and cool, same as right hand. 1425-  Snack and drink given to patient. 1430-  Spouse called and updated to come in.    1435-  Belongings in room. 1440-  Discharge instructions given. Understanding verbalized. 1445-  Patient dressing. 1454-  Patient discharged in stable condition with all belongings. This RN walked patient to car.

## 2020-06-17 NOTE — OP NOTE
Operative Note    Patient name: Marika Segovia             Medical Record Number: [id number]    Primary Care Physician: Danna Burgess DO     1940    Date of Procedure: 2020    Pre-operative Diagnosis: 3cm2 defect left medial hand (hypothenar) s/p MOHS for squamous cell carcinoma    Post-operative Diagnosis: Same    Procedure Performed: Full thickness skin graft (3 cm2) of left hand defect (CPT 59408)    Surgeons/Assistants: Dr. Leah Rodríguez PA-C    Estimated Blood Loss: 3ml     Complications: none immediately appreciated    Procedure: With the patient lying in the supine position and under adequate local anesthesia consisting of 19 ml of 1% Lidocaine 1:100,000 with epinephrine solution of the donor site of the medial proximal left arm as well as the left hypothenar defect. The patient has very thin skin and a large defect which could not be closed primarily, therefore a full thickness skin graft was taken. It was defatted and secured in position with a 3-0 silk suture tie and then a 5-0 fast absorbable suture was placed in a simple running fashion. A Bacitracin Xeroform and moist cotton ball tie over bolster was secured using the tails of the silk sutures. The donor site was closed with a 3-0 Monocryl suture placed in interrupted buried fashion and then Histoacryl respectively. The patient tolerated the procedure well and remained hemodynamically stable throughout the procedure and was quite comfortable throughout the operative course.     Clinical staging for cancer cases:  Ct  Cn  Cm    [place]  Electronically signed by me on 2020 at 2:18 PM  Operative Note      Patient: Marika Segovia  YOB: 1940  MRN: [id number]    Date of Procedure: 2020    Pre-Op Diagnosis: SCC LEFT MEDIAL HAND    Post-Op Diagnosis: Same       Procedure(s):  MOHS DEFECT REPAIR SCC LEFT MEDIAL HAND WITH SKIN GRAFT FROM LEFT UPPER ARM    Surgeon(s):  Tiffanie Skinner

## 2020-07-13 RX ORDER — RAMIPRIL 5 MG/1
CAPSULE ORAL
Qty: 90 CAPSULE | Refills: 3 | Status: SHIPPED | OUTPATIENT
Start: 2020-07-13 | End: 2021-12-15 | Stop reason: SDUPTHER

## 2020-08-27 ENCOUNTER — OFFICE VISIT (OUTPATIENT)
Dept: FAMILY MEDICINE CLINIC | Age: 80
End: 2020-08-27
Payer: MEDICARE

## 2020-08-27 VITALS
RESPIRATION RATE: 24 BRPM | SYSTOLIC BLOOD PRESSURE: 110 MMHG | TEMPERATURE: 97.2 F | WEIGHT: 243.2 LBS | HEART RATE: 84 BPM | BODY MASS INDEX: 40.47 KG/M2 | DIASTOLIC BLOOD PRESSURE: 68 MMHG

## 2020-08-27 PROBLEM — E66.01 MORBID OBESITY (HCC): Status: ACTIVE | Noted: 2020-08-27

## 2020-08-27 PROCEDURE — G8399 PT W/DXA RESULTS DOCUMENT: HCPCS | Performed by: FAMILY MEDICINE

## 2020-08-27 PROCEDURE — 1090F PRES/ABSN URINE INCON ASSESS: CPT | Performed by: FAMILY MEDICINE

## 2020-08-27 PROCEDURE — 1036F TOBACCO NON-USER: CPT | Performed by: FAMILY MEDICINE

## 2020-08-27 PROCEDURE — G8427 DOCREV CUR MEDS BY ELIG CLIN: HCPCS | Performed by: FAMILY MEDICINE

## 2020-08-27 PROCEDURE — 1123F ACP DISCUSS/DSCN MKR DOCD: CPT | Performed by: FAMILY MEDICINE

## 2020-08-27 PROCEDURE — G8417 CALC BMI ABV UP PARAM F/U: HCPCS | Performed by: FAMILY MEDICINE

## 2020-08-27 PROCEDURE — 99214 OFFICE O/P EST MOD 30 MIN: CPT | Performed by: FAMILY MEDICINE

## 2020-08-27 PROCEDURE — 4040F PNEUMOC VAC/ADMIN/RCVD: CPT | Performed by: FAMILY MEDICINE

## 2020-08-27 RX ORDER — NITROGLYCERIN 0.4 MG/1
TABLET SUBLINGUAL
Qty: 25 TABLET | Refills: 0 | Status: SHIPPED | OUTPATIENT
Start: 2020-08-27 | End: 2021-04-15 | Stop reason: SDUPTHER

## 2020-08-27 RX ORDER — CALCITONIN SALMON 200 [IU]/.09ML
1 SPRAY, METERED NASAL DAILY
Qty: 3.7 ML | Refills: 11 | Status: SHIPPED | OUTPATIENT
Start: 2020-08-27 | End: 2021-01-04

## 2020-08-27 RX ORDER — PRAVASTATIN SODIUM 80 MG/1
80 TABLET ORAL DAILY
Qty: 90 TABLET | Refills: 3 | Status: SHIPPED | OUTPATIENT
Start: 2020-08-27 | End: 2021-09-07

## 2020-08-27 SDOH — ECONOMIC STABILITY: FOOD INSECURITY: WITHIN THE PAST 12 MONTHS, YOU WORRIED THAT YOUR FOOD WOULD RUN OUT BEFORE YOU GOT MONEY TO BUY MORE.: NEVER TRUE

## 2020-08-27 SDOH — ECONOMIC STABILITY: FOOD INSECURITY: WITHIN THE PAST 12 MONTHS, THE FOOD YOU BOUGHT JUST DIDN'T LAST AND YOU DIDN'T HAVE MONEY TO GET MORE.: NEVER TRUE

## 2020-08-27 SDOH — ECONOMIC STABILITY: TRANSPORTATION INSECURITY
IN THE PAST 12 MONTHS, HAS LACK OF TRANSPORTATION KEPT YOU FROM MEETINGS, WORK, OR FROM GETTING THINGS NEEDED FOR DAILY LIVING?: NO

## 2020-08-27 SDOH — ECONOMIC STABILITY: TRANSPORTATION INSECURITY
IN THE PAST 12 MONTHS, HAS THE LACK OF TRANSPORTATION KEPT YOU FROM MEDICAL APPOINTMENTS OR FROM GETTING MEDICATIONS?: NO

## 2020-08-27 SDOH — ECONOMIC STABILITY: INCOME INSECURITY: HOW HARD IS IT FOR YOU TO PAY FOR THE VERY BASICS LIKE FOOD, HOUSING, MEDICAL CARE, AND HEATING?: NOT HARD AT ALL

## 2020-08-27 ASSESSMENT — PATIENT HEALTH QUESTIONNAIRE - PHQ9
2. FEELING DOWN, DEPRESSED OR HOPELESS: 0
SUM OF ALL RESPONSES TO PHQ QUESTIONS 1-9: 0
1. LITTLE INTEREST OR PLEASURE IN DOING THINGS: 0
SUM OF ALL RESPONSES TO PHQ QUESTIONS 1-9: 0
SUM OF ALL RESPONSES TO PHQ9 QUESTIONS 1 & 2: 0

## 2020-08-27 ASSESSMENT — ENCOUNTER SYMPTOMS
GASTROINTESTINAL NEGATIVE: 1
RESPIRATORY NEGATIVE: 1

## 2020-08-27 NOTE — PROGRESS NOTES
Subjective:      Patient ID: Karlie Weeks is a 67 y.o. female. HPI:    Chief Complaint   Patient presents with    6 Month Follow-Up    Diabetes    Hypertension     6 month eval.  Doing well overall. BP well controlled. BP Readings from Last 3 Encounters:   08/27/20 110/68   06/17/20 135/78   05/28/20 (!) 148/87     Weight stable. Wt Readings from Last 3 Encounters:   08/27/20 243 lb 3.2 oz (110.3 kg)   06/17/20 242 lb (109.8 kg)   05/28/20 243 lb (110.2 kg)     Sugars well controlled. Lab Results   Component Value Date    LABA1C 5.4 06/10/2020     GERD controlled on the omeprazole. Hx of ANITA, compliant with CPAP. Hx of a-fib, follows closely with Cardiology. No acute concerns today. Patient Active Problem List   Diagnosis    Hyperlipidemia    HTN (hypertension)    Osteopenia    GERD (gastroesophageal reflux disease)    Reactive airway disease    OA (osteoarthritis)    Chest pain, atypical    Diabetes mellitus type II, controlled ([address]    Obesity (BMI 30-39. 9)    Chronic low back pain    Neuropathy    Obstructive sleep apnea on CPAP    Controlled type 2 diabetes mellitus without complication (HCC)    Persistent atrial fibrillation    Paroxysmal atrial fibrillation (HCC)    Abnormal nuclear stress test    Influenza    Atrial fibrillation with rapid ventricular response (HCC)    Sacroiliac inflammation (HCC)    Morbid obesity ([address]     Past Surgical History:   Procedure Laterality Date    APPENDECTOMY      CARDIAC CATHETERIZATION      two cath    COLONOSCOPY  01/08/2013    COLONOSCOPY N/A 5/17/2019    COLONOSCOPY DIAGNOSTIC performed by Mercy Hurt MD at [address] [facility]. 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 Authorizing Provider   pravastatin (PRAVACHOL) 80 MG tablet Take 1 tablet by mouth daily 8/27/20  Yes Mansi Madrigal DO   calcitonin (MIACALCIN) 200 UNIT/ACT nasal spray 1 spray by Nasal route daily 8/27/20  Yes Mansi Madrigal DO   apixaban Audrene Repine) 5 MG TABS tablet Take 1 tablet by mouth 2 times daily 8/27/20  Yes Mansi Madrigal DO   nitroGLYCERIN (NITROSTAT) 0.4 MG SL tablet DISSOLVE ONE TABLET UNDER TONGUE AS NEEDED FOR CHEST PAIN EVERY 5 MINUTES. MAX OF 3 DOSES.  IF NO RELIEF AFTER 1 DOSE, CALL 911. 8/27/20  Yes Mansi Madrigal DO   ramipril (ALTACE) 5 MG capsule TAKE 1 CAPSULE BY MOUTH DAILY 7/13/20  Yes Mansi Madrigal DO   acetaminophen (TYLENOL) 650 MG extended release tablet Take 650 mg by mouth every 8 hours as needed for Pain   Yes Historical Provider, MD   triamcinolone (KENALOG) 0.1 % cream 2 times daily as needed 3/10/20  Yes Historical Provider, MD   docusate sodium (COLACE) 100 MG capsule Take 100 mg by mouth daily as needed for Constipation   Yes Historical Provider, MD   Cholecalciferol (VITAMIN D3) 50 MCG (2000 UT) CAPS Take by mouth daily   Yes Historical Provider, MD   Multiple Vitamins-Minerals (ONE DAILY FOR WOMEN PO) Take by mouth daily   Yes Historical Provider, MD   levothyroxine (SYNTHROID) 25 MCG tablet TAKE 1 TABLET BY MOUTH DAILY 5/8/20  Yes Mansi Madrigal DO   omeprazole (PRILOSEC) 40 MG delayed release capsule Take 1 capsule by mouth daily 4/22/20  Yes Mansi Madrigal DO   dilTIAZem (CARDIZEM CD) 120 MG extended release capsule Take 1 capsule daily 3/25/20  Yes Mansi Madrigal DO   JANUVIA 100 MG tablet TAKE 1 TABLET BY MOUTH DAILY 1/17/20  Yes Lavinia Res, DO   metoprolol succinate (TOPROL XL) 25 MG extended release tablet TAKE 1 TABLET BY MOUTH DAILY 1/17/20  Yes Lavinia Res, DO   spironolactone (ALDACTONE) 50 MG tablet Take 50 mg by mouth daily  10/23/19  Yes Historical Provider, MD   BIOTIN PO Take 1 tablet by mouth daily   Yes Historical Provider, MD   aspirin 81 MG EC tablet Take 1 tablet by mouth daily 6/9/15  Yes Lavinia Res, DO   Cyanocobalamin (VITAMIN B 12 PO) Take  by mouth Daily.      Yes Historical Provider, MD   vitamin B-12 (CYANOCOBALAMIN) 500 MCG tablet Take 500 mcg by mouth daily    Historical Provider, MD   Glucosamine HCl (GLUCOSAMINE PO) Take 1,000 mg by mouth daily    Historical Provider, MD       Lab Results   Component Value Date    LABA1C 5.4 06/10/2020     No results found for: EAG    No components found for: CHLPL  Lab Results   Component Value Date    TRIG 63 09/27/2019    TRIG 58 04/05/2018    TRIG 57 03/13/2018     Lab Results   Component Value Date    HDL 70 09/27/2019    HDL 94 04/05/2018    HDL 89 03/13/2018     Lab Results   Component Value Date    LDLCALC 66 09/27/2019    LDLCALC 66 04/05/2018    LDLCALC 49 03/13/2018     No results found for: LABVLDL      Chemistry        Component Value Date/Time     06/10/2020 1400    K 4.8 06/10/2020 1400    K 3.9 05/19/2019 0200     06/10/2020 1400    CO2 24 06/10/2020 1400    BUN 15 06/10/2020 1400    CREATININE 1.0 06/10/2020 1400        Component Value Date/Time    CALCIUM 10.2 06/10/2020 1400    ALKPHOS 51 07/13/2019 0815    AST 21 07/13/2019 0815    ALT 19 07/13/2019 0815    BILITOT 0.8 07/13/2019 0815            Lab Results   Component Value Date    TSH 2.840 09/27/2019       Lab Results   Component Value Date    WBC 5.7 06/10/2020    HGB 12.1 06/10/2020    HCT 37.0 06/10/2020    MCV 96.1 06/10/2020     06/10/2020         Health Maintenance   Topic Date Due    DTaP/Tdap/Td vaccine (1 - Tdap) 09[birth year]08/1960    Shingles Vaccine (1 of 2) 09/08/1991    Flu vaccine (1) 09/01/2020    Lipid screen  09/27/2020    Potassium monitoring  06/10/2021    Creatinine monitoring  06/10/2021    Pneumococcal 65+ years Vaccine  Completed    DEXA (modify frequency per FRAX score)  Addressed    Hepatitis A vaccine  Aged Out    Hib vaccine  Aged Out    Meningococcal (ACWY) vaccine  Aged [employer] History   Administered Date(s) Administered    Influenza 10/05/2011    Influenza Vaccine, unspecified formulation 10/06/2014    Influenza Virus Vaccine 12/31/2012    Influenza Whole 09/10/2015    Influenza, High Dose (Fluzone 65 yrs and older) 10/22/2016, 09/29/2017, 10/01/2018, 09/21/2019    Pneumococcal Conjugate 13-valent (Hjnxnft29) 03/29/2016    Pneumococcal Polysaccharide (Joxunnmco51) 10/10/2017           Review of Systems   Constitutional: Negative. HENT: Negative. Respiratory: Negative. Cardiovascular: Negative. Gastrointestinal: Negative. Musculoskeletal: Negative. All other systems reviewed and are negative. Objective:   Physical Exam  Vitals signs and nursing note reviewed. Constitutional:       Appearance: She is well-developed. HENT:      Head: Normocephalic and atraumatic. Right Ear: Tympanic membrane normal.      Left Ear: Tympanic membrane normal.   Cardiovascular:      Rate and Rhythm: Normal rate and regular rhythm. Heart sounds: Normal heart sounds. No murmur. Pulmonary:      Effort: Pulmonary effort is normal.      Breath sounds: Normal breath sounds. Abdominal:      General: Bowel sounds are normal.      Palpations: Abdomen is soft. Skin:     General: Skin is warm and dry. Neurological:      Mental Status: She is alert and oriented to person, place, and time. Psychiatric:         Behavior: Behavior normal.         Assessment:       Diagnosis Orders   1.  Controlled type 2 diabetes mellitus with diabetic nephropathy, without long-term current use of insulin ([facility]  Comprehensive Metabolic Panel    Hemoglobin A1C    Microalbumin / Creatinine Urine Ratio   2. Paroxysmal atrial fibrillation (HCC)  CBC Auto Differential   3. Morbid obesity ([address]     4. Mild intermittent reactive airway disease without complication     5. Obstructive sleep apnea on CPAP     6. Gastroesophageal reflux disease, esophagitis presence not specified  CBC Auto Differential    Magnesium   7. Pure hypercholesterolemia  Lipid Panel    Comprehensive Metabolic Panel    TSH with Reflex   8. Neuropathy     9. Osteopenia, unspecified location     10.  Essential hypertension             Plan:      -  Chronic medical problems stable  -  Continue current medications  -  Follow up with specialists as scheduled  -  Check labs 6 mos  -  RTO 6 mos        Mason Willams DO

## 2020-08-27 NOTE — PATIENT INSTRUCTIONS
to less than 2,300 milligrams (mg) a day. If you limit your sodium to 1,500 mg a day, you can lower your blood pressure even more. ? Buy foods that are labeled \"unsalted,\" \"sodium-free,\" or \"low-sodium. \" Foods labeled \"reduced-sodium\" and \"light sodium\" may still have too much sodium. ? Flavor your food with garlic, lemon juice, onion, vinegar, herbs, and spices instead of salt. Do not use soy sauce, steak sauce, onion salt, garlic salt, mustard, or ketchup on your food. ? Use less salt (or none) when recipes call for it. You can often use half the salt a recipe calls for without losing flavor. · Be physically active. Get at least 30 minutes of exercise on most days of the week. Walking is a good choice. You also may want to do other activities, such as running, swimming, cycling, or playing tennis or team sports. · Limit alcohol to 2 drinks a day for men and 1 drink a day for women. · Eat plenty of fruits, vegetables, and low-fat dairy products. Eat less saturated and total fats. How is high blood pressure treated? · Your doctor will suggest making lifestyle changes to help your heart. For example, your doctor may ask you to eat healthy foods, quit smoking, lose extra weight, and be more active. · If lifestyle changes don't help enough, your doctor may recommend that you take medicine. · When blood pressure is very high, medicines are needed to lower it. Follow-up care is a key part of your treatment and safety. Be sure to make and go to all appointments, and call your doctor if you are having problems. It's also a good idea to know your test results and keep a list of the medicines you take. Where can you learn more? Go to https://General DynamicshimanshuTAKO.[employer]. org and sign in to your SeamlessDocs account. Enter P501 in the [employer] box to learn more about \"Learning About High Blood Pressure. \"     If you do not have an account, please click on the \"Sign Up Now\" link.   Current as of: December 16, 2019               Content Version: 12.5  © 1750-8202 Healthwise, [employer]. Care instructions adapted under license by [facility] [place] ([facility]). If you have questions about a medical condition or this instruction, always ask your healthcare professional. [address] any warranty or liability for your use of this information. Patient Education        Learning About Type 2 Diabetes  What is type 2 diabetes? Insulin is a hormone that helps your body use sugar from your food as energy. Type 2 diabetes happens when your body can't use insulin the right way. Over time, the pancreas can't make enough insulin. If you don't have enough insulin, too much sugar stays in your blood. If you are overweight, get little or no exercise, or have type 2 diabetes in your family, you are more likely to have problems with the way insulin works in your body.  Americans, Hispanics, Native Americans,  Americans, and Pacific Islanders have a higher risk for type 2 diabetes. Type 2 diabetes can be prevented or delayed with a healthy lifestyle, which includes staying at a healthy weight, making smart food choices, and getting regular exercise. What can you expect with type 2 diabetes? Betty Pelayo keep hearing about how important it is to keep your blood sugar within a target range. That's because over time, high blood sugar can lead to serious problems. It can:  · Harm your eyes, nerves, and kidneys. · Damage your blood vessels, leading to heart disease and stroke. · Reduce blood flow and cause nerve damage to parts of your body, especially your feet. This can cause slow healing and pain when you walk. · Make your immune system weak and less able to fight infections. When people hear the word \"diabetes,\" they often think of problems like these. But daily care and treatment can help prevent or delay these problems. The goal is to keep your blood sugar in a target range.  That's the best way to reduce your chance of having more problems from diabetes. What are the symptoms? Some people who have type 2 diabetes may not have any symptoms early on. Many people with the disease don't even know they have it at first. But with time, diabetes starts to cause symptoms. You experience most symptoms of type 2 diabetes when your blood sugar is either too high or too low. The most common symptoms of high blood sugar include:  · Thirst.  · Frequent urination. · Weight loss. · Blurry vision. The symptoms of low blood sugar include:  · Sweating. · Shakiness. · Weakness. · Hunger. · Confusion. How can you prevent type 2 diabetes? The best way to prevent or delay type 2 diabetes is to adopt healthy habits, which include:  · Staying at a healthy weight. · Exercising regularly. · Eating healthy foods. How is type 2 diabetes treated? If you have type 2 diabetes, here are the most important things you can do. · Take your diabetes medicines. · Check your blood sugar as often as your doctor recommends. Also, get a hemoglobin A1c test at least every 6 months. · Try to eat a variety of foods and to spread carbohydrate throughout the day. Carbohydrate raises blood sugar higher and more quickly than any other nutrient does. Carbohydrate is found in sugar, breads and cereals, fruit, starchy vegetables such as potatoes and corn, and milk and yogurt. · Get at least 30 minutes of exercise on most days of the week. Walking is a good choice. You also may want to do other activities, such as running, swimming, cycling, or playing tennis or team sports. If your doctor says it's okay, do muscle-strengthening exercises at least 2 times a week. · See your doctor for checkups and tests on a regular schedule. · If you have high blood pressure or high cholesterol, take the medicines as prescribed by your doctor. · Do not smoke. Smoking can make health problems worse. This includes problems you might have with type 2 diabetes.  If you need help quitting, talk to your doctor about stop-smoking programs and medicines. These can increase your chances of quitting for good. Follow-up care is a key part of your treatment and safety. Be sure to make and go to all appointments, and call your doctor if you are having problems. It's also a good idea to know your test results and keep a list of the medicines you take. Where can you learn more? Go to https://ChangeCorppepicWiNetworkseb.[employer]. org and sign in to your yoone account. Enter U645 in the [employer] box to learn more about \"Learning About Type 2 Diabetes. \"     If you do not have an account, please click on the \"Sign Up Now\" link. Current as of: December 20, 2019               Content Version: 12.5  © 9887-1697 Healthwise, Incorporated. Care instructions adapted under license by [facility] ([facility]). If you have questions about a medical condition or this instruction, always ask your healthcare professional. [address] any warranty or liability for your use of this information.

## 2020-08-28 ENCOUNTER — TELEPHONE (OUTPATIENT)
Dept: FAMILY MEDICINE CLINIC | Age: 80
End: 2020-08-28

## 2020-08-28 NOTE — TELEPHONE ENCOUNTER
Pt called office stating she had an appt with you the other day and forgot to ask for a Bone Density order. Pt would like to get this done at [facility]. If no call back by 4pm, she will call [facility] Scheduling and have them pull order out of Epic and set up appt. Please advise.

## 2020-09-14 RX ORDER — LEVOTHYROXINE SODIUM 0.03 MG/1
25 TABLET ORAL DAILY
Qty: 30 TABLET | Refills: 3 | Status: SHIPPED | OUTPATIENT
Start: 2020-09-14 | End: 2021-01-05

## 2020-10-07 ENCOUNTER — HOSPITAL ENCOUNTER (OUTPATIENT)
Dept: WOMENS IMAGING | Age: 80
Discharge: HOME | End: 2020-10-07
Payer: MEDICARE

## 2020-10-07 PROCEDURE — 77080 DXA BONE DENSITY AXIAL: CPT

## 2020-10-07 PROCEDURE — 77063 BREAST TOMOSYNTHESIS BI: CPT

## 2020-10-09 ENCOUNTER — TELEPHONE (OUTPATIENT)
Dept: FAMILY MEDICINE CLINIC | Age: 80
End: 2020-10-09

## 2020-10-23 ENCOUNTER — HOSPITAL ENCOUNTER (OUTPATIENT)
Age: 80
Setting detail: OBSERVATION
Discharge: HOME | End: 2020-10-26
Attending: EMERGENCY MEDICINE | Admitting: INTERNAL MEDICINE
Payer: MEDICARE

## 2020-10-23 ENCOUNTER — APPOINTMENT (OUTPATIENT)
Dept: GENERAL RADIOLOGY | Age: 80
End: 2020-10-23
Payer: MEDICARE

## 2020-10-23 LAB
EKG ATRIAL RATE: 340 BPM
EKG Q-T INTERVAL: 354 MS
EKG QRS DURATION: 82 MS
EKG QTC CALCULATION (BAZETT): 415 MS
EKG R AXIS: -4 DEGREES
EKG T AXIS: 46 DEGREES
EKG VENTRICULAR RATE: 83 BPM

## 2020-10-23 PROCEDURE — 99283 EMERGENCY DEPT VISIT LOW MDM: CPT

## 2020-10-23 PROCEDURE — 93005 ELECTROCARDIOGRAM TRACING: CPT | Performed by: EMERGENCY MEDICINE

## 2020-10-23 PROCEDURE — 85025 COMPLETE CBC W/AUTO DIFF WBC: CPT

## 2020-10-23 PROCEDURE — 71101 X-RAY EXAM UNILAT RIBS/CHEST: CPT

## 2020-10-23 PROCEDURE — 84484 ASSAY OF TROPONIN QUANT: CPT

## 2020-10-23 PROCEDURE — 80053 COMPREHEN METABOLIC PANEL: CPT

## 2020-10-23 PROCEDURE — 83880 ASSAY OF NATRIURETIC PEPTIDE: CPT

## 2020-10-23 PROCEDURE — 36415 COLL VENOUS BLD VENIPUNCTURE: CPT

## 2020-10-23 ASSESSMENT — PAIN DESCRIPTION - ORIENTATION: ORIENTATION: LEFT

## 2020-10-23 ASSESSMENT — PAIN DESCRIPTION - DESCRIPTORS: DESCRIPTORS: SORE

## 2020-10-23 ASSESSMENT — PAIN DESCRIPTION - LOCATION: LOCATION: RIB CAGE

## 2020-10-23 ASSESSMENT — PAIN DESCRIPTION - PAIN TYPE: TYPE: ACUTE PAIN

## 2020-10-23 ASSESSMENT — PAIN SCALES - GENERAL: PAINLEVEL_OUTOF10: 2

## 2020-10-24 PROBLEM — R07.9 CHEST PAIN, MODERATE CORONARY ARTERY RISK: Status: ACTIVE | Noted: 2020-10-24

## 2020-10-24 LAB
ALBUMIN SERPL-MCNC: 4.4 G/DL (ref 3.5–5.1)
ALP BLD-CCNC: 47 U/L (ref 38–126)
ALT SERPL-CCNC: 21 U/L (ref 11–66)
ANION GAP SERPL CALCULATED.3IONS-SCNC: 13 MEQ/L (ref 8–16)
AST SERPL-CCNC: 24 U/L (ref 5–40)
BASOPHILS # BLD: 0.5 %
BASOPHILS ABSOLUTE: 0 THOU/MM3 (ref 0–0.1)
BILIRUB SERPL-MCNC: 0.7 MG/DL (ref 0.3–1.2)
BUN BLDV-MCNC: 14 MG/DL (ref 7–22)
CALCIUM SERPL-MCNC: 9.7 MG/DL (ref 8.5–10.5)
CHLORIDE BLD-SCNC: 101 MEQ/L (ref 98–111)
CO2: 22 MEQ/L (ref 23–33)
CREAT SERPL-MCNC: 0.9 MG/DL (ref 0.4–1.2)
EOSINOPHIL # BLD: 2 %
EOSINOPHILS ABSOLUTE: 0.2 THOU/MM3 (ref 0–0.4)
ERYTHROCYTE [DISTWIDTH] IN BLOOD BY AUTOMATED COUNT: 12.7 % (ref 11.5–14.5)
ERYTHROCYTE [DISTWIDTH] IN BLOOD BY AUTOMATED COUNT: 41.7 FL (ref 35–45)
GFR SERPL CREATININE-BSD FRML MDRD: 60 ML/MIN/1.73M2
GLUCOSE BLD-MCNC: 109 MG/DL (ref 70–108)
GLUCOSE BLD-MCNC: 134 MG/DL (ref 70–108)
GLUCOSE BLD-MCNC: 148 MG/DL (ref 70–108)
HCT VFR BLD CALC: 37.1 % (ref 37–47)
HEMOGLOBIN: 13.2 GM/DL (ref 12–16)
IMMATURE GRANS (ABS): 0.02 THOU/MM3 (ref 0–0.07)
IMMATURE GRANULOCYTES: 0.2 %
LYMPHOCYTES # BLD: 24.9 %
LYMPHOCYTES ABSOLUTE: 2.1 THOU/MM3 (ref 1–4.8)
MCH RBC QN AUTO: 31.9 PG (ref 26–33)
MCHC RBC AUTO-ENTMCNC: 35.6 GM/DL (ref 32.2–35.5)
MCV RBC AUTO: 89.6 FL (ref 81–99)
MONOCYTES # BLD: 10.4 %
MONOCYTES ABSOLUTE: 0.9 THOU/MM3 (ref 0.4–1.3)
NUCLEATED RED BLOOD CELLS: 0 /100 WBC
OSMOLALITY CALCULATION: 274.4 MOSMOL/KG (ref 275–300)
PLATELET # BLD: 224 THOU/MM3 (ref 130–400)
PMV BLD AUTO: 11.4 FL (ref 9.4–12.4)
POTASSIUM REFLEX MAGNESIUM: 5.3 MEQ/L (ref 3.5–5.2)
PRO-BNP: 684.2 PG/ML (ref 0–1800)
RBC # BLD: 4.14 MILL/MM3 (ref 4.2–5.4)
REASON FOR REJECTION: NORMAL
REJECTED TEST: NORMAL
SEG NEUTROPHILS: 62 %
SEGMENTED NEUTROPHILS ABSOLUTE COUNT: 5.3 THOU/MM3 (ref 1.8–7.7)
SODIUM BLD-SCNC: 136 MEQ/L (ref 135–145)
TOTAL PROTEIN: 7.3 G/DL (ref 6.1–8)
TROPONIN T: < 0.01 NG/ML
WBC # BLD: 8.5 THOU/MM3 (ref 4.8–10.8)

## 2020-10-24 PROCEDURE — G0378 HOSPITAL OBSERVATION PER HR: HCPCS

## 2020-10-24 PROCEDURE — 93010 ELECTROCARDIOGRAM REPORT: CPT | Performed by: NUCLEAR MEDICINE

## 2020-10-24 PROCEDURE — 2580000003 HC RX 258: Performed by: EMERGENCY MEDICINE

## 2020-10-24 PROCEDURE — 6370000000 HC RX 637 (ALT 250 FOR IP): Performed by: INTERNAL MEDICINE

## 2020-10-24 PROCEDURE — 36415 COLL VENOUS BLD VENIPUNCTURE: CPT

## 2020-10-24 PROCEDURE — 82948 REAGENT STRIP/BLOOD GLUCOSE: CPT

## 2020-10-24 PROCEDURE — 6370000000 HC RX 637 (ALT 250 FOR IP): Performed by: EMERGENCY MEDICINE

## 2020-10-24 PROCEDURE — 84484 ASSAY OF TROPONIN QUANT: CPT

## 2020-10-24 PROCEDURE — 2580000003 HC RX 258: Performed by: INTERNAL MEDICINE

## 2020-10-24 RX ORDER — NITROGLYCERIN 0.4 MG/1
0.4 TABLET SUBLINGUAL EVERY 5 MIN PRN
Status: DISCONTINUED | OUTPATIENT
Start: 2020-10-24 | End: 2020-10-24

## 2020-10-24 RX ORDER — PROMETHAZINE HYDROCHLORIDE 25 MG/1
12.5 TABLET ORAL EVERY 6 HOURS PRN
Status: DISCONTINUED | OUTPATIENT
Start: 2020-10-24 | End: 2020-10-26 | Stop reason: HOSPADM

## 2020-10-24 RX ORDER — SODIUM CHLORIDE 0.9 % (FLUSH) 0.9 %
10 SYRINGE (ML) INJECTION EVERY 12 HOURS SCHEDULED
Status: DISCONTINUED | OUTPATIENT
Start: 2020-10-24 | End: 2020-10-24

## 2020-10-24 RX ORDER — LANOLIN ALCOHOL/MO/W.PET/CERES
500 CREAM (GRAM) TOPICAL DAILY
Status: DISCONTINUED | OUTPATIENT
Start: 2020-10-24 | End: 2020-10-26 | Stop reason: HOSPADM

## 2020-10-24 RX ORDER — SODIUM CHLORIDE 0.9 % (FLUSH) 0.9 %
10 SYRINGE (ML) INJECTION PRN
Status: DISCONTINUED | OUTPATIENT
Start: 2020-10-24 | End: 2020-10-24

## 2020-10-24 RX ORDER — ACETAMINOPHEN 325 MG/1
650 TABLET ORAL EVERY 6 HOURS PRN
Status: DISCONTINUED | OUTPATIENT
Start: 2020-10-24 | End: 2020-10-26 | Stop reason: HOSPADM

## 2020-10-24 RX ORDER — POLYETHYLENE GLYCOL 3350 17 G/17G
17 POWDER, FOR SOLUTION ORAL DAILY PRN
Status: DISCONTINUED | OUTPATIENT
Start: 2020-10-24 | End: 2020-10-26 | Stop reason: HOSPADM

## 2020-10-24 RX ORDER — PRAVASTATIN SODIUM 80 MG/1
80 TABLET ORAL DAILY
Status: DISCONTINUED | OUTPATIENT
Start: 2020-10-24 | End: 2020-10-24

## 2020-10-24 RX ORDER — ASPIRIN 81 MG/1
81 TABLET, CHEWABLE ORAL DAILY
Status: DISCONTINUED | OUTPATIENT
Start: 2020-10-25 | End: 2020-10-26 | Stop reason: HOSPADM

## 2020-10-24 RX ORDER — DILTIAZEM HYDROCHLORIDE 120 MG/1
120 CAPSULE, COATED, EXTENDED RELEASE ORAL DAILY
Status: DISCONTINUED | OUTPATIENT
Start: 2020-10-24 | End: 2020-10-26 | Stop reason: HOSPADM

## 2020-10-24 RX ORDER — SENNOSIDES 8.6 MG
650 CAPSULE ORAL EVERY 8 HOURS PRN
Status: DISCONTINUED | OUTPATIENT
Start: 2020-10-24 | End: 2020-10-24

## 2020-10-24 RX ORDER — DOCUSATE SODIUM 100 MG/1
100 CAPSULE, LIQUID FILLED ORAL DAILY PRN
Status: DISCONTINUED | OUTPATIENT
Start: 2020-10-24 | End: 2020-10-26 | Stop reason: HOSPADM

## 2020-10-24 RX ORDER — ASPIRIN 81 MG/1
81 TABLET ORAL DAILY
Status: DISCONTINUED | OUTPATIENT
Start: 2020-10-24 | End: 2020-10-24

## 2020-10-24 RX ORDER — ATORVASTATIN CALCIUM 80 MG/1
80 TABLET, FILM COATED ORAL NIGHTLY
Status: DISCONTINUED | OUTPATIENT
Start: 2020-10-24 | End: 2020-10-26 | Stop reason: HOSPADM

## 2020-10-24 RX ORDER — M-VIT,TX,IRON,MINS/CALC/FOLIC 27MG-0.4MG
1 TABLET ORAL DAILY
Status: DISCONTINUED | OUTPATIENT
Start: 2020-10-24 | End: 2020-10-26 | Stop reason: HOSPADM

## 2020-10-24 RX ORDER — SPIRONOLACTONE 25 MG/1
50 TABLET ORAL DAILY
Status: DISCONTINUED | OUTPATIENT
Start: 2020-10-24 | End: 2020-10-26 | Stop reason: HOSPADM

## 2020-10-24 RX ORDER — CALCITONIN SALMON 200 [IU]/.09ML
1 SPRAY, METERED NASAL DAILY
Status: DISCONTINUED | OUTPATIENT
Start: 2020-10-24 | End: 2020-10-26 | Stop reason: HOSPADM

## 2020-10-24 RX ORDER — NITROGLYCERIN 0.4 MG/1
0.4 TABLET SUBLINGUAL EVERY 5 MIN PRN
Status: DISCONTINUED | OUTPATIENT
Start: 2020-10-24 | End: 2020-10-26 | Stop reason: HOSPADM

## 2020-10-24 RX ORDER — TRIAMCINOLONE ACETONIDE 1 MG/G
CREAM TOPICAL 2 TIMES DAILY PRN
Status: DISCONTINUED | OUTPATIENT
Start: 2020-10-24 | End: 2020-10-26 | Stop reason: HOSPADM

## 2020-10-24 RX ORDER — ACETAMINOPHEN 160 MG
2000 TABLET,DISINTEGRATING ORAL DAILY
Status: DISCONTINUED | OUTPATIENT
Start: 2020-10-24 | End: 2020-10-26 | Stop reason: HOSPADM

## 2020-10-24 RX ORDER — POTASSIUM CHLORIDE 7.45 MG/ML
10 INJECTION INTRAVENOUS PRN
Status: DISCONTINUED | OUTPATIENT
Start: 2020-10-24 | End: 2020-10-26 | Stop reason: HOSPADM

## 2020-10-24 RX ORDER — DIGOXIN 125 MCG
125 TABLET ORAL DAILY
COMMUNITY
End: 2022-10-19 | Stop reason: SDUPTHER

## 2020-10-24 RX ORDER — METOPROLOL SUCCINATE 25 MG/1
25 TABLET, EXTENDED RELEASE ORAL DAILY
Status: DISCONTINUED | OUTPATIENT
Start: 2020-10-24 | End: 2020-10-26 | Stop reason: HOSPADM

## 2020-10-24 RX ORDER — ASPIRIN 81 MG/1
324 TABLET, CHEWABLE ORAL ONCE
Status: COMPLETED | OUTPATIENT
Start: 2020-10-24 | End: 2020-10-24

## 2020-10-24 RX ORDER — RAMIPRIL 5 MG/1
5 CAPSULE ORAL DAILY
Status: DISCONTINUED | OUTPATIENT
Start: 2020-10-24 | End: 2020-10-26 | Stop reason: HOSPADM

## 2020-10-24 RX ORDER — ACETAMINOPHEN 325 MG/1
650 TABLET ORAL EVERY 4 HOURS PRN
Status: DISCONTINUED | OUTPATIENT
Start: 2020-10-24 | End: 2020-10-24

## 2020-10-24 RX ORDER — SODIUM CHLORIDE 9 MG/ML
INJECTION, SOLUTION INTRAVENOUS CONTINUOUS
Status: DISCONTINUED | OUTPATIENT
Start: 2020-10-24 | End: 2020-10-26 | Stop reason: HOSPADM

## 2020-10-24 RX ORDER — SODIUM CHLORIDE 0.9 % (FLUSH) 0.9 %
10 SYRINGE (ML) INJECTION PRN
Status: DISCONTINUED | OUTPATIENT
Start: 2020-10-24 | End: 2020-10-26 | Stop reason: HOSPADM

## 2020-10-24 RX ORDER — DIGOXIN 250 MCG
125 TABLET ORAL DAILY
Status: DISCONTINUED | OUTPATIENT
Start: 2020-10-24 | End: 2020-10-26 | Stop reason: HOSPADM

## 2020-10-24 RX ORDER — OMEPRAZOLE 40 MG/1
40 CAPSULE, DELAYED RELEASE ORAL DAILY
Status: DISCONTINUED | OUTPATIENT
Start: 2020-10-24 | End: 2020-10-26 | Stop reason: HOSPADM

## 2020-10-24 RX ORDER — PANTOPRAZOLE SODIUM 40 MG/1
40 TABLET, DELAYED RELEASE ORAL
Status: DISCONTINUED | OUTPATIENT
Start: 2020-10-25 | End: 2020-10-24

## 2020-10-24 RX ORDER — ONDANSETRON 2 MG/ML
4 INJECTION INTRAMUSCULAR; INTRAVENOUS EVERY 6 HOURS PRN
Status: DISCONTINUED | OUTPATIENT
Start: 2020-10-24 | End: 2020-10-26 | Stop reason: HOSPADM

## 2020-10-24 RX ORDER — SODIUM CHLORIDE 0.9 % (FLUSH) 0.9 %
10 SYRINGE (ML) INJECTION EVERY 12 HOURS SCHEDULED
Status: DISCONTINUED | OUTPATIENT
Start: 2020-10-24 | End: 2020-10-26 | Stop reason: HOSPADM

## 2020-10-24 RX ORDER — GLUCOSAMINE SULFATE 500 MG
1000 CAPSULE ORAL DAILY
Status: DISCONTINUED | OUTPATIENT
Start: 2020-10-24 | End: 2020-10-26 | Stop reason: HOSPADM

## 2020-10-24 RX ORDER — POTASSIUM CHLORIDE 20 MEQ/1
40 TABLET, EXTENDED RELEASE ORAL PRN
Status: DISCONTINUED | OUTPATIENT
Start: 2020-10-24 | End: 2020-10-26 | Stop reason: HOSPADM

## 2020-10-24 RX ORDER — ACETAMINOPHEN 650 MG/1
650 SUPPOSITORY RECTAL EVERY 6 HOURS PRN
Status: DISCONTINUED | OUTPATIENT
Start: 2020-10-24 | End: 2020-10-26 | Stop reason: HOSPADM

## 2020-10-24 RX ORDER — LEVOTHYROXINE SODIUM 0.03 MG/1
25 TABLET ORAL DAILY
Status: DISCONTINUED | OUTPATIENT
Start: 2020-10-24 | End: 2020-10-26 | Stop reason: HOSPADM

## 2020-10-24 RX ADMIN — SODIUM CHLORIDE, PRESERVATIVE FREE 10 ML: 5 INJECTION INTRAVENOUS at 10:09

## 2020-10-24 RX ADMIN — SPIRONOLACTONE 50 MG: 25 TABLET ORAL at 13:00

## 2020-10-24 RX ADMIN — METOPROLOL SUCCINATE 25 MG: 25 TABLET, EXTENDED RELEASE ORAL at 13:00

## 2020-10-24 RX ADMIN — RAMIPRIL 5 MG: 5 CAPSULE ORAL at 13:00

## 2020-10-24 RX ADMIN — DILTIAZEM HYDROCHLORIDE 120 MG: 120 CAPSULE, EXTENDED RELEASE ORAL at 13:00

## 2020-10-24 RX ADMIN — ATORVASTATIN CALCIUM 80 MG: 80 TABLET, FILM COATED ORAL at 22:42

## 2020-10-24 RX ADMIN — SODIUM CHLORIDE: 9 INJECTION, SOLUTION INTRAVENOUS at 15:51

## 2020-10-24 RX ADMIN — Medication 10 ML: at 15:52

## 2020-10-24 RX ADMIN — LEVOTHYROXINE SODIUM 25 MCG: 0.03 TABLET ORAL at 13:00

## 2020-10-24 RX ADMIN — APIXABAN 5 MG: 5 TABLET, FILM COATED ORAL at 13:00

## 2020-10-24 RX ADMIN — ASPIRIN 324 MG: 81 TABLET, CHEWABLE ORAL at 02:26

## 2020-10-24 RX ADMIN — CALCITONIN SALMON 1 SPRAY: 200 SPRAY, METERED NASAL at 22:45

## 2020-10-24 RX ADMIN — OMEPRAZOLE 40 MG: 40 CAPSULE, DELAYED RELEASE ORAL at 13:00

## 2020-10-24 RX ADMIN — Medication 125 MCG: at 13:00

## 2020-10-24 ASSESSMENT — ENCOUNTER SYMPTOMS
SHORTNESS OF BREATH: 0
VOICE CHANGE: 0
VOMITING: 0
BLOOD IN STOOL: 0
TROUBLE SWALLOWING: 0
NAUSEA: 0
BACK PAIN: 0
DIARRHEA: 0
ABDOMINAL PAIN: 0
CHEST TIGHTNESS: 1
COUGH: 0

## 2020-10-24 ASSESSMENT — PAIN SCALES - GENERAL: PAINLEVEL_OUTOF10: 0

## 2020-10-24 NOTE — ED NOTES
Patient resting quietly in cot showing no signs of distress at this time. Bedside report received from [facility]. Patient updated on POC. Will continue to monitor.       Kirill Potts RN  10/24/20 [id number]

## 2020-10-24 NOTE — ED NOTES
Pt resting in bed. Lights off for comfort. Breathing easy and unlabored. Will continue to monitor.       Mckay Castro RN  10/24/20 [id number]

## 2020-10-24 NOTE — ED NOTES
Pt states her daughter is bringing her home medications to the hospital.      Trish Corona RN  10/24/20 1015

## 2020-10-24 NOTE — ED NOTES
ED nurse-to-nurse bedside report    Chief Complaint   Patient presents with    Rib Pain     left      LOC: alert and orientated to name, place, date  Vital signs   Vitals:    10/24/20 0629 10/24/20 0928 10/24/20 1255 10/24/20 1544   BP: 124/64 (!) 126/58 (!) 122/94 124/64   Pulse: 73 79 81 76   Resp: 16 18 19 19   Temp: 97.6 °F (36.4 °C) 98.2 °F (36.8 °C)     TempSrc:  Oral     SpO2: 99% 98% 97% 98%   Weight:       Height:          Pain:    Pain Interventions: none  Pain Goal: denies pain  Oxygen: No    Current needs required room air   Telemetry: No  LDAs:   Peripheral IV 10/24/20 Left Antecubital (Active)   Site Assessment Clean;Dry; Intact 10/24/20 0358   Line Status Normal saline locked 10/24/20 0057   Dressing Status Clean;Dry; Intact 10/24/20 0358     Continuous Infusions:    sodium chloride 20 mL/hr at 10/24/20 1551     Mobility: Independent  Ibarra Fall Risk Score:    Fall Risk 9/27/2019 6/18/2019 6/13/2018 3/29/2017 9/29/2016 3/29/2016 12/11/2015   2 or more falls in past year? no no no no no no no   Fall with injury in past year? no no no yes no yes no     Fall Interventions: none  Report given to: Cindy Vásquez RN     Chantel Mazariegos RN  10/24/20 [id number]

## 2020-10-24 NOTE — ED NOTES
Pt resting on cot. Lights off for comfort. Daughter at bedside.      Cherie Mccray RN  10/24/20 [id number]

## 2020-10-24 NOTE — ED NOTES
Upon first contact with patient this RN receives bedside shift report from Jose Pablo RN. Pt resting on cot, eyes closed. Call light within reach.       Declan Duval RN  10/24/20 1018 denies pain/discomfort

## 2020-10-24 NOTE — ED NOTES
Pt took all morning/once daily home medications at this time.      Nellie Hernandez RN  10/24/20 1265 RN DIABETES EDUCATION PROGRESS NOTE    Adolfo Leonbryanna Sin. was seen from 0930 to 1015 for Follow-up diabetes self-management training in an individual/group setting.  The instruction was given to the patient.    No group class available within the next 2 months.     Material was presented using verbal, written, demonstration and return demonstration.  Food models and nutrition labels were used to demonstrate portion sizes and carbohydrate values.     COMMENTS/ASSESSMENT:    Patient encouraged to drop off BGs weekly in the office for review and insulin adjustment.  Due to how BGs usually drop during the day, I'd like to decrease the humulin dose and increase the lantus dose.  Dinner tends to be a higher CHO meal, but it doesn't sound like it's too high in CHO.    Patient sometimes provides conflicting information.  He'll actually get frustrated with the interpreters sometimes and start speaking in English.  I felt like it was easier to understand him today without an  and I tried to confirm understanding with restating concepts discussed and providing opportunity for patient to confirm/verify.    ? Potential Barriers:  ? English as a second language/Potential Cultural Barriers--patient declined the use of the interpter today (he's preferred speaking in English even though we had an  present at our previous visits).   video cart was in the office and available for use if the patient wanted me to connect at any point during today's visit.  ? Possible Limited Literacy Skills - Patient has limited education.  He denies problems with reading, but seems to prefer lower literacy handouts.  At previous visit we reviewed some handouts, it was unclear if he was able to read along.  Most topics were discussed in detail, and main points of DM self-management were repeated.  ? Back pain - Pt has ongoing pain in his back from an previous injury.  He gets steroid injections regularly (every 3  months).  His last injection was in July.  He continues to walk daily despite back pain, but the pain has interfered with other exercises (including strength training), that patient reportedly engaged in prior to his injury.  He typically medicates his pain with oxycodone prior to walking.  Patient states today that he's pain medication has started giving him a harder time (ie. More frequent pill counts and drug test), patient is concerned that he might have to stop taking pain pills (even though he states that his pill counts have been accurate and drug test have always been negative).  ? Dietary   o Restrictions/Preferences:  Patient has taken pictures of some of his meals.  He really has been including a lot of fresh veggies in his diet.  Occasionally some meals lack either a carbohydrate source and/or a protein.  We talked about this in the past as well.  ? Physical Activity: patient walks daily despite ongoing pain.  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  ? Diabetes Medications:   o Current medications: lantus and Humulin. Pt reports taking medication as directed.  o Noted decreasing BGs throughout the day.  o Increase lantus to 22 units at bedtime. AND decrease humulin to 10 units with meals.  ? Self-Blood Glucose Monitoring:   o Pt has a blood glucose meter and is checking home blood sugar readings 1-2 times per day (before meals).   o Patient brought a blood glucose meter and home blood sugars are recorded below.    ? Acute Complications  o Has patient been experiencing symptoms of hyperglycemia? No   o Has patient been experiencing symptoms of hypoglycemia? No      Home Blood Sugar Readings      Date Before Breakfast Before Lunch Before Dinner   Bedtime   8/30 193      8/29  188 254    8/28 255  156    8/27  219 189    8/26 251      8/25 234      8/24 265  197    8/23 280  263    8/22 213 149           A1c   Hemoglobin A1C (%)   Date Value   07/30/2018 10.7 (H)       Medications:    Current Outpatient Prescriptions   Medication Sig Dispense Refill   • insulin regular, human, (HUMULIN R) 100 UNIT/ML injectable solution 3 times daily before meals according to sliding.  Max dose of 45 units per day. (Patient taking differently: 12 Units 3 times daily (before meals). 3 times daily before meals according to sliding.  Max dose of 45 units per day.) 60 mL 3   • insulin glargine (LANTUS) 100 UNIT/ML injectable solution Inject 17 Units into the skin daily. Insulin being titrated.  Max dose of 25 units once daily. 30 mL prn   • ASPIR-LOW 81 MG EC tablet TAKE ONE TABLET BY MOUTH EVERY DAY 90 tablet 0   • lisinopril (PRINIVIL,ZESTRIL) 40 MG tablet TAKE 1 TABLET BY MOUTH DAILY 90 tablet 0   • B-D INSULIN SYRINGE ULTRAFINE 31G X 5/16\" 1 ML Misc USE AS DIRECTED 6 TO 7 TIMES DAILY 300 each 0   • blood glucose (ONE TOUCH ULTRA TEST) test strip Test blood sugars 4 times daily and  strip 2   • omeprazole (PRILOSEC) 40 MG capsule Take 1 capsule by mouth daily. USE IN AM BEFORE BREAKFAST (Patient taking differently: Take 40 mg by mouth daily. USEat bedtime) 30 capsule 11   • atorvastatin (LIPITOR) 40 MG tablet Take 1 tablet by mouth daily. 90 tablet 3   • benzonatate (TESSALON PERLES) 100 MG capsule Take 1 capsule by mouth 3 times daily as needed for Cough. 30 capsule 0   • albuterol (PROAIR HFA) 108 (90 Base) MCG/ACT inhaler Inhale 2 puffs into the lungs four times daily. 8.5 g 5   • B-D INSULIN SYRINGE ULTRAFINE 31G X 5/16\" 1 ML Misc USE AS DIRECTED 6 TO 7 TIMES DAILY 100 each 1   • Lancets (ONETOUCH ULTRASOFT) Misc Use as directed 4xdaily 300 each 1   • cyclobenzaprine (FLEXERIL) 5 MG tablet Take 5 mg by mouth 3 times daily as needed for Muscle spasms.     • zoster vaccine live, PF, (ZOSTAVAX) 67939 UNT/0.65ML injection Inject 1 each into the skin 1 time. 1 each 0   • DISPENSE Dispense 1 cane 1 each 0   • OxyCODONE HCl 10 MG immediate release tablet Take 10 mg by mouth every 4 hours as needed.     •  Syringe/Needle, Disp, (B-D SYRINGE/NEEDLE) 25G X 5/8\" 1 ML Misc Use up to 6-7 x daily as directed with insulin 300 each 11     No current facility-administered medications for this visit.        Smoking cessation  Former smoker    DM Care Plan   Diabetes Care Plan    Diabetic Instruction:  Individual Followup   Type of Education:  Comprehensive    Length of visit:  45 minutes    Barriers to self care:  Language, Injectable med   ----------------------------------------------------------------------------------------------------------------  1. Diabetes disease process/overview and treatment options  c.  Importance of diabetes control, ongoing education, and possible treatment changes/options:  Instructed/competent  2. Monitoring  a. Blood glucose (purpose, testing times, care of meter/test strips, correct technique, keeping a record, lancet disposal and alternative site testing-if applicable):  Able to perform skill/verbalize  d. A1c define, state goal, test schedule:  Instructed/competent  3. Diabetes Medications  a. Teaching medications list:  Insulin administration (dose, schedule, action, side effects, site selection)  b. Medication adjustments/supplements, which may include meals, activity changes, travel, surgery:  Instructed/competent  4. Physical Activity  a. Effects of physical activity on blood glucose levels, general health benefits, hypoglycemia prevention:  Competent  b. Develop a physical activity plan/goals (types, frequency, duration, intensity, medical clearance:  Competent  5. Psychosocial Strategies  6. Nutrition  c. Understanding of personalized meal plan:  Competent  d. Healthy dining out practices:  Competent  7. Acute/Chronic Complications (prevention, detection, treatment)  h. Emergency preparednesss/Supply management:  Instructed/competent  i.  Risk reduction strategies for prevention and treatment of nephropathy, retinopathy, neuropathy, frequent infections and cardiovascular disease:   Instructed/competent  j.  Essential Care Guidelines:  Tests (A1c, lipids, albumin/creatinine, diabetes focused visits every 3-6 months, dilated eye exam, dental visits and proper health care, annual comprehensive lower extremity exam, daily foot care and immunizations (flu, pneumonia, hepatitis B), general health benefits, hypoglycemia prevention:  Instructed/competent  8. Strategies to Promote Health/Behavioral Changes  a. Problem solving strategies:  Instructed/competent  b. Behavioral changes:  Instructed/competent  9. Insulin Pump  10. Continuous Glucose Monitoring  11. Diabetes Self-Management Support Plan  12. Goals  Nutrition 4=% Include some CHO and protein with all meals. - Patient reports not doing this with all meals, but does it with most meals.             Written [place] materials provided were:  Blood Sugar Targets - For Your Well-Being and Control Your Blood Sugar, Control Your Health, Diabetes Plates    Recommendations/ Plan:   Patient will try to learn the recommendation for self-management of diabetes. With the help of the educator, patient will develop a personalized plan to follow the recommendations, make personal goals, and bring blood sugar readings and HgA1c into an acceptable range. Patient is scheduled to follow up with the educator by phone.  Pt should follow up with their PCP/ endocrinologist every 3-6 months for a diabetes visit.    See Patient Instructions for further information    Kelly Valdivia  Provider order is located in EMR.

## 2020-10-24 NOTE — ED NOTES
Pt arrives to the ED for left rib pain. Pt states her pain is a 2/10 and she has this pain from time to time. Pt states she has not done anything to injure herself. Pt has hx of Afib and was concerned.       Eveline Shen RN  10/23/20 6963

## 2020-10-24 NOTE — ED NOTES
Meal tray delivered. Pt resting on cot. Call light within reach, no concerns voiced at this time.       Nick Mazariegos RN  10/24/20 2788

## 2020-10-24 NOTE — ED PROVIDER NOTES
[facility]    EMERGENCY MEDICINE     Pt Name: Dena Reardon  MRN: [id number]  [place] 1940  Date of evaluation: 10/23/2020  Provider: Ulises López DO, [address]       Chief Complaint   Patient presents with    Rib Pain     left       HISTORY OF PRESENT ILLNESS    Sergio Olea is a pleasant 79 y.o. female who presents to the emergency department from home with left sided chest pain for the past day. Some SOB on exertion and she has noted some \"sweats\". Denies fever or cough. No abdomianl pain. No LE pain or swelling. Hx of Afib, compliant with Eliquis      Triage notes and Nursing notes were reviewed by myself. Any discrepancies are addressed above.     PAST MEDICAL HISTORY     Past Medical History:   Diagnosis Date    Arthritis     Cancer [facility]     skin ca    Diabetes mellitus ([facility]     Hyperlipidemia     Hypertension     Osteoporosis 2017    Sleep apnea     has CPAP       SURGICAL HISTORY       Past Surgical History:   Procedure Laterality Date    APPENDECTOMY      CARDIAC CATHETERIZATION      two cath    COLONOSCOPY  01/08/2013    COLONOSCOPY N/A 5/17/2019    COLONOSCOPY DIAGNOSTIC performed by Catrina Yanez MD at [address] [place] [address]. INJECTION PROCEDURE FOR SACROILIAC JOINT Left 1/24/2020    LEFT SI MBB #1 - NO STEROID performed by Samra Mayorga MD at [facility] Left 6/17/2020    MOHS DEFECT REPAIR SCC LEFT MEDIAL HAND WITH SKIN GRAFT FROM LEFT UPPER ARM performed by Cecilio Stout MD at [place] [address]  03/2017    on face, left side    UPPER GASTROINTESTINAL ENDOSCOPY N/A 5/17/2019    EGD BIOPSY performed by Catrina Yanez MD at [facility]       Previous Medications    ACETAMINOPHEN (TYLENOL) 650 MG EXTENDED RELEASE TABLET    Take 650 mg by mouth every 8 hours as needed for Pain    APIXABAN (ELIQUIS) 5 MG TABS TABLET    Take 1 tablet by mouth 2 times daily    ASPIRIN 81 MG EC TABLET    Take 1 tablet by mouth daily    BIOTIN PO    Take 1 tablet by mouth daily    CALCITONIN (MIACALCIN) 200 UNIT/ACT NASAL SPRAY    1 spray by Nasal route daily    CHOLECALCIFEROL (VITAMIN D3) 50 MCG (2000 UT) CAPS    Take by mouth daily    CYANOCOBALAMIN (VITAMIN B 12 PO)    Take  by mouth Daily. DILTIAZEM (CARDIZEM CD) 120 MG EXTENDED RELEASE CAPSULE    Take 1 capsule daily    DOCUSATE SODIUM (COLACE) 100 MG CAPSULE    Take 100 mg by mouth daily as needed for Constipation    GLUCOSAMINE HCL (GLUCOSAMINE PO)    Take 1,000 mg by mouth daily    JANUVIA 100 MG TABLET    TAKE 1 TABLET BY MOUTH DAILY    LEVOTHYROXINE (SYNTHROID) 25 MCG TABLET    TAKE 1 TABLET BY MOUTH DAILY    METOPROLOL SUCCINATE (TOPROL XL) 25 MG EXTENDED RELEASE TABLET    TAKE 1 TABLET BY MOUTH DAILY    MULTIPLE VITAMINS-MINERALS (ONE DAILY FOR WOMEN PO)    Take by mouth daily    NITROGLYCERIN (NITROSTAT) 0.4 MG SL TABLET    DISSOLVE ONE TABLET UNDER TONGUE AS NEEDED FOR CHEST PAIN EVERY 5 MINUTES. MAX OF 3 DOSES. IF NO RELIEF AFTER 1 DOSE, CALL 911.     OMEPRAZOLE (PRILOSEC) 40 MG DELAYED RELEASE CAPSULE    Take 1 capsule by mouth daily    PRAVASTATIN (PRAVACHOL) 80 MG TABLET    Take 1 tablet by mouth daily    RAMIPRIL (ALTACE) 5 MG CAPSULE    TAKE 1 CAPSULE BY MOUTH DAILY    SPIRONOLACTONE (ALDACTONE) 50 MG TABLET    Take 50 mg by mouth daily     TRIAMCINOLONE (KENALOG) 0.1 % CREAM    2 times daily as needed    VITAMIN B-12 (CYANOCOBALAMIN) 500 MCG TABLET    Take 500 mcg by mouth daily       ALLERGIES     Ranolazine    FAMILY HISTORY       Family History   Problem Relation Age of Onset    Alzheimer's Disease Mother     Heart Disease Mother     Cancer Sister         Throat    Diabetes Neg Hx     High Blood Pressure Neg Hx         SOCIAL HISTORY       Social History     Socioeconomic History    Marital status:      Spouse name: Delmy Paiz Number of children: 4    Years of education: 15    Highest education level: Some college, no degree   Occupational History    Not on file   Social Needs    Financial resource strain: Not hard at all   [place] insecurity     Worry: Never true     Inability: Never true   [employer] needs     Medical: No     Non-medical: No   Tobacco Use    Smoking status: Never Smoker    Smokeless tobacco: Never Used   Substance and Sexual Activity    Alcohol use: No     Alcohol/week: 0.0 standard drinks    Drug use: No    Sexual activity: Not Currently   Lifestyle    Physical activity     Days per week: Not on file     Minutes per session: Not on file    Stress: Not on file   Relationships    Social connections     Talks on phone: Not on file     Gets together: Not on file     Attends Restorationist service: Not on file     Active member of club or organization: Not on file     Attends meetings of clubs or organizations: Not on file     Relationship status: Not on file    Intimate partner violence     Fear of current or ex partner: Not on file     Emotionally abused: Not on file     Physically abused: Not on file     Forced sexual activity: Not on file   Other Topics Concern    Not on file   Social History Narrative    Not on file       REVIEW OF SYSTEMS     Review of Systems   Constitutional: Negative for chills, diaphoresis and fever. HENT: Negative for trouble swallowing and voice change. Eyes: Negative for visual disturbance. Respiratory: Positive for chest tightness. Negative for cough and shortness of breath. Cardiovascular: Positive for chest pain. Negative for leg swelling. Gastrointestinal: Negative for abdominal pain, blood in stool, diarrhea, nausea and vomiting. Genitourinary: Negative for dysuria, frequency and hematuria. Musculoskeletal: Negative for back pain and neck pain. Skin: Negative for rash and wound. Neurological: Negative for speech difficulty, weakness, numbness and headaches. Psychiatric/Behavioral: Negative for confusion. Except as noted above the remainder of the review of systems was reviewed and is. PHYSICAL EXAM    (up to 7 for level 4, 8 or more for level 5)     ED Triage Vitals [10/23/20 2054]   BP Temp Temp Source Pulse Resp SpO2 Height Weight   (!) 144/81 97.8 °F (36.6 °C) Oral 78 18 98 % 5' 5\" (1.651 m) 243 lb (110.2 kg)       Physical Exam  Vitals signs and nursing note reviewed. Constitutional:       General: She is not in acute distress. Appearance: She is well-developed. She is not ill-appearing, toxic-appearing or diaphoretic. HENT:      Head: Normocephalic and atraumatic. Eyes:      General: No scleral icterus. Conjunctiva/sclera: Conjunctivae normal.      Right eye: Right conjunctiva is not injected. Left eye: Left conjunctiva is not injected. Pupils: Pupils are equal, round, and reactive to light. Neck:      Musculoskeletal: Normal range of motion and neck supple. Thyroid: No thyromegaly. Trachea: No tracheal deviation. Cardiovascular:      Rate and Rhythm: Normal rate and regular rhythm. Heart sounds: Normal heart sounds. No murmur. No friction rub. No gallop. Pulmonary:      Effort: Pulmonary effort is normal. No respiratory distress. Breath sounds: Normal breath sounds. No stridor. No wheezing or rales. Abdominal:      General: Bowel sounds are normal. There is no distension. Palpations: Abdomen is soft. There is no mass. Tenderness: There is no abdominal tenderness. There is no guarding or rebound. Comments: Negative Rene's sign  Nontender McBurney's Point  Negative Rovsig's sign  No bruising or echymosis of abdomen   Musculoskeletal:         General: No tenderness. Comments: Negative Allison's Sign bilaterally   Lymphadenopathy:      Cervical: No cervical adenopathy. Skin:     General: Skin is warm and dry. Coloration: Skin is not pale. Findings: No erythema or rash. Neurological:      Mental Status: She is alert and oriented to person, place, and time. Cranial Nerves: No cranial nerve deficit. Motor: No abnormal muscle tone. Coordination: Coordination normal.      Comments: No nystagmus   Psychiatric:         Behavior: Behavior normal.         Thought Content: Thought content normal.         DIAGNOSTIC RESULTS     EKG:(none if blank)  All EKG's are interpreted by [facility] Department Physician who either signs or Co-signs this chart in the absence of a cardiologist.    EKG nonischemic; afib    RADIOLOGY: (none if blank)   Interpretation per the Radiologistbelow, if available at the time of this note:    XR RIBS LEFT INCLUDE CHEST (MIN 3 VIEWS)   Final Result   Impression:      No acute processes. This document has been electronically signed by: Simón Flores MD on    10/23/2020 09:44 PM             LABS:  Labs Reviewed   CBC WITH AUTO DIFFERENTIAL - Abnormal; Notable for the following components:       Result Value    RBC 4.14 (*)     MCHC 35.6 (*)     All other components within normal limits   COMPREHENSIVE METABOLIC PANEL W/ REFLEX TO MG FOR LOW K - Abnormal; Notable for the following components:    Glucose 134 (*)     Potassium reflex Magnesium 5.3 (*)     CO2 22 (*)     All other components within normal limits   GLOMERULAR FILTRATION RATE, ESTIMATED - Abnormal; Notable for the following components:    Est, Glom Filt Rate 60 (*)     All other components within normal limits   OSMOLALITY - Abnormal; Notable for the following components:    Osmolality Calc 274.4 (*)     All other components within normal limits   TROPONIN   ANION GAP   BRAIN NATRIURETIC PEPTIDE       All other labs were within normal range or not returned as of this dictation. Please note, any cultures that may have been sent were not resulted at the time of this patient visit. EMERGENCY DEPARTMENT COURSE andMedical Decision Making:     MDM/       Workup initiated.  Labs pending; chest X-ray unrenarkable. My suspicion for PE is low given no LE findings and pt compliant with NOAC. However, \"sweats\" in the setting of chest pain concerning and will need admission for further risk stratification. Case signed out to Dr Tmaika Handley at 0100hrs for disposition/admission of the patient. ED Medications administered this visit:  Medications - No data to display      Procedures: (None if blank)       CLINICAL     No diagnosis found. DISPOSITION/PLAN   DISPOSITION        PATIENT REFERRED TO:  No follow-up provider specified.     DISCHARGE MEDICATIONS:  New Prescriptions    No medications on file              (Please note that portions of this note were completed with a voice recognition program.  Efforts were made to edit the dictations but occasionallywords are mis-transcribed.)      Anca Freire DO, FACEP (electronically signed)  Attending Physician, Emergency [address], DO  10/24/20 8886

## 2020-10-24 NOTE — ED NOTES
Dr. Luan Campa contacted for IP orders at this time and states he will place the orders once he gets to a computer.       Kirill Potts RN  10/24/20 1214

## 2020-10-24 NOTE — ED NOTES
Pt moved to inpatient bed. Pt and family updated on plan of care.       Sundeep Xavier RN  10/23/20 3332

## 2020-10-25 LAB
ALBUMIN SERPL-MCNC: 4.2 G/DL (ref 3.5–5.1)
ALP BLD-CCNC: 45 U/L (ref 38–126)
ALT SERPL-CCNC: 22 U/L (ref 11–66)
AST SERPL-CCNC: 24 U/L (ref 5–40)
BILIRUB SERPL-MCNC: 0.8 MG/DL (ref 0.3–1.2)
BILIRUBIN DIRECT: 0.3 MG/DL (ref 0–0.3)
EKG ATRIAL RATE: 69 BPM
EKG Q-T INTERVAL: 376 MS
EKG QRS DURATION: 86 MS
EKG QTC CALCULATION (BAZETT): 425 MS
EKG R AXIS: 20 DEGREES
EKG T AXIS: 44 DEGREES
EKG VENTRICULAR RATE: 77 BPM
GLUCOSE BLD-MCNC: 116 MG/DL (ref 70–108)
GLUCOSE BLD-MCNC: 129 MG/DL (ref 70–108)
GLUCOSE BLD-MCNC: 168 MG/DL (ref 70–108)
TOTAL PROTEIN: 7.2 G/DL (ref 6.1–8)
TSH SERPL DL<=0.05 MIU/L-ACNC: 1.62 UIU/ML (ref 0.4–4.2)

## 2020-10-25 PROCEDURE — 80076 HEPATIC FUNCTION PANEL: CPT

## 2020-10-25 PROCEDURE — 82948 REAGENT STRIP/BLOOD GLUCOSE: CPT

## 2020-10-25 PROCEDURE — 93010 ELECTROCARDIOGRAM REPORT: CPT | Performed by: NUCLEAR MEDICINE

## 2020-10-25 PROCEDURE — G0378 HOSPITAL OBSERVATION PER HR: HCPCS

## 2020-10-25 PROCEDURE — 36415 COLL VENOUS BLD VENIPUNCTURE: CPT

## 2020-10-25 PROCEDURE — 6370000000 HC RX 637 (ALT 250 FOR IP): Performed by: INTERNAL MEDICINE

## 2020-10-25 PROCEDURE — 2580000003 HC RX 258: Performed by: INTERNAL MEDICINE

## 2020-10-25 PROCEDURE — 93005 ELECTROCARDIOGRAM TRACING: CPT | Performed by: INTERNAL MEDICINE

## 2020-10-25 PROCEDURE — 84443 ASSAY THYROID STIM HORMONE: CPT

## 2020-10-25 RX ORDER — AMIODARONE HYDROCHLORIDE 200 MG/1
200 TABLET ORAL DAILY
Status: DISCONTINUED | OUTPATIENT
Start: 2020-10-25 | End: 2020-10-26 | Stop reason: HOSPADM

## 2020-10-25 RX ADMIN — RAMIPRIL 5 MG: 5 CAPSULE ORAL at 10:01

## 2020-10-25 RX ADMIN — CALCITONIN SALMON 1 SPRAY: 200 SPRAY, METERED NASAL at 09:58

## 2020-10-25 RX ADMIN — Medication 10 ML: at 10:02

## 2020-10-25 RX ADMIN — OMEPRAZOLE 40 MG: 40 CAPSULE, DELAYED RELEASE ORAL at 10:00

## 2020-10-25 RX ADMIN — DILTIAZEM HYDROCHLORIDE 120 MG: 120 CAPSULE, EXTENDED RELEASE ORAL at 09:59

## 2020-10-25 RX ADMIN — SPIRONOLACTONE 50 MG: 25 TABLET ORAL at 10:02

## 2020-10-25 RX ADMIN — APIXABAN 5 MG: 5 TABLET, FILM COATED ORAL at 21:21

## 2020-10-25 RX ADMIN — METOPROLOL SUCCINATE 25 MG: 25 TABLET, EXTENDED RELEASE ORAL at 10:00

## 2020-10-25 RX ADMIN — LEVOTHYROXINE SODIUM 25 MCG: 0.03 TABLET ORAL at 05:54

## 2020-10-25 RX ADMIN — ASPIRIN 81 MG: 81 TABLET, CHEWABLE ORAL at 09:57

## 2020-10-25 RX ADMIN — POLYETHYLENE GLYCOL 3350 17 G: 17 POWDER, FOR SOLUTION ORAL at 16:40

## 2020-10-25 RX ADMIN — Medication 125 MCG: at 09:59

## 2020-10-25 RX ADMIN — APIXABAN 5 MG: 5 TABLET, FILM COATED ORAL at 09:57

## 2020-10-25 RX ADMIN — Medication 10 ML: at 21:22

## 2020-10-25 RX ADMIN — ATORVASTATIN CALCIUM 80 MG: 80 TABLET, FILM COATED ORAL at 21:21

## 2020-10-25 ASSESSMENT — PAIN SCALES - GENERAL
PAINLEVEL_OUTOF10: 0
PAINLEVEL_OUTOF10: 0

## 2020-10-26 VITALS
TEMPERATURE: 97.8 F | HEART RATE: 63 BPM | SYSTOLIC BLOOD PRESSURE: 130 MMHG | RESPIRATION RATE: 16 BRPM | BODY MASS INDEX: 39.85 KG/M2 | DIASTOLIC BLOOD PRESSURE: 82 MMHG | WEIGHT: 239.2 LBS | OXYGEN SATURATION: 97 % | HEIGHT: 65 IN

## 2020-10-26 LAB
GLUCOSE BLD-MCNC: 129 MG/DL (ref 70–108)
GLUCOSE BLD-MCNC: 135 MG/DL (ref 70–108)
GLUCOSE BLD-MCNC: 146 MG/DL (ref 70–108)
LV EF: 50 %
LVEF MODALITY: NORMAL

## 2020-10-26 PROCEDURE — 82948 REAGENT STRIP/BLOOD GLUCOSE: CPT

## 2020-10-26 PROCEDURE — G0378 HOSPITAL OBSERVATION PER HR: HCPCS

## 2020-10-26 PROCEDURE — 93306 TTE W/DOPPLER COMPLETE: CPT

## 2020-10-26 PROCEDURE — 2580000003 HC RX 258: Performed by: INTERNAL MEDICINE

## 2020-10-26 PROCEDURE — 94760 N-INVAS EAR/PLS OXIMETRY 1: CPT

## 2020-10-26 PROCEDURE — 6370000000 HC RX 637 (ALT 250 FOR IP): Performed by: INTERNAL MEDICINE

## 2020-10-26 RX ORDER — AMIODARONE HYDROCHLORIDE 200 MG/1
200 TABLET ORAL DAILY
Qty: 30 TABLET | Refills: 3 | Status: SHIPPED | OUTPATIENT
Start: 2020-10-27 | End: 2020-12-25

## 2020-10-26 RX ORDER — METOPROLOL SUCCINATE 25 MG/1
50 TABLET, EXTENDED RELEASE ORAL DAILY
Qty: 30 TABLET | Refills: 3 | Status: SHIPPED | OUTPATIENT
Start: 2020-10-27 | End: 2022-10-19

## 2020-10-26 RX ADMIN — CALCITONIN SALMON 1 SPRAY: 200 SPRAY, METERED NASAL at 08:27

## 2020-10-26 RX ADMIN — Medication 125 MCG: at 08:27

## 2020-10-26 RX ADMIN — ASPIRIN 81 MG: 81 TABLET, CHEWABLE ORAL at 08:27

## 2020-10-26 RX ADMIN — RAMIPRIL 5 MG: 5 CAPSULE ORAL at 08:27

## 2020-10-26 RX ADMIN — DILTIAZEM HYDROCHLORIDE 120 MG: 120 CAPSULE, EXTENDED RELEASE ORAL at 08:27

## 2020-10-26 RX ADMIN — AMIODARONE HYDROCHLORIDE 200 MG: 200 TABLET ORAL at 00:18

## 2020-10-26 RX ADMIN — SPIRONOLACTONE 50 MG: 25 TABLET ORAL at 08:27

## 2020-10-26 RX ADMIN — OMEPRAZOLE 40 MG: 40 CAPSULE, DELAYED RELEASE ORAL at 08:27

## 2020-10-26 RX ADMIN — LEVOTHYROXINE SODIUM 25 MCG: 0.03 TABLET ORAL at 05:25

## 2020-10-26 RX ADMIN — AMIODARONE HYDROCHLORIDE 200 MG: 200 TABLET ORAL at 08:28

## 2020-10-26 RX ADMIN — METOPROLOL SUCCINATE 25 MG: 25 TABLET, EXTENDED RELEASE ORAL at 08:27

## 2020-10-26 RX ADMIN — APIXABAN 5 MG: 5 TABLET, FILM COATED ORAL at 08:27

## 2020-10-26 RX ADMIN — Medication 10 ML: at 08:33

## 2020-10-26 ASSESSMENT — PAIN SCALES - GENERAL: PAINLEVEL_OUTOF10: 0

## 2020-10-26 NOTE — CARE COORDINATION
10/26/20, 7:49 AM EDT  DISCHARGE PLANNING EVALUATION:    Lynn Olea       Admitted from: ED 10/23/2020/ 2101 Hospital day: 0   Location: [facility]- Reason for admit: Chest pain, moderate coronary artery risk [R07.9] Status: observ  Admit order signed?: yes  PMH:  has a past medical history of Arthritis, Cancer ([facility] [address] Diabetes mellitus ([facility] [address] Hyperlipidemia, Hypertension, Osteoporosis, and Sleep apnea. Procedure: 10/26  Echo results pending  Pertinent abnormal Imaging: na    Medications:  Scheduled Meds:   amiodarone  200 mg Oral Daily    sodium chloride flush  10 mL Intravenous 2 times per day    atorvastatin  80 mg Oral Nightly    aspirin  81 mg Oral Daily    insulin lispro  0-12 Units Subcutaneous TID WC    insulin lispro  0-6 Units Subcutaneous Nightly    apixaban  5 mg Oral BID    calcitonin  1 spray Alternating Nares Daily    Vitamin D3  2,000 Units Oral Daily    Vitamin B 12  1 tablet Oral Daily    dilTIAZem  120 mg Oral Daily    Glucosamine  1,000 mg Oral Daily    levothyroxine  25 mcg Oral Daily    metoprolol succinate  25 mg Oral Daily    therapeutic multivitamin-minerals  1 tablet Oral Daily    omeprazole  40 mg Oral Daily    ramipril  5 mg Oral Daily    spironolactone  50 mg Oral Daily    vitamin B-12  500 mcg Oral Daily    SITagliptin  100 mg Oral Daily    digoxin  125 mcg Oral Daily     Continuous Infusions:   sodium chloride 20 mL/hr at 10/24/20 1551      Pertinent Info/Orders/Treatment Plan: To ED complaining intermittent left-sided chest pain in the last 24 hours    Telemetry chronic Atrial fib 70-80, no fever, no chest pain, cardiology primary, IVF, on ASA, diabetic management, Prilosec, Metoprolol, Lanoxin, Amiodarone, I/O. Diet: DIET CARDIAC; No Caffeine   Smoking status:  reports that she has never smoked.  She has never used smokeless tobacco.   PCP: Veda Lezama DO  Readmission 30 days or less: no   %    Discharge Planning Evaluation  Current Residence: Private Residence  Living Arrangements:  Spouse/Significant Other, Children   Support Systems:  Spouse/Significant Other, Family Members, Children  Current Services PTA:     Potential Assistance Needed:  N/A  Potential Assistance Purchasing Medications:  No  Does patient want to participate in local refill/ meds to beds program?  No  Type of Home Care Services:     Patient expects to be discharged to:  home  Expected Discharge date:  10/26/20  Follow Up Appointment: Best Day/ Time: Monday PM    Patient Goals/Plan/Treatment Preferences: Marilyn Farah lives home with her  and daughter Alyse Teresa. She drives, is independent of ADLs, [place], has PCP, and declines needs or HH. Patient expects to be discharged later today. Transportation/Food Security/Housekeeping Addressed:  No issues identified.  Evaluation: no     10/26/20, 12:11 PM EDT    Patient goals/plan/ treatment preferences discussed by  and . Patient goals/plan/ treatment preferences reviewed with patient/ family. Patient/ family verbalize understanding of discharge plan and are in agreement with goal/plan/treatment preferences. Understanding was demonstrated using the teach back method. AVS provided by RN at time of discharge, which includes all necessary medical information pertaining to the patients current course of illness, treatment, post-discharge goals of care, and treatment preferences.

## 2020-10-26 NOTE — PROGRESS NOTES
Comprehensive Nutrition Assessment    Type and Reason for Visit:  Initial, Positive Nutrition Screen(weight loss, poor po)    Nutrition Recommendations/Plan: Will monitor po intake for tolerance and adequacy. Encouraged good nutrition at best efforts. Diet education completed. Recommend continue MVI. Nutrition Assessment:    Pt. nutritionally compromised AEB decreased appetite and intake past few weeks. At risk for further nutrition compromise r/t underlying medical condition (hx DM). Nutrition recommendations/interventions as per above. Malnutrition Assessment:  Malnutrition Status: At risk for malnutrition (Comment)    Context:  Acute Illness     Findings of the 6 clinical characteristics of malnutrition:  Energy Intake:  1 - 75% or less of estimated energy requirements for 7 or more days  Weight Loss:  No significant weight loss(1.6% in 2 months)     Body Fat Loss:  No significant body fat loss     Muscle Mass Loss:  No significant muscle mass loss    Fluid Accumulation:  Unable to assess     Strength:  Not Performed    Estimated Daily Nutrient Needs:  Energy (kcal):  3996-7722 kcals (15-18); Weight Used for Energy Requirements:  (109 kgm 10/26)     Protein (g):  68+ grams (1.2+); Weight Used for Protein Requirements:  Ideal(57 kgm)         Nutrition Related Findings:  Pt. seen - reports decreased appetite/intake for past couple of weeks; tries to consume 3 meals/day - eats smaller portions; admits that she doesn't check her blood sugars pta or read food labels but feels like she should start; 10/23: Glucose 134, Potassium 5.3; 6/10/20: HgbA1c 5.4%;  Rx includes Januvia, Insulin, MVI, Vitamin B1, B12 and D;      Wounds:  None       Current Nutrition Therapies:    DIET CARDIAC; Carb Control: 4 carb choices (60 gms)/meal; No Caffeine    Anthropometric Measures:  · Height: 5' 5\" (165.1 cm)  · Current Body Weight: 239 lb 3.2 oz (108.5 kg)(10/26, +1 edema)   · Admission Body Weight: 237 lb 7 oz (107.7 kg)(10/24, +1 edema)    · Usual Body Weight: (per pt 243#; per EMR: 1/24/20: 241# 12.8 oz, 8/27/20: 243# 3.2 oz)     · Ideal Body Weight: 125 lbs;     · BMI: 39.8  · BMI Categories: Obese Class 2 (BMI 35.0 -39.9)       Nutrition Diagnosis:   · Inadequate oral intake related to inadequate protein-energy intake as evidenced by poor intake prior to admission      Nutrition Interventions:   Food and/or Nutrient Delivery:  Continue Current Diet  Nutrition Education/Counseling:  Education completed(10/26 Encouraged good nutrition at best efforts. Reviewed CHO controlled diet, blood sugar goals, CHO sources and label reading. Written information, RD name and number given.)   Coordination of Nutrition Care:  Continued Inpatient Monitoring    Goals:  Pt. will consume 75% or more of meals during LOS. Nutrition Monitoring and Evaluation:   Behavioral-Environmental Outcomes:  Knowledge or Skill   Food/Nutrient Intake Outcomes:  Diet Advancement/Tolerance, Food and Nutrient Intake  Physical Signs/Symptoms Outcomes:  Biochemical Data, GI Status, Fluid Status or Edema, Nutrition Focused Physical Findings, Skin, Weight     Discharge Planning:     Too soon to determine     Electronically signed by Isamar Sánchez RD, LD on 10/26/20 at 2:31 PM EDT    Contact: [phone]

## 2020-10-26 NOTE — PROGRESS NOTES
Inpatient Cardiac Rehabilitation Consult    Received consult for Phase II Cardiac Rehabilitation. Linda Mosqueda does not qualify for cardiac rehab at this time. Will follow her progress.

## 2020-10-26 NOTE — H&P
[address] [id number]                              HISTORY AND PHYSICAL    PATIENT NAME: Gianna TILLMAN                 :        1940  MED REC NO:   [id number]                           ROOM:       0022  ACCOUNT NO:   [de-identified]                           ADMIT DATE: 10/23/2020  PROVIDER:     May Orellana. Abiel Núñez M.D. REASON FOR EVALUATION:  Atypical chest pain, atrial fib. HISTORY OF PRESENT ILLNESS:  This is a 78-year-old lady with a history  of nonobstructive coronary artery disease. The patient has a history of  chronic atrial fib. She has been on the aspirin and the Eliquis. The  patient came to the emergency room because she felt some fluttering and  some discomfort in the left inframammary region. She was admitted for  observation. She was in atrial fib. The patient has chronic atrial  fibrillation. She has been on aspirin and Eliquis. She has refused  cardioversion in the past.  She has refused ablation treatment in the  past.  She still doubts and she wants to continue with the current  treatment. The patient's heart rate is under control at rest, increased  to 140 with activity. She denies PND and orthopnea. She gets an  exertional shortness of breath. She did have a heart cath back in 2018  that showed patent coronary artery disease. REVIEW OF SYSTEMS:  She has a history of sleep apnea. She is utilizing  the CPAP. She has a history of hypertension, has been under control. The patient has a history of anxiety, gastroesophageal reflux. Th  patient has a history of hypercholesterolemia, morbid obesity. No GI  bleed or bleeding disorders. No claudication. She has a history of  arthritis. SOCIAL HISTORY:  She denied smoking or alcohol abuse. ALLERGIES:  She has no known allergies. SOCIAL HISTORY:  She denied smoking or alcohol abuse.     CODE STATUS:  She is a full code patient. PHYSICAL EXAMINATION:  VITAL SIGNS:  Showed her blood pressure is 118/80. She was in atrial  fib, controlled ventricular response. She was afebrile. Respiratory  rate is 18. NEUROLOGIC:  She has no focal neurological deficits. NECK:  She has no JVD. LUNGS:  She has scattered expiratory rhonchi. Lungs are clear. HEART:  There was no S3.  ABDOMEN:  Soft. EXTREMITIES:  Lower limbs, there is no edema. DIAGNOSTIC DATA:  EKG showed atrial fib. LABORATORY DATA:  Showed calcium was 5.3. Her BUN 14, creatinine 0.9. Hemoglobin 13, hematocrit 37. Her blood sugar is 134. IMPRESSION:  1. The patient with atypical chest pain, doubt cardiac. 2.  Cardiac enzymes will be obtained. 3.  Chronic atrial fib with variable ventricular response. 4.  History of hypertension. 5.  History of hypercholesterolemia. 6.  Anxiety. The patient will continue current medication. RECOMMENDATIONS:  The patient will remain as per the request.  Continue  with the above. We will add amiodarone for better control of her heart  rate. I did discuss with the patient again the cardioversion and the  ablation treatment. She does not want to have it done nor she will  think about it. We will get an echo with a thyroid function. Pending  the results of the above tests and hospitalization course, further  recommendation will be made. Thank you very much for allowing us to share in the management of this  patient.         Didi Yuan M.D.    D: 10/25/2020 19:15:31       T: 10/25/2020 21:58:22     [id number]  Job#: [id number]     Doc#: [id number]    CC:

## 2020-10-27 ENCOUNTER — TELEPHONE (OUTPATIENT)
Dept: FAMILY MEDICINE CLINIC | Age: 80
End: 2020-10-27

## 2020-10-27 NOTE — TELEPHONE ENCOUNTER
[facility] Transitions Initial Follow Up Call    Outreach made within 2 business days of discharge: Yes    Patient: Della Edgar Patient : 1940   MRN: [id number]  Reason for Admission: There are no discharge diagnoses documented for the most recent discharge. Discharge Date: 10/26/20       Spoke with: patient     Discharge department/facility: [facility] Interactive Patient Contact:  Was patient able to fill all prescriptions: Yes  Was patient instructed to bring all medications to the follow-up visit: Yes  Is patient taking all medications as directed in the discharge summary? Yes  Does patient understand their discharge instructions: Yes  Does patient have questions or concerns that need addressed prior to 7-14 day follow up office visit: no    Scheduled appointment with PCP within 7-14 days    Follow Up  Future Appointments   Date Time Provider Silvia Gustafson   2021 10:30 AM Harjeet Contreras   3/1/2021 10:45 AM Carina Melgar  [facility], CMA ([facility])     Pt is following with cardiology at this time.

## 2020-10-27 NOTE — DISCHARGE SUMMARY
[address] [place] [address]                               DISCHARGE SUMMARY    PATIENT NAME: Jasiel Jovel                 :        1940  MED REC NO:   [id number]                           ROOM:       0022  ACCOUNT NO:   [de-identified]                           ADMIT DATE: 10/23/2020  PROVIDER:     Jeanette Lay. See Ybarra M.D.               Lizzette : 10/26/2020      FINAL DIAGNOSES:  1. Atrial fib with rapid ventricular response. 2.  Shortness of breath on exertion. 3.  Atrial fibrillation. 4.  History of hypertension. 5.  History of diabetes mellitus. 6.  History of arthritis. 7.  History of hypothyroidism. BRIEF HISTORY:  This is a patient who is a 81-year-old lady admitted to  the hospital through the emergency room. Came to the emergency room  because of chest pain and shortness of breath. For the rest of the H  and P, please refer to my H and P.    HOSPITALIZATION COURSE:  The patient was admitted to the telemetry bed. All medication continued. The patient continued to in atrial fib with  rapid ventricular response. Medication adjusted. Her heart rate has  been under control. Her blood pressure is under control. The patient  was given the option to have a cardioversion. She does not want to have  any procedure done. She does not want to have any ablation treatment. She wants a long-term medical treatment. The patient discharged home in  a stable condition. She is to follow up with the office to have a dig  level. CBC and BMP at the next visit and to seek medical attention  should there be any change in her clinical condition. Ha Bernard M.D.    D: 10/26/2020 18:44:33       T: 10/27/2020 6:11:38     [id number]  Job#: [id number]     Doc#: [id number]    CC:  Brayden Ybarra M.D.

## 2020-11-02 RX ORDER — SITAGLIPTIN 100 MG/1
TABLET, FILM COATED ORAL
Qty: 90 TABLET | Refills: 3 | Status: SHIPPED | OUTPATIENT
Start: 2020-11-02 | End: 2021-09-07

## 2020-11-24 ENCOUNTER — OFFICE VISIT (OUTPATIENT)
Dept: FAMILY MEDICINE CLINIC | Age: 80
End: 2020-11-24
Payer: MEDICARE

## 2020-11-24 ENCOUNTER — NURSE TRIAGE (OUTPATIENT)
Dept: OTHER | Facility: CLINIC | Age: 80
End: 2020-11-24

## 2020-11-24 VITALS
DIASTOLIC BLOOD PRESSURE: 60 MMHG | WEIGHT: 241.8 LBS | SYSTOLIC BLOOD PRESSURE: 118 MMHG | OXYGEN SATURATION: 98 % | TEMPERATURE: 97.2 F | HEART RATE: 58 BPM | BODY MASS INDEX: 40.24 KG/M2 | RESPIRATION RATE: 16 BRPM

## 2020-11-24 PROCEDURE — 1090F PRES/ABSN URINE INCON ASSESS: CPT | Performed by: NURSE PRACTITIONER

## 2020-11-24 PROCEDURE — G8399 PT W/DXA RESULTS DOCUMENT: HCPCS | Performed by: NURSE PRACTITIONER

## 2020-11-24 PROCEDURE — 4040F PNEUMOC VAC/ADMIN/RCVD: CPT | Performed by: NURSE PRACTITIONER

## 2020-11-24 PROCEDURE — G8484 FLU IMMUNIZE NO ADMIN: HCPCS | Performed by: NURSE PRACTITIONER

## 2020-11-24 PROCEDURE — G8427 DOCREV CUR MEDS BY ELIG CLIN: HCPCS | Performed by: NURSE PRACTITIONER

## 2020-11-24 PROCEDURE — 1036F TOBACCO NON-USER: CPT | Performed by: NURSE PRACTITIONER

## 2020-11-24 PROCEDURE — G8417 CALC BMI ABV UP PARAM F/U: HCPCS | Performed by: NURSE PRACTITIONER

## 2020-11-24 PROCEDURE — 1123F ACP DISCUSS/DSCN MKR DOCD: CPT | Performed by: NURSE PRACTITIONER

## 2020-11-24 PROCEDURE — 99213 OFFICE O/P EST LOW 20 MIN: CPT | Performed by: NURSE PRACTITIONER

## 2020-11-24 RX ORDER — OMEPRAZOLE 40 MG/1
40 CAPSULE, DELAYED RELEASE ORAL 2 TIMES DAILY
Qty: 90 CAPSULE | Refills: 3 | Status: SHIPPED
Start: 2020-11-24 | End: 2021-03-29

## 2020-11-24 RX ORDER — OMEPRAZOLE 40 MG/1
40 CAPSULE, DELAYED RELEASE ORAL DAILY
Qty: 90 CAPSULE | Refills: 3 | Status: CANCELLED | OUTPATIENT
Start: 2020-11-24

## 2020-11-24 ASSESSMENT — ENCOUNTER SYMPTOMS
COUGH: 0
CHEST TIGHTNESS: 0
SHORTNESS OF BREATH: 0
ABDOMINAL PAIN: 1
NAUSEA: 1

## 2020-11-24 NOTE — TELEPHONE ENCOUNTER
Chest pain between breast bone. BP normal. Started last night. Pain gone for now. Pain 5/10.belch constantly. No sour taste in mouth. Concerned because mother had Hiatal hernia. Reason for Disposition   Patient wants to be seen    Answer Assessment - Initial Assessment Questions  1. LOCATION: \"Where does it hurt? \"          See above    2. RADIATION: \"Does the pain go anywhere else? \" (e.g., into neck, jaw, arms, back)        No    3. ONSET: \"When did the chest pain begin? \" (Minutes, hours or days)         See above    4. PATTERN \"Does the pain come and go, or has it been constant since it started? \"  \"Does it get worse with exertion? \"         Comes and goes    5. DURATION: \"How long does it last\" (e.g., seconds, minutes, hours)        Wasn't timing it. 6. SEVERITY: \"How bad is the pain? \"  (e.g., Scale 1-10; mild, moderate, or severe)     - MILD (1-3): doesn't interfere with normal activities      - MODERATE (4-7): interferes with normal activities or awakens from sleep     - SEVERE (8-10): excruciating pain, unable to do any normal activities          See above    7. CARDIAC RISK FACTORS: \"Do you have any history of heart problems or risk factors for heart disease? \" (e.g., angina, prior heart attack; diabetes, high blood pressure, high cholesterol, smoker, or strong family history of heart disease)        High blood pressure, type 2 diabetic. 8. PULMONARY RISK FACTORS: \"Do you have any history of lung disease? \"  (e.g., blood clots in lung, asthma, emphysema, birth control pills)        Sleep Apnea    9. CAUSE: \"What do you think is causing the chest pain? \"        See above    10. OTHER SYMPTOMS: \"Do you have any other symptoms? \" (e.g., dizziness, nausea, vomiting, sweating, fever, difficulty breathing, cough)          No    11. PREGNANCY: \"Is there any chance you are pregnant? \" \"When was your last menstrual period? \"          NA    Protocols used: CHEST PAIN-ADULT-[place]    Caller provided care advice and instructed to call back with worsening symptoms. Warm transfer to [place]. Will go to  if unable to see MD.     Attention Provider: Thank you for allowing me to participate in the care of your patient. The patient was connected to triage in response to information provided to the ECC. Please do not respond through this encounter as the response is not directed to a shared pool.

## 2020-11-24 NOTE — PROGRESS NOTES
Visit Information    Have you changed or started any medications since your last visit including any over-the-counter medicines, vitamins, or herbal medicines? no   Are you having any side effects from any of your medications? -  no  Have you stopped taking any of your medications? Is so, why? -  no    Have you seen any other physician or provider since your last visit? No  Have you had any other diagnostic tests since your last visit? No  Have you been seen in the emergency room and/or had an admission to a hospital since we last saw you? No  Have you had your routine dental cleaning in the past 6 months? na  Have you activated your [employer] account? If not, what are your barriers?  Yes     Patient Care Team:  Raad Galan DO as PCP - General  Raad Galan DO as PCP - [facility] Provider  Jose A Warren MD as Consulting Physician (Orthopedic Surgery)    Medical History Review  Past Medical, Family, and Social History reviewed and does contribute to the patient presenting condition    Health Maintenance   Topic Date Due    DTaP/Tdap/Td vaccine (1 - Tdap) 09/08/1960    Shingles Vaccine (1 of 2) 09/08/1991    Lipid screen  09/27/2020    Potassium monitoring  10/23/2021    Creatinine monitoring  10/23/2021    TSH testing  10/25/2021    Flu vaccine  Completed    Pneumococcal 65+ years Vaccine  Completed    DEXA (modify frequency per FRAX score)  Addressed    Hepatitis A vaccine  Aged Out    Hib vaccine  Aged Out    Meningococcal (ACWY) vaccine  Aged Out

## 2020-11-24 NOTE — PROGRESS NOTES
Subjective:      Patient ID: Jerilyn Jones is a 79 y.o. female. HPI: Acute for heartburn    Chief Complaint   Patient presents with    Heartburn     with epigastric pain, with burping , ? hernia present for 1 day     Other     breast bone tenderness with touch        Onset of 1 days with constant epigastric/breastbone pain. Denies worsening. Has also been dealing with continuous heartburn, indigestion and belching. Belching worse with laying down. No lightheadedness or dizziness. No weakness. On Omperazole 40 mg. Poor diet. Carbonated beverages. Denies SOB or chest tightness. Seen in Hospital In October with AFIB RVR and amiodarone was added. Vitals:    11/24/20 1402   BP: 118/60   Pulse: 58   Resp: 16   Temp: 97.2 °F (36.2 °C)   SpO2: 98%     Patient Active Problem List   Diagnosis    Hyperlipidemia    HTN (hypertension)    Osteopenia    GERD (gastroesophageal reflux disease)    Reactive airway disease    OA (osteoarthritis)    Chest pain, atypical    Diabetes mellitus type II, controlled ([facility] [address]    Obesity (BMI 30-39. 9)    Chronic low back pain    Neuropathy    Obstructive sleep apnea on CPAP    Controlled type 2 diabetes mellitus without complication (HCC)    Persistent atrial fibrillation (HCC)    Paroxysmal atrial fibrillation (HCC)    Abnormal nuclear stress test    Influenza    Atrial fibrillation with rapid ventricular response (HCC)    Sacroiliac inflammation (HCC)    Morbid obesity (HCC)    Chest pain, moderate coronary artery risk           Review of Systems   Constitutional: Negative for chills and fever. HENT: Negative. Respiratory: Negative for cough, chest tightness and shortness of breath. Cardiovascular: Negative for chest pain. Gastrointestinal: Positive for abdominal pain and nausea. Skin: Negative for rash. Neurological: Negative for dizziness, light-headedness and headaches. Psychiatric/Behavioral: Negative. Objective:   Physical Exam  Constitutional:       General: She is not in acute distress. Eyes:      Pupils: Pupils are equal, round, and reactive to light. Neck:      Musculoskeletal: Normal range of motion and neck supple. Cardiovascular:      Rate and Rhythm: Normal rate. Rhythm irregularly irregular. Heart sounds: No murmur. Pulmonary:      Effort: Pulmonary effort is normal.      Breath sounds: Normal breath sounds. No wheezing. Abdominal:      General: Bowel sounds are normal. There is no distension. Palpations: Abdomen is soft. Tenderness: There is abdominal tenderness in the epigastric area. Musculoskeletal: Normal range of motion. General: No tenderness. Skin:     General: Skin is warm and dry. Findings: No rash. Assessment:       Diagnosis Orders   1. Gastroesophageal reflux disease, unspecified whether esophagitis present     2.  Morbid obesity with BMI of 40.0-44.9, adult (HCC)     3. Paroxysmal atrial fibrillation (HCC)             Plan:      Uncontrolled GERD is diet and weight driven  Increase Omeprazole BID  GERD diet discussed  Stay active, weight loss  RTO if symptoms worsen or stay the same          Vaishali Rosenberg, APRN - CNP

## 2020-11-24 NOTE — PATIENT INSTRUCTIONS
You may receive a survey regarding the care you received during your visit. Your input is valuable to us. We encourage you to complete and return your survey. We hope you will choose us in the future for your healthcare needs. Patient Education        Gastroesophageal Reflux Disease (GERD): Care Instructions  Overview     Gastroesophageal reflux disease (GERD) is the backward flow of stomach acid into the esophagus. The esophagus is the tube that leads from your throat to your stomach. A one-way valve prevents the stomach acid from backing up into this tube. But when you have GERD, this valve does not close tightly enough. This can also cause pain and swelling in your esophagus. (This is called esophagitis.)  If you have mild GERD symptoms including heartburn, you may be able to control the problem with antacids or over-the-counter medicine. You can also make lifestyle changes to help reduce your symptoms. These include changing your diet and eating habits, such as not eating late at night and losing weight. Follow-up care is a key part of your treatment and safety. Be sure to make and go to all appointments, and call your doctor if you are having problems. It's also a good idea to know your test results and keep a list of the medicines you take. How can you care for yourself at home? · Take your medicines exactly as prescribed. Call your doctor if you think you are having a problem with your medicine. · Your doctor may recommend over-the-counter medicine. For mild or occasional indigestion, antacids, such as Tums, Gaviscon, Mylanta, or Maalox, may help. Your doctor also may recommend over-the-counter acid reducers, such as famotidine (Pepcid AC), cimetidine (Tagamet HB), or omeprazole (Prilosec). Read and follow all instructions on the label. If you use these medicines often, talk with your doctor. · Change your eating habits.   ? It's best to eat several small meals instead of two or three large meals.  ? After you eat, wait 2 to 3 hours before you lie down. ? Chocolate, mint, and alcohol can make GERD worse. ? Spicy foods, foods that have a lot of acid (like tomatoes and oranges), and coffee can make GERD symptoms worse in some people. If your symptoms are worse after you eat a certain food, you may want to stop eating that food to see if your symptoms get better. · Do not smoke or chew tobacco. Smoking can make GERD worse. If you need help quitting, talk to your doctor about stop-smoking programs and medicines. These can increase your chances of quitting for good. · If you have GERD symptoms at night, raise the head of your bed 6 to 8 inches by putting the frame on blocks or placing a foam wedge under the head of your mattress. (Adding extra pillows does not work.)  · Do not wear tight clothing around your middle. · Lose weight if you need to. Losing just 5 to 10 pounds can help. When should you call for help? Call your doctor now or seek immediate medical care if:    · You have new or different belly pain.     · Your stools are black and tarlike or have streaks of blood. Watch closely for changes in your health, and be sure to contact your doctor if:    · Your symptoms have not improved after 2 days.     · Food seems to catch in your throat or chest.   Where can you learn more? Go to [email or web address] and sign in to your [employer] account. Enter U061 in the formerly [facility] box to learn more about \"Gastroesophageal Reflux Disease (GERD): Care Instructions. \"     If you do not have an account, please click on the \"Sign Up Now\" link. Current as of: April 15, 2020               Content Version: 12.6  © 7584-8305 [employer]. Care instructions adapted under license by [facility]  If you have questions about a medical condition or this instruction, always ask your healthcare professional. Ben Gaines any warranty or liability for your use of this information.

## 2020-12-01 ENCOUNTER — TELEPHONE (OUTPATIENT)
Dept: PULMONOLOGY | Age: 80
End: 2020-12-01

## 2020-12-07 ENCOUNTER — HOSPITAL ENCOUNTER (OUTPATIENT)
Age: 80
Discharge: HOME | End: 2020-12-07
Payer: MEDICARE

## 2020-12-07 LAB
ANION GAP SERPL CALCULATED.3IONS-SCNC: 13 MEQ/L (ref 8–16)
BUN BLDV-MCNC: 15 MG/DL (ref 7–22)
CALCIUM SERPL-MCNC: 10.2 MG/DL (ref 8.5–10.5)
CHLORIDE BLD-SCNC: 96 MEQ/L (ref 98–111)
CO2: 24 MEQ/L (ref 23–33)
CREAT SERPL-MCNC: 1 MG/DL (ref 0.4–1.2)
ERYTHROCYTE [DISTWIDTH] IN BLOOD BY AUTOMATED COUNT: 12.9 % (ref 11.5–14.5)
ERYTHROCYTE [DISTWIDTH] IN BLOOD BY AUTOMATED COUNT: 45.3 FL (ref 35–45)
GFR SERPL CREATININE-BSD FRML MDRD: 53 ML/MIN/1.73M2
GLUCOSE BLD-MCNC: 200 MG/DL (ref 70–108)
HCT VFR BLD CALC: 38.5 % (ref 37–47)
HEMOGLOBIN: 12.9 GM/DL (ref 12–16)
MCH RBC QN AUTO: 32.1 PG (ref 26–33)
MCHC RBC AUTO-ENTMCNC: 33.5 GM/DL (ref 32.2–35.5)
MCV RBC AUTO: 95.8 FL (ref 81–99)
PLATELET # BLD: 234 THOU/MM3 (ref 130–400)
PMV BLD AUTO: 10.8 FL (ref 9.4–12.4)
POTASSIUM SERPL-SCNC: 5 MEQ/L (ref 3.5–5.2)
RBC # BLD: 4.02 MILL/MM3 (ref 4.2–5.4)
SODIUM BLD-SCNC: 133 MEQ/L (ref 135–145)
T4 FREE: 1.58 NG/DL (ref 0.93–1.76)
TSH SERPL DL<=0.05 MIU/L-ACNC: 2.48 UIU/ML (ref 0.4–4.2)
WBC # BLD: 6 THOU/MM3 (ref 4.8–10.8)

## 2020-12-07 PROCEDURE — 84439 ASSAY OF FREE THYROXINE: CPT

## 2020-12-07 PROCEDURE — 80048 BASIC METABOLIC PNL TOTAL CA: CPT

## 2020-12-07 PROCEDURE — 84443 ASSAY THYROID STIM HORMONE: CPT

## 2020-12-07 PROCEDURE — 85027 COMPLETE CBC AUTOMATED: CPT

## 2020-12-07 PROCEDURE — 36415 COLL VENOUS BLD VENIPUNCTURE: CPT

## 2020-12-25 ENCOUNTER — HOSPITAL ENCOUNTER (OUTPATIENT)
Age: 80
Setting detail: OBSERVATION
Discharge: HOME | End: 2020-12-27
Attending: EMERGENCY MEDICINE | Admitting: INTERNAL MEDICINE
Payer: MEDICARE

## 2020-12-25 ENCOUNTER — APPOINTMENT (OUTPATIENT)
Dept: GENERAL RADIOLOGY | Age: 80
End: 2020-12-25
Payer: MEDICARE

## 2020-12-25 PROBLEM — R07.9 CHEST PAIN: Status: ACTIVE | Noted: 2020-12-25

## 2020-12-25 LAB
ALBUMIN SERPL-MCNC: 4.4 G/DL (ref 3.5–5.1)
ALP BLD-CCNC: 42 U/L (ref 38–126)
ALT SERPL-CCNC: 26 U/L (ref 11–66)
ANION GAP SERPL CALCULATED.3IONS-SCNC: 13 MEQ/L (ref 8–16)
ANION GAP SERPL CALCULATED.3IONS-SCNC: 21 MEQ/L (ref 8–16)
AST SERPL-CCNC: 24 U/L (ref 5–40)
AVERAGE GLUCOSE: 150 MG/DL (ref 70–126)
BACTERIA: ABNORMAL
BASOPHILS # BLD: 0.9 %
BASOPHILS ABSOLUTE: 0.1 THOU/MM3 (ref 0–0.1)
BILIRUB SERPL-MCNC: 0.5 MG/DL (ref 0.3–1.2)
BILIRUBIN URINE: NEGATIVE
BLOOD, URINE: NEGATIVE
BUN BLDV-MCNC: 13 MG/DL (ref 7–22)
BUN BLDV-MCNC: 15 MG/DL (ref 7–22)
CALCIUM IONIZED: 1.14 MMOL/L (ref 1.12–1.32)
CALCIUM SERPL-MCNC: 10.6 MG/DL (ref 8.5–10.5)
CALCIUM SERPL-MCNC: 10.8 MG/DL (ref 8.5–10.5)
CASTS: ABNORMAL /LPF
CASTS: ABNORMAL /LPF
CHARACTER, URINE: CLEAR
CHLORIDE BLD-SCNC: 100 MEQ/L (ref 98–111)
CHLORIDE BLD-SCNC: 101 MEQ/L (ref 98–111)
CO2: 18 MEQ/L (ref 23–33)
CO2: 25 MEQ/L (ref 23–33)
COLOR: YELLOW
CREAT SERPL-MCNC: 0.8 MG/DL (ref 0.4–1.2)
CREAT SERPL-MCNC: 0.9 MG/DL (ref 0.4–1.2)
CRYSTALS: ABNORMAL
DIGOXIN LEVEL: 1.2 NG/ML (ref 0.5–2)
DIGOXIN LEVEL: 1.4 NG/ML (ref 0.5–2)
EKG ATRIAL RATE: 72 BPM
EKG Q-T INTERVAL: 306 MS
EKG QRS DURATION: 74 MS
EKG QTC CALCULATION (BAZETT): 361 MS
EKG R AXIS: 14 DEGREES
EKG T AXIS: -141 DEGREES
EKG VENTRICULAR RATE: 84 BPM
EOSINOPHIL # BLD: 3.3 %
EOSINOPHILS ABSOLUTE: 0.2 THOU/MM3 (ref 0–0.4)
EPITHELIAL CELLS, UA: ABNORMAL /HPF
ERYTHROCYTE [DISTWIDTH] IN BLOOD BY AUTOMATED COUNT: 12.7 % (ref 11.5–14.5)
ERYTHROCYTE [DISTWIDTH] IN BLOOD BY AUTOMATED COUNT: 44.9 FL (ref 35–45)
GFR SERPL CREATININE-BSD FRML MDRD: 60 ML/MIN/1.73M2
GFR SERPL CREATININE-BSD FRML MDRD: 69 ML/MIN/1.73M2
GLUCOSE BLD-MCNC: 186 MG/DL (ref 70–108)
GLUCOSE BLD-MCNC: 200 MG/DL (ref 70–108)
GLUCOSE BLD-MCNC: 274 MG/DL (ref 70–108)
GLUCOSE, URINE: NEGATIVE MG/DL
HBA1C MFR BLD: 7 % (ref 4.4–6.4)
HCT VFR BLD CALC: 39.2 % (ref 37–47)
HEMOGLOBIN: 13 GM/DL (ref 12–16)
IMMATURE GRANS (ABS): 0.03 THOU/MM3 (ref 0–0.07)
IMMATURE GRANULOCYTES: 0.4 %
INR BLD: 1.56 (ref 0.85–1.13)
KETONES, URINE: NEGATIVE
LEUKOCYTE ESTERASE, URINE: ABNORMAL
LIPASE: 30.3 U/L (ref 5.6–51.3)
LYMPHOCYTES # BLD: 20.4 %
LYMPHOCYTES ABSOLUTE: 1.4 THOU/MM3 (ref 1–4.8)
MAGNESIUM: 1.5 MG/DL (ref 1.6–2.4)
MCH RBC QN AUTO: 31.8 PG (ref 26–33)
MCHC RBC AUTO-ENTMCNC: 33.2 GM/DL (ref 32.2–35.5)
MCV RBC AUTO: 95.8 FL (ref 81–99)
MISCELLANEOUS LAB TEST RESULT: ABNORMAL
MONOCYTES # BLD: 10.3 %
MONOCYTES ABSOLUTE: 0.7 THOU/MM3 (ref 0.4–1.3)
NITRITE, URINE: NEGATIVE
NUCLEATED RED BLOOD CELLS: 0 /100 WBC
OSMOLALITY CALCULATION: 286.3 MOSMOL/KG (ref 275–300)
PH UA: 6 (ref 5–9)
PLATELET # BLD: 240 THOU/MM3 (ref 130–400)
PMV BLD AUTO: 10.7 FL (ref 9.4–12.4)
POTASSIUM REFLEX MAGNESIUM: 5.3 MEQ/L (ref 3.5–5.2)
POTASSIUM SERPL-SCNC: 5.6 MEQ/L (ref 3.5–5.2)
PRO-BNP: 756.3 PG/ML (ref 0–1800)
PROTEIN UA: NEGATIVE MG/DL
RBC # BLD: 4.09 MILL/MM3 (ref 4.2–5.4)
RBC URINE: ABNORMAL /HPF
RENAL EPITHELIAL, UA: ABNORMAL
SEG NEUTROPHILS: 64.7 %
SEGMENTED NEUTROPHILS ABSOLUTE COUNT: 4.5 THOU/MM3 (ref 1.8–7.7)
SODIUM BLD-SCNC: 138 MEQ/L (ref 135–145)
SODIUM BLD-SCNC: 140 MEQ/L (ref 135–145)
SPECIFIC GRAVITY UA: 1.01 (ref 1–1.03)
TOTAL PROTEIN: 6.9 G/DL (ref 6.1–8)
TROPONIN T: < 0.01 NG/ML
TROPONIN T: < 0.01 NG/ML
TSH SERPL DL<=0.05 MIU/L-ACNC: 2.01 UIU/ML (ref 0.4–4.2)
UROBILINOGEN, URINE: 0.2 EU/DL (ref 0–1)
WBC # BLD: 6.9 THOU/MM3 (ref 4.8–10.8)
WBC UA: ABNORMAL /HPF
YEAST: ABNORMAL

## 2020-12-25 PROCEDURE — 81001 URINALYSIS AUTO W/SCOPE: CPT

## 2020-12-25 PROCEDURE — 6360000002 HC RX W HCPCS: Performed by: INTERNAL MEDICINE

## 2020-12-25 PROCEDURE — 82330 ASSAY OF CALCIUM: CPT

## 2020-12-25 PROCEDURE — 80053 COMPREHEN METABOLIC PANEL: CPT

## 2020-12-25 PROCEDURE — 93005 ELECTROCARDIOGRAM TRACING: CPT | Performed by: EMERGENCY MEDICINE

## 2020-12-25 PROCEDURE — 6370000000 HC RX 637 (ALT 250 FOR IP): Performed by: INTERNAL MEDICINE

## 2020-12-25 PROCEDURE — 80162 ASSAY OF DIGOXIN TOTAL: CPT

## 2020-12-25 PROCEDURE — 36415 COLL VENOUS BLD VENIPUNCTURE: CPT

## 2020-12-25 PROCEDURE — 71045 X-RAY EXAM CHEST 1 VIEW: CPT

## 2020-12-25 PROCEDURE — G0378 HOSPITAL OBSERVATION PER HR: HCPCS

## 2020-12-25 PROCEDURE — 84443 ASSAY THYROID STIM HORMONE: CPT

## 2020-12-25 PROCEDURE — 93010 ELECTROCARDIOGRAM REPORT: CPT | Performed by: NUCLEAR MEDICINE

## 2020-12-25 PROCEDURE — 83880 ASSAY OF NATRIURETIC PEPTIDE: CPT

## 2020-12-25 PROCEDURE — 83735 ASSAY OF MAGNESIUM: CPT

## 2020-12-25 PROCEDURE — 99285 EMERGENCY DEPT VISIT HI MDM: CPT

## 2020-12-25 PROCEDURE — 84484 ASSAY OF TROPONIN QUANT: CPT

## 2020-12-25 PROCEDURE — 85610 PROTHROMBIN TIME: CPT

## 2020-12-25 PROCEDURE — 2580000003 HC RX 258: Performed by: INTERNAL MEDICINE

## 2020-12-25 PROCEDURE — 82948 REAGENT STRIP/BLOOD GLUCOSE: CPT

## 2020-12-25 PROCEDURE — 85025 COMPLETE CBC W/AUTO DIFF WBC: CPT

## 2020-12-25 PROCEDURE — 83036 HEMOGLOBIN GLYCOSYLATED A1C: CPT

## 2020-12-25 PROCEDURE — 83690 ASSAY OF LIPASE: CPT

## 2020-12-25 PROCEDURE — 96374 THER/PROPH/DIAG INJ IV PUSH: CPT

## 2020-12-25 PROCEDURE — 99219 PR INITIAL OBSERVATION CARE/DAY 50 MINUTES: CPT | Performed by: INTERNAL MEDICINE

## 2020-12-25 RX ORDER — DIGOXIN 125 MCG
125 TABLET ORAL DAILY
Status: DISCONTINUED | OUTPATIENT
Start: 2020-12-26 | End: 2020-12-27 | Stop reason: HOSPADM

## 2020-12-25 RX ORDER — ASPIRIN 81 MG/1
81 TABLET ORAL DAILY
Status: DISCONTINUED | OUTPATIENT
Start: 2020-12-26 | End: 2020-12-27 | Stop reason: HOSPADM

## 2020-12-25 RX ORDER — ALBUTEROL SULFATE 90 UG/1
2 AEROSOL, METERED RESPIRATORY (INHALATION) PRN
Status: ACTIVE | OUTPATIENT
Start: 2020-12-25 | End: 2020-12-25

## 2020-12-25 RX ORDER — POTASSIUM CHLORIDE 20 MEQ/1
40 TABLET, EXTENDED RELEASE ORAL PRN
Status: DISCONTINUED | OUTPATIENT
Start: 2020-12-25 | End: 2020-12-27 | Stop reason: HOSPADM

## 2020-12-25 RX ORDER — SODIUM CHLORIDE 0.9 % (FLUSH) 0.9 %
10 SYRINGE (ML) INJECTION EVERY 12 HOURS SCHEDULED
Status: DISCONTINUED | OUTPATIENT
Start: 2020-12-25 | End: 2020-12-27 | Stop reason: HOSPADM

## 2020-12-25 RX ORDER — SODIUM CHLORIDE 9 MG/ML
INJECTION, SOLUTION INTRAVENOUS CONTINUOUS
Status: DISCONTINUED | OUTPATIENT
Start: 2020-12-25 | End: 2020-12-25

## 2020-12-25 RX ORDER — ACETAMINOPHEN 650 MG/1
650 SUPPOSITORY RECTAL EVERY 6 HOURS PRN
Status: DISCONTINUED | OUTPATIENT
Start: 2020-12-25 | End: 2020-12-27 | Stop reason: HOSPADM

## 2020-12-25 RX ORDER — ONDANSETRON 2 MG/ML
4 INJECTION INTRAMUSCULAR; INTRAVENOUS EVERY 6 HOURS PRN
Status: DISCONTINUED | OUTPATIENT
Start: 2020-12-25 | End: 2020-12-27 | Stop reason: HOSPADM

## 2020-12-25 RX ORDER — LEVOTHYROXINE SODIUM 0.03 MG/1
25 TABLET ORAL DAILY
Status: DISCONTINUED | OUTPATIENT
Start: 2020-12-26 | End: 2020-12-27 | Stop reason: HOSPADM

## 2020-12-25 RX ORDER — CALCITONIN SALMON 200 [IU]/.09ML
1 SPRAY, METERED NASAL DAILY
Status: DISCONTINUED | OUTPATIENT
Start: 2020-12-25 | End: 2020-12-25 | Stop reason: RX

## 2020-12-25 RX ORDER — RAMIPRIL 5 MG/1
5 CAPSULE ORAL DAILY
Status: DISCONTINUED | OUTPATIENT
Start: 2020-12-26 | End: 2020-12-27 | Stop reason: HOSPADM

## 2020-12-25 RX ORDER — PANTOPRAZOLE SODIUM 40 MG/1
40 TABLET, DELAYED RELEASE ORAL
Status: DISCONTINUED | OUTPATIENT
Start: 2020-12-26 | End: 2020-12-25

## 2020-12-25 RX ORDER — AMINOPHYLLINE DIHYDRATE 25 MG/ML
50 INJECTION, SOLUTION INTRAVENOUS PRN
Status: ACTIVE | OUTPATIENT
Start: 2020-12-25 | End: 2020-12-25

## 2020-12-25 RX ORDER — METOPROLOL TARTRATE 5 MG/5ML
5 INJECTION INTRAVENOUS EVERY 5 MIN PRN
Status: ACTIVE | OUTPATIENT
Start: 2020-12-25 | End: 2020-12-25

## 2020-12-25 RX ORDER — SODIUM CHLORIDE 0.9 % (FLUSH) 0.9 %
10 SYRINGE (ML) INJECTION PRN
Status: DISCONTINUED | OUTPATIENT
Start: 2020-12-25 | End: 2020-12-27 | Stop reason: HOSPADM

## 2020-12-25 RX ORDER — SODIUM CHLORIDE 9 MG/ML
500 INJECTION, SOLUTION INTRAVENOUS CONTINUOUS PRN
Status: ACTIVE | OUTPATIENT
Start: 2020-12-25 | End: 2020-12-25

## 2020-12-25 RX ORDER — DEXTROSE MONOHYDRATE 25 G/50ML
12.5 INJECTION, SOLUTION INTRAVENOUS PRN
Status: DISCONTINUED | OUTPATIENT
Start: 2020-12-25 | End: 2020-12-27 | Stop reason: HOSPADM

## 2020-12-25 RX ORDER — NICOTINE POLACRILEX 4 MG
15 LOZENGE BUCCAL PRN
Status: DISCONTINUED | OUTPATIENT
Start: 2020-12-25 | End: 2020-12-27 | Stop reason: HOSPADM

## 2020-12-25 RX ORDER — SODIUM POLYSTYRENE SULFONATE 15 G/60ML
30 SUSPENSION ORAL; RECTAL ONCE
Status: COMPLETED | OUTPATIENT
Start: 2020-12-25 | End: 2020-12-25

## 2020-12-25 RX ORDER — NITROGLYCERIN 0.4 MG/1
0.4 TABLET SUBLINGUAL EVERY 5 MIN PRN
Status: DISCONTINUED | OUTPATIENT
Start: 2020-12-25 | End: 2020-12-25 | Stop reason: SDUPTHER

## 2020-12-25 RX ORDER — SODIUM CHLORIDE 0.9 % (FLUSH) 0.9 %
10 SYRINGE (ML) INJECTION PRN
Status: DISCONTINUED | OUTPATIENT
Start: 2020-12-25 | End: 2020-12-25 | Stop reason: SDUPTHER

## 2020-12-25 RX ORDER — POTASSIUM CHLORIDE 7.45 MG/ML
10 INJECTION INTRAVENOUS PRN
Status: DISCONTINUED | OUTPATIENT
Start: 2020-12-25 | End: 2020-12-27 | Stop reason: HOSPADM

## 2020-12-25 RX ORDER — NITROGLYCERIN 0.4 MG/1
0.4 TABLET SUBLINGUAL EVERY 5 MIN PRN
Status: DISCONTINUED | OUTPATIENT
Start: 2020-12-25 | End: 2020-12-27 | Stop reason: HOSPADM

## 2020-12-25 RX ORDER — METOPROLOL SUCCINATE 50 MG/1
50 TABLET, EXTENDED RELEASE ORAL DAILY
Status: DISCONTINUED | OUTPATIENT
Start: 2020-12-26 | End: 2020-12-27 | Stop reason: HOSPADM

## 2020-12-25 RX ORDER — DILTIAZEM HYDROCHLORIDE 120 MG/1
120 CAPSULE, COATED, EXTENDED RELEASE ORAL DAILY
Status: DISCONTINUED | OUTPATIENT
Start: 2020-12-26 | End: 2020-12-27 | Stop reason: HOSPADM

## 2020-12-25 RX ORDER — ATROPINE SULFATE 0.1 MG/ML
0.5 INJECTION INTRAVENOUS EVERY 5 MIN PRN
Status: ACTIVE | OUTPATIENT
Start: 2020-12-25 | End: 2020-12-25

## 2020-12-25 RX ORDER — PRAVASTATIN SODIUM 80 MG/1
80 TABLET ORAL NIGHTLY
Status: DISCONTINUED | OUTPATIENT
Start: 2020-12-25 | End: 2020-12-27 | Stop reason: HOSPADM

## 2020-12-25 RX ORDER — ONDANSETRON 4 MG/1
4 TABLET, ORALLY DISINTEGRATING ORAL EVERY 8 HOURS PRN
Status: DISCONTINUED | OUTPATIENT
Start: 2020-12-25 | End: 2020-12-27 | Stop reason: HOSPADM

## 2020-12-25 RX ORDER — PANTOPRAZOLE SODIUM 40 MG/1
40 TABLET, DELAYED RELEASE ORAL
Status: DISCONTINUED | OUTPATIENT
Start: 2020-12-25 | End: 2020-12-27 | Stop reason: HOSPADM

## 2020-12-25 RX ORDER — PRAVASTATIN SODIUM 80 MG/1
80 TABLET ORAL DAILY
Status: DISCONTINUED | OUTPATIENT
Start: 2020-12-25 | End: 2020-12-25

## 2020-12-25 RX ORDER — SPIRONOLACTONE 25 MG/1
50 TABLET ORAL DAILY
Status: DISCONTINUED | OUTPATIENT
Start: 2020-12-26 | End: 2020-12-27 | Stop reason: HOSPADM

## 2020-12-25 RX ORDER — ACETAMINOPHEN 325 MG/1
650 TABLET ORAL EVERY 6 HOURS PRN
Status: DISCONTINUED | OUTPATIENT
Start: 2020-12-25 | End: 2020-12-27 | Stop reason: HOSPADM

## 2020-12-25 RX ORDER — POLYETHYLENE GLYCOL 3350 17 G/17G
17 POWDER, FOR SOLUTION ORAL DAILY PRN
Status: DISCONTINUED | OUTPATIENT
Start: 2020-12-25 | End: 2020-12-27 | Stop reason: HOSPADM

## 2020-12-25 RX ORDER — FUROSEMIDE 10 MG/ML
20 INJECTION INTRAMUSCULAR; INTRAVENOUS ONCE
Status: COMPLETED | OUTPATIENT
Start: 2020-12-25 | End: 2020-12-25

## 2020-12-25 RX ORDER — DEXTROSE MONOHYDRATE 50 MG/ML
100 INJECTION, SOLUTION INTRAVENOUS PRN
Status: DISCONTINUED | OUTPATIENT
Start: 2020-12-25 | End: 2020-12-27 | Stop reason: HOSPADM

## 2020-12-25 RX ADMIN — SODIUM POLYSTYRENE SULFONATE 30 G: 15 SUSPENSION ORAL; RECTAL at 20:12

## 2020-12-25 RX ADMIN — APIXABAN 5 MG: 5 TABLET, FILM COATED ORAL at 20:12

## 2020-12-25 RX ADMIN — INSULIN LISPRO 1 UNITS: 100 INJECTION, SOLUTION INTRAVENOUS; SUBCUTANEOUS at 22:13

## 2020-12-25 RX ADMIN — FUROSEMIDE 20 MG: 10 INJECTION, SOLUTION INTRAMUSCULAR; INTRAVENOUS at 20:12

## 2020-12-25 RX ADMIN — PRAVASTATIN SODIUM 80 MG: 80 TABLET ORAL at 20:12

## 2020-12-25 RX ADMIN — SODIUM CHLORIDE, PRESERVATIVE FREE 10 ML: 5 INJECTION INTRAVENOUS at 20:19

## 2020-12-25 ASSESSMENT — PAIN DESCRIPTION - ORIENTATION
ORIENTATION: MID

## 2020-12-25 ASSESSMENT — PAIN SCALES - GENERAL
PAINLEVEL_OUTOF10: 2

## 2020-12-25 ASSESSMENT — PAIN DESCRIPTION - PAIN TYPE
TYPE: ACUTE PAIN

## 2020-12-25 ASSESSMENT — PAIN DESCRIPTION - DESCRIPTORS: DESCRIPTORS: ACHING

## 2020-12-25 ASSESSMENT — PAIN DESCRIPTION - LOCATION
LOCATION: CHEST

## 2020-12-25 NOTE — ED NOTES
Pt returned to cot in stable condition. Updated on POC at this time. Pt verbalized understanding. Vitals stable. Call light in reach.  Will continue to monitor     Madeline Velásquez RN  12/25/20 4415

## 2020-12-25 NOTE — ED NOTES
ED to inpatient nurses report    Chief Complaint   Patient presents with    Chest Pain      Present to ED from home  LOC: alert and orientated to name, place, date  Vital signs   Vitals:    12/25/20 1255 12/25/20 1345 12/25/20 1452 12/25/20 1533   BP: (!) 133/53 121/67 129/67 127/66   Pulse: 80 68 68 66   Resp: 16 14 16 16   Temp: 98.3 °F (36.8 °C)      TempSrc: Oral      SpO2: 99% 99% 99% 100%   Weight: 243 lb (110.2 kg)      Height: 5' 5\" (1.651 m)         Oxygen Baseline RA    Current needs required none   LDAs:   Peripheral IV 12/25/20 Right Antecubital (Active)   Site Assessment Clean;Dry; Intact 12/25/20 1534   Line Status Flushed 12/25/20 1534   Dressing Status Clean;Dry; Intact 12/25/20 1534   Dressing Intervention New 12/25/20 1307     Mobility: Requires assistance * 1  Pending ED orders: none  Present condition: stable       Electronically signed by Rick Ding RN on 12/25/2020 at 4:55 PM     Rick Ding RN  12/25/20 1395

## 2020-12-25 NOTE — ED NOTES
Pt resting on cot in stable condition. Voices no new concerns at this time. Vitals stable. Call light in reach.  Will continue to monitor     Lashell Bethea RN  12/25/20 4667

## 2020-12-25 NOTE — H&P
History & Physical        Patient:  Pinky Figueroa  YOB: 1940  MRN: [id number]   PCP: Joe Burnham DO        Acct: [de-identified]    Date of Admission: 12/25/2020  Date of Service: Patient seen and examined on 12/25/2020    Assessment/plan   ASSESSMENT/ PLAN:  1. Atypical chest pain: I suspect that patient may be having intermittent episodes of atrial fibrillation with RVR. I doubt that this is acute coronary syndrome because she recently had a heart cath in 2018, performed by Dr. Nina Baptiste that showed non-obstructive CAD in the LAD. Will trend troponin. Will also need to rule out the presence of gallbladder disease as the patient states that she has some discomfort radiating to her left shoulder. RUQ ultrasound ordered. Stress test ordered but may cancel if necessary as I doubt that the patient has coronary syndrome . Check TSH. GI cocktail also ordered with respect to the Ddx of GERD. Check UA,  lipid panel and lipase. Cardiac telemetry. Recommend Holter monitor at the time of discharge  2. Chronic atrial fibrillation: EKG reviewed and shows rate controled atrial fibrillation. Patient's amiodarone was recently discontinued. Will continue metoprolol, diltiazem, digoxin and apixaban. Cardiology consulted. Replace Mg and potassium prn. Check digoxin level  3. Peripheral edema with suspected acute diastolic congestive heart failure secondary to atrial fibrillation: Echocardiogram reviewed from 10/26/2020 and showed EF of 50% with no regional wall motion abnormalities. Patient states that she is currently being treated for lymphedema and wears compression stockings. On metoprolol, ramipril,  Pravastatin and apixaban  4. Hypercalcemia: takes calcitonin. Continue. Check ionized calcium. Monitor closely after Lasix given  5. Hyperkalemia: Give Kayexalate and Lasix  6. DM Type 2: ISS. Hold home medications  7. Hypothyroidism: Levothyroxine  8. GERD: Continue Protonix  9.  ANITA wears CPAP 10. Essential HTN: continue ramipril  11. Hyperlipidemia: pravasatin  12. Morbid obesity: BMI 40.44    DVT prophylaxis: [x] Already on anticoagulation  Disposition:    [x] Home  Code Status: Full Code  Placed in observation. Could possibly upgrade to inpatient status if it is anticipated that she would need 2 midnights or more tomorrow. Electronically signed by Earney Severs, MD on 12/25/2020 at 5:53 PM      Chief Complaint     Chest pressure      History of Present Illness     The patient is a 79 y. o.female with a history of essential HTN, DM Type 2, chronic atrial fibrillation on Apixaban who presented to [facility] ED on 12/25/2020 with a 1 week history of intermittent episodes of substernal chest pressure/pain lasting for several seconds at a time occurring up to 2 times a day and with intermittent left shoulder pain. She does not recall whether her discomfort is postprandial in nature. She denied fevers, chills, shortness of breath, loss of taste or smell, headaches, nausea, acute abdominal pain, vomiting or diarrhea. She mentions that she had jsut started treatment for lymphedema, and approximately 3 weeks ago, her amiodarone was discontinued by Dr. Noreen George. Her last Echo was reviewed from 10/26/2020 and showed an EF of 50% without wall motion abnormalities. Her last cardiac cath was in 2018 performed by Dr. Noreen George and the patient had mostly patent cardiac vessels then with non-obstructive disease of the LAD. She does have a history of GERD for which she takes omeprazole. In the ED, the patient was afebrile without leukocytosis. Troponin was unremarkable. CXR was unremarkable for an acute process. There was concern that her EKG showed signs of ischemia so we were contacted for admission. Review of her EKG shows rate controlled atrial fibrillation.        Past Medical History         Diagnosis Date    Arthritis     Cancer ([facility] [address]     skin ca    Diabetes mellitus ([facility] [address]     Hyperlipidemia     Hypertension  Osteoporosis 2017    Sleep apnea     has CPAP         Past Surgical History         Procedure Laterality Date    APPENDECTOMY      CARDIAC CATHETERIZATION      two cath    COLONOSCOPY  01/08/2013    COLONOSCOPY N/A 5/17/2019    COLONOSCOPY DIAGNOSTIC performed by Jenny Shah MD at [address] [place] [address] OF [place]. INJECTION PROCEDURE FOR SACROILIAC JOINT Left 1/24/2020    LEFT SI MBB #1 - NO STEROID performed by Dorys Troy MD at [id number] Left 6/17/2020    MOHS DEFECT REPAIR SCC LEFT MEDIAL HAND WITH SKIN GRAFT FROM LEFT UPPER ARM performed by Lauren Bland MD at [place] [address]  03/2017    on face, left side    UPPER GASTROINTESTINAL ENDOSCOPY N/A 5/17/2019    EGD BIOPSY performed by Jenny Shah MD at [facility]         Medications prior to admission      Prior to Admission medications    Medication Sig Start Date End Date Taking?  Authorizing Provider   omeprazole (PRILOSEC) 40 MG delayed release capsule Take 1 capsule by mouth 2 times daily 11/24/20  Yes Alysha Dickerson, APRN - CNP   JANUVIA 100 MG tablet TAKE 1 TABLET BY MOUTH DAILY 11/2/20  Yes Alysha Dickerson, APRN - CNP   metoprolol succinate (TOPROL XL) 25 MG extended release tablet Take 2 tablets by mouth daily 10/27/20  Yes Brandi Valadez MD   digoxin (LANOXIN) 125 MCG tablet Take 125 mcg by mouth daily   Yes Historical Provider, MD   levothyroxine (SYNTHROID) 25 MCG tablet TAKE 1 TABLET BY MOUTH DAILY 9/14/20  Yes Scott Session, DO   pravastatin (PRAVACHOL) 80 MG tablet Take 1 tablet by mouth daily 8/27/20  Yes Scott Session, DO   calcitonin (MIACALCIN) 200 UNIT/ACT nasal spray 1 spray by Nasal route daily 8/27/20  Yes Scott Session, DO   apixaban Karen Schlein) 5 MG TABS tablet Take 1 tablet by mouth 2 times daily 8/27/20  Yes Scott Session, DO ramipril (ALTACE) 5 MG capsule TAKE 1 CAPSULE BY MOUTH DAILY 7/13/20  Yes Dona Larsen,    triamcinolone (KENALOG) 0.1 % cream 2 times daily as needed 3/10/20  Yes Historical Provider, MD   docusate sodium (COLACE) 100 MG capsule Take 100 mg by mouth daily as needed for Constipation   Yes Historical Provider, MD   Glucosamine HCl (GLUCOSAMINE PO) Take 1,000 mg by mouth daily   Yes Historical Provider, MD   Cholecalciferol (VITAMIN D3) 50 MCG (2000 UT) CAPS Take by mouth daily   Yes Historical Provider, MD   Multiple Vitamins-Minerals (ONE DAILY FOR WOMEN PO) Take by mouth daily   Yes Historical Provider, MD   dilTIAZem (CARDIZEM CD) 120 MG extended release capsule Take 1 capsule daily 3/25/20  Yes Dona Larsen,    spironolactone (ALDACTONE) 50 MG tablet Take 50 mg by mouth daily  10/23/19  Yes Historical Provider, MD   BIOTIN PO Take 1 tablet by mouth daily   Yes Historical Provider, MD   aspirin 81 MG EC tablet Take 1 tablet by mouth daily 6/9/15  Yes Dona Larsen, DO   Cyanocobalamin (VITAMIN B 12 PO) Take  by mouth Daily. Yes Historical Provider, MD   nitroGLYCERIN (NITROSTAT) 0.4 MG SL tablet DISSOLVE ONE TABLET UNDER TONGUE AS NEEDED FOR CHEST PAIN EVERY 5 MINUTES. MAX OF 3 DOSES.  IF NO RELIEF AFTER 1 DOSE, CALL 911. 8/27/20   Dona Larsen, DO   acetaminophen (TYLENOL) 650 MG extended release tablet Take 650 mg by mouth every 8 hours as needed for Pain    Historical Provider, MD   vitamin B-12 (CYANOCOBALAMIN) 500 MCG tablet Take 500 mcg by mouth daily    Historical Provider, MD         Allergies     Ranolazine      Family History           Problem Relation Age of Onset    Alzheimer's Disease Mother     Heart Disease Mother     Cancer Sister         Throat    Diabetes Neg Hx     High Blood Pressure Neg Hx        Social History       Social History     Socioeconomic History    Marital status:      Spouse name: Miguel Sr Number of children: 4  Years of education: 15    Highest education level: Some college, no degree   Occupational History    None   Social Needs    Financial resource strain: Not hard at all   [employer] insecurity     Worry: Never true     Inability: Never true   [employer] needs     Medical: No     Non-medical: No   Tobacco Use    Smoking status: Never Smoker    Smokeless tobacco: Never Used   Substance and Sexual Activity    Alcohol use: No     Alcohol/week: 0.0 standard drinks    Drug use: No    Sexual activity: Not Currently   Lifestyle    Physical activity     Days per week: None     Minutes per session: None    Stress: None   Relationships    Social connections     Talks on phone: None     Gets together: None     Attends [employer] service: None     Active member of club or organization: None     Attends meetings of clubs or organizations: None     Relationship status: None    Intimate partner violence     Fear of current or ex partner: None     Emotionally abused: None     Physically abused: None     Forced sexual activity: None   Other Topics Concern    None   Social History Narrative    None         TOBACCO:   reports that she has never smoked. She has never used smokeless tobacco.  ETOH:   reports no history of alcohol use. Review of systems     Pertinent positives as noted in the HPI. All other systems reviewed and negative. Review of Systems      Physical examination     BP (!) 142/78   Pulse 66   Temp 97.9 °F (36.6 °C) (Oral)   Resp 18   Ht 5' 5\" (1.651 m)   Wt 243 lb (110.2 kg)   SpO2 95%   BMI 40.44 kg/m²     General appearance:  No apparent distress. HEENT: Normocephalic. PERRL. Extraocular motion intact. Conjunctivae clear. Nose symmetric without evidence of discharge. Oral mucosa moist w/o erythema or exudate. Neck: Supple. Trachea midline. No thyromegaly. Cardiovascular:  RRR w/ normal S1/S2. No murmurs, rubs or gallops. Respiratory: Clear to auscultation, with mildly decreased breath sounds at the lung bases   Abdomen: Soft, non-tender, non-distended with normal bowel sounds. Musculoskeletal:  Full ROM without deformity. Neurologic:  No focal sensory/motor deficits. Cranial nerves: II-XII intact. Lymphatic: Deferred  Psychiatric:  Alert and oriented. Vascular: Dorsalis pedis pulses bilaterally palpable 2+. Radial pulses palpable bilaterally 2+.  1-2+ pitting edema up to the level of the knees. Capillary refill<3 seconds  Genitourinary: Deferred. Skin: No visible rashes or lesions. Labs and imaging     Recent Labs     12/25/20 0105   WBC 6.9   HGB 13.0   HCT 39.2        Recent Labs     12/25/20 0105      K 5.3*      CO2 25   BUN 15   CREATININE 0.9   CALCIUM 10.8*     Recent Labs     12/25/20 0105   AST 24   ALT 26   BILITOT 0.5   ALKPHOS 42     Recent Labs     12/25/20 0105   INR 1.56*     No results for input(s): Srinivasan Oiler in the last 72 hours. Urinalysis:   Lab Results   Component Value Date    NITRU NEGATIVE 07/13/2019    WBCUA 15-25 07/13/2019    BACTERIA MANY 07/13/2019    RBCUA 0-2 07/13/2019    BLOODU SMALL 07/13/2019    GLUCOSEU NEGATIVE 07/13/2019       Radiology:     CXR: I have reviewed the CXR with the following interpretation: No acute cardiopulmonary process  EKG:  I have reviewed the EKG with the following interpretation: Rate controlled atrial fibrillation with PVCs      Xr Chest Portable    Result Date: 12/25/2020  PROCEDURE: XR CHEST PORTABLE CLINICAL INFORMATION: cp. COMPARISON: Chest x-ray 23 October 2020. TECHNIQUE: AP upright view of the chest. FINDINGS: There is mild cardiomegaly. This atherosclerotic calcification in the aortic arch. There are no acute pulmonary infiltrates or effusions. The pulmonary vascularity is normal. No suspicious osseous lesions are present.

## 2020-12-25 NOTE — ED NOTES
Pt transported to [facility] on cart in stable condition. Floor contacted before transport. Spoke with Bishop Hoffman.      Erick Strickland  12/25/20 4664

## 2020-12-25 NOTE — ED NOTES
Bed: 006A  Expected date:   Expected time:   Means of arrival:   Comments:  Kashif Moy RN  12/25/20 9461

## 2020-12-25 NOTE — ED TRIAGE NOTES
Pt presents to the ED through triage with c/c chest pain. Pt states the chest pain started this morning and gradually got worse. Pt states pain is currently 2/10 at rest. Pt has hx of afib, currently taking Eliquis for treatment. EKG completed. Tele applied.

## 2020-12-25 NOTE — ED NOTES
Pt resting on cot with eyes closed. Pt denies any needs at this time. Voices no new concerns.  Call light in reach     Siobhan Veras RN  12/25/20 1242

## 2020-12-25 NOTE — ED PROVIDER NOTES
[facility] COMPLAINT       Chief Complaint   Patient presents with    Chest Pain       Nurses Notes reviewed and I agree except as noted in the HPI. HISTORY OF PRESENT ILLNESS    Dustin Childers is a 79 y.o. female. This a 78-year-old female who presents with chest pain while in the process of cooking and preparing for tonight's meal on [place]. She is a patient of Dr. Rosendo Katz. She mainly sees him for atrial fibrillation issues. She says the chest pain is new. It is in the left chest and goes to the shoulder. Last heart cath was a few years ago. No stents. Her EKG looks different today. REVIEW OF SYSTEMS         No coughing, no fever, no abdominal pain    Remainder of review of systems is otherwise reviewed as negative. PAST MEDICAL HISTORY    has a past medical history of Arthritis, Cancer ([facility] [address] Diabetes mellitus ([facility] [address] Hyperlipidemia, Hypertension, Osteoporosis, and Sleep apnea. SURGICAL HISTORY      has a past surgical history that includes Appendectomy; Cardiac catheterization; Dilation and curettage of uterus; Colonoscopy (01/08/2013); Skin cancer excision (03/2017); Colonoscopy (N/A, 5/17/2019); Upper gastrointestinal endoscopy (N/A, 5/17/2019); Injection Procedure For Sacroiliac Joint (Left, 1/24/2020); and Mohs surgery (Left, 6/17/2020). CURRENT MEDICATIONS       Previous Medications    ACETAMINOPHEN (TYLENOL) 650 MG EXTENDED RELEASE TABLET    Take 650 mg by mouth every 8 hours as needed for Pain    APIXABAN (ELIQUIS) 5 MG TABS TABLET    Take 1 tablet by mouth 2 times daily    ASPIRIN 81 MG EC TABLET    Take 1 tablet by mouth daily    BIOTIN PO    Take 1 tablet by mouth daily    CALCITONIN (MIACALCIN) 200 UNIT/ACT NASAL SPRAY    1 spray by Nasal route daily    CHOLECALCIFEROL (VITAMIN D3) 50 MCG (2000 UT) CAPS    Take by mouth daily    CYANOCOBALAMIN (VITAMIN B 12 PO)    Take  by mouth Daily. DIGOXIN (LANOXIN) 125 MCG TABLET    Take 125 mcg by mouth daily    DILTIAZEM (CARDIZEM CD) 120 MG EXTENDED RELEASE CAPSULE    Take 1 capsule daily    DOCUSATE SODIUM (COLACE) 100 MG CAPSULE    Take 100 mg by mouth daily as needed for Constipation    GLUCOSAMINE HCL (GLUCOSAMINE PO)    Take 1,000 mg by mouth daily    JANUVIA 100 MG TABLET    TAKE 1 TABLET BY MOUTH DAILY    LEVOTHYROXINE (SYNTHROID) 25 MCG TABLET    TAKE 1 TABLET BY MOUTH DAILY    METOPROLOL SUCCINATE (TOPROL XL) 25 MG EXTENDED RELEASE TABLET    Take 2 tablets by mouth daily    MULTIPLE VITAMINS-MINERALS (ONE DAILY FOR WOMEN PO)    Take by mouth daily    NITROGLYCERIN (NITROSTAT) 0.4 MG SL TABLET    DISSOLVE ONE TABLET UNDER TONGUE AS NEEDED FOR CHEST PAIN EVERY 5 MINUTES. MAX OF 3 DOSES. IF NO RELIEF AFTER 1 DOSE, CALL 911. OMEPRAZOLE (PRILOSEC) 40 MG DELAYED RELEASE CAPSULE    Take 1 capsule by mouth 2 times daily    PRAVASTATIN (PRAVACHOL) 80 MG TABLET    Take 1 tablet by mouth daily    RAMIPRIL (ALTACE) 5 MG CAPSULE    TAKE 1 CAPSULE BY MOUTH DAILY    SPIRONOLACTONE (ALDACTONE) 50 MG TABLET    Take 50 mg by mouth daily     TRIAMCINOLONE (KENALOG) 0.1 % CREAM    2 times daily as needed    VITAMIN B-12 (CYANOCOBALAMIN) 500 MCG TABLET    Take 500 mcg by mouth daily       ALLERGIES     is allergic to ranolazine. FAMILY HISTORY     She indicated that her mother is . She indicated that her father is . She indicated that her sister is . She indicated that all of her four brothers are . She indicated that the status of her neg hx is unknown.   family history includes Alzheimer's Disease in her mother; Cancer in her sister; Heart Disease in her mother. SOCIAL HISTORY      reports that she has never smoked. She has never used smokeless tobacco. She reports that she does not drink alcohol or use drugs.     PHYSICAL EXAM GLOMERULAR FILTRATION RATE, ESTIMATED - Abnormal; Notable for the following components:    Est, Glom Filt Rate 60 (*)     All other components within normal limits   TROPONIN   BRAIN NATRIURETIC PEPTIDE   DIGOXIN LEVEL   ANION GAP   OSMOLALITY       EMERGENCY DEPARTMENT COURSE:   Vitals:    Vitals:    12/25/20 1255 12/25/20 1345 12/25/20 1452 12/25/20 1533   BP: (!) 133/53 121/67 129/67 127/66   Pulse: 80 68 68 66   Resp: 16 14 16 16   Temp: 98.3 °F (36.8 °C)      TempSrc: Oral      SpO2: 99% 99% 99% 100%   Weight: 243 lb (110.2 kg)      Height: 5' 5\" (1.651 m)        I think her EKG looks a little bit different today and in conjunction with her chest pain probably needs to be ruled out. Her first troponin came back negative. She initially did not want to stay but she is now agreeable. I attempted to reach Dr. Abiel Núñez but I am told that he is apparently out of town and not available for admission so we are arranging admission to the hospitalist service.       CRITICAL CARE:   none         FINAL IMPRESSION    Chest Pain    DISPOSITION/PLAN   Admitted    DISCHARGE MEDICATIONS:  New Prescriptions    No medications on file       (Please note that portions of this note were completed with a voice recognition program.  Efforts were made to edit the dictations but occasionally words are mis-transcribed.)    Marcela Garza, [facility],   12/25/20 6667

## 2020-12-25 NOTE — PROGRESS NOTES
Pt admitted to [phone] in a wheelchair and from ED. Complaints: Chest pain / discomfort. IV none. IV site free of s/s of infection or infiltration. Vital signs obtained. Assessment and data collection initiated. Two nurse skin assessment performed by Terese Da Silva and Gunner Gray RN. Oriented to room. Policies and procedures for 3B explained. This RN discussed hourly rounding with patient addressing 5 P's. Fall prevention and safety brochure discussed with patient. Bed alarm on. Call light in reach. The best day to schedule a follow up Dr appointment is: Wednesday a.m. Explained patients right to have family, representative or physician notified of their admission. Patient has Declined for physician to be notified. Patient has Declined for family/representative to be notified. All questions answered with no further questions at this time.

## 2020-12-26 ENCOUNTER — APPOINTMENT (OUTPATIENT)
Dept: ULTRASOUND IMAGING | Age: 80
End: 2020-12-26
Payer: MEDICARE

## 2020-12-26 LAB
ANION GAP SERPL CALCULATED.3IONS-SCNC: 15 MEQ/L (ref 8–16)
BUN BLDV-MCNC: 15 MG/DL (ref 7–22)
CALCIUM SERPL-MCNC: 9.7 MG/DL (ref 8.5–10.5)
CHLORIDE BLD-SCNC: 100 MEQ/L (ref 98–111)
CHOLESTEROL, TOTAL: 144 MG/DL (ref 100–199)
CO2: 24 MEQ/L (ref 23–33)
CREAT SERPL-MCNC: 0.8 MG/DL (ref 0.4–1.2)
ERYTHROCYTE [DISTWIDTH] IN BLOOD BY AUTOMATED COUNT: 12.7 % (ref 11.5–14.5)
ERYTHROCYTE [DISTWIDTH] IN BLOOD BY AUTOMATED COUNT: 45 FL (ref 35–45)
GFR SERPL CREATININE-BSD FRML MDRD: 69 ML/MIN/1.73M2
GLUCOSE BLD-MCNC: 167 MG/DL (ref 70–108)
GLUCOSE BLD-MCNC: 183 MG/DL (ref 70–108)
GLUCOSE BLD-MCNC: 186 MG/DL (ref 70–108)
GLUCOSE BLD-MCNC: 211 MG/DL (ref 70–108)
GLUCOSE BLD-MCNC: 230 MG/DL (ref 70–108)
HCT VFR BLD CALC: 38.4 % (ref 37–47)
HDLC SERPL-MCNC: 46 MG/DL
HEMOGLOBIN: 12.7 GM/DL (ref 12–16)
LDL CHOLESTEROL CALCULATED: 60 MG/DL
MCH RBC QN AUTO: 31.8 PG (ref 26–33)
MCHC RBC AUTO-ENTMCNC: 33.1 GM/DL (ref 32.2–35.5)
MCV RBC AUTO: 96.2 FL (ref 81–99)
PLATELET # BLD: 221 THOU/MM3 (ref 130–400)
PMV BLD AUTO: 10.8 FL (ref 9.4–12.4)
POTASSIUM REFLEX MAGNESIUM: 4 MEQ/L (ref 3.5–5.2)
RBC # BLD: 3.99 MILL/MM3 (ref 4.2–5.4)
SODIUM BLD-SCNC: 139 MEQ/L (ref 135–145)
TRIGL SERPL-MCNC: 188 MG/DL (ref 0–199)
TROPONIN T: < 0.01 NG/ML
WBC # BLD: 8.5 THOU/MM3 (ref 4.8–10.8)

## 2020-12-26 PROCEDURE — 99205 OFFICE O/P NEW HI 60 MIN: CPT | Performed by: NUCLEAR MEDICINE

## 2020-12-26 PROCEDURE — 84484 ASSAY OF TROPONIN QUANT: CPT

## 2020-12-26 PROCEDURE — 6370000000 HC RX 637 (ALT 250 FOR IP): Performed by: INTERNAL MEDICINE

## 2020-12-26 PROCEDURE — G0378 HOSPITAL OBSERVATION PER HR: HCPCS

## 2020-12-26 PROCEDURE — 36415 COLL VENOUS BLD VENIPUNCTURE: CPT

## 2020-12-26 PROCEDURE — 76705 ECHO EXAM OF ABDOMEN: CPT

## 2020-12-26 PROCEDURE — 94760 N-INVAS EAR/PLS OXIMETRY 1: CPT

## 2020-12-26 PROCEDURE — 80048 BASIC METABOLIC PNL TOTAL CA: CPT

## 2020-12-26 PROCEDURE — 85027 COMPLETE CBC AUTOMATED: CPT

## 2020-12-26 PROCEDURE — 80061 LIPID PANEL: CPT

## 2020-12-26 PROCEDURE — 82948 REAGENT STRIP/BLOOD GLUCOSE: CPT

## 2020-12-26 PROCEDURE — 99226 PR SBSQ OBSERVATION CARE/DAY 35 MINUTES: CPT | Performed by: FAMILY MEDICINE

## 2020-12-26 PROCEDURE — 2580000003 HC RX 258: Performed by: INTERNAL MEDICINE

## 2020-12-26 RX ADMIN — METOPROLOL SUCCINATE 50 MG: 50 TABLET, FILM COATED, EXTENDED RELEASE ORAL at 10:41

## 2020-12-26 RX ADMIN — RAMIPRIL 5 MG: 5 CAPSULE ORAL at 10:41

## 2020-12-26 RX ADMIN — INSULIN LISPRO 4 UNITS: 100 INJECTION, SOLUTION INTRAVENOUS; SUBCUTANEOUS at 12:54

## 2020-12-26 RX ADMIN — PANTOPRAZOLE SODIUM 40 MG: 40 TABLET, DELAYED RELEASE ORAL at 17:46

## 2020-12-26 RX ADMIN — APIXABAN 5 MG: 5 TABLET, FILM COATED ORAL at 10:41

## 2020-12-26 RX ADMIN — LEVOTHYROXINE SODIUM 25 MCG: 25 TABLET ORAL at 06:19

## 2020-12-26 RX ADMIN — SODIUM CHLORIDE, PRESERVATIVE FREE 10 ML: 5 INJECTION INTRAVENOUS at 21:30

## 2020-12-26 RX ADMIN — APIXABAN 5 MG: 5 TABLET, FILM COATED ORAL at 21:30

## 2020-12-26 RX ADMIN — INSULIN LISPRO 1 UNITS: 100 INJECTION, SOLUTION INTRAVENOUS; SUBCUTANEOUS at 21:41

## 2020-12-26 RX ADMIN — INSULIN LISPRO 2 UNITS: 100 INJECTION, SOLUTION INTRAVENOUS; SUBCUTANEOUS at 17:42

## 2020-12-26 RX ADMIN — ASPIRIN 81 MG: 81 TABLET, COATED ORAL at 10:41

## 2020-12-26 RX ADMIN — DILTIAZEM HYDROCHLORIDE 120 MG: 120 CAPSULE, EXTENDED RELEASE ORAL at 10:41

## 2020-12-26 RX ADMIN — PRAVASTATIN SODIUM 80 MG: 80 TABLET ORAL at 21:30

## 2020-12-26 RX ADMIN — SODIUM CHLORIDE, PRESERVATIVE FREE 10 ML: 5 INJECTION INTRAVENOUS at 11:21

## 2020-12-26 RX ADMIN — INSULIN LISPRO 4 UNITS: 100 INJECTION, SOLUTION INTRAVENOUS; SUBCUTANEOUS at 10:41

## 2020-12-26 RX ADMIN — DIGOXIN 125 MCG: 125 TABLET ORAL at 10:41

## 2020-12-26 RX ADMIN — SPIRONOLACTONE 50 MG: 25 TABLET ORAL at 10:41

## 2020-12-26 ASSESSMENT — PAIN SCALES - GENERAL
PAINLEVEL_OUTOF10: 0

## 2020-12-26 NOTE — CONSULTS
[address] [place] [address]                                  CONSULTATION    PATIENT NAME: Layton Fernandez                 :        1940  MED REC NO:   [id number]                           ROOM:       0028  ACCOUNT NO:   [de-identified]                           ADMIT DATE: 2020  PROVIDER:     NAVID Templeton DATE:  2020    REFERRING PROVIDER:  Hospitalist Service. REASON FOR THE CONSULTATION:  Chest pain. HISTORY OF PRESENT ILLNESS:  This is a pleasant 71-year-old patient who  usually follows with Dr. Hannah Mendez, who has a history of nonobstructive  coronary artery disease on a cardiac cath done in 2018, history of  intermittent chronic atrial fibrillation, hypertension, and  hyperlipidemia, comes in with onset of symptoms of epigastric and chest  discomfort, sharp in nature, some element of tightness, no specific  radiation, no specific triggers, not exertional, for which she was  evaluated in the ER without any obvious myocardial infarct by EKG or  cardiac enzymes, was mainly in atrial fibrillation, which is chronic. The patient does have multiple risk factors for coronary artery disease  including diabetes, hypertension, and hyperlipidemia. We were consulted  to assist in management of the patient. REVIEW OF SYSTEMS:  No obvious fever or chills. No hematuria or  dysuria. No abdominal pain, nausea, or vomiting except what is  mentioned above. No obvious active psych problems or suicidal ideation. Looks like she might have some underlying anxiety. No skin rashes. No  obvious dizziness, lightheadedness, or loss of consciousness. No recent  trauma. No bleeding problem. No HEENT-related problems. PAST MEDICAL HISTORY:  1. Nonobstructive coronary artery disease, pretty mild. 2.  AFib. 3.  Hypertension. 4.  Hyperlipidemia. 5.  Diabetes. 6.  Arthritis. ALLERGIES:  RANEXA. CURRENT MEDICATIONS:  Eliquis 5 twice a day, aspirin 81 a day, diltiazem   a day, Lanoxin 0.125 a day, insulin, metoprolol 50 a day, Altace  5 a day, spironolactone 50 a day. SOCIAL HISTORY:  No tobacco, no drugs, no alcohol. FAMILY HISTORY:  Noncontributory. PHYSICAL EXAMINATION:  VITAL SIGNS:  Showed a blood pressure of 130/70 and heart rate of 70. GENERAL APPEARANCE:  Pleasant lady in no obvious acute distress. EYES AND EARS:  No discharge. NECK:  No JVD. No bruits. No masses. LUNGS:  Decreased air entry. No crackles. No wheezes. HEART:  Normal S1 and S2. Systolic murmur grade 2/6. ABDOMEN:  Soft and nontender. Positive bowel sounds. No organomegaly. EXTREMITIES:  No significant edema. NEUROLOGIC:  Grossly intact. Awake and alert. No focal deficits. PSYCHIATRIC:   No evidence of active psychosis. SKIN:  No rashes. LABORATORY STUDIES:  Sodium 139, potassium 4, BUN 15, and creatinine  0.8. White count 8.5, hemoglobin 12.7, hematocrit 38.5, and platelets  908. EKG showed AFib with diffuse ST-T wave changes and PVCs. IMPRESSION:  This is a patient who comes in with atypical chest pain,  possible GI related; however, cannot exclude angina who has multiple  risk factors for coronary artery disease. PLAN:  At this point, plan is as follows:  1. The patient needs consultation from GI especially with her  ultrasound showing gallbladder stones, which could be part of the  symptoms. 2.  In the absence of any other objective findings, I do not think the  patient needs repeat cardiac catheterization, especially that she had a  cath done 2 years ago. However, she potentially could be considered for  a stress test, which could also be done as an outpatient if she  continues to be asymptomatic after her GI workup. She usually follows  with Dr. Bony Lowry. We will have her make arrangement to follow up with  Dr. Bony Lowry.   In the meantime, we will continue with aggressive medical treatment, risk factor modification. Thank you for allowing me to participate in her care.         Zach Rivera M.D.    D: 12/26/2020 9:39:02       T: 12/26/2020 9:50:08     [email or web address]  Job#: [id number]     Doc#: [id number]    CC:

## 2020-12-27 VITALS
TEMPERATURE: 98 F | BODY MASS INDEX: 38.65 KG/M2 | RESPIRATION RATE: 18 BRPM | DIASTOLIC BLOOD PRESSURE: 72 MMHG | SYSTOLIC BLOOD PRESSURE: 120 MMHG | WEIGHT: 232 LBS | HEART RATE: 90 BPM | HEIGHT: 65 IN | OXYGEN SATURATION: 95 %

## 2020-12-27 LAB
ALBUMIN SERPL-MCNC: 3.9 G/DL (ref 3.5–5.1)
ALP BLD-CCNC: 41 U/L (ref 38–126)
ALT SERPL-CCNC: 29 U/L (ref 11–66)
ANION GAP SERPL CALCULATED.3IONS-SCNC: 12 MEQ/L (ref 8–16)
AST SERPL-CCNC: 31 U/L (ref 5–40)
BASOPHILS # BLD: 0.9 %
BASOPHILS ABSOLUTE: 0.1 THOU/MM3 (ref 0–0.1)
BILIRUB SERPL-MCNC: 0.5 MG/DL (ref 0.3–1.2)
BUN BLDV-MCNC: 17 MG/DL (ref 7–22)
CALCIUM SERPL-MCNC: 9.5 MG/DL (ref 8.5–10.5)
CHLORIDE BLD-SCNC: 94 MEQ/L (ref 98–111)
CO2: 22 MEQ/L (ref 23–33)
CREAT SERPL-MCNC: 0.9 MG/DL (ref 0.4–1.2)
EOSINOPHIL # BLD: 3.8 %
EOSINOPHILS ABSOLUTE: 0.2 THOU/MM3 (ref 0–0.4)
ERYTHROCYTE [DISTWIDTH] IN BLOOD BY AUTOMATED COUNT: 12.7 % (ref 11.5–14.5)
ERYTHROCYTE [DISTWIDTH] IN BLOOD BY AUTOMATED COUNT: 45.5 FL (ref 35–45)
GFR SERPL CREATININE-BSD FRML MDRD: 60 ML/MIN/1.73M2
GLUCOSE BLD-MCNC: 233 MG/DL (ref 70–108)
GLUCOSE BLD-MCNC: 234 MG/DL (ref 70–108)
GLUCOSE BLD-MCNC: 238 MG/DL (ref 70–108)
HCT VFR BLD CALC: 39.9 % (ref 37–47)
HEMOGLOBIN: 12.8 GM/DL (ref 12–16)
IMMATURE GRANS (ABS): 0.02 THOU/MM3 (ref 0–0.07)
IMMATURE GRANULOCYTES: 0.3 %
LYMPHOCYTES # BLD: 28 %
LYMPHOCYTES ABSOLUTE: 1.8 THOU/MM3 (ref 1–4.8)
MCH RBC QN AUTO: 31.6 PG (ref 26–33)
MCHC RBC AUTO-ENTMCNC: 32.1 GM/DL (ref 32.2–35.5)
MCV RBC AUTO: 98.5 FL (ref 81–99)
MONOCYTES # BLD: 11.6 %
MONOCYTES ABSOLUTE: 0.7 THOU/MM3 (ref 0.4–1.3)
NUCLEATED RED BLOOD CELLS: 0 /100 WBC
PLATELET # BLD: 212 THOU/MM3 (ref 130–400)
PMV BLD AUTO: 10.8 FL (ref 9.4–12.4)
POTASSIUM SERPL-SCNC: 4.1 MEQ/L (ref 3.5–5.2)
RBC # BLD: 4.05 MILL/MM3 (ref 4.2–5.4)
SEG NEUTROPHILS: 55.4 %
SEGMENTED NEUTROPHILS ABSOLUTE COUNT: 3.5 THOU/MM3 (ref 1.8–7.7)
SODIUM BLD-SCNC: 128 MEQ/L (ref 135–145)
TOTAL PROTEIN: 6.4 G/DL (ref 6.1–8)
WBC # BLD: 6.4 THOU/MM3 (ref 4.8–10.8)

## 2020-12-27 PROCEDURE — 85025 COMPLETE CBC W/AUTO DIFF WBC: CPT

## 2020-12-27 PROCEDURE — 2580000003 HC RX 258: Performed by: INTERNAL MEDICINE

## 2020-12-27 PROCEDURE — 99217 PR OBSERVATION CARE DISCHARGE MANAGEMENT: CPT | Performed by: FAMILY MEDICINE

## 2020-12-27 PROCEDURE — 80053 COMPREHEN METABOLIC PANEL: CPT

## 2020-12-27 PROCEDURE — 82948 REAGENT STRIP/BLOOD GLUCOSE: CPT

## 2020-12-27 PROCEDURE — 36415 COLL VENOUS BLD VENIPUNCTURE: CPT

## 2020-12-27 PROCEDURE — 6370000000 HC RX 637 (ALT 250 FOR IP): Performed by: INTERNAL MEDICINE

## 2020-12-27 PROCEDURE — G0378 HOSPITAL OBSERVATION PER HR: HCPCS

## 2020-12-27 RX ORDER — POLYETHYLENE GLYCOL 3350 17 G/17G
17 POWDER, FOR SOLUTION ORAL DAILY
Status: DISCONTINUED | OUTPATIENT
Start: 2020-12-27 | End: 2020-12-27 | Stop reason: HOSPADM

## 2020-12-27 RX ADMIN — ASPIRIN 81 MG: 81 TABLET, COATED ORAL at 08:39

## 2020-12-27 RX ADMIN — LEVOTHYROXINE SODIUM 25 MCG: 25 TABLET ORAL at 05:06

## 2020-12-27 RX ADMIN — INSULIN LISPRO 4 UNITS: 100 INJECTION, SOLUTION INTRAVENOUS; SUBCUTANEOUS at 08:39

## 2020-12-27 RX ADMIN — SPIRONOLACTONE 50 MG: 25 TABLET ORAL at 08:39

## 2020-12-27 RX ADMIN — DIGOXIN 125 MCG: 125 TABLET ORAL at 08:39

## 2020-12-27 RX ADMIN — METOPROLOL SUCCINATE 50 MG: 50 TABLET, FILM COATED, EXTENDED RELEASE ORAL at 08:39

## 2020-12-27 RX ADMIN — APIXABAN 5 MG: 5 TABLET, FILM COATED ORAL at 08:39

## 2020-12-27 RX ADMIN — SODIUM CHLORIDE, PRESERVATIVE FREE 10 ML: 5 INJECTION INTRAVENOUS at 08:39

## 2020-12-27 RX ADMIN — DILTIAZEM HYDROCHLORIDE 120 MG: 120 CAPSULE, EXTENDED RELEASE ORAL at 08:39

## 2020-12-27 RX ADMIN — RAMIPRIL 5 MG: 5 CAPSULE ORAL at 08:39

## 2020-12-27 ASSESSMENT — PAIN SCALES - GENERAL
PAINLEVEL_OUTOF10: 0
PAINLEVEL_OUTOF10: 0

## 2020-12-27 NOTE — PROGRESS NOTES
AVS reviewed with patient in detail. All follow-up appointment information reviewed with her and all belongings/home meds sent home with the patient. CPAP machine and associated equipment sent home with patient. All questions and concerns addressed. Encouragement and support offered to patient. Taken to waiting vehicle via WC.

## 2020-12-27 NOTE — DISCHARGE SUMMARY
Hospital Medicine Discharge Summary      Patient Identification:   Stella Boateng   : 1940  MRN: [id number]   Account: [de-identified]      Patient's PCP: Veda Lezama DO    Admit Date: 2020     Discharge Date:   2020    Admitting Physician: Hero Jernigan MD     Discharge Physician: Mario Stevenson MD     Discharge Diagnoses: Active Hospital Problems    Diagnosis Date Noted    ANITA on CPAP [G47.33, Z99.89] 2015     Priority: High    Diabetes mellitus ([address] [E11.9] 2012     Priority: Medium    Atypical chest pain [R07.89] 2012     Priority: Low    Chest pain [R07.9] 2020    Chronic atrial fibrillation (HCC) [I48.20]     Peripheral edema [R60.9]     Hypercalcemia [E83.52]     Hyperkalemia [E87.5]     Hypothyroidism [E03.9]        The patient was seen and examined on day of discharge and this discharge summary is in conjunction with any daily progress note from day of discharge. Hospital Course:   Stella Boateng is a 79 y.o. female admitted to [facility] on 2020 for chest pain. Evaluated by Cardio and gastro with no inpt work up at this time. Cardio suspects GI pathology although pt is currently asymptomatic. She has had some sxms at home and has evidence of gallstones on usn. She will f/u as outpt. Electrolytes replaced and recommended repeat labs in 2 days with close follow up with pcp, gi, cardio. 1. Atypical chest pain: cardio consulted. Recommend GI work up as pts usn shows gallstones, cholelithaisis. Consult placed. --> GI recommends outpt work up. Consider outpt stress test if symptomatic. 2. Chronic atrial fibrillation: EKG reviewed and shows rate controled atrial fibrillation.  Patient's amiodarone was recently discontinued. Nikhil Son continue metoprolol, diltiazem, digoxin and apixaban.   Cardiovascular:  Regular rate and rhythm with normal S1/S2 without murmurs, rubs or gallops. Abdomen: Soft, non-tender, non-distended with normal bowel sounds. Musculoskeletal:  No clubbing, cyanosis or edema bilaterally. Full range of motion without deformity. Skin: Skin color, texture, turgor normal.  No rashes or lesions. Neurologic:  Neurovascularly intact without any focal sensory/motor deficits. Cranial nerves: II-XII intact, grossly non-focal.  Psychiatric:  Alert and oriented, thought content appropriate, normal insight  Capillary Refill: Brisk,< 3 seconds   Peripheral Pulses: +2 palpable, equal bilaterally       Labs: For convenience and continuity at follow-up the following most recent labs are provided:      CBC:    Lab Results   Component Value Date    WBC 6.4 12/27/2020    HGB 12.8 12/27/2020    HCT 39.9 12/27/2020     12/27/2020       Renal:    Lab Results   Component Value Date     12/27/2020    K 4.1 12/27/2020    K 4.0 12/26/2020    CL 94 12/27/2020    CO2 22 12/27/2020    BUN 17 12/27/2020    CREATININE 0.9 12/27/2020    CALCIUM 9.5 12/27/2020    PHOS 3.7 04/22/2014         Significant Diagnostic Studies    Radiology:   US GALLBLADDER RUQ   Final Result   1. Hepatomegaly with fatty infiltration of the liver. 2. Cholelithiasis. This document has been electronically signed by: Josee Abrams MD on    12/26/2020 02:02 AM         XR CHEST PORTABLE   Final Result   Stable radiographic appearance of the chest. No evidence of an acute process. **This report has been created using voice recognition software. It may contain minor errors which are inherent in voice recognition technology. **      Final report electronically signed by DR Rosendo Rowland on 12/25/2020 1:38 PM             Consults:     IP CONSULT TO CARDIOLOGY  IP CONSULT TO GI    Disposition: Home  Condition at Discharge: Stable    Code Status:  Full Code     Patient Instructions:    Discharge lab work: Activity: activity as tolerated  Diet: DIET CARDIAC; Carb Control: 5 carb choices (75 gms)/meal; Daily Fluid Restriction: 1800 ml      Follow-up visits:   DO Fabricio Parmar, [address] [facility]  [phone]      As needed    Lexie Drummond MD  [address] [facility] [phone]      As needed         Discharge Medications:      Jesenia Felipe   Home Medication Instructions LXN:[id number]    Printed on:12/27/20 8918   Medication Information                      acetaminophen (TYLENOL) 650 MG extended release tablet  Take 650 mg by mouth every 8 hours as needed for Pain             apixaban (ELIQUIS) 5 MG TABS tablet  Take 1 tablet by mouth 2 times daily             aspirin 81 MG EC tablet  Take 1 tablet by mouth daily             BIOTIN PO  Take 1 tablet by mouth daily             calcitonin (MIACALCIN) 200 UNIT/ACT nasal spray  1 spray by Nasal route daily             Cholecalciferol (VITAMIN D3) 50 MCG (2000 UT) CAPS  Take by mouth daily             Cyanocobalamin (VITAMIN B 12 PO)  Take  by mouth Daily. digoxin (LANOXIN) 125 MCG tablet  Take 125 mcg by mouth daily             dilTIAZem (CARDIZEM CD) 120 MG extended release capsule  Take 1 capsule daily             docusate sodium (COLACE) 100 MG capsule  Take 100 mg by mouth daily as needed for Constipation             Glucosamine HCl (GLUCOSAMINE PO)  Take 1,000 mg by mouth daily             JANUVIA 100 MG tablet  TAKE 1 TABLET BY MOUTH DAILY             levothyroxine (SYNTHROID) 25 MCG tablet  TAKE 1 TABLET BY MOUTH DAILY             metoprolol succinate (TOPROL XL) 25 MG extended release tablet  Take 2 tablets by mouth daily             Multiple Vitamins-Minerals (ONE DAILY FOR WOMEN PO)  Take by mouth daily             nitroGLYCERIN (NITROSTAT) 0.4 MG SL tablet  DISSOLVE ONE TABLET UNDER TONGUE AS NEEDED FOR CHEST PAIN EVERY 5 MINUTES. MAX OF 3 DOSES. IF NO RELIEF AFTER 1 DOSE, CALL 911. omeprazole (PRILOSEC) 40 MG delayed release capsule  Take 1 capsule by mouth 2 times daily             pravastatin (PRAVACHOL) 80 MG tablet  Take 1 tablet by mouth daily             ramipril (ALTACE) 5 MG capsule  TAKE 1 CAPSULE BY MOUTH DAILY             spironolactone (ALDACTONE) 50 MG tablet  Take 50 mg by mouth daily              triamcinolone (KENALOG) 0.1 % cream  2 times daily as needed             vitamin B-12 (CYANOCOBALAMIN) 500 MCG tablet  Take 500 mcg by mouth daily                 Time Spent on discharge is more than 30 minutes in the examination, evaluation, counseling and review of medications and discharge plan. Signed: Thank you Raad Galan DO for the opportunity to be involved in this patient's care.     Electronically signed by Francesco Luz MD on 12/27/2020 at 11:18 AM

## 2020-12-28 ENCOUNTER — TELEPHONE (OUTPATIENT)
Dept: FAMILY MEDICINE CLINIC | Age: 80
End: 2020-12-28

## 2020-12-28 NOTE — CONSULTS
[address] [place] [address]                                  CONSULTATION    PATIENT NAME: Silvina Montanez                 :        1940  MED REC NO:   [id number]                           ROOM:       0028  ACCOUNT NO:   [de-identified]                           ADMIT DATE: 2020  PROVIDER:     NAVID Jordan Go:  2020    REASON FOR CONSULTATION:  For further evaluation of gallstones seen on  ultrasound. HISTORY OF PRESENT ILLNESS:  The patient is a 81-year-old pleasant  female who has past medical history of hypertension; diabetes mellitus;  chronic atrial fibrillation, on apixaban, who had EGD and colonoscopy by  me on 2019 for blood in the stool and anemia, who presented to the  emergency room with one week history of intermittent chest pain, lasting  for several seconds. Pain intermittent, not associated with eating. Denied any nausea. Denied any painful swallowing. Denied abdominal  pain, vomiting or diarrhea. The patient was started on treatment for  lymphedema. The patient also gets constipated easily. Whenever she  gets constipated, she noticed blood around the stool. No blood inside  the stool. Denied any weight loss. No family history of gallbladder  disease. Because of worsening of symptoms, she was admitted to the  hospital.  Blood workup showed sodium 128, potassium 4.1, chloride 94,  CO2 of 22, BUN 17, creatinine 0.9, blood glucose 233. Total protein  6.4, albumin 3.9, alkaline phosphatase 41, ALT 29, AST 31, total  bilirubin 0.5. WBC 6.4, hemoglobin 12.8, hematocrit 39.9, MCV 98.5,  platelet count 014,499. Ultrasound of the gallbladder was reported by  the radiologist as hepatomegaly with fatty infiltration of the liver. No intrahepatic biliary ductal dilation. Common bile duct is 5.6 mm in  diameter. Gallbladder contained multiple small stones.   No sonographic Rene sign. Gallbladder wall thickness 2 mm. Because of the  gallstones, we were consulted for further evaluation. The patient never  had any biliary colic type of symptoms before. PAST MEDICAL HISTORY:  Arthritis, skin cancer, diabetes mellitus,  non-insulin requiring diabetes mellitus, hyperlipidemia, hypertension,  osteoporosis, sleep apnea, internal hemorrhoids, diverticulosis. PAST SURGICAL HISTORY:  Appendectomy, colonoscopy by me on 05/17/2019,  dilation and curettage of the uterus, sacroiliac joint injection, Mohs  surgery, skin cancer excision, EGD by me on 05/17/2019. HOME MEDICATIONS:  Omeprazole 40 mg delayed release capsule one capsule  by mouth two times daily, Januvia 100 mg one tablet by mouth daily,  metoprolol succinate 25 mg two tablets by mouth daily, digoxin 125 mcg  by mouth daily, levothyroxine 25 mcg one tablet by mouth daily,  pravastatin 80 mg one tablet by mouth daily, calcitonin 200 units, _____  nasal spray one spray by nasal route daily, apixaban (Eliquis) 5 mg one  tablet by mouth two times daily, ramipril 5 mg one capsule by mouth  daily, triamcinolone 0.1% every two times daily as needed, docusate  sodium 100 mg by mouth daily as needed, glucosamine 1000 mg daily,  cholecalciferol 50 mcg by mouth daily, multivitamin with mineral one  tablet daily, diltiazem one capsule by mouth daily, spironolactone 50 mg  by mouth daily, aspirin 81 mg by mouth daily, cyanocobalamin (vitamin  B12) one tablet by mouth daily, nitroglycerin sublingual 0.4 mg  sublingual tablet under the tongue as needed for chest pain,  acetaminophen 650 mg by mouth every eight hours as needed, vitamin B12  at 500 mcg by mouth daily. ALLERGIES:  RANOLAZINE. FAMILY HISTORY:  Mother had Alzheimer's disease. Mother had heart  disease. Sister had throat cancer. SOCIAL HISTORY:  The patient is . The patient had four children. Denied any tobacco.  No alcohol. No illicit drug use. REVIEW OF SYSTEMS:  14-point review of systems was reviewed. Pertinent  positives were mentioned in HPI and past medical history, otherwise  noncontributory. PHYSICAL EXAMINATION:  VITAL SIGNS:  Blood pressure 148/78, pulse 66, temperature 97.9,  respiratory rate 18, BMI 40.44 kg/m2. GENERAL:  A 71-year-old pleasant female lying in bed, well built,  appears to be in no acute distress. HEENT:  Normocephalic, atraumatic. Pupils are equal, round, and react  to light. Anicteric sclerae. No erythema of oropharynx. NECK:  Supple. No JVD. No thyromegaly. CHEST:  No axillary or supraclavicular adenopathy. LUNGS:  Equal to expansion on inspection. Keyona Concepcion air entry bilaterally. HEART:  Regular. Normal S1 and S2. No S3 or murmurs. ABDOMEN:  Soft. Normoactive bowel sounds. Nontender. Melina Child sign is  negative. No palpable masses. No appreciable hernias. No rebound or  guarding. RECTAL:  Deferred. EXTREMITIES:  Trace edema of the extremities. SKIN:  No skin rash. NEURO:  The patient is alert. No gross neuro deficits. DIAGNOSTIC DATA:  As in HPI. IMPRESSION:  3  A 71-year-old pleasant female who had asymptomatic gallstones. Her  liver enzymes are normal.  No need for any further workup. Because of  asymptomatic gallstones, no further workup is indicated. I have  discussed with the patient one in a million chance of those things they  may migrate into bile duct, that is when we will need to intervene. At  this time, no need for any intervention. 2.  Fatty liver of the disease. Discussed with the patient various  methods of high protein, low cholesterol, low-fat diet. 3.  Blood in the stool, most likely hemorrhoidal.  The patient had  colonoscopy by me on 05/17/2019, showed hemorrhoids and mild  diverticulosis. No colonoscopy needed. 4.  Atrial fibrillation. 5.  Multiple comorbid conditions.     RECOMMENDATIONS:  At this time is antireflux instructions, MiraLax 17 gm p.o. at bedtime, low-fat diet. No further intervention needed at this  time. If clinical condition changes, then consider re-evaluation. Thank you Dr. Shahbaz Russo for letting me see the patient. Do not hesitate  to call me if you have any questions. My recommendations are above.         Laura South M.D.    D: 12/27/2020 9:26:27       T: 12/27/2020 9:30:52     [email or web address]  Job#: [id number]     Doc#: [id number]    CC:  Xochitl Galvin MD Vonne Albert, M.D. Darral Frater, M.D.

## 2021-01-04 ENCOUNTER — VIRTUAL VISIT (OUTPATIENT)
Dept: FAMILY MEDICINE CLINIC | Age: 81
End: 2021-01-04
Payer: MEDICARE

## 2021-01-04 DIAGNOSIS — I50.31 ACUTE DIASTOLIC (CONGESTIVE) HEART FAILURE (HCC): ICD-10-CM

## 2021-01-04 DIAGNOSIS — E87.5 HYPERKALEMIA: ICD-10-CM

## 2021-01-04 DIAGNOSIS — E66.01 MORBID OBESITY WITH BMI OF 40.0-44.9, ADULT (HCC): ICD-10-CM

## 2021-01-04 DIAGNOSIS — G47.33 OBSTRUCTIVE SLEEP APNEA ON CPAP: ICD-10-CM

## 2021-01-04 DIAGNOSIS — I10 ESSENTIAL HYPERTENSION: ICD-10-CM

## 2021-01-04 DIAGNOSIS — I89.0 LYMPHEDEMA: ICD-10-CM

## 2021-01-04 DIAGNOSIS — E83.52 HYPERCALCEMIA: ICD-10-CM

## 2021-01-04 DIAGNOSIS — I48.0 PAROXYSMAL ATRIAL FIBRILLATION (HCC): ICD-10-CM

## 2021-01-04 DIAGNOSIS — R07.89 ATYPICAL CHEST PAIN: Primary | ICD-10-CM

## 2021-01-04 DIAGNOSIS — Z99.89 OBSTRUCTIVE SLEEP APNEA ON CPAP: ICD-10-CM

## 2021-01-04 PROCEDURE — 99495 TRANSJ CARE MGMT MOD F2F 14D: CPT | Performed by: FAMILY MEDICINE

## 2021-01-04 PROCEDURE — 1111F DSCHRG MED/CURRENT MED MERGE: CPT | Performed by: FAMILY MEDICINE

## 2021-01-04 NOTE — PROGRESS NOTES
2. Chronic atrial fibrillation: EKG reviewed and shows rate controled atrial fibrillation. Patient's amiodarone was recently discontinued. Patric Frederick continue metoprolol, diltiazem, digoxin and apixaban.    3. Peripheral edema with suspected acute diastolic congestive heart failure secondary to atrial fibrillation: Echocardiogram reviewed from 10/26/2020 and showed EF of 50% with no regional wall motion abnormalities. Patient states that she is currently being treated for lymphedema and wears compression stockings. On metoprolol, ramipril,  Pravastatin and apixaban  4. Hypercalcemia: takes calcitonin. Continue. Check ionized calcium. Monitor closely after Lasix given. Improved. States she is not sure why she is on hypercalcemia.   5. Hyperkalemia: Give Kayexalate and Lasix. Improved. 6. DM Type 2: ISS. Hold home medications  7. Hypothyroidism: Levothyroxine  8. GERD: Continue Protonix  9. ANITA wears CPAP  10. Essential HTN: continue ramipril  11. Hyperlipidemia: pravasatin  12. Morbid obesity: BMI 40.4  13. Lymphedema: outpt management by pcp already underway    Pt is feeling much better at this time. Denies CP, abd pain. Follow up GI prn. Cardio follow up scheduled. No acute concerns for me today. She is due for repeat labs, plans to do later this week. Allergies   Allergen Reactions    Ranolazine Hives     Pt was on brand Ranexa     No outpatient medications have been marked as taking for the 1/4/21 encounter (Virtual Visit) with Natacha Schmidt DO. There were no vitals filed for this visit. There is no height or weight on file to calculate BMI.      Wt Readings from Last 3 Encounters:   12/27/20 232 lb (105.2 kg)   11/24/20 241 lb 12.8 oz (109.7 kg)   10/26/20 239 lb 3.2 oz (108.5 kg)     BP Readings from Last 3 Encounters:   12/27/20 120/72   11/24/20 118/60   10/26/20 130/82 Patient was admitted to [address] [place] [address] from 12/25/20 to 12/2720 for CP, electrolyte abnormality. Inpatient course: Discharge summary reviewed- see chart. Current status: improved    Review of Systems:  A comprehensive review of systems was negative except for what was noted in the HPI. Physical Exam:  General Appearance: alert and oriented to person, place and time, well developed and well- nourished, in no acute distress  Skin: warm and dry, no rash or erythema  Head: normocephalic and atraumatic  Neck: supple   Pulmonary/Chest:  no respiratory distress    Initial post-discharge communication occurred between medical assistant and patient on 12/28/20- see documentation in chart: telephone encounter.     Assessment/Plan:  Zeenat Riddle was seen today for follow-up from hospital.    Diagnoses and all orders for this visit:    Atypical chest pain    Paroxysmal atrial fibrillation (HCC)    Acute diastolic (congestive) heart failure (HCC)    Hypercalcemia    Hyperkalemia    Morbid obesity with BMI of 40.0-44.9, adult (HCC)    Obstructive sleep apnea on CPAP    Essential hypertension    Lymphedema          Diagnostic test results reviewed: inpatient labs, EKG, x-ray-cxr and US-GB    Patient risk of morbidity and mortality: high    Medical Decision Making: high complexity    -  Chronic medical problems stable  -  Continue current medications  -  Follow up with specialists as scheduled  -  Check labs, will call with results  -  RAJEEV

## 2021-01-08 ENCOUNTER — TELEPHONE (OUTPATIENT)
Dept: FAMILY MEDICINE CLINIC | Age: 81
End: 2021-01-08

## 2021-01-08 NOTE — TELEPHONE ENCOUNTER
Yesi with [employer] called they are working on getting pt compression pumps for her legs they are asking if we can fax them the last 2-3 OV notes to [phone]. Please advise.

## 2021-02-02 ENCOUNTER — TELEPHONE (OUTPATIENT)
Dept: FAMILY MEDICINE CLINIC | Age: 81
End: 2021-02-02

## 2021-02-02 NOTE — TELEPHONE ENCOUNTER
Hospitalist Progress Note


Assessment/Plan: 


Assessment:  60-year-old male presents with acute encephalopathy in setting of 

MRSA bacteremia





Plan:





1. Acute encephalopathy.  Evidenced by global brain dysfunction characterized 

as confusion, disorientation, found down, all of which are acute changes from 

his baseline, most likely secondary to the toxic effects of infection


-treat infection


-mental status at baseline


-likely will require SNF for asst w/ ADLs/IV abx s/p discharge





2. MRSA bacteremia. POA, unclear source although LUE skin tear possible


-d/w Dr. Middleton, she reports CT abd w/ colitis, but no overt source of MRSA 

infxn


-L-spine/T-spine MRI w/o abscess


-repeat BCx pending





3. History of C diff colitis.  Per chart review, there were reports of diarrhea 

and vomiting prior to presentation, the patient's ability to provide history 

was impaired


-given colitis on CT, cont PO Vanco q6





4. Chronic cirrhosis.  Secondary to alcohol intake and hepatitis C virus, 

patient reports that he has been sober for the last 6 months


-MELD 11, low risk of short-term mortality specifically from liver disease


-counseled the patient regarding goals of care today, and it seems that his 

primary goal is to get home to his apt, but he endorses that he would like to 

live as long as possible, so hospice consideration does not seem in line w/ his 

stated goals today


-that said, palliative consultation seems appropriate, as the patient is asking 

what death from end-stage liver disease looks like, and I shared with him to 

the best of my ability


-I counseled him that an outpatient palliative consultation in his home might 

serve him best, to determine if enrolling in a palliative service is right for 

him, so that when he does begin to decompensate from ESLD, his care can 

smoothly transition to hospice benefits and he can die comfortably at home, 

which IS what he wants





5. Chronic pain with continuous opiate dependency.  Patient reports that he 

chronically takes oxycodone and tramadol for abdominal pain, which he 

associates with his cirrhosis


-PRN oxy IR





6. Anemia of chronic inflammatory disease.  Fecal occult blood test negative, 

secondary to liver disease, continue monitor CBC





Diet.  Regular as tolerates, SLP eval





Prophylaxis.  High risk patient, Lovenox 40





Code. DNR





Disposition.  Anticipated discharge is uncertain this time, anticipated length 

stay greater than 48 hr, for further workup and treatment of condition outlined 

above.











Subjective: patient reports his pain in back and abd "comes and goes"


Objective: 


 Vital Signs











Temp Pulse Resp BP Pulse Ox


 


 36.8 C   77   18   107/72   98 


 


 12/07/17 16:00  12/07/17 16:00  12/07/17 16:00  12/07/17 16:00  12/07/17 16:00








 Laboratory Results





 12/06/17 04:45 





 12/07/17 04:50 





 











 12/06/17 12/07/17 12/08/17





 05:59 05:59 05:59


 


Intake Total 1794 3018 


 


Balance 1794 3018 








 











PT  16.4 SEC (12.0-15.0)  H  12/04/17  12:20    


 


INR  1.30  (0.83-1.16)  H  12/04/17  12:20    














- Time Spent With Patient


Time Spent with Patient: greater than 35 minutes


Time Spent with Patient: Greater than 35 minutes spent on this patients care, 

greater than 50% of time spent counseling, educating, and coordinating care 

regarding the above mentioned plan.





- Physical Exam


Constitutional: no apparent distress, chronically ill appearing, No 

uncomfortable


Cardiovascular: systolic murmur (I/VI at sternum), No irregularly irregular, No 

tachycardia, No edema


Respiratory: no respiratory distress, no rales or rhonchi, clear to auscultation


Gastrointestinal: normoactive bowel sounds, tenderness (mild to mod depth 

palpation), No guarding


Skin: other (ulceration/abrasion on LUE dried and scabbing)


Neurologic: sensation intact bilaterally, other (AAOx2 (person and place)), No 

weakness (motor 5/5 bilat LE)


Psychiatric: not anxious, thought process linear, flat affect, No agitated





ICD10 Worksheet


Patient Problems: 


 Problems











Problem Status Onset


 


Altered mental status Acute  


 


Pneumonia Acute  


 


Alcohol dependence Active  


 


Cellulitis Active  


 


Kidney disease Active  


 


Necrotizing fasciitis Active  


 


Acute renal failure Acute  


 


C. difficile diarrhea Acute  ~05/12/17


 


Dyspnea Acute  


 


Edema extremities Acute  


 


HTN (hypertension) Acute  


 


Hepatorenal failure Acute  


 


Hyperammonemia Acute  


 


Hyperbilirubinemia Acute  


 


Hyperkalemia Acute  


 


Liver cirrhosis Acute  


 


MRSA (methicillin resistant staph aureus) culture positive Acute  07/21/14


 


Palliative care encounter Acute  


 


Squamous cell cancer of skin of right shoulder Acute  


 


Squamous cell carcinoma Acute  


 


Weakness Acute  


 


Alcoholism Chronic  


 


Gait instability Chronic  


 


Hepatitis C Chronic  


 


Low back pain Chronic Patient notified and understanding voiced.

## 2021-02-03 ENCOUNTER — TELEPHONE (OUTPATIENT)
Dept: FAMILY MEDICINE CLINIC | Age: 81
End: 2021-02-03

## 2021-02-03 DIAGNOSIS — J40 TRACHEOBRONCHITIS: ICD-10-CM

## 2021-02-03 RX ORDER — ALBUTEROL SULFATE 90 UG/1
2 AEROSOL, METERED RESPIRATORY (INHALATION) EVERY 4 HOURS PRN
Qty: 1 INHALER | Refills: 5 | Status: SHIPPED | OUTPATIENT
Start: 2021-02-03 | End: 2021-02-03

## 2021-02-03 RX ORDER — ALBUTEROL SULFATE 90 UG/1
2 AEROSOL, METERED RESPIRATORY (INHALATION) EVERY 4 HOURS PRN
Qty: 1 INHALER | Refills: 5 | Status: SHIPPED | OUTPATIENT
Start: 2021-02-03

## 2021-02-03 NOTE — TELEPHONE ENCOUNTER
Pt called office requesting a refill of her Ventolin inhaler to [facility]. If no call back, she will check with the pharmacy after 2pm. Refill if appropriate.

## 2021-02-19 ENCOUNTER — VIRTUAL VISIT (OUTPATIENT)
Dept: FAMILY MEDICINE CLINIC | Age: 81
End: 2021-02-19
Payer: MEDICARE

## 2021-02-19 ENCOUNTER — TELEPHONE (OUTPATIENT)
Dept: FAMILY MEDICINE CLINIC | Age: 81
End: 2021-02-19

## 2021-02-19 DIAGNOSIS — K08.89 PAIN, DENTAL: ICD-10-CM

## 2021-02-19 DIAGNOSIS — R22.0 FACIAL SWELLING: Primary | ICD-10-CM

## 2021-02-19 PROCEDURE — 99442 PR PHYS/QHP TELEPHONE EVALUATION 11-20 MIN: CPT | Performed by: FAMILY MEDICINE

## 2021-02-19 RX ORDER — AMOXICILLIN 500 MG/1
500 CAPSULE ORAL 3 TIMES DAILY
Qty: 30 CAPSULE | Refills: 0 | Status: SHIPPED | OUTPATIENT
Start: 2021-02-19 | End: 2021-03-01

## 2021-02-19 ASSESSMENT — ENCOUNTER SYMPTOMS
RESPIRATORY NEGATIVE: 1
GASTROINTESTINAL NEGATIVE: 1
FACIAL SWELLING: 1

## 2021-02-19 NOTE — PROGRESS NOTES
Alena Olea (:  1940) is a 79 y.o. female,Established patient, here for evaluation of the following chief complaint(s): Facial Swelling and Dental Pain    Sadiq Reinoso is a 79 y.o. female evaluated via telephone on 2021. Consent:  She and/or health care decision maker is aware that that she may receive a bill for this telephone service, depending on her insurance coverage, and has provided verbal consent to proceed: Yes      Documentation:  I communicated with the patient and/or health care decision maker about facial swelling, dental pain. Details of this discussion including any medical advice provided: see A/P      I affirm this is a Patient Initiated Episode with a Patient who has not had a related appointment within my department in the past 7 days or scheduled within the next 24 hours. Patient identification was verified at the start of the visit: Yes    Total Time: minutes: 11-20 minutes    Note: not billable if this call serves to triage the patient into an appointment for the relevant concern      Kassy Melissa:  HPI:    Chief Complaint   Patient presents with    Facial Swelling    Dental Pain       Pt presents for telephone encounter for right sided face swelling for the last 3 days. The swelling is getting worse per pt. She believes that the swelling is related to a bad tooth but she cannot get into her Dentist until next week. No fevers. No facial redness or abscess formation. Patient Active Problem List   Diagnosis    Hyperlipidemia    HTN (hypertension)    Osteopenia    GERD (gastroesophageal reflux disease)    Reactive airway disease    OA (osteoarthritis)    Atypical chest pain    Diabetes mellitus ([facility] [address]    Obesity (BMI 30-39. 9)    Chronic low back pain    Neuropathy    ANITA on CPAP    Controlled type 2 diabetes mellitus without complication (HCC)    Persistent atrial fibrillation (HCC)  Paroxysmal atrial fibrillation (HCC)    Abnormal nuclear stress test    Influenza    Atrial fibrillation with rapid ventricular response (HCC)    Sacroiliac inflammation (HCC)    Morbid obesity (HCC)    Chest pain, moderate coronary artery risk    Chest pain    Chronic atrial fibrillation (HCC)    Peripheral edema    Hypercalcemia    Hyperkalemia    Hypothyroidism     Past Surgical History:   Procedure Laterality Date    APPENDECTOMY      CARDIAC CATHETERIZATION      two cath    COLONOSCOPY  01/08/2013    COLONOSCOPY N/A 5/17/2019    COLONOSCOPY DIAGNOSTIC performed by Zunilda Flores MD at [facility]      [address] [place] Left 1/24/2020    LEFT SI MBB #1 - NO STEROID performed by Bard Vince MD at [address] Left 6/17/2020    MOHS DEFECT REPAIR SCC LEFT MEDIAL HAND WITH SKIN GRAFT FROM LEFT UPPER ARM performed by Bubba Summers MD at [place] [address]  03/2017    on face, left side    UPPER GASTROINTESTINAL ENDOSCOPY N/A 5/17/2019    EGD BIOPSY performed by Zunilda Flores MD at [facility]         Review of Systems   Constitutional: Negative. HENT: Positive for dental problem and facial swelling. Respiratory: Negative. Cardiovascular: Negative. Gastrointestinal: Negative. Musculoskeletal: Negative. All other systems reviewed and are negative. No flowsheet data found. Physical Exam  Constitutional:       General: She is not in acute distress. Pulmonary:      Effort: Pulmonary effort is normal. No respiratory distress. Neurological:      Mental Status: Mental status is at baseline. Psychiatric:         Mood and Affect: Mood normal.         Behavior: Behavior normal.         Thought Content: Thought content normal.         Judgment: Judgment normal.       ASSESSMENT/PLAN:  1.  Facial swelling -     amoxicillin (AMOXIL) 500 MG capsule; Take 1 capsule by mouth 3 times daily for 10 days, Disp-30 capsule, R-0Normal  2. Pain, dental    -  rx for abx sent  -  Follow up with Dentist next week  -  UC over weekend if worsening symptoms    Return if symptoms worsen or fail to improve. On this date 02/19/21 I have spent 11-20 minutes reviewing previous notes, test results and face to face (virtual) with the patient discussing the diagnosis and importance of compliance with the treatment plan as well as documenting on the day of the visit. Mindi Padilla is a 79 y.o. female being evaluated by a Virtual Visit (video visit) encounter to address concerns as mentioned above. A caregiver was present when appropriate. Due to this being a TeleHealth encounter (During [facility] EROY-03 public health emergency), evaluation of the following organ systems was limited: Vitals/Constitutional/EENT/Resp/CV/GI//MS/Neuro/Skin/Heme-Lymph-Imm. Pursuant to the emergency declaration under the [address] [place] [address] and the [employer] Act, this Virtual Visit was conducted with patient's (and/or legal guardian's) consent, to reduce the patient's risk of exposure to COVID-19 and provide necessary medical care. The patient (and/or legal guardian) has also been advised to contact this office for worsening conditions or problems, and seek emergency medical treatment and/or call 911 if deemed necessary. Patient identification was verified at the start of the visit: Yes    Services were provided through a video synchronous discussion virtually to substitute for in-person clinic visit. Patient was located at home and provider was located in office or at home. An electronic signature was used to authenticate this note.     --Chester Angelo,

## 2021-02-19 NOTE — TELEPHONE ENCOUNTER
Pt came to office. She has right lower jaw swelling/ tooth pain. She called her dentist and cannot get in until Tuesday next week. PT wants to know if an antibiotic can be sent in and what she can take for the pain.  (Imer Verduzco)

## 2021-02-25 ENCOUNTER — OFFICE VISIT (OUTPATIENT)
Dept: PULMONOLOGY | Age: 81
End: 2021-02-25
Payer: MEDICARE

## 2021-02-25 VITALS
HEIGHT: 66 IN | HEART RATE: 69 BPM | WEIGHT: 240 LBS | BODY MASS INDEX: 38.57 KG/M2 | SYSTOLIC BLOOD PRESSURE: 118 MMHG | DIASTOLIC BLOOD PRESSURE: 62 MMHG | OXYGEN SATURATION: 96 % | TEMPERATURE: 97.6 F

## 2021-02-25 DIAGNOSIS — E66.9 OBESITY (BMI 30-39.9): ICD-10-CM

## 2021-02-25 DIAGNOSIS — Z99.89 OBSTRUCTIVE SLEEP APNEA ON CPAP: Primary | ICD-10-CM

## 2021-02-25 DIAGNOSIS — G47.33 OBSTRUCTIVE SLEEP APNEA ON CPAP: Primary | ICD-10-CM

## 2021-02-25 PROCEDURE — 1090F PRES/ABSN URINE INCON ASSESS: CPT | Performed by: NURSE PRACTITIONER

## 2021-02-25 PROCEDURE — G8427 DOCREV CUR MEDS BY ELIG CLIN: HCPCS | Performed by: NURSE PRACTITIONER

## 2021-02-25 PROCEDURE — 1036F TOBACCO NON-USER: CPT | Performed by: NURSE PRACTITIONER

## 2021-02-25 PROCEDURE — G8417 CALC BMI ABV UP PARAM F/U: HCPCS | Performed by: NURSE PRACTITIONER

## 2021-02-25 PROCEDURE — 1123F ACP DISCUSS/DSCN MKR DOCD: CPT | Performed by: NURSE PRACTITIONER

## 2021-02-25 PROCEDURE — 4040F PNEUMOC VAC/ADMIN/RCVD: CPT | Performed by: NURSE PRACTITIONER

## 2021-02-25 PROCEDURE — G8484 FLU IMMUNIZE NO ADMIN: HCPCS | Performed by: NURSE PRACTITIONER

## 2021-02-25 PROCEDURE — G8399 PT W/DXA RESULTS DOCUMENT: HCPCS | Performed by: NURSE PRACTITIONER

## 2021-02-25 PROCEDURE — 99214 OFFICE O/P EST MOD 30 MIN: CPT | Performed by: NURSE PRACTITIONER

## 2021-02-25 ASSESSMENT — ENCOUNTER SYMPTOMS
COUGH: 0
WHEEZING: 0
EYES NEGATIVE: 1
RESPIRATORY NEGATIVE: 1
ALLERGIC/IMMUNOLOGIC NEGATIVE: 1
GASTROINTESTINAL NEGATIVE: 1
SHORTNESS OF BREATH: 0

## 2021-02-25 NOTE — PROGRESS NOTES
[facility]      Navin Schwab         [id number]  2/25/2021   Chief Complaint   Patient presents with    Follow-up     Last seen 2/17/2020 with Niki        Pt of Dr. Belkis Galo     PAP Download:   Original or initial AHI: 39.8     Date of initial study: 10/11/10      Compliant  100%     Noncompliant  %     PAP Type  Cpap Level  9   Avg Hrs/Day 8 hours 28 minutes   AHI: 2.4   Recorded compliance dates , 1/25/21  to 2/23/21   Machine/Mfg:   [] ResMed    [x] Respironics/Dreamstation   Interface:   [] Nasal    [] Nasal pillows   [] FFM      Provider:      [x] [facility]     [][employer]     [] [employer]    [] Dyan Humphrey    [] [facility]               [] [facility]      [] [employer]    [] [address]      [] Other    Neck Size:16   Mallampati 3  ESS:  10  SAQLI: 72    Here is a scan of the most recent download:            Presentation:   Mekhi Jackson presents for sleep medicine follow up for obstructive sleep apnea  Since the last visit, Mekhi Jackson has been compliant with her PAP and still benefiting from current therapy. Recently seen Dr. Oneida Reddy in the hospital where he asked the patient be seen regarding her CPAP because patient states she has had a couple of times that she found it very hard to wake herself up (doesn't happen often). AHI today is 2.7 and very little leak no issues seen with CPAP currently. Patient denies taking anything night before that would have made her drowsy in the AM.     Equipment issues: The pressure is  acceptable, the mask is acceptable     Sleep issues:  Do you feel better? Yes  More rested? Yes   Better concentration? yes    Progress History:   Since last visit any new medical issues? No  New ER or hospitlal visits? Yes 12/25/20- Afib related  Any new or changes in medicines? No  Any new sleep medicines?  No        Past Medical History:   Diagnosis Date    Arthritis     Cancer ([facility] [address]     skin ca    Diabetes mellitus ([facility] [address]     Hyperlipidemia  Hypertension     Osteoporosis 2017    Sleep apnea     has CPAP       Past Surgical History:   Procedure Laterality Date    APPENDECTOMY      CARDIAC CATHETERIZATION      two cath    COLONOSCOPY  01/08/2013    COLONOSCOPY N/A 5/17/2019    COLONOSCOPY DIAGNOSTIC performed by Cooper Arredondo MD at [facility] INJECTION PROCEDURE FOR SACROILIAC JOINT Left 1/24/2020    LEFT SI MBB #1 - NO STEROID performed by Kamila Byrne MD at [address] Left 6/17/2020    MOHS DEFECT REPAIR SCC LEFT MEDIAL HAND WITH SKIN GRAFT FROM LEFT UPPER ARM performed by Corina Varghese MD at [place] [address]  03/2017    on face, left side    UPPER GASTROINTESTINAL ENDOSCOPY N/A 5/17/2019    EGD BIOPSY performed by Cooper Arredondo MD at [facility]       Social History     Tobacco Use    Smoking status: Never Smoker    Smokeless tobacco: Never Used   Substance Use Topics    Alcohol use: No     Alcohol/week: 0.0 standard drinks    Drug use: No       Allergies   Allergen Reactions    Ranolazine Hives     Pt was on brand Ranexa       Current Outpatient Medications   Medication Sig Dispense Refill    amoxicillin (AMOXIL) 500 MG capsule Take 1 capsule by mouth 3 times daily for 10 days 30 capsule 0    albuterol sulfate HFA (PROVENTIL HFA) 108 (90 Base) MCG/ACT inhaler Inhale 2 puffs into the lungs every 4 hours as needed for Wheezing or Shortness of Breath 1 Inhaler 5    levothyroxine (SYNTHROID) 25 MCG tablet TAKE 1 TABLET BY MOUTH DAILY 90 tablet 3    omeprazole (PRILOSEC) 40 MG delayed release capsule Take 1 capsule by mouth 2 times daily 90 capsule 3    JANUVIA 100 MG tablet TAKE 1 TABLET BY MOUTH DAILY 90 tablet 3    metoprolol succinate (TOPROL XL) 25 MG extended release tablet Take 2 tablets by mouth daily 30 tablet 3    digoxin (LANOXIN) 125 MCG tablet Take 125 mcg by mouth daily  pravastatin (PRAVACHOL) 80 MG tablet Take 1 tablet by mouth daily 90 tablet 3    apixaban (ELIQUIS) 5 MG TABS tablet Take 1 tablet by mouth 2 times daily 180 tablet 3    nitroGLYCERIN (NITROSTAT) 0.4 MG SL tablet DISSOLVE ONE TABLET UNDER TONGUE AS NEEDED FOR CHEST PAIN EVERY 5 MINUTES. MAX OF 3 DOSES. IF NO RELIEF AFTER 1 DOSE, CALL 911. 25 tablet 0    ramipril (ALTACE) 5 MG capsule TAKE 1 CAPSULE BY MOUTH DAILY 90 capsule 3    acetaminophen (TYLENOL) 650 MG extended release tablet Take 650 mg by mouth every 8 hours as needed for Pain      triamcinolone (KENALOG) 0.1 % cream 2 times daily as needed      vitamin B-12 (CYANOCOBALAMIN) 500 MCG tablet Take 500 mcg by mouth daily      docusate sodium (COLACE) 100 MG capsule Take 100 mg by mouth daily as needed for Constipation      Glucosamine HCl (GLUCOSAMINE PO) Take 1,000 mg by mouth daily      Cholecalciferol (VITAMIN D3) 50 MCG (2000 UT) CAPS Take by mouth daily      Multiple Vitamins-Minerals (ONE DAILY FOR WOMEN PO) Take by mouth daily      dilTIAZem (CARDIZEM CD) 120 MG extended release capsule Take 1 capsule daily 90 capsule 3    spironolactone (ALDACTONE) 50 MG tablet Take 50 mg by mouth daily       BIOTIN PO Take 1 tablet by mouth daily      aspirin 81 MG EC tablet Take 1 tablet by mouth daily 30 tablet 11    Cyanocobalamin (VITAMIN B 12 PO) Take  by mouth Daily. No current facility-administered medications for this visit. Family History   Problem Relation Age of Onset    Alzheimer's Disease Mother     Heart Disease Mother     Cancer Sister         Throat    Diabetes Neg Hx     High Blood Pressure Neg Hx         Review of Systems -   Review of Systems   Constitutional: Negative. Negative for chills and fever. HENT: Negative. Negative for congestion. Eyes: Negative. Respiratory: Negative. Negative for cough, shortness of breath and wheezing. Cardiovascular: Negative. Negative for chest pain and leg swelling. Gastrointestinal: Negative. Endocrine: Negative. Genitourinary: Negative. Musculoskeletal: Negative. Allergic/Immunologic: Negative. Neurological: Negative. Hematological: Negative. Psychiatric/Behavioral: Negative. Negative for sleep disturbance. Physical Exam:    BMI:  Body mass index is 38.74 kg/m². Wt Readings from Last 3 Encounters:   02/25/21 240 lb (108.9 kg)   12/27/20 232 lb (105.2 kg)   11/24/20 241 lb 12.8 oz (109.7 kg)     Weight gained 8 lbs over 2 months  Vitals: /62 (Site: Left Upper Arm, Position: Sitting)   Pulse 69   Temp 97.6 °F (36.4 °C) (Tympanic)   Ht 5' 6\" (1.676 m)   Wt 240 lb (108.9 kg)   SpO2 96% Comment: room air  BMI 38.74 kg/m²       Physical Exam  Vitals signs and nursing note reviewed. Constitutional:       Appearance: She is well-developed. She is obese. HENT:      Head: Normocephalic and atraumatic. Eyes:      Conjunctiva/sclera: Conjunctivae normal.      Pupils: Pupils are equal, round, and reactive to light. Neck:      Musculoskeletal: Normal range of motion and neck supple. Vascular: No JVD. Cardiovascular:      Rate and Rhythm: Normal rate. Rhythm irregularly irregular. Heart sounds: Normal heart sounds. No murmur. No friction rub. No gallop. Pulmonary:      Effort: Pulmonary effort is normal. No respiratory distress. Breath sounds: Normal breath sounds. No wheezing or rales. Abdominal:      General: Bowel sounds are normal.      Palpations: Abdomen is soft. Musculoskeletal: Normal range of motion. Lymphadenopathy:      Comments: (+) BLE lymphedema    Skin:     General: Skin is warm and dry. Capillary Refill: Capillary refill takes less than 2 seconds. Neurological:      Mental Status: She is alert and oriented to person, place, and time.    Psychiatric:         Behavior: Behavior normal. Thought Content: Thought content normal.         Judgment: Judgment normal.       ASSESSMENT/DIAGNOSIS     Diagnosis Orders   1. Obstructive sleep apnea on CPAP  DME Order for CPAP as OP   2. Obesity (BMI 30-39. 9)          Plan   Do you need any equipment today? Yes     - She  was advised to continue current positive airway pressure therapy with above described pressure. - She  advised to keep good compliance with current recommended pressure to get optimal results and clinical improvement  - Recommend 7-9 hours of sleep with PAP  - She was advised to call DME company regarding supplies if needed.   -She call my office for earlier appointment if needed for worsening of sleep symptoms.   - She was instructed on weight loss  - Juan David eHnry was educated about my impression and plan. Patient verbalizesunderstanding.   We will see Juan David Olea back in: 1 year with download     Information added by my medical assistant/LPN was reviewed today     Electronically signed by BOWEN Espino CNP on 2/25/2021 at 11:42 AM

## 2021-03-01 ENCOUNTER — OFFICE VISIT (OUTPATIENT)
Dept: FAMILY MEDICINE CLINIC | Age: 81
End: 2021-03-01
Payer: MEDICARE

## 2021-03-01 VITALS
WEIGHT: 222.3 LBS | RESPIRATION RATE: 16 BRPM | HEART RATE: 76 BPM | TEMPERATURE: 97 F | DIASTOLIC BLOOD PRESSURE: 70 MMHG | SYSTOLIC BLOOD PRESSURE: 124 MMHG | BODY MASS INDEX: 35.88 KG/M2

## 2021-03-01 DIAGNOSIS — Z99.89 OBSTRUCTIVE SLEEP APNEA ON CPAP: ICD-10-CM

## 2021-03-01 DIAGNOSIS — I89.0 LYMPHEDEMA: ICD-10-CM

## 2021-03-01 DIAGNOSIS — M46.1 SACROILIAC INFLAMMATION (HCC): ICD-10-CM

## 2021-03-01 DIAGNOSIS — E66.01 MORBID OBESITY (HCC): ICD-10-CM

## 2021-03-01 DIAGNOSIS — E11.9 CONTROLLED TYPE 2 DIABETES MELLITUS WITHOUT COMPLICATION, WITHOUT LONG-TERM CURRENT USE OF INSULIN (HCC): Primary | ICD-10-CM

## 2021-03-01 DIAGNOSIS — G47.33 OBSTRUCTIVE SLEEP APNEA ON CPAP: ICD-10-CM

## 2021-03-01 DIAGNOSIS — I20.9 ANGINA PECTORIS (HCC): ICD-10-CM

## 2021-03-01 DIAGNOSIS — E11.69 TYPE 2 DIABETES MELLITUS WITH OTHER SPECIFIED COMPLICATION, WITHOUT LONG-TERM CURRENT USE OF INSULIN (HCC): ICD-10-CM

## 2021-03-01 DIAGNOSIS — I10 ESSENTIAL HYPERTENSION: ICD-10-CM

## 2021-03-01 DIAGNOSIS — I48.0 PAROXYSMAL ATRIAL FIBRILLATION (HCC): ICD-10-CM

## 2021-03-01 DIAGNOSIS — E66.9 OBESITY (BMI 30-39.9): ICD-10-CM

## 2021-03-01 PROCEDURE — G8427 DOCREV CUR MEDS BY ELIG CLIN: HCPCS | Performed by: FAMILY MEDICINE

## 2021-03-01 PROCEDURE — G8484 FLU IMMUNIZE NO ADMIN: HCPCS | Performed by: FAMILY MEDICINE

## 2021-03-01 PROCEDURE — 4040F PNEUMOC VAC/ADMIN/RCVD: CPT | Performed by: FAMILY MEDICINE

## 2021-03-01 PROCEDURE — G8417 CALC BMI ABV UP PARAM F/U: HCPCS | Performed by: FAMILY MEDICINE

## 2021-03-01 PROCEDURE — 1036F TOBACCO NON-USER: CPT | Performed by: FAMILY MEDICINE

## 2021-03-01 PROCEDURE — 1090F PRES/ABSN URINE INCON ASSESS: CPT | Performed by: FAMILY MEDICINE

## 2021-03-01 PROCEDURE — 1123F ACP DISCUSS/DSCN MKR DOCD: CPT | Performed by: FAMILY MEDICINE

## 2021-03-01 PROCEDURE — 99214 OFFICE O/P EST MOD 30 MIN: CPT | Performed by: FAMILY MEDICINE

## 2021-03-01 PROCEDURE — G8399 PT W/DXA RESULTS DOCUMENT: HCPCS | Performed by: FAMILY MEDICINE

## 2021-03-01 ASSESSMENT — PATIENT HEALTH QUESTIONNAIRE - PHQ9
SUM OF ALL RESPONSES TO PHQ QUESTIONS 1-9: 0
SUM OF ALL RESPONSES TO PHQ9 QUESTIONS 1 & 2: 0

## 2021-03-01 ASSESSMENT — ENCOUNTER SYMPTOMS
GASTROINTESTINAL NEGATIVE: 1
RESPIRATORY NEGATIVE: 1

## 2021-03-01 NOTE — PROGRESS NOTES
3/1/2021    Alena Olea (:  1940) is a 79 y.o. female, here for a preventive medicine evaluation. Chief Complaint   Patient presents with    6 Month Follow-Up    Diabetes    Hypertension     6 month eval.  Doing well overall. Sugars jumped back in December, will check again in 3 mos. Lab Results   Component Value Date    LABA1C 7.0 (H) 2020    LABA1C 5.4 06/10/2020    LABA1C 5.9 2019     Lab Results   Component Value Date    LABMICR < 1.20 2019    LDLCALC 60 2020    CREATININE 0.9 2020     BP well controlled. BP Readings from Last 3 Encounters:   21 124/70   21 118/62   20 120/72     Weight is down almost 20 lbs since November. She is eating better during this time frame. Wt Readings from Last 3 Encounters:   21 222 lb 4.8 oz (100.8 kg)   21 240 lb (108.9 kg)   20 232 lb (105.2 kg)     GERD controlled on omeprazole. Hx of a-fib, sees Dr. Homer Silveira again in May. Patient Active Problem List   Diagnosis    Hyperlipidemia    HTN (hypertension)    Osteopenia    GERD (gastroesophageal reflux disease)    Reactive airway disease    OA (osteoarthritis)    Atypical chest pain    Diabetes mellitus ([address]    Obesity (BMI 30-39. 9)    Chronic low back pain    Neuropathy    ANITA on CPAP    Controlled type 2 diabetes mellitus without complication (HCC)    Persistent atrial fibrillation (HCC)    Paroxysmal atrial fibrillation (HCC)    Abnormal nuclear stress test    Influenza    Atrial fibrillation with rapid ventricular response (HCC)    Sacroiliac inflammation (HCC)    Morbid obesity (HCC)    Chest pain, moderate coronary artery risk    Angina pectoris (HCC)    Chronic atrial fibrillation (HCC)    Peripheral edema    Hypercalcemia    Hyperkalemia    Hypothyroidism       Review of Systems   Constitutional: Negative. HENT: Negative. Respiratory: Negative. Cardiovascular: Negative. Gastrointestinal: Negative. Musculoskeletal: Negative. All other systems reviewed and are negative. Prior to Visit Medications    Medication Sig Taking? Authorizing Provider   amoxicillin (AMOXIL) 500 MG capsule Take 1 capsule by mouth 3 times daily for 10 days Yes Allie Amato DO   albuterol sulfate HFA (PROVENTIL HFA) 108 (90 Base) MCG/ACT inhaler Inhale 2 puffs into the lungs every 4 hours as needed for Wheezing or Shortness of Breath Yes Allie Amato DO   levothyroxine (SYNTHROID) 25 MCG tablet TAKE 1 TABLET BY MOUTH DAILY Yes Allie Amato DO   omeprazole (PRILOSEC) 40 MG delayed release capsule Take 1 capsule by mouth 2 times daily Yes BOWEN Logan CNP   JANUVIA 100 MG tablet TAKE 1 TABLET BY MOUTH DAILY Yes BOWEN Logan CNP   metoprolol succinate (TOPROL XL) 25 MG extended release tablet Take 2 tablets by mouth daily Yes Joyce Zamarripa MD   digoxin (LANOXIN) 125 MCG tablet Take 125 mcg by mouth daily Yes Historical Provider, MD   pravastatin (PRAVACHOL) 80 MG tablet Take 1 tablet by mouth daily Yes Allie Amato DO   apixaban (ELIQUIS) 5 MG TABS tablet Take 1 tablet by mouth 2 times daily Yes Allie Amato DO   nitroGLYCERIN (NITROSTAT) 0.4 MG SL tablet DISSOLVE ONE TABLET UNDER TONGUE AS NEEDED FOR CHEST PAIN EVERY 5 MINUTES. MAX OF 3 DOSES. IF NO RELIEF AFTER 1 DOSE, CALL 911.  Yes Allie Amato DO   ramipril (ALTACE) 5 MG capsule TAKE 1 CAPSULE BY MOUTH DAILY Yes Allie Amato DO   acetaminophen (TYLENOL) 650 MG extended release tablet Take 650 mg by mouth every 8 hours as needed for Pain Yes Historical Provider, MD   triamcinolone (KENALOG) 0.1 % cream 2 times daily as needed Yes Historical Provider, MD   vitamin B-12 (CYANOCOBALAMIN) 500 MCG tablet Take 500 mcg by mouth daily Yes Historical Provider, MD Social History     Socioeconomic History    Marital status:      Spouse name: April Sebastian Number of children: 4    Years of education: 15    Highest education level: Some college, no degree   Occupational History    Not on file   Social Needs    Financial resource strain: Not hard at all   Dio-Ramya insecurity     Worry: Never true     Inability: Never true   [employer] needs     Medical: No     Non-medical: No   Tobacco Use    Smoking status: Never Smoker    Smokeless tobacco: Never Used   Substance and Sexual Activity    Alcohol use: No     Alcohol/week: 0.0 standard drinks    Drug use: No    Sexual activity: Not Currently   Lifestyle    Physical activity     Days per week: Not on file     Minutes per session: Not on file    Stress: Not on file   Relationships    Social connections     Talks on phone: Not on file     Gets together: Not on file     Attends [employer] service: Not on file     Active member of club or organization: Not on file     Attends meetings of clubs or organizations: Not on file     Relationship status: Not on file    Intimate partner violence     Fear of current or ex partner: Not on file     Emotionally abused: Not on file     Physically abused: Not on file     Forced sexual activity: Not on file   Other Topics Concern    Not on file   Social History Narrative    Not on file        Family History   Problem Relation Age of Onset    Alzheimer's Disease Mother     Heart Disease Mother     Cancer Sister         Throat    Diabetes Neg Hx     High Blood Pressure Neg Hx        ADVANCE DIRECTIVE: N, <no information>    Vitals:    03/01/21 1044   BP: 124/70   Site: Left Upper Arm   Position: Sitting   Cuff Size: Large Adult   Pulse: 76   Resp: 16   Temp: 97 °F (36.1 °C)   TempSrc: Temporal   Weight: 222 lb 4.8 oz (100.8 kg)     Estimated body mass index is 35.88 kg/m² as calculated from the following:    Height as of 2/25/21: 5' 6\" (1.676 m). Weight as of this encounter: 222 lb 4.8 oz (100.8 kg). Physical Exam  Vitals signs and nursing note reviewed. Constitutional:       General: She is not in acute distress. Appearance: Normal appearance. She is well-developed. HENT:      Head: Normocephalic and atraumatic. Right Ear: Tympanic membrane normal.      Left Ear: Tympanic membrane normal.   Eyes:      Conjunctiva/sclera: Conjunctivae normal.   Neck:      Musculoskeletal: Neck supple. Cardiovascular:      Rate and Rhythm: Normal rate and regular rhythm. Heart sounds: Normal heart sounds. No murmur. Pulmonary:      Effort: Pulmonary effort is normal.      Breath sounds: Normal breath sounds. No wheezing, rhonchi or rales. Abdominal:      General: There is no distension. Skin:     General: Skin is warm and dry. Findings: No rash (on exposed surfaces). Neurological:      General: No focal deficit present. Mental Status: She is alert. Psychiatric:         Attention and Perception: Attention normal.         Mood and Affect: Mood normal.         Speech: Speech normal.         Behavior: Behavior normal. Behavior is cooperative. Thought Content: Thought content normal.         Judgment: Judgment normal.         No flowsheet data found.     Lab Results   Component Value Date    CHOL 144 12/26/2020    CHOL 149 09/27/2019    CHOL 172 04/05/2018    TRIG 188 12/26/2020    TRIG 63 09/27/2019    TRIG 58 04/05/2018    HDL 46 12/26/2020    HDL 70 09/27/2019    HDL 94 04/05/2018    LDLCALC 60 12/26/2020    LDLCALC 66 09/27/2019    LDLCALC 66 04/05/2018    GLUCOSE 233 12/27/2020    GLUCOSE 114 05/30/2012    LABA1C 7.0 12/25/2020    LABA1C 5.4 06/10/2020    LABA1C 5.9 09/27/2019       The 10-year ASCVD risk score (Tanya Jaimes et al., 2013) is: 48.8%    Values used to calculate the score:      Age: 79 years      Sex: Female      Is Non- : No      Diabetic: Yes      Tobacco smoker: No Systolic Blood Pressure: 667 mmHg      Is BP treated: Yes      HDL Cholesterol: 46 mg/dL      Total Cholesterol: 144 mg/dL    Immunization History   Administered Date(s) Administered    Influenza 10/05/2011    Influenza Vaccine, unspecified formulation 10/06/2014    Influenza Virus Vaccine 12/31/2012, 09/18/2020    Influenza Whole 09/10/2015    Influenza, High Dose (Fluzone 65 yrs and older) 10/22/2016, 09/29/2017, 10/01/2018, 09/21/2019    Pneumococcal Conjugate 13-valent ([id number]) 03/29/2016    Pneumococcal Polysaccharide ([id number]) 10/10/2017       Health Maintenance   Topic Date Due    Hepatitis C screen  1940    COVID-19 Vaccine (1 of 2) 09/08/1957    DTaP/Tdap/Td vaccine (1 - Tdap) 09/08/1960    Shingles Vaccine (1 of 2) 09/08/1991    TSH testing  12/25/2021    Lipid screen  12/26/2021    Potassium monitoring  12/27/2021    Creatinine monitoring  12/27/2021    DEXA (modify frequency per FRAX score)  Completed    Flu vaccine  Completed    Pneumococcal 65+ years Vaccine  Completed    Hepatitis A vaccine  Aged Out    Hib vaccine  Aged Out    Meningococcal (ACWY) vaccine  Aged Out       ASSESSMENT/PLAN:  1. Controlled type 2 diabetes mellitus without complication, without long-term current use of insulin (HCC)  -     Hemoglobin A1C; Future  2. Paroxysmal atrial fibrillation (HCC)  3. Obstructive sleep apnea on CPAP  4. Essential hypertension  5. Morbid obesity ([address]  6. Obesity (BMI 30-39.9)  7. Lymphedema  8. Sacroiliac inflammation ([address]  9. Type 2 diabetes mellitus with other specified complication, without long-term current use of insulin (HCC)  10. Angina pectoris ([address]    -  Chronic medical problems stable  -  Continue current medications  -  Follow up with specialists as scheduled  -  Check A1C 3 mos    Return in about 3 months (around 6/1/2021) for DM2. An electronic signature was used to authenticate this note.     --Kendall Manzano, DO on 3/1/2021 at 10:55 AM

## 2021-03-01 NOTE — PROGRESS NOTES
Chronic Disease Visit Information    BP Readings from Last 3 Encounters:   03/01/21 124/70   02/25/21 118/62   12/27/20 120/72          Hemoglobin A1C (%)   Date Value   12/25/2020 7.0 (H)   06/10/2020 5.4   09/27/2019 5.9     Microalbumin, Random Urine (mg/dL)   Date Value   09/27/2019 < 1.20     LDL Calculated (mg/dL)   Date Value   12/26/2020 60     HDL (mg/dL)   Date Value   12/26/2020 46     BUN (mg/dL)   Date Value   12/27/2020 17     CREATININE (mg/dL)   Date Value   12/27/2020 0.9     Glucose   Date Value   12/27/2020 233 mg/dL (H)   05/30/2012 114 mg/dl (H)            Have you changed or started any medications since your last visit including any over-the-counter medicines, vitamins, or herbal medicines? no   Are you having any side effects from any of your medications? -  no  Have you stopped taking any of your medications? Is so, why? -  no    Have you seen any other physician or provider since your last visit? Yes - Records Obtained  Have you had any other diagnostic tests since your last visit? No  Have you been seen in the emergency room and/or had an admission to a hospital since we last saw you? No  Have you had your annual diabetic retinal (eye) exam? No  Have you had your routine dental cleaning in the past 6 months? yes - last week    Have you activated your [employer] account? If not, what are your barriers?  Yes     Patient Care Team:  Dallin Farias DO as PCP - [facility]  Dallin Farias DO [facility] PCP [facility]  Millie Dasilva MD as Consulting Physician (Orthopedic Surgery)         Medical History Review  Past Medical, Family, and Social History reviewed and does contribute to the patient presenting condition    Health Maintenance   Topic Date Due    Hepatitis C screen  1940    COVID-19 Vaccine (1 of 2) 09/08/1957    DTaP/Tdap/Td vaccine (1 - Tdap) 09/08/1960    Shingles Vaccine (1 of 2) 09/08/1991    TSH testing  12/25/2021    Lipid screen  12/26/2021  Potassium monitoring  12/27/2021    Creatinine monitoring  12/27/2021    DEXA (modify frequency per FRAX score)  Completed    Flu vaccine  Completed    Pneumococcal 65+ years Vaccine  Completed    Hepatitis A vaccine  Aged Out    Hib vaccine  Aged Out    Meningococcal (ACWY) vaccine  Aged Out

## 2021-03-05 ENCOUNTER — HOSPITAL ENCOUNTER (OUTPATIENT)
Age: 81
Discharge: HOME | End: 2021-03-05
Payer: MEDICARE

## 2021-03-05 DIAGNOSIS — E11.9 CONTROLLED TYPE 2 DIABETES MELLITUS WITHOUT COMPLICATION, WITHOUT LONG-TERM CURRENT USE OF INSULIN (HCC): ICD-10-CM

## 2021-03-05 LAB
AVERAGE GLUCOSE: 291 MG/DL (ref 70–126)
HBA1C MFR BLD: 11.7 % (ref 4.4–6.4)

## 2021-03-05 PROCEDURE — 83036 HEMOGLOBIN GLYCOSYLATED A1C: CPT

## 2021-03-05 PROCEDURE — 36415 COLL VENOUS BLD VENIPUNCTURE: CPT

## 2021-03-08 ENCOUNTER — TELEPHONE (OUTPATIENT)
Dept: FAMILY MEDICINE CLINIC | Age: 81
End: 2021-03-08

## 2021-03-08 ENCOUNTER — VIRTUAL VISIT (OUTPATIENT)
Dept: FAMILY MEDICINE CLINIC | Age: 81
End: 2021-03-08
Payer: MEDICARE

## 2021-03-08 DIAGNOSIS — Z99.89 OBSTRUCTIVE SLEEP APNEA ON CPAP: ICD-10-CM

## 2021-03-08 DIAGNOSIS — E66.9 OBESITY (BMI 30-39.9): ICD-10-CM

## 2021-03-08 DIAGNOSIS — I48.0 PAROXYSMAL ATRIAL FIBRILLATION (HCC): ICD-10-CM

## 2021-03-08 DIAGNOSIS — I10 ESSENTIAL HYPERTENSION: ICD-10-CM

## 2021-03-08 DIAGNOSIS — G47.33 OBSTRUCTIVE SLEEP APNEA ON CPAP: ICD-10-CM

## 2021-03-08 PROCEDURE — G8399 PT W/DXA RESULTS DOCUMENT: HCPCS | Performed by: FAMILY MEDICINE

## 2021-03-08 PROCEDURE — G8428 CUR MEDS NOT DOCUMENT: HCPCS | Performed by: FAMILY MEDICINE

## 2021-03-08 PROCEDURE — 4040F PNEUMOC VAC/ADMIN/RCVD: CPT | Performed by: FAMILY MEDICINE

## 2021-03-08 PROCEDURE — 1123F ACP DISCUSS/DSCN MKR DOCD: CPT | Performed by: FAMILY MEDICINE

## 2021-03-08 PROCEDURE — 99214 OFFICE O/P EST MOD 30 MIN: CPT | Performed by: FAMILY MEDICINE

## 2021-03-08 PROCEDURE — 1090F PRES/ABSN URINE INCON ASSESS: CPT | Performed by: FAMILY MEDICINE

## 2021-03-08 RX ORDER — METFORMIN HYDROCHLORIDE 500 MG/1
500 TABLET, EXTENDED RELEASE ORAL
Qty: 90 TABLET | Refills: 0 | Status: SHIPPED | OUTPATIENT
Start: 2021-03-08 | End: 2021-06-05

## 2021-03-08 RX ORDER — BLOOD-GLUCOSE METER
EACH MISCELLANEOUS
Qty: 1 KIT | Refills: 0 | Status: SHIPPED | OUTPATIENT
Start: 2021-03-08

## 2021-03-08 RX ORDER — LANCETS
EACH MISCELLANEOUS
Qty: 100 EACH | Refills: 3 | Status: SHIPPED | OUTPATIENT
Start: 2021-03-08 | End: 2021-03-09

## 2021-03-08 RX ORDER — BLOOD SUGAR DIAGNOSTIC
STRIP MISCELLANEOUS
Qty: 100 EACH | Refills: 3 | Status: SHIPPED | OUTPATIENT
Start: 2021-03-08

## 2021-03-08 ASSESSMENT — ENCOUNTER SYMPTOMS
GASTROINTESTINAL NEGATIVE: 1
RESPIRATORY NEGATIVE: 1

## 2021-03-08 NOTE — TELEPHONE ENCOUNTER
Naomie with [address] called office left vm stating they received the order for One Touch Ultrasoft Lancets, but the glucometer she uses requires the One Touch Delica Lancets. She is asking that you send the correct rx. Please advise.

## 2021-03-09 RX ORDER — LANCETS 33 GAUGE
EACH MISCELLANEOUS
Qty: 100 EACH | Refills: 3 | Status: SHIPPED | OUTPATIENT
Start: 2021-03-09

## 2021-03-11 NOTE — TELEPHONE ENCOUNTER
Pt called office stating she does not know how to use her new glucometer. She will contact [facility] and have them review this with her.

## 2021-03-29 ENCOUNTER — TELEPHONE (OUTPATIENT)
Dept: FAMILY MEDICINE CLINIC | Age: 81
End: 2021-03-29

## 2021-03-29 RX ORDER — OMEPRAZOLE 40 MG/1
CAPSULE, DELAYED RELEASE ORAL
Qty: 90 CAPSULE | Refills: 3 | Status: SHIPPED | OUTPATIENT
Start: 2021-03-29 | End: 2022-04-01

## 2021-03-29 NOTE — TELEPHONE ENCOUNTER
Patient calling in and requesting teaching for glucometer. She has been to the pharmacy and they tried to show her how to use her glucometer but she cannot figure it out. Her  is DM but \"he doesn't have the patience to teach me\". [facility] but they need an order to assist her.   Please advise

## 2021-04-15 RX ORDER — NITROGLYCERIN 0.4 MG/1
TABLET SUBLINGUAL
Qty: 25 TABLET | Refills: 0 | Status: SHIPPED | OUTPATIENT
Start: 2021-04-15 | End: 2022-10-19 | Stop reason: SDUPTHER

## 2021-04-15 NOTE — TELEPHONE ENCOUNTER
The patient called in and stated that her Christopher Rangel has  and is requesting a refill be sent to [facility]. Order pended for your signature.       If no call back the patient will check with her pharmacy after 2pm today

## 2021-05-04 RX ORDER — APIXABAN 5 MG/1
TABLET, FILM COATED ORAL
Qty: 180 TABLET | Refills: 3 | Status: SHIPPED | OUTPATIENT
Start: 2021-05-04 | End: 2022-10-11

## 2021-06-05 RX ORDER — METFORMIN HYDROCHLORIDE 500 MG/1
TABLET, EXTENDED RELEASE ORAL
Qty: 90 TABLET | Refills: 0 | Status: SHIPPED | OUTPATIENT
Start: 2021-06-05 | End: 2021-06-14 | Stop reason: SDUPTHER

## 2021-06-14 ENCOUNTER — OFFICE VISIT (OUTPATIENT)
Dept: FAMILY MEDICINE CLINIC | Age: 81
End: 2021-06-14
Payer: MEDICARE

## 2021-06-14 VITALS
DIASTOLIC BLOOD PRESSURE: 60 MMHG | WEIGHT: 219.9 LBS | RESPIRATION RATE: 16 BRPM | SYSTOLIC BLOOD PRESSURE: 108 MMHG | BODY MASS INDEX: 35.49 KG/M2 | HEART RATE: 80 BPM

## 2021-06-14 DIAGNOSIS — G47.33 OSA ON CPAP: ICD-10-CM

## 2021-06-14 DIAGNOSIS — I10 ESSENTIAL HYPERTENSION: ICD-10-CM

## 2021-06-14 DIAGNOSIS — I89.0 LYMPHEDEMA: ICD-10-CM

## 2021-06-14 DIAGNOSIS — Z99.89 OBSTRUCTIVE SLEEP APNEA ON CPAP: ICD-10-CM

## 2021-06-14 DIAGNOSIS — E66.9 OBESITY (BMI 30-39.9): ICD-10-CM

## 2021-06-14 DIAGNOSIS — I48.0 PAROXYSMAL ATRIAL FIBRILLATION (HCC): ICD-10-CM

## 2021-06-14 DIAGNOSIS — Z99.89 OSA ON CPAP: ICD-10-CM

## 2021-06-14 DIAGNOSIS — G47.33 OBSTRUCTIVE SLEEP APNEA ON CPAP: ICD-10-CM

## 2021-06-14 DIAGNOSIS — K21.9 GASTROESOPHAGEAL REFLUX DISEASE, UNSPECIFIED WHETHER ESOPHAGITIS PRESENT: ICD-10-CM

## 2021-06-14 LAB — HBA1C MFR BLD: 9.1 %

## 2021-06-14 PROCEDURE — 1036F TOBACCO NON-USER: CPT | Performed by: FAMILY MEDICINE

## 2021-06-14 PROCEDURE — 83036 HEMOGLOBIN GLYCOSYLATED A1C: CPT | Performed by: FAMILY MEDICINE

## 2021-06-14 PROCEDURE — 1123F ACP DISCUSS/DSCN MKR DOCD: CPT | Performed by: FAMILY MEDICINE

## 2021-06-14 PROCEDURE — 1090F PRES/ABSN URINE INCON ASSESS: CPT | Performed by: FAMILY MEDICINE

## 2021-06-14 PROCEDURE — G8427 DOCREV CUR MEDS BY ELIG CLIN: HCPCS | Performed by: FAMILY MEDICINE

## 2021-06-14 PROCEDURE — 4040F PNEUMOC VAC/ADMIN/RCVD: CPT | Performed by: FAMILY MEDICINE

## 2021-06-14 PROCEDURE — 99214 OFFICE O/P EST MOD 30 MIN: CPT | Performed by: FAMILY MEDICINE

## 2021-06-14 PROCEDURE — G8417 CALC BMI ABV UP PARAM F/U: HCPCS | Performed by: FAMILY MEDICINE

## 2021-06-14 PROCEDURE — G8399 PT W/DXA RESULTS DOCUMENT: HCPCS | Performed by: FAMILY MEDICINE

## 2021-06-14 RX ORDER — METFORMIN HYDROCHLORIDE 500 MG/1
TABLET, EXTENDED RELEASE ORAL
Qty: 180 TABLET | Refills: 3 | Status: SHIPPED | OUTPATIENT
Start: 2021-06-14 | End: 2022-07-29

## 2021-06-14 ASSESSMENT — ENCOUNTER SYMPTOMS
RESPIRATORY NEGATIVE: 1
GASTROINTESTINAL NEGATIVE: 1

## 2021-06-14 NOTE — PROGRESS NOTES
2021    Alena Olea (:  1940) is a 79 y.o. female, here for a preventive medicine evaluation. Chief Complaint   Patient presents with    3 Month Follow-Up     3 month eval.    Pt never [facility] with the [facility], she missed a few appts. She is now rescheduled to September. She is eating better, weight is down. She is cutting back on some of her sweets but still a \"popaholic\". Sugars improving but still not to goal.    Lab Results   Component Value Date    LABA1C 9.1 2021    LABA1C 11.7 (H) 2021    LABA1C 7.0 (H) 2020     Lab Results   Component Value Date    LABMICR < 1.20 2019    LDLCALC 60 2020    CREATININE 0.9 2020     Weight is down. Wt Readings from Last 3 Encounters:   21 219 lb 14.4 oz (99.7 kg)   21 222 lb 4.8 oz (100.8 kg)   21 240 lb (108.9 kg)     BPs controlled. BP Readings from Last 3 Encounters:   21 108/60   21 124/70   21 118/62     Recently seen by Dr. Martin Alfredo, back to yearly appts. Patient Active Problem List   Diagnosis    Hyperlipidemia    HTN (hypertension)    Osteopenia    GERD (gastroesophageal reflux disease)    Reactive airway disease    OA (osteoarthritis)    Atypical chest pain    Diabetes mellitus ([facility] [address]    Obesity (BMI 30-39. 9)    Chronic low back pain    Neuropathy    ANITA on CPAP    Controlled type 2 diabetes mellitus without complication (HCC)    Persistent atrial fibrillation (HCC)    Paroxysmal atrial fibrillation (HCC)    Abnormal nuclear stress test    Influenza    Atrial fibrillation with rapid ventricular response (HCC)    Sacroiliac inflammation (HCC)    Morbid obesity (HCC)    Chest pain, moderate coronary artery risk    Angina pectoris (HCC)    Chronic atrial fibrillation (HCC)    Peripheral edema    Hypercalcemia    Hyperkalemia    Hypothyroidism       Review of Systems   Constitutional: Negative. HENT: Negative. Respiratory: Negative. Cardiovascular: Negative. Gastrointestinal: Negative. Musculoskeletal: Negative. All other systems reviewed and are negative. Prior to Visit Medications    Medication Sig Taking? Authorizing Provider   metFORMIN (GLUCOPHAGE-XR) 500 MG extended release tablet TAKE 1 TABLET BY MOUTH BID Yes Hilario Perla DO   ELIQUIS 5 MG TABS tablet TAKE 1 TABLET BY MOUTH TWICE DAILY Yes Hilario Perla DO   nitroGLYCERIN (NITROSTAT) 0.4 MG SL tablet DISSOLVE ONE TABLET UNDER TONGUE AS NEEDED FOR CHEST PAIN EVERY 5 MINUTES. MAX OF 3 DOSES. IF NO RELIEF AFTER 1 DOSE, CALL 911. Yes Hilario Perla DO   omeprazole (PRILOSEC) 40 MG delayed release capsule TAKE 1 CAPSULE BY MOUTH DAILY Yes iHlario Perla DO   OneTouch Delica Lancets 89W MISC Check sugars daily. Dx: E11.9 Yes Hilario Perla DO   Blood Glucose Monitoring Suppl (Leonie Pear IQ SYSTEM) w/Device KIT Check sugars daily. Dx: E11.9 Yes Hilario Perla DO   blood glucose test strips [facility]) strip Check sugars daily.   Dx: E11.9 Yes Hilario Perla DO   albuterol sulfate HFA (PROVENTIL HFA) 108 (90 Base) MCG/ACT inhaler Inhale 2 puffs into the lungs every 4 hours as needed for Wheezing or Shortness of Breath Yes Hilario Perla DO   levothyroxine (SYNTHROID) 25 MCG tablet TAKE 1 TABLET BY MOUTH DAILY Yes Hilario Perla DO   JANUVIA 100 MG tablet TAKE 1 TABLET BY MOUTH DAILY Yes BOWEN Walker - CNP   metoprolol succinate (TOPROL XL) 25 MG extended release tablet Take 2 tablets by mouth daily Yes Rosanna Alcantara MD   digoxin (LANOXIN) 125 MCG tablet Take 125 mcg by mouth daily Yes Historical Provider, MD   pravastatin (PRAVACHOL) 80 MG tablet Take 1 tablet by mouth daily Yes Hilario Perla DO   ramipril (ALTACE) 5 MG capsule TAKE 1 CAPSULE BY MOUTH DAILY Yes Hilario Perla DO   acetaminophen (TYLENOL) 650 MG extended release tablet Take 650 mg by mouth every 8 hours as needed for Pain Yes Historical Provider, MD   triamcinolone (KENALOG) 0.1 % cream 2 times daily as needed Yes Historical Provider, MD   vitamin B-12 (CYANOCOBALAMIN) 500 MCG tablet Take 500 mcg by mouth daily Yes Historical Provider, MD   docusate sodium (COLACE) 100 MG capsule Take 100 mg by mouth daily as needed for Constipation Yes Historical Provider, MD   Glucosamine HCl (GLUCOSAMINE PO) Take 1,000 mg by mouth daily Yes Historical Provider, MD   Cholecalciferol (VITAMIN D3) 50 MCG (2000 UT) CAPS Take by mouth daily Yes Historical Provider, MD   Multiple Vitamins-Minerals (ONE DAILY FOR WOMEN PO) Take by mouth daily Yes Historical Provider, MD   dilTIAZem (CARDIZEM CD) 120 MG extended release capsule Take 1 capsule daily Yes Rachel Oats, DO   spironolactone (ALDACTONE) 50 MG tablet Take 50 mg by mouth daily  Yes Historical Provider, MD   BIOTIN PO Take 1 tablet by mouth daily Yes Historical Provider, MD   aspirin 81 MG EC tablet Take 1 tablet by mouth daily Yes Rachel Oats, DO   Cyanocobalamin (VITAMIN B 12 PO) Take  by mouth Daily.    Yes Historical Provider, MD        Allergies   Allergen Reactions    Ranolazine Hives     Pt was on brand Ranexa       Past Medical History:   Diagnosis Date    Arthritis     Cancer ([facility] [address]     skin ca    Diabetes mellitus ([facility] [address]     Hyperlipidemia     Hypertension     Osteoporosis 2017    Sleep apnea     has CPAP       Past Surgical History:   Procedure Laterality Date    APPENDECTOMY      CARDIAC CATHETERIZATION      two cath    COLONOSCOPY  01/08/2013    COLONOSCOPY N/A 5/17/2019    COLONOSCOPY DIAGNOSTIC performed by Bri Donohue MD at [address] [place] [address] OF [facility]. INJECTION PROCEDURE FOR SACROILIAC JOINT Left 1/24/2020    LEFT SI MBB #1 - NO STEROID performed by Júnior Bhardwaj MD at [address] Left 6/17/2020    MOHS DEFECT REPAIR SCC LEFT MEDIAL HAND WITH SKIN GRAFT FROM LEFT UPPER ARM performed by Henrique Evans MD at [place] [address]  03/2017    on face, left side    UPPER GASTROINTESTINAL ENDOSCOPY N/A 5/17/2019    EGD BIOPSY performed by Consuelo Burch MD at [facility]       Social History     Socioeconomic History    Marital status:      Spouse name: Fletcher Crigler Number of children: 4    Years of education: 15    Highest education level: Some college, no degree   Occupational History    Not on file   Tobacco Use    Smoking status: Never Smoker    Smokeless tobacco: Never Used   Vaping Use    Vaping Use: Never used   Substance and Sexual Activity    Alcohol use: No     Alcohol/week: 0.0 standard drinks    Drug use: No    Sexual activity: Not Currently   Other Topics Concern    Not on file   Social History Narrative    Not on file     Social Determinants of Health     Financial Resource Strain: Low Risk     Difficulty of Paying Living Expenses: Not hard at all   Food Insecurity: No Food Insecurity    Worried About Running Out of Food in the Last Year: Never true    Leonardo of Food in the Last Year: Never true   Transportation Needs: No Transportation Needs    Lack of Transportation (Medical): No    Lack of Transportation (Non-Medical):  No   Physical Activity:     Days of Exercise per Week:     Minutes of Exercise per Session:    Stress:     Feeling of Stress :    Social Connections:     Frequency of Communication with Friends and Family:     Frequency of Social Gatherings with Friends and Family:     Attends Christian Services:     Active Member of Clubs or Organizations:     Attends Club or Organization Meetings:     Marital Status:    Intimate Partner Violence:     Fear of Current or Ex-Partner:     Emotionally Abused:     Physically Abused:     Sexually Abused:         Family History   Problem Relation Age of Onset    Alzheimer's Disease Mother     Heart Disease Mother    Vedia Chai Sister         Throat  Diabetes Neg Hx     High Blood Pressure Neg Hx        ADVANCE DIRECTIVE: N, <no information>    Vitals:    06/14/21 1523   BP: 108/60   Site: Right Upper Arm   Position: Sitting   Cuff Size: Large Adult   Pulse: 80   Resp: 16   Weight: 219 lb 14.4 oz (99.7 kg)     Estimated body mass index is 35.49 kg/m² as calculated from the following:    Height as of 2/25/21: 5' 6\" (1.676 m). Weight as of this encounter: 219 lb 14.4 oz (99.7 kg). Physical Exam  Vitals and nursing note reviewed. Constitutional:       General: She is not in acute distress. Appearance: Normal appearance. She is well-developed. HENT:      Head: Normocephalic and atraumatic. Right Ear: Tympanic membrane normal.      Left Ear: Tympanic membrane normal.   Eyes:      Conjunctiva/sclera: Conjunctivae normal.   Cardiovascular:      Rate and Rhythm: Normal rate and regular rhythm. Heart sounds: Normal heart sounds. No murmur heard. Pulmonary:      Effort: Pulmonary effort is normal.      Breath sounds: Normal breath sounds. No wheezing, rhonchi or rales. Abdominal:      General: There is no distension. Musculoskeletal:      Cervical back: Neck supple. Skin:     General: Skin is warm and dry. Findings: No rash (on exposed surfaces). Neurological:      General: No focal deficit present. Mental Status: She is alert. Psychiatric:         Attention and Perception: Attention normal.         Mood and Affect: Mood normal.         Speech: Speech normal.         Behavior: Behavior normal. Behavior is cooperative. Thought Content: Thought content normal.         Judgment: Judgment normal.         No flowsheet data found.     Lab Results   Component Value Date    CHOL 144 12/26/2020    CHOL 149 09/27/2019    CHOL 172 04/05/2018    TRIG 188 12/26/2020    TRIG 63 09/27/2019    TRIG 58 04/05/2018    HDL 46 12/26/2020    HDL 70 09/27/2019    HDL 94 04/05/2018    LDLCALC 60 12/26/2020    LDLCALC 66 09/27/2019 [address] 04/05/2018    GLUCOSE 233 12/27/2020    GLUCOSE 114 05/30/2012    LABA1C 9.1 06/14/2021    LABA1C 11.7 03/05/2021    LABA1C 7.0 12/25/2020       The 10-year ASCVD risk score (Gilman Aase., et al., 2013) is: 39.7%    Values used to calculate the score:      Age: 79 years      Sex: Female      Is Non- : No      Diabetic: Yes      Tobacco smoker: No      Systolic Blood Pressure: 440 mmHg      Is BP treated: Yes      HDL Cholesterol: 46 mg/dL      Total Cholesterol: 144 mg/dL    Immunization History   Administered Date(s) Administered    Influenza 10/05/2011    Influenza Vaccine, unspecified formulation 10/06/2014    Influenza Virus Vaccine 12/31/2012, 09/18/2020    Influenza Whole 09/10/2015    Influenza, High Dose (Fluzone 65 yrs and older) 10/22/2016, 09/29/2017, 10/01/2018, 09/21/2019    Pneumococcal Conjugate 13-valent ([id number]) 03/29/2016    Pneumococcal Polysaccharide ([id number]) 10/10/2017       Health Maintenance   Topic Date Due    Hepatitis C screen  Never done    COVID-19 Vaccine (1) Never done    DTaP/Tdap/Td vaccine (1 - Tdap) Never done    Shingles Vaccine (1 of 2) Never done    Diabetic foot exam  03/29/2018    Diabetic retinal exam  09/16/2020    Annual Wellness Visit (AWV)  Never done    A1C test (Diabetic or Prediabetic)  12/14/2021    TSH testing  12/25/2021    Lipid screen  12/26/2021    Potassium monitoring  12/27/2021    Creatinine monitoring  12/27/2021    DEXA (modify frequency per FRAX score)  Completed    Flu vaccine  Completed    Pneumococcal 65+ years Vaccine  Completed    Hepatitis A vaccine  Aged Out    Hib vaccine  Aged Out    Meningococcal (ACWY) vaccine  Aged Out          ASSESSMENT/PLAN:  1.  Uncontrolled type 2 diabetes mellitus, without long-term current use of insulin (HCC)  -     POCT glycosylated hemoglobin (Hb A1C)  -     metFORMIN (GLUCOPHAGE-XR) 500 MG extended release tablet; TAKE 1 TABLET BY MOUTH BID, Disp-180 tablet, R-3Normal  2. Essential hypertension  3. Obesity (BMI 30-39.9)  4. Paroxysmal atrial fibrillation (HCC)  5. Obstructive sleep apnea on CPAP  6. Lymphedema  7. ANITA on CPAP  8. Gastroesophageal reflux disease, unspecified whether esophagitis present    -  Chronic medical problems stable  -  Continue current medications, will increase her metformin XR to BID  -  Follow up with specialists as scheduled  -  Pt has appt with [facility] in September Return in about 6 months (around 12/14/2021) for DM2. An electronic signature was used to authenticate this note.     --Guille Sawant DO on 6/14/2021 at 8:18 PM

## 2021-06-14 NOTE — PROGRESS NOTES
Chronic Disease Visit Information    BP Readings from Last 3 Encounters:   03/01/21 124/70   02/25/21 118/62   12/27/20 120/72          Hemoglobin A1C (%)   Date Value   03/05/2021 11.7 (H)   12/25/2020 7.0 (H)   06/10/2020 5.4     Microalbumin, Random Urine (mg/dL)   Date Value   09/27/2019 < 1.20     LDL Calculated (mg/dL)   Date Value   12/26/2020 60     HDL (mg/dL)   Date Value   12/26/2020 46     BUN (mg/dL)   Date Value   12/27/2020 17     CREATININE (mg/dL)   Date Value   12/27/2020 0.9     Glucose   Date Value   12/27/2020 233 mg/dL (H)   05/30/2012 114 mg/dl (H)            Have you changed or started any medications since your last visit including any over-the-counter medicines, vitamins, or herbal medicines? yes - see list   Are you having any side effects from any of your medications? -  no  Have you stopped taking any of your medications? Is so, why? -  no    Have you seen any other physician or provider since your last visit? No  Have you had any other diagnostic tests since your last visit? No  Have you been seen in the emergency room and/or had an admission to a hospital since we last saw you? No  Have you had your annual diabetic retinal (eye) exam? No  Have you had your routine dental cleaning in the past 6 months? yes - within the past 6mo    Have you activated your [employer] account? If not, what are your barriers?  Yes     Patient Care Team:  Bryan Cherry DO as PCP - General  Bryan Cherry DO as PCP - [facility] Provider  Vj Pichardo MD as Consulting Physician (Orthopedic Surgery)         Medical History Review  Past Medical, Family, and Social History reviewed and does contribute to the patient presenting condition    Health Maintenance   Topic Date Due    Hepatitis C screen  Never done    COVID-19 Vaccine (1) Never done    DTaP/Tdap/Td vaccine (1 - Tdap) Never done    Shingles Vaccine (1 of 2) Never done    Diabetic foot exam  03/29/2018    Diabetic retinal exam

## 2021-07-07 ENCOUNTER — TELEPHONE (OUTPATIENT)
Dept: INTERNAL MEDICINE CLINIC | Age: 81
End: 2021-07-07

## 2021-07-07 ENCOUNTER — OFFICE VISIT (OUTPATIENT)
Dept: INTERNAL MEDICINE CLINIC | Age: 81
End: 2021-07-07
Payer: MEDICARE

## 2021-07-07 VITALS
TEMPERATURE: 96.8 F | BODY MASS INDEX: 34.68 KG/M2 | DIASTOLIC BLOOD PRESSURE: 60 MMHG | HEART RATE: 73 BPM | HEIGHT: 66 IN | SYSTOLIC BLOOD PRESSURE: 126 MMHG | WEIGHT: 215.8 LBS

## 2021-07-07 DIAGNOSIS — E11.9 TYPE 2 DIABETES MELLITUS WITHOUT COMPLICATION, WITHOUT LONG-TERM CURRENT USE OF INSULIN (HCC): ICD-10-CM

## 2021-07-07 PROCEDURE — G0108 DIAB MANAGE TRN  PER INDIV: HCPCS | Performed by: INTERNAL MEDICINE

## 2021-07-07 RX ORDER — DILTIAZEM HYDROCHLORIDE 180 MG/1
180 CAPSULE, COATED, EXTENDED RELEASE ORAL DAILY
COMMUNITY
Start: 2021-06-23 | End: 2021-12-29 | Stop reason: SDUPTHER

## 2021-07-07 NOTE — PATIENT INSTRUCTIONS
Stop any regular pop           --limit diet pop to only 2 cans per day        Drink at least 2 bottles of water per day--keep them set out in the           Front of your fridge or on the counter to remind you  Limit meals to about 3 servings of carbohydrate plus protein and             Veggies             Limit a snack between meals of about 1 serving carbohydrate                   Hand full of tortilla chips and salsa                   Veggies and dip                   1 small cup sugar pudding                   Chicken nuggets 4 piece  Use stretch band to exercise after supper when you first sit down to             Watch TV          --10 to 15 minutes with arms and legs with a stretch band       *also keep doing your stretching exercises when you wake up in the                   Morning  Check your blood sugars waking up and before supper or before                Bedtime for the next week (2 times per day)  Keep a log of what you are eating and how much. Bring this to your next               Appointment.   We can ask Dr. Randeen Moritz about Sanjiv Freire

## 2021-07-07 NOTE — PROGRESS NOTES
Diabetes Mellitus Type II, Initial Visit:   Dr. Robyn Orozco  Patient here for an initial evaluation of Type 2 diabetes mellitus. Current symptoms/problems include none. The patient was initially diagnosed with Type 2 diabetes mellitus; 2019. Known diabetic complications: peripheral neuropathy, cardiovascular disease and peripheral vascular disease  Cardiovascular risk factors: advanced age (older than 54 for men, 72 for women), diabetes mellitus, dyslipidemia, family history of premature cardiovascular disease, hypertension, obesity (BMI >= 30 kg/m2) and sedentary lifestyle  Current diabetic medications include Januvai/ Metformin. Eye exam current (within one year): yes [id number] Dr. Ester Blanca  Weight trend: decreasing steadily. Down about 50# in the past year. Highest weight 297#  Prior visit with dietician: no  Current diet: B 10am no breakfast               L 12pm soup with corn/ potato/ vinegar/ sugar               D 5-6pm whopper; pop               Snacks - chicken mcnuggets; salsa and chips; veggies/dip; celery/ pb               Drinks -reg pop 2-3 cans per day; water bottle 1 per day  Current exercise: ADL's low activity levels. Few stretches when  First waking up                 Current monitoring regimen: none  Home blood sugar records: lost old meter several months ago/ cannot recall glucose results  Any episodes of hypoglycemia? no  Is She on ACE inhibitor or angiotensin II receptor blocker? Yes   ramipril (Altace)  BG in the office today 0.75hrpp 204    Focus  Initial visit for Diabetes education. Recent A1C 9.1%. Constance Hopkins is not checking her blood sugars, reporting she lost her meter and can't remember the last time she checked her BG or what it was. She has a new meter and has been instructed on it today. Discussed meal planning and cutting out regular pop. She is also receptive to a small exercise plan with stretch bands. Constance Hopkins could benefit from GLP1 therapy in lieu of DDP4 for more glucose impact. She does not want to take an injection, but Rybelsus appears to be covered the same as her Januvia. Will discuss with provider. Follow up 1 week. Plan  Reviewed physiology of DM; BG goals; SGM; meter use; carbs; exercise; preventing complications  Stop any regular pop           --limit diet pop to only 2 cans per day        Drink at least 2 bottles of water per day--keep them set out in the           Front of your fridge or on the counter to remind you  Limit meals to about 3 servings of carbohydrate plus protein and             Veggies             Limit a snack between meals of about 1 serving carbohydrate                   Hand full of tortilla chips and salsa                   Veggies and dip                   1 small cup sugar pudding                   Chicken nuggets 4 piece  Use stretch band to exercise after supper when you first sit down to             Watch TV          --10 to 15 minutes with arms and legs with a stretch band       *also keep doing your stretching exercises when you wake up in the                   Morning  Check your blood sugars waking up and before supper or before                Bedtime for the next week (2 times per day)  Keep a log of what you are eating and how much. Bring this to your next               Appointment. We can ask Dr. Hopson Fears about Spike Bautista voices understanding of above instructions via teach back and willingness to participate in the above plan of care. Time spent in direct contact with patient 60  Minutes.

## 2021-07-08 NOTE — TELEPHONE ENCOUNTER
Alka approved; script sent to [facility]. Will stop Januvia after current supply is completed. Call to [facility] with these instructions. [facility] voiced understanding of these instructions via teach back. She reports she is just picking up her glucose test strips from [facility] today.

## 2021-07-12 ENCOUNTER — NURSE ONLY (OUTPATIENT)
Dept: INTERNAL MEDICINE CLINIC | Age: 81
End: 2021-07-12
Payer: MEDICARE

## 2021-07-12 DIAGNOSIS — E11.21 TYPE 2 DIABETES MELLITUS WITH DIABETIC NEPHROPATHY, WITHOUT LONG-TERM CURRENT USE OF INSULIN (HCC): ICD-10-CM

## 2021-07-12 PROCEDURE — 97804 MEDICAL NUTRITION GROUP: CPT | Performed by: DIETITIAN, REGISTERED

## 2021-07-28 ENCOUNTER — TELEPHONE (OUTPATIENT)
Dept: FAMILY MEDICINE CLINIC | Age: 81
End: 2021-07-28

## 2021-07-28 NOTE — TELEPHONE ENCOUNTER
----- Message from Sunita Patel sent at 7/27/2021  4:51 PM EDT -----  Subject: Message to Provider    QUESTIONS  Information for Provider? patient was calling in to see if pcp can give   her a call in regarding over the counter medication she stated she's   having issue with her legs especially left if can she would like a phone   call   ---------------------------------------------------------------------------  --------------  [address]  What is the best way for the office to contact you? OK to leave message on   voicemail  Preferred Call Back Phone Number? [phone]  ---------------------------------------------------------------------------  --------------  SCRIPT ANSWERS  Relationship to Patient?  Self

## 2021-09-07 RX ORDER — SITAGLIPTIN 100 MG/1
TABLET, FILM COATED ORAL
Qty: 90 TABLET | Refills: 3 | Status: SHIPPED | OUTPATIENT
Start: 2021-09-07 | End: 2021-09-20

## 2021-09-07 RX ORDER — PRAVASTATIN SODIUM 80 MG/1
80 TABLET ORAL DAILY
Qty: 90 TABLET | Refills: 3 | Status: SHIPPED | OUTPATIENT
Start: 2021-09-07 | End: 2022-10-10

## 2021-09-15 ENCOUNTER — OFFICE VISIT (OUTPATIENT)
Dept: INTERNAL MEDICINE CLINIC | Age: 81
End: 2021-09-15
Payer: MEDICARE

## 2021-09-15 VITALS
TEMPERATURE: 98.1 F | DIASTOLIC BLOOD PRESSURE: 61 MMHG | WEIGHT: 213.6 LBS | HEART RATE: 75 BPM | SYSTOLIC BLOOD PRESSURE: 131 MMHG | BODY MASS INDEX: 34.33 KG/M2 | HEIGHT: 66 IN

## 2021-09-15 DIAGNOSIS — E11.21 TYPE 2 DIABETES MELLITUS WITH DIABETIC NEPHROPATHY, WITHOUT LONG-TERM CURRENT USE OF INSULIN (HCC): Primary | ICD-10-CM

## 2021-09-15 LAB — HBA1C MFR BLD: 6.5 % (ref 4.3–5.7)

## 2021-09-15 PROCEDURE — 83036 HEMOGLOBIN GLYCOSYLATED A1C: CPT | Performed by: INTERNAL MEDICINE

## 2021-09-15 PROCEDURE — G0108 DIAB MANAGE TRN  PER INDIV: HCPCS | Performed by: INTERNAL MEDICINE

## 2021-09-15 NOTE — PROGRESS NOTES
[facility]  [facility] [address] [facility], [address]  [phone] (phone)  [phone] (fax)    Patient ID: Mary Leslie [id number]  Referring Provider: Dr. Luci Gutierres     Patient's name and  were verified. Subjective:    She presents for Her follow-up diabetic visit. She has type 2 diabetes mellitus. Home regimen includes: Biguanide, DDP-IV-1 and GLP-1 Agonist She is noncompliant some of the time. Assessment:     Lab Results   Component Value Date    LABA1C 9.1 2021    BUN 17 2020    CREATININE 0.9 2020     There were no vitals filed for this visit. Wt Readings from Last 3 Encounters:   21 215 lb 12.8 oz (97.9 kg)   21 219 lb 14.4 oz (99.7 kg)   21 222 lb 4.8 oz (100.8 kg)     Ht Readings from Last 3 Encounters:   21 5' 6\" (1.676 m)   21 5' 6\" (1.676 m)   20 5' 5\" (1.651 m)       Glucose at .25 hrs PPD today resulted at 195mg/dl  Current monitoring regimen: home blood tests - 1 or less times daily  Home blood sugar trends: no recent glucose checks  Any episodes of hypoglycemia? no  Depression screening completed 3/1/2021  Previous visit with dietician: no  Current diet: B/L 10-11am  kristopher/ dr zamorano                     D6pm  Chicken/ slaw/ m potato/ gravy [employer]                     Snacking on peppers/ pickles/ few crackers                     Avoiding sweets                      Water - 2-4 16 oz bottles. Soda Dr. Ovidio Mauro 3 cans per day  Current exercise: ADL's - moderate activity  Eye exam current (within one year): yes Dr. Sergio West cataracts 2021  Any history of foot problems? no  Last foot exam: 9/15/21 Pedal pulses:   peripheral pulses symmetrical   Results of monofilament test: 10/10              Skin noted to be warm, pale and hair is absent. Reports toes are tingly bilat  Immunizations up to date: yes - .  Still waiting to decide               On vaccine  Taking ASA:  Yes  Appropriate for use of [facility] Gl[facility] Grid: in direct patient education: 60 minutes.

## 2021-09-20 ENCOUNTER — VIRTUAL VISIT (OUTPATIENT)
Dept: FAMILY MEDICINE CLINIC | Age: 81
End: 2021-09-20
Payer: MEDICARE

## 2021-09-20 PROCEDURE — 1090F PRES/ABSN URINE INCON ASSESS: CPT | Performed by: FAMILY MEDICINE

## 2021-09-20 PROCEDURE — 1123F ACP DISCUSS/DSCN MKR DOCD: CPT | Performed by: FAMILY MEDICINE

## 2021-09-20 PROCEDURE — 99213 OFFICE O/P EST LOW 20 MIN: CPT | Performed by: FAMILY MEDICINE

## 2021-09-20 PROCEDURE — G8399 PT W/DXA RESULTS DOCUMENT: HCPCS | Performed by: FAMILY MEDICINE

## 2021-09-20 PROCEDURE — G8428 CUR MEDS NOT DOCUMENT: HCPCS | Performed by: FAMILY MEDICINE

## 2021-09-20 PROCEDURE — 4040F PNEUMOC VAC/ADMIN/RCVD: CPT | Performed by: FAMILY MEDICINE

## 2021-09-20 ASSESSMENT — ENCOUNTER SYMPTOMS
RESPIRATORY NEGATIVE: 1
GASTROINTESTINAL NEGATIVE: 1

## 2021-09-20 NOTE — PROGRESS NOTES
Alena Olea (:  1940) is a [de-identified] y.o. female,Established patient, here for evaluation of the following chief complaint(s): Medication Problem           SUBJECTIVE/OBJECTIVE:  HPI:    Chief Complaint   Patient presents with    Medication Problem       Pt presents today to discuss her medications. Recently started on Rybelsus by the [facility] but has some questions. Has reservations about taking it with the Januvia. Patient Active Problem List   Diagnosis    Hyperlipidemia    HTN (hypertension)    Osteopenia    GERD (gastroesophageal reflux disease)    Reactive airway disease    OA (osteoarthritis)    Atypical chest pain    Diabetes mellitus ([address]    Obesity (BMI 30-39. 9)    Chronic low back pain    Neuropathy    ANITA on CPAP    Controlled type 2 diabetes mellitus without complication (HCC)    Persistent atrial fibrillation (HCC)    Paroxysmal atrial fibrillation (HCC)    Abnormal nuclear stress test    Influenza    Atrial fibrillation with rapid ventricular response (HCC)    Sacroiliac inflammation (HCC)    Morbid obesity (HCC)    Chest pain, moderate coronary artery risk    Angina pectoris (HCC)    Chronic atrial fibrillation (HCC)    Peripheral edema    Hypercalcemia    Hyperkalemia    Hypothyroidism     Past Surgical History:   Procedure Laterality Date    APPENDECTOMY      CARDIAC CATHETERIZATION      two cath    COLONOSCOPY  2013    COLONOSCOPY N/A 2019    COLONOSCOPY DIAGNOSTIC performed by Shazia More MD at [address] [place] [address]. INJECTION PROCEDURE FOR SACROILIAC JOINT Left 2020    LEFT SI MBB #1 - NO STEROID performed by Natacha Mcconnell MD at [address] Left 2020    MOHS DEFECT REPAIR SCC LEFT MEDIAL HAND WITH SKIN GRAFT FROM LEFT UPPER ARM performed by Nikhil Fleming MD at [place] [address]  2017    on face, left side    UPPER GASTROINTESTINAL ENDOSCOPY N/A 5/17/2019    EGD BIOPSY performed by Mable Jones MD at [facility]     Social History     Tobacco Use    Smoking status: Never Smoker    Smokeless tobacco: Never Used   Vaping Use    Vaping Use: Never used   Substance Use Topics    Alcohol use: No     Alcohol/week: 0.0 standard drinks    Drug use: No         Review of Systems   Constitutional: Negative. HENT: Negative. Respiratory: Negative. Cardiovascular: Negative. Gastrointestinal: Negative. Musculoskeletal: Negative. All other systems reviewed and are negative. No flowsheet data found. Physical Exam  Constitutional:       General: She is not in acute distress. Appearance: Normal appearance. She is well-developed. She is not ill-appearing. HENT:      Head: Normocephalic and atraumatic. Right Ear: External ear normal.      Left Ear: External ear normal.   Eyes:      Conjunctiva/sclera: Conjunctivae normal.   Pulmonary:      Effort: Pulmonary effort is normal. No respiratory distress. Skin:     Findings: No rash (on exposed surfaces). Neurological:      Mental Status: She is alert and oriented to person, place, and time. Psychiatric:         Mood and Affect: Mood normal.         Behavior: Behavior normal.         Thought Content: Thought content normal.         Judgment: Judgment normal.        ASSESSMENT/PLAN:  1. Uncontrolled type 2 diabetes mellitus, without long-term current use of insulin ([address]    -  Hold Januvia  -  Start Rybelsus as directed    Return for Keep known appt. Alena Olea, was evaluated through a synchronous (real-time) audio-video encounter. The patient (or guardian if applicable) is aware that this is a billable service. Verbal consent to proceed has been obtained within the past 12 months.  The visit was conducted pursuant to the emergency declaration under the [facility] [place] waiver authority and the [employer] General Act. Patient identification was verified, and a caregiver was present when appropriate. The patient was located in a state where the provider was credentialed to provide care. An electronic signature was used to authenticate this note.     --Disputanta Keep, DO

## 2021-09-21 ENCOUNTER — APPOINTMENT (OUTPATIENT)
Dept: GENERAL RADIOLOGY | Age: 81
End: 2021-09-21
Payer: MEDICARE

## 2021-09-21 ENCOUNTER — HOSPITAL ENCOUNTER (EMERGENCY)
Age: 81
Discharge: HOME | End: 2021-09-21
Attending: EMERGENCY MEDICINE
Payer: MEDICARE

## 2021-09-21 VITALS
DIASTOLIC BLOOD PRESSURE: 70 MMHG | WEIGHT: 213 LBS | TEMPERATURE: 98.3 F | HEART RATE: 78 BPM | BODY MASS INDEX: 35.49 KG/M2 | SYSTOLIC BLOOD PRESSURE: 135 MMHG | RESPIRATION RATE: 17 BRPM | HEIGHT: 65 IN | OXYGEN SATURATION: 98 %

## 2021-09-21 DIAGNOSIS — M25.562 ACUTE PAIN OF LEFT KNEE: Primary | ICD-10-CM

## 2021-09-21 PROCEDURE — 73590 X-RAY EXAM OF LOWER LEG: CPT

## 2021-09-21 PROCEDURE — 99283 EMERGENCY DEPT VISIT LOW MDM: CPT

## 2021-09-21 PROCEDURE — 6370000000 HC RX 637 (ALT 250 FOR IP): Performed by: EMERGENCY MEDICINE

## 2021-09-21 PROCEDURE — 73564 X-RAY EXAM KNEE 4 OR MORE: CPT

## 2021-09-21 RX ORDER — IBUPROFEN 200 MG
400 TABLET ORAL ONCE
Status: COMPLETED | OUTPATIENT
Start: 2021-09-21 | End: 2021-09-21

## 2021-09-21 RX ORDER — ACETAMINOPHEN 325 MG/1
650 TABLET ORAL ONCE
Status: COMPLETED | OUTPATIENT
Start: 2021-09-21 | End: 2021-09-21

## 2021-09-21 RX ADMIN — ACETAMINOPHEN 650 MG: 325 TABLET ORAL at 18:38

## 2021-09-21 RX ADMIN — IBUPROFEN 400 MG: 200 TABLET, FILM COATED ORAL at 18:39

## 2021-09-21 ASSESSMENT — PAIN SCALES - GENERAL
PAINLEVEL_OUTOF10: 8
PAINLEVEL_OUTOF10: 8

## 2021-09-21 ASSESSMENT — PAIN DESCRIPTION - ORIENTATION: ORIENTATION: LEFT

## 2021-09-21 ASSESSMENT — PAIN DESCRIPTION - PAIN TYPE: TYPE: ACUTE PAIN

## 2021-09-21 ASSESSMENT — PAIN DESCRIPTION - LOCATION: LOCATION: LEG

## 2021-09-21 NOTE — ED PROVIDER NOTES
[facility] [address] [address] EMERGENCY DEPT  eMERGENCY dEPARTMENT eNCOUnter      Pt Name: Jerilyn Jones  MRN: [id number]  [place] 1940  Date of evaluation: 9/21/21      CHIEF COMPLAINT       Chief Complaint   Patient presents with    Leg Pain       HISTORY OF PRESENT ILLNESS     Jerilyn Jones is a [de-identified] y.o. female who presents to the emergency department for evaluation of atraumatic left knee and upper leg pain. Onset was around noon today. No fall or injury. No rash or overlying skin change. No other discomfort to the left lower extremity. Pain is only present when she is up walking. Pain is worse with weightbearing. She has no pain when she is sitting down or at rest.  No distal paresthesias. No recent illness or fever. She does not feel short of breath. No cough or wheezing. No chest pain, heart racing, or dizziness. No GI/ complaints. She is not taking any medications for pain control. She has a history of chronic lower extremity edema which is recently improved from her baseline. Review of systems otherwise negative. She has no additional complaints at this time. REVIEW OF SYSTEMS       Review of Systems   Cardiovascular: Positive for leg swelling (Chronic). Musculoskeletal:        Left knee/lower leg pain   All other systems reviewed and are negative. PAST MEDICAL HISTORY      has a past medical history of Arthritis, Cancer ([address] Diabetes mellitus ([address] Hyperlipidemia, Hypertension, Osteoporosis, and Sleep apnea. SURGICAL HISTORY        has a past surgical history that includes Appendectomy; Cardiac catheterization; Dilation and curettage of uterus; Colonoscopy (01/08/2013); Skin cancer excision (03/2017); Colonoscopy (N/A, 5/17/2019); Upper gastrointestinal endoscopy (N/A, 5/17/2019); Injection Procedure For Sacroiliac Joint (Left, 1/24/2020); and Mohs surgery (Left, 6/17/2020).       CURRENT MEDICATIONS       Discharge Medication List as of 9/21/2021  9:56 PM CONTINUE these medications which have NOT CHANGED    Details   pravastatin (PRAVACHOL) 80 MG tablet TAKE 1 TABLET BY MOUTH DAILY, Disp-90 tablet, R-3Normal      Semaglutide 3 MG TABS Take 3 mg by mouth daily, Disp-30 tablet, R-3Normal      CPAP Machine MISC Historical Med      dilTIAZem (CARDIZEM CD) 180 MG extended release capsule Take 180 mg by mouth dailyHistorical Med      !! blood glucose test strips (ASCENSIA AUTODISC VI;ONE TOUCH ULTRA TEST VI) strip DAILY Starting Wed 7/7/2021, Disp-100 each, R-3, NormalOne Touch Verio test strips. Test once daily and As needed. metFORMIN (GLUCOPHAGE-XR) 500 MG extended release tablet TAKE 1 TABLET BY MOUTH BID, Disp-180 tablet, R-3Normal      ELIQUIS 5 MG TABS tablet TAKE 1 TABLET BY MOUTH TWICE DAILY, Disp-180 tablet, R-3Normal      nitroGLYCERIN (NITROSTAT) 0.4 MG SL tablet DISSOLVE ONE TABLET UNDER TONGUE AS NEEDED FOR CHEST PAIN EVERY 5 MINUTES. MAX OF 3 DOSES. IF NO RELIEF AFTER 1 DOSE, CALL 911., Disp-25 tablet, R-0Normal      omeprazole (PRILOSEC) 40 MG delayed release capsule TAKE 1 CAPSULE BY MOUTH DAILY, Disp-90 capsule, R-3Normal      OneTouch Delica Lancets 97R MISC Check sugars daily. Dx: E11.9, Disp-100 each, R-3Normal      Blood Glucose Monitoring Suppl (Latoya Poisson IQ SYSTEM) w/Device KIT Disp-1 kit, R-0, NormalCheck sugars daily. Dx: E11.9      !! blood glucose test strips (ONETOUCH VERIO) strip Check sugars daily.   Dx: E11.9, Disp-100 each, R-3Normal      albuterol sulfate HFA (PROVENTIL HFA) 108 (90 Base) MCG/ACT inhaler Inhale 2 puffs into the lungs every 4 hours as needed for Wheezing or Shortness of Breath, Disp-1 Inhaler, R-5Normal      levothyroxine (SYNTHROID) 25 MCG tablet TAKE 1 TABLET BY MOUTH DAILY, Disp-90 tablet, R-3Normal      metoprolol succinate (TOPROL XL) 25 MG extended release tablet Take 2 tablets by mouth daily, Disp-30 tablet,R-3Print      digoxin (LANOXIN) 125 MCG tablet Take 125 mcg by mouth dailyHistorical Med ramipril (ALTACE) 5 MG capsule TAKE 1 CAPSULE BY MOUTH DAILY, Disp-90 capsule,R-3Normal      acetaminophen (TYLENOL) 650 MG extended release tablet Take 650 mg by mouth every 8 hours as needed for PainHistorical Med      triamcinolone (KENALOG) 0.1 % cream 2 times daily as needed, 2 TIMES DAILY PRN Starting Tue 3/10/2020, Historical Med      !! vitamin B-12 (CYANOCOBALAMIN) 500 MCG tablet Take 500 mcg by mouth dailyHistorical Med      docusate sodium (COLACE) 100 MG capsule Take 100 mg by mouth daily as needed for ConstipationHistorical Med      Glucosamine HCl (GLUCOSAMINE PO) Take 1,000 mg by mouth dailyHistorical Med      Cholecalciferol (VITAMIN D3) 50 MCG ( UT) CAPS Take by mouth dailyHistorical Med      Multiple Vitamins-Minerals (ONE DAILY FOR WOMEN PO) Take by mouth dailyHistorical Med      spironolactone (ALDACTONE) 50 MG tablet Take 50 mg by mouth daily Historical Med      BIOTIN PO Take 1 tablet by mouth dailyHistorical Med      aspirin 81 MG EC tablet Take 1 tablet by mouth daily, Disp-30 tablet, R-11OTC      !! Cyanocobalamin (VITAMIN B 12 PO) Take  by mouth Daily. Historical Med       !! - Potential duplicate medications found. Please discuss with provider. ALLERGIES       is allergic to ranolazine. FAMILY HISTORY       She indicated that her mother is . She indicated that her father is . She indicated that her sister is . She indicated that all of her four brothers are . She indicated that the status of her neg hx is unknown.     family history includes Alzheimer's Disease in her mother; Cancer in her sister; Heart Disease in her mother. SOCIAL HISTORY        reports that she has never smoked. She has never used smokeless tobacco. She reports that she does not drink alcohol and does not use drugs. PHYSICAL EXAM       INITIAL VITALS:  height is 5' 5\" (1.651 m) and weight is 213 lb (96.6 kg). Her oral temperature is 98.3 °F (36.8 °C).  Her blood pressure is 135/70 and her pulse is 78. Her respiration is 17 and oxygen saturation is 98%. Physical Exam  Vitals and nursing note reviewed. Constitutional:       Appearance: Normal appearance. Cardiovascular:      Rate and Rhythm: Normal rate and regular rhythm. Heart sounds: Normal heart sounds. No murmur heard. Pulmonary:      Effort: Pulmonary effort is normal.      Breath sounds: Normal breath sounds. Abdominal:      Palpations: Abdomen is soft. Tenderness: There is no abdominal tenderness. Musculoskeletal:      Comments: Chronic appearing bilateral pretibial pitting edema secondary to obesity. On examination of the left lower extremity she has no focal bony pain. No rash or overlying skin change. No calf discomfort. No popliteal mass or discomfort. Intact peripheral perfusion and sensation. Skin:     General: Skin is warm and dry. Capillary Refill: Capillary refill takes less than 2 seconds. Neurological:      General: No focal deficit present. Mental Status: She is alert. Psychiatric:         Mood and Affect: Mood normal.         Behavior: Behavior normal.       DIAGNOSTIC RESULTS     RADIOLOGY:   I directly visualized the following images and reviewed the radiologist interpretations:    XR TIBIA FIBULA LEFT (2 VIEWS)   Final Result      No fracture or dislocation. Final report electronically signed by Dr. Светлана Campoverde on 9/21/2021 7:08 PM      XR KNEE LEFT (MIN 4 VIEWS)   Final Result   1. No fracture or dislocation. 2. Tricompartmental osteoarthritis.       Final report electronically signed by Dr. Светлана Campoverde on 9/21/2021 7:07 PM          EMERGENCY DEPARTMENT COURSE:     Vitals:    Vitals:    09/21/21 1701   BP: 135/70   Pulse: 78   Resp: 17   Temp: 98.3 °F (36.8 °C)   TempSrc: Oral   SpO2: 98%   Weight: 213 lb (96.6 kg)   Height: 5' 5\" (1.651 m)     -------------------------  BP: 135/70, Temp: 98.3 °F (36.8 °C), Pulse: 78, Resp: 17      MEDICAL DECISION MAKING:     Tylenol/Motrin ordered awaiting x-rays    Patient updated on all results  Pain well controlled at this time  She is comfortable with the plan of care for discharge with an Ace wrap  Have encouraged outpatient orthopedic follow-up if pain persists  Stable for discharge      FINAL IMPRESSION      1. Acute pain of left knee          DISPOSITION/PLAN     Discharge      PATIENT REFERRED TO:  Savana Delatorre [address] [id number]    Call in 1 day      [address]  [address] [facility] [phone]  Call in 1 day      (Please note that portions of this note were completed with a voice recognition program.  Efforts were made to edit the dictations but occasionally words are mis-transcribed.)    Gulshan David.  DO Tram  Attending Emergency Physician                   Mia Polo DO  09/21/21 1847

## 2021-09-21 NOTE — ED TRIAGE NOTES
Presents to ER with complaints of left leg pain. Pt states she has been unable to put pressure on it. States she got out of the car at the store and when she felt pain in her left leg.

## 2021-09-22 ENCOUNTER — TELEPHONE (OUTPATIENT)
Dept: FAMILY MEDICINE CLINIC | Age: 81
End: 2021-09-22

## 2021-10-04 ENCOUNTER — OFFICE VISIT (OUTPATIENT)
Dept: INTERNAL MEDICINE CLINIC | Age: 81
End: 2021-10-04
Payer: MEDICARE

## 2021-10-04 VITALS — TEMPERATURE: 97.8 F

## 2021-10-04 DIAGNOSIS — E11.21 TYPE 2 DIABETES MELLITUS WITH DIABETIC NEPHROPATHY, WITHOUT LONG-TERM CURRENT USE OF INSULIN (HCC): ICD-10-CM

## 2021-10-04 PROCEDURE — 97802 MEDICAL NUTRITION INDIV IN: CPT | Performed by: DIETITIAN, REGISTERED

## 2021-10-04 NOTE — PROGRESS NOTES
[address] [place] [address]. [address] [place] [address] Leonardo., RussLogan [facility], [facility]  [phone] (phone)  [phone] (fax)    Patient Name: Bella Lloyd. Date of Birth: [id number]. MRN: [id number]      Assessment: Patient is a [de-identified] y.o. female seen for Initial MNT visit for Type 2 DB.     -Nutritionally relevant labs:   Lab Results   Component Value Date/Time    LABA1C 6.5 (H) 09/15/2021 09:55 AM    LABA1C 9.1 06/14/2021 03:31 PM    LABA1C 11.7 (H) 03/05/2021 09:25 AM    LABA1C 7.0 (H) 12/25/2020 01:05 PM    GLUCOSE 233 (H) 12/27/2020 03:55 AM    GLUCOSE 183 (H) 12/26/2020 04:09 AM    GLUCOSE 114 (H) 05/30/2012 01:02 PM    CHOL 144 12/26/2020 04:09 AM    HDL 46 12/26/2020 04:09 AM    LDLCALC 60 12/26/2020 04:09 AM    TRIG 188 12/26/2020 04:09 AM     9/15/21 Kevin visit  Limit soda to only 1 per day --must be Dr. Kael Castelan!!            NO regular pop  --treat yourself during Days of Our Lives - Patient has transitioned over to Zero pop. Try crystal light or [employer] brand or sugar free koolaid or country time  Start taking Rybelsus tomorrow--when you first wake up - Patient started taking Rybelsus              On an empty stomach. Take your other pills and eat                Breakfast at least 1/2 hour later. Use your Januvia up, but do not get any more Januvia  Use stretch band exercises at the end of the 6pm news - not doing this yet. At least 10  Minutes of arms and legs              Also do your leg stretches - doing this    Today's Visit:  Pt arriving in wheelchair and daughter in law with her.  -Blood sugar trends: Pt c/o having trouble with meter and checking her BS. She did not know that the strip turns on her meter for her - she was turning it on 1st and then trying to check. She gave up and only has a few readings since last DB center appt.   Showed her how the meter works and how to adjust the lancet and how to change the lancet in her lancet device. 2 hr pp Brkf today in office - 156  Other:  FBS:  176, 154    -Food recall from RN-CDE visit 9/15/21. Current diet: B/L 10-11am  croissant/ dr zamorano                     D6pm  Chicken/ slaw/ m potato/ gravy [employer]                     Snacking on peppers/ pickles/ few crackers                     Avoiding sweets                      Water - 2-4 16 oz bottles. Soda Dr. Huong Aranda 3 cans per day  Pt likes non-starchy veggies. Pt likes chocolate milk and asked about this. Pt likes sweetened instant oatmeal.    -Main Beverages: zero gatorade and zero coke. -Impression of Dietary Intake: on average, 3 meals per day    Current Outpatient Medications on File Prior to Visit   Medication Sig Dispense Refill    pravastatin (PRAVACHOL) 80 MG tablet TAKE 1 TABLET BY MOUTH DAILY 90 tablet 3    Semaglutide 3 MG TABS Take 3 mg by mouth daily 30 tablet 3    CPAP Machine MISC by Does not apply route      dilTIAZem (CARDIZEM CD) 180 MG extended release capsule Take 180 mg by mouth daily      blood glucose test strips (ASCENSIA AUTODISC VI;ONE TOUCH ULTRA TEST VI) strip 1 each by In Vitro route daily One Touch Verio test strips. Test once daily and As needed. 100 each 3    metFORMIN (GLUCOPHAGE-XR) 500 MG extended release tablet TAKE 1 TABLET BY MOUTH  tablet 3    ELIQUIS 5 MG TABS tablet TAKE 1 TABLET BY MOUTH TWICE DAILY 180 tablet 3    nitroGLYCERIN (NITROSTAT) 0.4 MG SL tablet DISSOLVE ONE TABLET UNDER TONGUE AS NEEDED FOR CHEST PAIN EVERY 5 MINUTES. MAX OF 3 DOSES. IF NO RELIEF AFTER 1 DOSE, CALL 911. 25 tablet 0    omeprazole (PRILOSEC) 40 MG delayed release capsule TAKE 1 CAPSULE BY MOUTH DAILY 90 capsule 3    OneTouch Delica Lancets 15A MISC Check sugars daily. Dx: E11.9 100 each 3    Blood Glucose Monitoring Suppl (RETC SYSTEM) w/Device KIT Check sugars daily. Dx: E11.9 1 kit 0    blood glucose test strips (ONETOUCH VERIO) strip Check sugars daily.   Dx: E11.9 100 each 3    albuterol sulfate HFA (PROVENTIL HFA) 108 (90 Base) MCG/ACT inhaler Inhale 2 puffs into the lungs every 4 hours as needed for Wheezing or Shortness of Breath 1 Inhaler 5    levothyroxine (SYNTHROID) 25 MCG tablet TAKE 1 TABLET BY MOUTH DAILY 90 tablet 3    metoprolol succinate (TOPROL XL) 25 MG extended release tablet Take 2 tablets by mouth daily 30 tablet 3    digoxin (LANOXIN) 125 MCG tablet Take 125 mcg by mouth daily      ramipril (ALTACE) 5 MG capsule TAKE 1 CAPSULE BY MOUTH DAILY 90 capsule 3    acetaminophen (TYLENOL) 650 MG extended release tablet Take 650 mg by mouth every 8 hours as needed for Pain      triamcinolone (KENALOG) 0.1 % cream 2 times daily as needed      vitamin B-12 (CYANOCOBALAMIN) 500 MCG tablet Take 500 mcg by mouth daily      docusate sodium (COLACE) 100 MG capsule Take 100 mg by mouth daily as needed for Constipation (Patient not taking: Reported on 7/7/2021)      Glucosamine HCl (GLUCOSAMINE PO) Take 1,000 mg by mouth daily (Patient not taking: Reported on 7/7/2021)      Cholecalciferol (VITAMIN D3) 50 MCG (2000 UT) CAPS Take by mouth daily      Multiple Vitamins-Minerals (ONE DAILY FOR WOMEN PO) Take by mouth daily      spironolactone (ALDACTONE) 50 MG tablet Take 50 mg by mouth daily       BIOTIN PO Take 1 tablet by mouth daily      aspirin 81 MG EC tablet Take 1 tablet by mouth daily 30 tablet 11    Cyanocobalamin (VITAMIN B 12 PO) Take  by mouth Daily. No current facility-administered medications on file prior to visit. Vitals from current and previous visits:  Vitals 9/15/2021   Weight 213 lb 9.6 oz   Height 5' 6\"   Body mass index 34.47 kg/m2     - Weight goal: lose weight. Nutrition Diagnosis:   Food and nutrition-related knowledge deficit related to Lack of previous MNT/currently undergoing MNT as evidenced by Conditions associated with a diagnosis or treatment: Type 2 DB.          Intervention:  -Impression: Pt attended Carb Count Class.    -Instructed the patient on: Carbohydrate Counting, Consistent Carbohydrate Intake, Food Label Reading, Meal Planning for Regular, Balanced Meals & Snacks and The Importance of Regular Physical Activity. Encouraged pt to call before next appt. with questions about diet and if still concerns with checking her BS's.    -Handouts given for: carbohydrate counting, 150 gm carb sample meal plans/menus and fancy plate pictures, healthy snacks. Patient Instructions   1.)  Get familiar with reading the nutrition facts label by looking at serving size and total carbohydrates. - Without a label refer to your carb count guide booklet. 2.)  Measure foods for accuracy in carb counting.    3.)  Healthy carb choices:  whole grains, whole wheat pasta, starchy vegetables, fresh fruit and lowfat/non-fat milk and yogurt. 4.)  Your meal plan is found on the inside cover of your carb counting guide booklet:  1st meal or Brkf:  30-45 gms carbohydrates (2-3 carb servings) + 1-2 oz protein  2nd meal or Lunch: 30-45 gms carbohydrates (2-3 carb servings) + 2-3 oz protein + non-starchy veggies  3rd meal or Dinner:  30-45 gms carbohydrates (2-3 carb servings) + 2-3 oz protein + non- starchy veggies    Snack time:  15 - 20 gms carbohydrates + 1 oz protein    5.)  Choose lean protein most of the time and Cut in 1/2 added fats to help with weight loss efforts. 6.) Bring a 1-2 week food log and meter to next dietitian appt. Thanks. Check BS:  1-2x/day  Fasting BS (1st thing in the morning) or before a meal (at least 4 hour since last ate), BS goal:  90 - 130  2 hours after the start of a meal, BS goal:  100 - 150    7.)  Keep thinking about doing your exercise bands during the news each night!!    -Nutrition prescription: 1400 calories/day, 120 - 165 g carbs/day. Adj Wt. 68 kg (151#)    Comprehension verified using teachback method.     Monitoring/Evaluation:   -Followup visit: 6 weeks with dietitian.   -Receptiveness to education/goals: Agreeable.  -Evaluation of education: Indicates understanding.  -Readiness to change: contemplation - ambivalent about change applying carb counting to her meal planning.  -Expected compliance: good. Thank you for your referral of this patient. Total time involved in direct patient education: 45 minutes for initial MNT visit.

## 2021-10-04 NOTE — PATIENT INSTRUCTIONS
1. )  Get familiar with reading the nutrition facts label by looking at serving size and total carbohydrates. - Without a label refer to your carb count guide booklet. 2.)  Measure foods for accuracy in carb counting.    3.)  Healthy carb choices:  whole grains, whole wheat pasta, starchy vegetables, fresh fruit and lowfat/non-fat milk and yogurt. 4.)  Your meal plan is found on the inside cover of your carb counting guide booklet:  1st meal or Brkf:  30-45 gms carbohydrates (2-3 carb servings) + 1-2 oz protein  2nd meal or Lunch: 30-45 gms carbohydrates (2-3 carb servings) + 2-3 oz protein + non-starchy veggies  3rd meal or Dinner:  30-45 gms carbohydrates (2-3 carb servings) + 2-3 oz protein + non- starchy veggies    Snack time:  15 - 20 gms carbohydrates + 1 oz protein    5.)  Choose lean protein most of the time and Cut in 1/2 added fats to help with weight loss efforts. 6.) Bring a 1-2 week food log and meter to next dietitian appt. Thanks.   Check BS:  1-2x/day  Fasting BS (1st thing in the morning) or before a meal (at least 4 hour since last ate), BS goal:  90 - 130  2 hours after the start of a meal, BS goal:  100 - 150    7.)  Keep thinking about doing your exercise bands during the news each night!!

## 2021-11-01 ENCOUNTER — OFFICE VISIT (OUTPATIENT)
Dept: FAMILY MEDICINE CLINIC | Age: 81
End: 2021-11-01
Payer: MEDICARE

## 2021-11-01 VITALS
SYSTOLIC BLOOD PRESSURE: 122 MMHG | WEIGHT: 208.6 LBS | BODY MASS INDEX: 34.71 KG/M2 | RESPIRATION RATE: 16 BRPM | HEART RATE: 68 BPM | DIASTOLIC BLOOD PRESSURE: 74 MMHG

## 2021-11-01 DIAGNOSIS — L85.3 XEROSIS OF SKIN: Primary | ICD-10-CM

## 2021-11-01 PROCEDURE — G8399 PT W/DXA RESULTS DOCUMENT: HCPCS | Performed by: FAMILY MEDICINE

## 2021-11-01 PROCEDURE — G8484 FLU IMMUNIZE NO ADMIN: HCPCS | Performed by: FAMILY MEDICINE

## 2021-11-01 PROCEDURE — 99213 OFFICE O/P EST LOW 20 MIN: CPT | Performed by: FAMILY MEDICINE

## 2021-11-01 PROCEDURE — 1036F TOBACCO NON-USER: CPT | Performed by: FAMILY MEDICINE

## 2021-11-01 PROCEDURE — 1123F ACP DISCUSS/DSCN MKR DOCD: CPT | Performed by: FAMILY MEDICINE

## 2021-11-01 PROCEDURE — G8417 CALC BMI ABV UP PARAM F/U: HCPCS | Performed by: FAMILY MEDICINE

## 2021-11-01 PROCEDURE — G8427 DOCREV CUR MEDS BY ELIG CLIN: HCPCS | Performed by: FAMILY MEDICINE

## 2021-11-01 PROCEDURE — 4040F PNEUMOC VAC/ADMIN/RCVD: CPT | Performed by: FAMILY MEDICINE

## 2021-11-01 PROCEDURE — 1090F PRES/ABSN URINE INCON ASSESS: CPT | Performed by: FAMILY MEDICINE

## 2021-11-01 RX ORDER — HYDROCORTISONE 25 MG/ML
LOTION TOPICAL
Qty: 120 ML | Refills: 0 | Status: SHIPPED | OUTPATIENT
Start: 2021-11-01 | End: 2021-11-24

## 2021-11-01 SDOH — ECONOMIC STABILITY: FOOD INSECURITY: WITHIN THE PAST 12 MONTHS, YOU WORRIED THAT YOUR FOOD WOULD RUN OUT BEFORE YOU GOT MONEY TO BUY MORE.: NEVER TRUE

## 2021-11-01 SDOH — ECONOMIC STABILITY: FOOD INSECURITY: WITHIN THE PAST 12 MONTHS, THE FOOD YOU BOUGHT JUST DIDN'T LAST AND YOU DIDN'T HAVE MONEY TO GET MORE.: NEVER TRUE

## 2021-11-01 ASSESSMENT — SOCIAL DETERMINANTS OF HEALTH (SDOH): HOW HARD IS IT FOR YOU TO PAY FOR THE VERY BASICS LIKE FOOD, HOUSING, MEDICAL CARE, AND HEATING?: NOT HARD AT ALL

## 2021-11-01 ASSESSMENT — ENCOUNTER SYMPTOMS
GASTROINTESTINAL NEGATIVE: 1
RESPIRATORY NEGATIVE: 1

## 2021-11-01 NOTE — PROGRESS NOTES
Alena Olea (:  1940) is a [address] [place] [address] y.o. female,Established patient, here for evaluation of the following chief complaint(s):  Pruritis (bilateral legs) and Rash (bilateral legs, right wrist     using Calamine lotion)        Subjective   SUBJECTIVE/OBJECTIVE:  HPI:    Chief Complaint   Patient presents with    Pruritis     bilateral legs    Rash     bilateral legs, right wrist     using Calamine lotion     Pt presents today for bilateral legs for the last 6 mos. Uncertain what soap she is using. She is using Calamine lotion for the itch. Patient Active Problem List   Diagnosis    Hyperlipidemia    HTN (hypertension)    Osteopenia    GERD (gastroesophageal reflux disease)    Reactive airway disease    OA (osteoarthritis)    Atypical chest pain    Diabetes mellitus ([address]    Obesity (BMI 30-39. 9)    Chronic low back pain    Neuropathy    ANITA on CPAP    Controlled type 2 diabetes mellitus without complication (HCC)    Persistent atrial fibrillation (HCC)    Paroxysmal atrial fibrillation (HCC)    Abnormal nuclear stress test    Influenza    Atrial fibrillation with rapid ventricular response (HCC)    Sacroiliac inflammation (HCC)    Morbid obesity (HCC)    Chest pain, moderate coronary artery risk    Angina pectoris (HCC)    Chronic atrial fibrillation (HCC)    Peripheral edema    Hypercalcemia    Hyperkalemia    Hypothyroidism     Past Surgical History:   Procedure Laterality Date    APPENDECTOMY      CARDIAC CATHETERIZATION      two cath    COLONOSCOPY  2013    COLONOSCOPY N/A 2019    COLONOSCOPY DIAGNOSTIC performed by Anthony Ruiz MD at [address] [place] [address]. 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 HENT: Negative. Respiratory: Negative. Cardiovascular: Negative. Gastrointestinal: Negative. Musculoskeletal: Negative. Skin: Positive for rash. All other systems reviewed and are negative. Objective   Physical Exam  Vitals and nursing note reviewed. Constitutional:       General: She is not in acute distress. Appearance: Normal appearance. She is well-developed. HENT:      Head: Normocephalic and atraumatic. Right Ear: Tympanic membrane normal.      Left Ear: Tympanic membrane normal.   Eyes:      Conjunctiva/sclera: Conjunctivae normal.   Cardiovascular:      Rate and Rhythm: Normal rate and regular rhythm. Heart sounds: Normal heart sounds. No murmur heard. Pulmonary:      Effort: Pulmonary effort is normal.      Breath sounds: Normal breath sounds. No wheezing, rhonchi or rales. Abdominal:      General: There is no distension. Musculoskeletal:      Cervical back: Neck supple. Skin:     General: Skin is warm and dry. Findings: Rash (dry, macular rash to LEs) present. Neurological:      General: No focal deficit present. Mental Status: She is alert. Psychiatric:         Attention and Perception: Attention normal.         Mood and Affect: Mood normal.         Speech: Speech normal.         Behavior: Behavior normal. Behavior is cooperative. Thought Content:  Thought content normal.         Judgment: Judgment normal. ASSESSMENT/PLAN:  1. Xerosis of skin  -     hydrocortisone (HYTONE) 2.5 % lotion; Apply topically 2 times daily. , Disp-120 mL, R-0, Normal    -  Dove soap  -  Hytone lotion BID, transition to Cerava cream once controlled    Return if symptoms worsen or fail to improve. An electronic signature was used to authenticate this note.     --Lisa Daigle, DO

## 2021-11-23 DIAGNOSIS — L85.3 XEROSIS OF SKIN: ICD-10-CM

## 2021-11-24 RX ORDER — HYDROCORTISONE 25 MG/ML
LOTION TOPICAL
Qty: 118 ML | Refills: 0 | Status: SHIPPED | OUTPATIENT
Start: 2021-11-24 | End: 2022-08-02 | Stop reason: SDUPTHER

## 2021-12-01 ENCOUNTER — OFFICE VISIT (OUTPATIENT)
Dept: PULMONOLOGY | Age: 81
End: 2021-12-01
Payer: MEDICARE

## 2021-12-01 VITALS
TEMPERATURE: 98.4 F | HEART RATE: 99 BPM | WEIGHT: 198.4 LBS | BODY MASS INDEX: 33.05 KG/M2 | SYSTOLIC BLOOD PRESSURE: 118 MMHG | OXYGEN SATURATION: 97 % | DIASTOLIC BLOOD PRESSURE: 65 MMHG | HEIGHT: 65 IN

## 2021-12-01 DIAGNOSIS — G47.33 OSA ON CPAP: Primary | ICD-10-CM

## 2021-12-01 DIAGNOSIS — Z99.89 OSA ON CPAP: Primary | ICD-10-CM

## 2021-12-01 PROCEDURE — G8417 CALC BMI ABV UP PARAM F/U: HCPCS | Performed by: NURSE PRACTITIONER

## 2021-12-01 PROCEDURE — 1036F TOBACCO NON-USER: CPT | Performed by: NURSE PRACTITIONER

## 2021-12-01 PROCEDURE — 99214 OFFICE O/P EST MOD 30 MIN: CPT | Performed by: NURSE PRACTITIONER

## 2021-12-01 PROCEDURE — 1123F ACP DISCUSS/DSCN MKR DOCD: CPT | Performed by: NURSE PRACTITIONER

## 2021-12-01 PROCEDURE — G8484 FLU IMMUNIZE NO ADMIN: HCPCS | Performed by: NURSE PRACTITIONER

## 2021-12-01 PROCEDURE — G8399 PT W/DXA RESULTS DOCUMENT: HCPCS | Performed by: NURSE PRACTITIONER

## 2021-12-01 PROCEDURE — 4040F PNEUMOC VAC/ADMIN/RCVD: CPT | Performed by: NURSE PRACTITIONER

## 2021-12-01 PROCEDURE — 1090F PRES/ABSN URINE INCON ASSESS: CPT | Performed by: NURSE PRACTITIONER

## 2021-12-01 PROCEDURE — G8427 DOCREV CUR MEDS BY ELIG CLIN: HCPCS | Performed by: NURSE PRACTITIONER

## 2021-12-01 ASSESSMENT — ENCOUNTER SYMPTOMS
SHORTNESS OF BREATH: 0
COUGH: 0
EYES NEGATIVE: 1
RESPIRATORY NEGATIVE: 1
GASTROINTESTINAL NEGATIVE: 1
ALLERGIC/IMMUNOLOGIC NEGATIVE: 1
WHEEZING: 0

## 2021-12-01 NOTE — PROGRESS NOTES
Negative. Endocrine: Negative. Genitourinary: Negative. Musculoskeletal: Positive for gait problem. Skin: Positive for rash (all over itchy rash ). Allergic/Immunologic: Negative. Hematological: Negative. Psychiatric/Behavioral: Positive for sleep disturbance (sometimes waking up sleepy ). BMI:  Body mass index is 33.02 kg/m². Wt Readings from Last 3 Encounters:   12/01/21 198 lb 6.4 oz (90 kg)   11/01/21 208 lb 9.6 oz (94.6 kg)   09/21/21 213 lb (96.6 kg)     Weight lost 10 lbs over 1 month   Vitals: /65 (Site: Left Upper Arm, Position: Sitting, Cuff Size: Large Adult)   Pulse 99   Temp 98.4 °F (36.9 °C)   Ht 5' 5\" (1.651 m)   Wt 198 lb 6.4 oz (90 kg)   SpO2 97% Comment: on r/a  BMI 33.02 kg/m²       Physical Exam  Vitals and nursing note reviewed. Constitutional:       Appearance: She is well-developed. HENT:      Head: Normocephalic and atraumatic. Eyes:      Conjunctiva/sclera: Conjunctivae normal.      Pupils: Pupils are equal, round, and reactive to light. Neck:      Vascular: No JVD. Cardiovascular:      Rate and Rhythm: Normal rate and regular rhythm. Heart sounds: Normal heart sounds. No murmur heard. No friction rub. No gallop. Pulmonary:      Effort: Pulmonary effort is normal. No respiratory distress. Breath sounds: Normal breath sounds. No wheezing or rales. Abdominal:      General: Bowel sounds are normal.      Palpations: Abdomen is soft. Musculoskeletal:         General: Normal range of motion. Cervical back: Normal range of motion and neck supple. Skin:     General: Skin is warm and dry. Capillary Refill: Capillary refill takes less than 2 seconds. Neurological:      Mental Status: She is alert and oriented to person, place, and time. Psychiatric:         Behavior: Behavior normal.         Thought Content:  Thought content normal.         Judgment: Judgment normal.           ASSESSMENT/DIAGNOSIS     Diagnosis Orders 1. ANITA on CPAP  DME Order for CPAP as OP            Plan   Do you need any equipment today? Yes     - Discussed using Benadryl 25 mg PO at night for help with itching and maybe help her sleep better at night   - Discussed Atarax but would defer to PCP or dermatologist   - Will make appointment today for [facility] for mask fitting and supplies- due to higher leak  - Download reviewed and discussed with patient  - She  was advised to continue current positive airway pressure therapy with above described pressure. - She  advised to keep good compliance with current recommended pressure to get optimal results and clinical improvement  - Recommend 7-9 hours of sleep with PAP  - She was advised to call DME company regarding supplies if needed.   -She call my office for earlier appointment if needed for worsening of sleep symptoms.   - She was instructed on weight loss  - Blanca Castillo was educated about my impression and plan. Patient verbalizesunderstanding.   We will see Blanca Olea back in: 1 year with download    Information added by my medical assistant/LPN was reviewed today    Electronically signed by BOWEN Benitez CNP on 12/1/2021 at 8:43 AM

## 2021-12-10 ENCOUNTER — TELEPHONE (OUTPATIENT)
Dept: FAMILY MEDICINE CLINIC | Age: 81
End: 2021-12-10

## 2021-12-10 DIAGNOSIS — E03.8 OTHER SPECIFIED HYPOTHYROIDISM: ICD-10-CM

## 2021-12-10 DIAGNOSIS — I10 ESSENTIAL HYPERTENSION: ICD-10-CM

## 2021-12-10 DIAGNOSIS — K21.9 GASTROESOPHAGEAL REFLUX DISEASE, UNSPECIFIED WHETHER ESOPHAGITIS PRESENT: ICD-10-CM

## 2021-12-10 DIAGNOSIS — E78.00 PURE HYPERCHOLESTEROLEMIA: ICD-10-CM

## 2021-12-10 NOTE — TELEPHONE ENCOUNTER
Attempted to contact patient and  Miguel Penny both phone numbers ring busy. Lab orders filed at  for .

## 2021-12-15 ENCOUNTER — OFFICE VISIT (OUTPATIENT)
Dept: FAMILY MEDICINE CLINIC | Age: 81
End: 2021-12-15
Payer: MEDICARE

## 2021-12-15 VITALS
SYSTOLIC BLOOD PRESSURE: 120 MMHG | HEART RATE: 80 BPM | WEIGHT: 193.8 LBS | DIASTOLIC BLOOD PRESSURE: 64 MMHG | HEIGHT: 65 IN | BODY MASS INDEX: 32.29 KG/M2 | RESPIRATION RATE: 24 BRPM

## 2021-12-15 DIAGNOSIS — E03.8 OTHER SPECIFIED HYPOTHYROIDISM: ICD-10-CM

## 2021-12-15 DIAGNOSIS — I10 ESSENTIAL HYPERTENSION: ICD-10-CM

## 2021-12-15 DIAGNOSIS — K21.9 GASTROESOPHAGEAL REFLUX DISEASE, UNSPECIFIED WHETHER ESOPHAGITIS PRESENT: ICD-10-CM

## 2021-12-15 DIAGNOSIS — E66.9 OBESITY (BMI 30-39.9): ICD-10-CM

## 2021-12-15 DIAGNOSIS — E78.00 PURE HYPERCHOLESTEROLEMIA: ICD-10-CM

## 2021-12-15 DIAGNOSIS — Z99.89 OBSTRUCTIVE SLEEP APNEA ON CPAP: ICD-10-CM

## 2021-12-15 DIAGNOSIS — G47.33 OBSTRUCTIVE SLEEP APNEA ON CPAP: ICD-10-CM

## 2021-12-15 DIAGNOSIS — I89.0 LYMPHEDEMA: ICD-10-CM

## 2021-12-15 DIAGNOSIS — Z00.00 ROUTINE GENERAL MEDICAL EXAMINATION AT A HEALTH CARE FACILITY: Primary | ICD-10-CM

## 2021-12-15 PROCEDURE — G8484 FLU IMMUNIZE NO ADMIN: HCPCS | Performed by: FAMILY MEDICINE

## 2021-12-15 PROCEDURE — G0439 PPPS, SUBSEQ VISIT: HCPCS | Performed by: FAMILY MEDICINE

## 2021-12-15 PROCEDURE — 1123F ACP DISCUSS/DSCN MKR DOCD: CPT | Performed by: FAMILY MEDICINE

## 2021-12-15 PROCEDURE — 4040F PNEUMOC VAC/ADMIN/RCVD: CPT | Performed by: FAMILY MEDICINE

## 2021-12-15 RX ORDER — LEVOTHYROXINE SODIUM 0.03 MG/1
25 TABLET ORAL DAILY
Qty: 90 TABLET | Refills: 3 | Status: SHIPPED | OUTPATIENT
Start: 2021-12-15 | End: 2022-01-24

## 2021-12-15 RX ORDER — RAMIPRIL 5 MG/1
CAPSULE ORAL
Qty: 90 CAPSULE | Refills: 3 | Status: SHIPPED | OUTPATIENT
Start: 2021-12-15 | End: 2022-10-19 | Stop reason: SDUPTHER

## 2021-12-15 ASSESSMENT — PATIENT HEALTH QUESTIONNAIRE - PHQ9
SUM OF ALL RESPONSES TO PHQ QUESTIONS 1-9: 0
SUM OF ALL RESPONSES TO PHQ QUESTIONS 1-9: 0
2. FEELING DOWN, DEPRESSED OR HOPELESS: 0
SUM OF ALL RESPONSES TO PHQ9 QUESTIONS 1 & 2: 0
1. LITTLE INTEREST OR PLEASURE IN DOING THINGS: 0
SUM OF ALL RESPONSES TO PHQ QUESTIONS 1-9: 0

## 2021-12-15 ASSESSMENT — ENCOUNTER SYMPTOMS
GASTROINTESTINAL NEGATIVE: 1
RESPIRATORY NEGATIVE: 1

## 2021-12-15 ASSESSMENT — LIFESTYLE VARIABLES: HOW OFTEN DO YOU HAVE A DRINK CONTAINING ALCOHOL: 0

## 2021-12-15 NOTE — PATIENT INSTRUCTIONS
You may receive a survey regarding the care you received during your visit. Your input is valuable to us. We encourage you to complete and return your survey. We hope you will choose us in the future for your healthcare needs. Personalized Preventive Plan for Ilan Jan - 12/15/2021  Medicare offers a range of preventive health benefits. Some of the tests and screenings are paid in full while other may be subject to a deductible, co-insurance, and/or copay. Some of these benefits include a comprehensive review of your medical history including lifestyle, illnesses that may run in your family, and various assessments and screenings as appropriate. After reviewing your medical record and screening and assessments performed today your provider may have ordered immunizations, labs, imaging, and/or referrals for you. A list of these orders (if applicable) as well as your Preventive Care list are included within your After Visit Summary for your review. Other Preventive Recommendations:    · A preventive eye exam performed by an eye specialist is recommended every 1-2 years to screen for glaucoma; cataracts, macular degeneration, and other eye disorders. · A preventive dental visit is recommended every 6 months. · Try to get at least 150 minutes of exercise per week or 10,000 steps per day on a pedometer . · Order or download the FREE \"Exercise & Physical Activity: Your Everyday Guide\" from [employer]. Call 4-792.623.4964 or search [employer] online. · You need 3612-7380 mg of calcium and 1870-4078 IU of vitamin D per day. It is possible to meet your calcium requirement with diet alone, but a vitamin D supplement is usually necessary to meet this goal.  · When exposed to the sun, use a sunscreen that protects against both UVA and UVB radiation with an SPF of 30 or greater.  Reapply every 2 to 3 hours or after sweating, drying off with a towel, or swimming. · Always wear a seat belt when traveling in a car. Always wear a helmet when riding a bicycle or motorcycle.

## 2021-12-15 NOTE — PROGRESS NOTES
12/15/2021    Alena Olea (:  1940) is a [de-identified] y.o. female, here for a preventive medicine evaluation. Chief Complaint   Patient presents with    Medicare AWV    6 Month Follow-Up    Diabetes    Medication Refill     AWV and 6 month eval.    Due for A1C, has the order. Lab Results   Component Value Date    LABA1C 6.5 (H) 09/15/2021    LABA1C 9.1 2021    LABA1C 11.7 (H) 2021     Lab Results   Component Value Date    LABMICR < 1.20 2019    LDLCALC 60 2020    CREATININE 0.9 2020     BPs stable. BP Readings from Last 3 Encounters:   12/15/21 120/64   21 118/65   21 122/74     Weight is down but she is trying to lose. Wt Readings from Last 3 Encounters:   12/15/21 193 lb 12.8 oz (87.9 kg)   21 198 lb 6.4 oz (90 kg)   21 208 lb 9.6 oz (94.6 kg)       Patient Active Problem List   Diagnosis    Hyperlipidemia    HTN (hypertension)    Osteopenia    GERD (gastroesophageal reflux disease)    Reactive airway disease    OA (osteoarthritis)    Atypical chest pain    Diabetes mellitus ([address]    Obesity (BMI 30-39. 9)    Chronic low back pain    Neuropathy    ANITA on CPAP    Controlled type 2 diabetes mellitus without complication (HCC)    Persistent atrial fibrillation (HCC)    Paroxysmal atrial fibrillation (HCC)    Abnormal nuclear stress test    Influenza    Atrial fibrillation with rapid ventricular response (HCC)    Sacroiliac inflammation (HCC)    Morbid obesity (HCC)    Chest pain, moderate coronary artery risk    Angina pectoris (HCC)    Chronic atrial fibrillation (HCC)    Peripheral edema    Hypercalcemia    Hyperkalemia    Hypothyroidism       Review of Systems   Constitutional: Negative. HENT: Negative. Respiratory: Negative. Cardiovascular: Negative. Gastrointestinal: Negative. Musculoskeletal: Negative. All other systems reviewed and are negative.       Prior to Visit Medications    Medication Sig Taking? Authorizing Provider   levothyroxine (SYNTHROID) 25 MCG tablet Take 1 tablet by mouth Daily Yes Phoebe Ngo DO   ramipril (ALTACE) 5 MG capsule TAKE 1 CAPSULE BY MOUTH DAILY Yes Phoebe Ngo DO   hydrocortisone (HYTONE) 2.5 % lotion APPLY TOPICALLY TO THE AFFECTED AREA TWICE DAILY Yes Phoebe Ngo DO   pravastatin (PRAVACHOL) 80 MG tablet TAKE 1 TABLET BY MOUTH DAILY Yes Phoebe Ngo DO   Semaglutide 3 MG TABS Take 3 mg by mouth daily Yes Phoebe Ngo DO   CPAP Machine MISC by Does not apply route Yes Historical Provider, MD   dilTIAZem (CARDIZEM CD) 180 MG extended release capsule Take 180 mg by mouth daily Yes Historical Provider, MD   blood glucose test strips (ASCENSIA AUTODISC VI;ONE TOUCH ULTRA TEST VI) strip 1 each by In Vitro route daily One Touch Verio test strips. Test once daily and As needed. Yes Phoebe Ngo DO   metFORMIN (GLUCOPHAGE-XR) 500 MG extended release tablet TAKE 1 TABLET BY MOUTH BID Yes Phoebe Ngo DO   ELIQUIS 5 MG TABS tablet TAKE 1 TABLET BY MOUTH TWICE DAILY Yes Phoebe Ngo DO   nitroGLYCERIN (NITROSTAT) 0.4 MG SL tablet DISSOLVE ONE TABLET UNDER TONGUE AS NEEDED FOR CHEST PAIN EVERY 5 MINUTES. MAX OF 3 DOSES. IF NO RELIEF AFTER 1 DOSE, CALL 911. Yes Phoebe Ngo DO   omeprazole (PRILOSEC) 40 MG delayed release capsule TAKE 1 CAPSULE BY MOUTH DAILY Yes Phoebe Ngo DO   OneTouch Delica Lancets 54Q MISC Check sugars daily. Dx: E11.9 Yes Phoebe Ngo DO   Blood Glucose Monitoring Suppl ([facility]) w/Device KIT Check sugars daily. Dx: E11.9 Yes Phoebe Ngo DO   blood glucose test strips [facility] VERIO) strip Check sugars daily.   Dx: E11.9 Yes Phoebe Ngo DO   albuterol sulfate HFA (PROVENTIL HFA) 108 (90 Base) MCG/ACT inhaler Inhale 2 puffs into the lungs every 4 hours as needed for Wheezing or Shortness of Breath Yes Phoebe Ngo DO   digoxin (LANOXIN) 125 MCG tablet Take 125 mcg by mouth daily Yes Historical Provider, MD   acetaminophen (TYLENOL) 650 MG extended release tablet Take 650 mg by mouth every 8 hours as needed for Pain Yes Historical Provider, MD   triamcinolone (KENALOG) 0.1 % cream 2 times daily as needed Yes Historical Provider, MD   docusate sodium (COLACE) 100 MG capsule Take 100 mg by mouth daily as needed for Constipation  Yes Historical Provider, MD   Glucosamine HCl (GLUCOSAMINE PO) Take 1,000 mg by mouth daily  Yes Historical Provider, MD   Cholecalciferol (VITAMIN D3) 50 MCG (2000 UT) CAPS Take by mouth daily Yes Historical Provider, MD   Multiple Vitamins-Minerals (ONE DAILY FOR WOMEN PO) Take by mouth daily Yes Historical Provider, MD   spironolactone (ALDACTONE) 50 MG tablet Take 50 mg by mouth daily  Yes Historical Provider, MD   BIOTIN PO Take 1 tablet by mouth daily Yes Historical Provider, MD   aspirin 81 MG EC tablet Take 1 tablet by mouth daily Yes Tereasa Rubins, DO   Cyanocobalamin (VITAMIN B 12 PO) Take  by mouth Daily.    Yes Historical Provider, MD   metoprolol succinate (TOPROL XL) 25 MG extended release tablet Take 2 tablets by mouth daily  Brayden Monsalve MD        Allergies   Allergen Reactions    Ranolazine Hives     Pt was on brand Ranexa       Past Medical History:   Diagnosis Date    Arthritis     Cancer ([facility]     skin ca    Diabetes mellitus ([facility]     Hyperlipidemia     Hypertension     Osteoporosis 2017    Sleep apnea     has CPAP       Past Surgical History:   Procedure Laterality Date    APPENDECTOMY      CARDIAC CATHETERIZATION      two cath    COLONOSCOPY  01/08/2013    COLONOSCOPY N/A 5/17/2019    COLONOSCOPY DIAGNOSTIC performed by Shruthi Moreno MD at [address] Left 1/24/2020    LEFT SI MBB #1 - NO STEROID performed by Baltazar Brody MD at [place] [address] Left 6/17/2020    MOHS DEFECT REPAIR SCC LEFT MEDIAL HAND WITH SKIN GRAFT FROM LEFT UPPER ARM performed by Jennie Gavin MD at [place] [address]  03/2017    on face, left side    UPPER GASTROINTESTINAL ENDOSCOPY N/A 5/17/2019    EGD BIOPSY performed by Ban Guerra MD at [facility]       Social History     Socioeconomic History    Marital status:      Spouse name: Royce Fountain Number of children: 4    Years of education: 15    Highest education level: Some college, no degree   Occupational History    Not on file   Tobacco Use    Smoking status: Never Smoker    Smokeless tobacco: Never Used   Vaping Use    Vaping Use: Never used   Substance and Sexual Activity    Alcohol use: No     Alcohol/week: 0.0 standard drinks    Drug use: No    Sexual activity: Not Currently   Other Topics Concern    Not on file   Social History Narrative    Not on file     Social Determinants of Health     Financial Resource Strain: Low Risk     Difficulty of Paying Living Expenses: Not hard at all   Food Insecurity: No Food Insecurity    Worried About Running Out of Food in the Last Year: Never true    Leonardo of Food in the Last Year: Never true   Transportation Needs:     Lack of Transportation (Medical): Not on file    Lack of Transportation (Non-Medical):  Not on file   Physical Activity:     Days of Exercise per Week: Not on file    Minutes of Exercise per Session: Not on file   Stress:     Feeling of Stress : Not on file   Social Connections:     Frequency of Communication with Friends and Family: Not on file    Frequency of Social Gatherings with Friends and Family: Not on file    Attends Religion Services: Not on file    Active Member of Clubs or Organizations: Not on file    Attends Club or Organization Meetings: Not on file    Marital Status: Not on file   Intimate Partner Violence:     Fear of Current or Ex-Partner: Not on file    Emotionally Abused: Not on file  Physically Abused: Not on file    Sexually Abused: Not on file   Housing Stability:     Unable to Pay for Housing in the Last Year: Not on file    Number of Places Lived in the Last Year: Not on file    Unstable Housing in the Last Year: Not on file        Family History   Problem Relation Age of Onset    Alzheimer's Disease Mother     Heart Disease Mother     Cancer Sister         Throat    Diabetes Neg Hx     High Blood Pressure Neg Hx        ADVANCE DIRECTIVE: N, <no information>    Vitals:    12/15/21 1142   BP: 120/64   Site: Right Upper Arm   Position: Sitting   Cuff Size: Large Adult   Pulse: 80   Resp: 24   Weight: 193 lb 12.8 oz (87.9 kg)   Height: 5' 5\" (1.651 m)     Estimated body mass index is 32.25 kg/m² as calculated from the following:    Height as of this encounter: 5' 5\" (1.651 m). Weight as of this encounter: 193 lb 12.8 oz (87.9 kg). Physical Exam  Vitals and nursing note reviewed. Constitutional:       General: She is not in acute distress. Appearance: Normal appearance. She is well-developed. HENT:      Head: Normocephalic and atraumatic. Right Ear: Tympanic membrane normal.      Left Ear: Tympanic membrane normal.   Eyes:      Conjunctiva/sclera: Conjunctivae normal.   Cardiovascular:      Rate and Rhythm: Normal rate and regular rhythm. Heart sounds: Normal heart sounds. No murmur heard. Pulmonary:      Effort: Pulmonary effort is normal.      Breath sounds: Normal breath sounds. No wheezing, rhonchi or rales. Abdominal:      General: There is no distension. Musculoskeletal:      Cervical back: Neck supple. Skin:     General: Skin is warm and dry. Findings: No rash (on exposed surfaces). Neurological:      General: No focal deficit present. Mental Status: She is alert.    Psychiatric:         Attention and Perception: Attention normal.         Mood and Affect: Mood normal.         Speech: Speech normal.         Behavior: Behavior normal. Behavior is cooperative. Thought Content: Thought content normal.         Judgment: Judgment normal.         No flowsheet data found. Lab Results   Component Value Date    CHOL 144 12/26/2020    CHOL 149 09/27/2019    CHOL 172 04/05/2018    TRIG 188 12/26/2020    TRIG 63 09/27/2019    TRIG 58 04/05/2018    HDL 46 12/26/2020    HDL 70 09/27/2019    HDL 94 04/05/2018    LDLCALC 60 12/26/2020    LDLCALC 66 09/27/2019    LDLCALC 66 04/05/2018    GLUCOSE 233 12/27/2020    GLUCOSE 114 05/30/2012    LABA1C 6.5 09/15/2021    LABA1C 9.1 06/14/2021    LABA1C 11.7 03/05/2021    LABA1C 7.0 12/25/2020       The ASCVD Risk score (Duc Melgar, et al., 2013) failed to calculate for the following reasons:     The 2013 ASCVD risk score is only valid for ages 36 to 78    Immunization History   Administered Date(s) Administered    Influenza 10/05/2011    Influenza Vaccine, unspecified formulation 10/06/2014    Influenza Virus Vaccine 12/31/2012, 09/18/2020    Influenza Whole 09/10/2015    Influenza, High Dose (Fluzone 65 yrs and older) 10/22/2016, 09/29/2017, 10/01/2018, 09/21/2019    Influenza, Quadv, adjuvanted, 65 yrs +, IM, PF (Fluad) 10/18/2021    Pneumococcal Conjugate 13-valent ([id number]) 03/29/2016    Pneumococcal Polysaccharide ([id number]) 10/10/2017       Health Maintenance   Topic Date Due    COVID-19 Vaccine (1) Never done    DTaP/Tdap/Td vaccine (1 - Tdap) Never done    Shingles Vaccine (1 of 2) Never done    TSH testing  12/25/2021    Lipid screen  12/26/2021    Potassium monitoring  12/27/2021    Creatinine monitoring  12/27/2021    A1C test (Diabetic or Prediabetic)  03/15/2022    Diabetic retinal exam  08/25/2022    Diabetic foot exam  09/15/2022    DEXA (modify frequency per FRAX score)  Completed    Flu vaccine  Completed    Pneumococcal 65+ years Vaccine  Completed    Hepatitis A vaccine  Aged Out    Hib vaccine  Aged Out    Meningococcal (ACWY) vaccine  Aged Out ASSESSMENT/PLAN:  1. Routine general medical examination at a health care facility  2. Uncontrolled type 2 diabetes mellitus, without long-term current use of insulin ([facility] [address]  3. Essential hypertension  4. Gastroesophageal reflux disease, unspecified whether esophagitis present  5. Pure hypercholesterolemia  6. Other specified hypothyroidism  7. Obesity (BMI 30-39.9)  8. Obstructive sleep apnea on CPAP  9. Lymphedema    -  Chronic medical problems stable  -  Continue current medications  -  Follow up with specialists as scheduled  -  Check labs now, will call    Return in 6 months (on 6/15/2022) for DM2. An electronic signature was used to authenticate this note. --Radha Rust DO on 12/15/2021 at 12:44 PM    Medicare Annual Wellness Visit  Name: Rosy Padilla Date: 12/15/2021   MRN: [id number] Sex: Female   Age: [de-identified] y.o. Ethnicity: Non- / Non    : 1940 Race: White (non-)      Francisco J Pena is here for Medicare AWV, 6 Month Follow-Up, Diabetes, and Medication Refill    Screenings for behavioral, psychosocial and functional/safety risks, and cognitive dysfunction are all negative except as indicated below. These results, as well as other patient data from the [address] form, are documented in Flowsheets linked to this Encounter. Allergies   Allergen Reactions    Ranolazine Hives     Pt was on brand Ranexa         Prior to Visit Medications    Medication Sig Taking?  Authorizing Provider   levothyroxine (SYNTHROID) 25 MCG tablet Take 1 tablet by mouth Daily Yes Radha Rust DO   ramipril (ALTACE) 5 MG capsule TAKE 1 CAPSULE BY MOUTH DAILY Yes Radha Rust DO   hydrocortisone (HYTONE) 2.5 % lotion APPLY TOPICALLY TO THE AFFECTED AREA TWICE DAILY Yes Radha Rust DO   pravastatin (PRAVACHOL) 80 MG tablet TAKE 1 TABLET BY MOUTH DAILY Yes Radha Rust DO   Semaglutide 3 MG TABS Take 3 mg by mouth daily Yes Christal Mora Reuben Duarte, DO   CPAP Machine MISC by Does not apply route Yes Historical Provider, MD   dilTIAZem (CARDIZEM CD) 180 MG extended release capsule Take 180 mg by mouth daily Yes Historical Provider, MD   blood glucose test strips (ASCENSIA AUTODISC VI;ONE TOUCH ULTRA TEST VI) strip 1 each by In Vitro route daily One Touch Verio test strips. Test once daily and As needed. Yes Jailene Shepherd DO   metFORMIN (GLUCOPHAGE-XR) 500 MG extended release tablet TAKE 1 TABLET BY MOUTH BID Yes Jailene Shepherd DO   ELIQUIS 5 MG TABS tablet TAKE 1 TABLET BY MOUTH TWICE DAILY Yes Jailene Shepherd DO   nitroGLYCERIN (NITROSTAT) 0.4 MG SL tablet DISSOLVE ONE TABLET UNDER TONGUE AS NEEDED FOR CHEST PAIN EVERY 5 MINUTES. MAX OF 3 DOSES. IF NO RELIEF AFTER 1 DOSE, CALL 911. Yes Jailene Shepherd DO   omeprazole (PRILOSEC) 40 MG delayed release capsule TAKE 1 CAPSULE BY MOUTH DAILY Yes Jailene Shepherd DO   OneTouch Delica Lancets 68G MISC Check sugars daily. Dx: E11.9 Yes Jailene Shepherd DO   Blood Glucose Monitoring Suppl ([employer] IQ SYSTEM) w/Device KIT Check sugars daily. Dx: E11.9 Yes Jailene Shepherd DO   blood glucose test strips [facility] VERIO) strip Check sugars daily.   Dx: E11.9 Yes Jailene Shepherd DO   albuterol sulfate HFA (PROVENTIL HFA) 108 (90 Base) MCG/ACT inhaler Inhale 2 puffs into the lungs every 4 hours as needed for Wheezing or Shortness of Breath Yes Jailene Shepherd DO   digoxin (LANOXIN) 125 MCG tablet Take 125 mcg by mouth daily Yes Historical Provider, MD   acetaminophen (TYLENOL) 650 MG extended release tablet Take 650 mg by mouth every 8 hours as needed for Pain Yes Historical Provider, MD   triamcinolone (KENALOG) 0.1 % cream 2 times daily as needed Yes Historical Provider, MD   docusate sodium (COLACE) 100 MG capsule Take 100 mg by mouth daily as needed for Constipation  Yes Historical Provider, MD   Glucosamine HCl (GLUCOSAMINE PO) Take 1,000 mg by mouth daily  Yes Historical Provider, MD   Cholecalciferol (VITAMIN D3) 50 MCG (2000 UT) CAPS Take by mouth daily Yes Historical Provider, MD   Multiple Vitamins-Minerals (ONE DAILY FOR WOMEN PO) Take by mouth daily Yes Historical Provider, MD   spironolactone (ALDACTONE) 50 MG tablet Take 50 mg by mouth daily  Yes Historical Provider, MD   BIOTIN PO Take 1 tablet by mouth daily Yes Historical Provider, MD   aspirin 81 MG EC tablet Take 1 tablet by mouth daily Yes Gilberto Party, DO   Cyanocobalamin (VITAMIN B 12 PO) Take  by mouth Daily.    Yes Historical Provider, MD   metoprolol succinate (TOPROL XL) 25 MG extended release tablet Take 2 tablets by mouth daily  Jazmyn Harrington MD         Past Medical History:   Diagnosis Date    Arthritis     Cancer ([facility] [address]     skin ca    Diabetes mellitus ([facility] [address]     Hyperlipidemia     Hypertension     Osteoporosis 2017    Sleep apnea     has CPAP       Past Surgical History:   Procedure Laterality Date    APPENDECTOMY      CARDIAC CATHETERIZATION      two cath    COLONOSCOPY  01/08/2013    COLONOSCOPY N/A 5/17/2019    COLONOSCOPY DIAGNOSTIC performed by Renea Han MD at [address] [place] [address] Left 1/24/2020    LEFT SI MBB #1 - NO STEROID performed by Darren Cobb MD at [address] [facility] Left 6/17/2020    MOHS DEFECT REPAIR SCC LEFT MEDIAL HAND WITH SKIN GRAFT FROM LEFT UPPER ARM performed by Corinne Nissen, MD at [place] [address]  03/2017    on face, left side    UPPER GASTROINTESTINAL ENDOSCOPY N/A 5/17/2019    EGD BIOPSY performed by Renea Han MD at [facility]         Family History   Problem Relation Age of Onset    Alzheimer's Disease Mother     Heart Disease Mother     Cancer Sister         Throat    Diabetes Neg Hx     High Blood Pressure Neg Hx        CareTeam (Including outside providers/suppliers minutes per session  · No Living Will: Patient declines ACP discussion/assistance    Health Habits/Nutrition:  Health Habits/Nutrition  Do you exercise for at least 20 minutes 2-3 times per week?: (!) No  Have you lost any weight without trying in the past 3 months?: No  Do you eat only one meal per day?: No  Have you seen the dentist within the past year?: Appointment is scheduled  Body mass index: (!) 32.25  Health Habits/Nutrition Interventions:  · Inadequate physical activity:  patient is not ready to increase his/her physical activity level at this time     Safety:  Safety  Do you have working smoke detectors?: Yes  Have all throw rugs been removed or fastened?: (!) No  Do you have non-slip mats or surfaces in all bathtubs/showers?: Yes  Do all of your stairways have a railing or banister?: Yes  Are your doorways, halls and stairs free of clutter?: Yes  Do you always fasten your seatbelt when you are in a car?: Yes  Safety Interventions:  · Home safety tips provided     Personalized Preventive Plan   Current Health Maintenance Status  Immunization History   Administered Date(s) Administered    Influenza 10/05/2011    Influenza Vaccine, unspecified formulation 10/06/2014    Influenza Virus Vaccine 12/31/2012, 09/18/2020    Influenza Whole 09/10/2015    Influenza, High Dose (Fluzone 65 yrs and older) 10/22/2016, 09/29/2017, 10/01/2018, 09/21/2019    Influenza, Quadv, adjuvanted, 65 yrs +, IM, PF (Fluad) 10/18/2021    Pneumococcal Conjugate 13-valent ([id number]) 03/29/2016    Pneumococcal Polysaccharide ([id number]) 10/10/2017        Health Maintenance   Topic Date Due    COVID-19 Vaccine (1) Never done    DTaP/Tdap/Td vaccine (1 - Tdap) Never done    Shingles Vaccine (1 of 2) Never done    TSH testing  12/25/2021    Lipid screen  12/26/2021    Potassium monitoring  12/27/2021    Creatinine monitoring  12/27/2021    A1C test (Diabetic or Prediabetic)  03/15/2022    Diabetic retinal exam 08/25/2022    Diabetic foot exam  09/15/2022    DEXA (modify frequency per FRAX score)  Completed    Flu vaccine  Completed    Pneumococcal 65+ years Vaccine  Completed    Hepatitis A vaccine  Aged Out    Hib vaccine  Aged Out    Meningococcal (ACWY) vaccine  Aged Out     Recommendations for [facility] Due: see orders and patient instructions/AVS.  . Recommended screening schedule for the next 5-10 years is provided to the patient in written form: see Patient Kristy Castrejon was seen today for medicare aw, 6 month follow-up, diabetes and medication refill. Diagnoses and all orders for this visit:    Routine general medical examination at a health care facility    Uncontrolled type 2 diabetes mellitus, without long-term current use of insulin ([facility] [address] [address]    Essential hypertension    Gastroesophageal reflux disease, unspecified whether esophagitis present    Pure hypercholesterolemia    Other specified hypothyroidism    Obesity (BMI 30-39. 9)    Obstructive sleep apnea on CPAP    Lymphedema    Other orders  -     levothyroxine (SYNTHROID) 25 MCG tablet;  Take 1 tablet by mouth Daily  -     ramipril (ALTACE) 5 MG capsule; TAKE 1 CAPSULE BY MOUTH DAILY

## 2021-12-16 ENCOUNTER — TELEPHONE (OUTPATIENT)
Dept: FAMILY MEDICINE CLINIC | Age: 81
End: 2021-12-16

## 2021-12-16 ENCOUNTER — HOSPITAL ENCOUNTER (OUTPATIENT)
Age: 81
Discharge: HOME | End: 2021-12-16
Payer: MEDICARE

## 2021-12-16 DIAGNOSIS — E03.8 OTHER SPECIFIED HYPOTHYROIDISM: ICD-10-CM

## 2021-12-16 DIAGNOSIS — E78.00 PURE HYPERCHOLESTEROLEMIA: ICD-10-CM

## 2021-12-16 DIAGNOSIS — I10 ESSENTIAL HYPERTENSION: ICD-10-CM

## 2021-12-16 LAB
ALBUMIN SERPL-MCNC: 4.4 G/DL (ref 3.5–5.1)
ALP BLD-CCNC: 49 U/L (ref 38–126)
ALT SERPL-CCNC: 20 U/L (ref 11–66)
ANION GAP SERPL CALCULATED.3IONS-SCNC: 14 MEQ/L (ref 8–16)
AST SERPL-CCNC: 20 U/L (ref 5–40)
AVERAGE GLUCOSE: 129 MG/DL (ref 70–126)
BASOPHILS # BLD: 0.9 %
BASOPHILS ABSOLUTE: 0.1 THOU/MM3 (ref 0–0.1)
BILIRUB SERPL-MCNC: 0.7 MG/DL (ref 0.3–1.2)
BUN BLDV-MCNC: 13 MG/DL (ref 7–22)
CALCIUM SERPL-MCNC: 10.2 MG/DL (ref 8.5–10.5)
CHLORIDE BLD-SCNC: 99 MEQ/L (ref 98–111)
CHOLESTEROL, TOTAL: 137 MG/DL (ref 100–199)
CO2: 25 MEQ/L (ref 23–33)
CREAT SERPL-MCNC: 0.9 MG/DL (ref 0.4–1.2)
CREATININE, URINE: 91.6 MG/DL
EOSINOPHIL # BLD: 4.5 %
EOSINOPHILS ABSOLUTE: 0.3 THOU/MM3 (ref 0–0.4)
ERYTHROCYTE [DISTWIDTH] IN BLOOD BY AUTOMATED COUNT: 13.3 % (ref 11.5–14.5)
ERYTHROCYTE [DISTWIDTH] IN BLOOD BY AUTOMATED COUNT: 44.9 FL (ref 35–45)
GFR SERPL CREATININE-BSD FRML MDRD: 60 ML/MIN/1.73M2
GLUCOSE BLD-MCNC: 176 MG/DL (ref 70–108)
HBA1C MFR BLD: 6.3 % (ref 4.4–6.4)
HCT VFR BLD CALC: 38.3 % (ref 37–47)
HDLC SERPL-MCNC: 51 MG/DL
HEMOGLOBIN: 12.9 GM/DL (ref 12–16)
IMMATURE GRANS (ABS): 0.03 THOU/MM3 (ref 0–0.07)
IMMATURE GRANULOCYTES: 0.4 %
LDL CHOLESTEROL CALCULATED: 68 MG/DL
LYMPHOCYTES # BLD: 11.9 %
LYMPHOCYTES ABSOLUTE: 0.9 THOU/MM3 (ref 1–4.8)
MCH RBC QN AUTO: 30.9 PG (ref 26–33)
MCHC RBC AUTO-ENTMCNC: 33.7 GM/DL (ref 32.2–35.5)
MCV RBC AUTO: 91.6 FL (ref 81–99)
MICROALBUMIN UR-MCNC: < 1.2 MG/DL
MICROALBUMIN/CREAT UR-RTO: 13 MG/G (ref 0–30)
MONOCYTES # BLD: 9.9 %
MONOCYTES ABSOLUTE: 0.7 THOU/MM3 (ref 0.4–1.3)
NUCLEATED RED BLOOD CELLS: 0 /100 WBC
PLATELET # BLD: 263 THOU/MM3 (ref 130–400)
PMV BLD AUTO: 10.8 FL (ref 9.4–12.4)
POTASSIUM SERPL-SCNC: 4.7 MEQ/L (ref 3.5–5.2)
RBC # BLD: 4.18 MILL/MM3 (ref 4.2–5.4)
SEG NEUTROPHILS: 72.4 %
SEGMENTED NEUTROPHILS ABSOLUTE COUNT: 5.4 THOU/MM3 (ref 1.8–7.7)
SODIUM BLD-SCNC: 138 MEQ/L (ref 135–145)
TOTAL PROTEIN: 7.1 G/DL (ref 6.1–8)
TRIGL SERPL-MCNC: 92 MG/DL (ref 0–199)
TSH SERPL DL<=0.05 MIU/L-ACNC: 1.54 UIU/ML (ref 0.4–4.2)
WBC # BLD: 7.4 THOU/MM3 (ref 4.8–10.8)

## 2021-12-16 PROCEDURE — 84443 ASSAY THYROID STIM HORMONE: CPT

## 2021-12-16 PROCEDURE — 83036 HEMOGLOBIN GLYCOSYLATED A1C: CPT

## 2021-12-16 PROCEDURE — 80061 LIPID PANEL: CPT

## 2021-12-16 PROCEDURE — 85025 COMPLETE CBC W/AUTO DIFF WBC: CPT

## 2021-12-16 PROCEDURE — 80053 COMPREHEN METABOLIC PANEL: CPT

## 2021-12-16 PROCEDURE — 82043 UR ALBUMIN QUANTITATIVE: CPT

## 2021-12-29 RX ORDER — DILTIAZEM HYDROCHLORIDE 180 MG/1
180 CAPSULE, COATED, EXTENDED RELEASE ORAL DAILY
Qty: 90 CAPSULE | Refills: 3 | Status: SHIPPED | OUTPATIENT
Start: 2021-12-29 | End: 2022-10-19 | Stop reason: SDUPTHER

## 2021-12-29 NOTE — TELEPHONE ENCOUNTER
Requested Prescriptions     Pending Prescriptions Disp Refills    dilTIAZem (CARDIZEM CD) 180 MG extended release capsule 90 capsule 3     Sig: Take 1 capsule by mouth daily       If no call back patient will check with viky ESTRADA at 5 pm today.

## 2022-01-24 RX ORDER — LEVOTHYROXINE SODIUM 0.03 MG/1
25 TABLET ORAL DAILY
Qty: 90 TABLET | Refills: 3 | Status: SHIPPED | OUTPATIENT
Start: 2022-01-24

## 2022-01-24 RX ORDER — LEVOTHYROXINE SODIUM 0.03 MG/1
25 TABLET ORAL DAILY
Qty: 90 TABLET | Refills: 3 | Status: SHIPPED | OUTPATIENT
Start: 2022-01-24 | End: 2022-01-25 | Stop reason: SDUPTHER

## 2022-01-25 RX ORDER — LEVOTHYROXINE SODIUM 0.03 MG/1
25 TABLET ORAL DAILY
Qty: 90 TABLET | Refills: 3 | Status: SHIPPED | OUTPATIENT
Start: 2022-01-25 | End: 2022-03-10

## 2022-01-28 NOTE — TELEPHONE ENCOUNTER
Pt called office requesting a refill of Semaglutide 3mg to [facility]. If no call back she will check with the pharmacy after noon. Refill if appropriate.

## 2022-02-01 ENCOUNTER — TELEPHONE (OUTPATIENT)
Dept: FAMILY MEDICINE CLINIC | Age: 82
End: 2022-02-01

## 2022-02-01 NOTE — TELEPHONE ENCOUNTER
PA request received from [facility] through fax for Rybelsus 3mg (Semaglutide). PA submitted online at [employer]. Vericept and: Message from [facility] is covered by current benefit plan.  No further PA activity needed

## 2022-03-07 ENCOUNTER — TELEPHONE (OUTPATIENT)
Dept: FAMILY MEDICINE CLINIC | Age: 82
End: 2022-03-07

## 2022-03-07 NOTE — TELEPHONE ENCOUNTER
Pt called office asking how her \"alzheimer's test\" was at her last appt on 12/15/21. Advised pt she was here for an AWV. Pt says she has some concerns with her memory. She dropped her daughter off at work, then dropped her one grandson off at his job, then went home \"and got ready\", went to pay some bills and then went to a \"conference at the school for my other grandson\". When pt got back home from all of this she was mad because her first grandson was not there, she thought she had to take him to work still and he must of found another ride instead. Pt called the first grandson mad and asked if he had found another ride, he said \"you already dropped me off earlier today\". Pt did not remember. Pt saw a commercial on tv about a medication for alzheimer's and didn't know if it's something she needs. Please advise.

## 2022-03-07 NOTE — TELEPHONE ENCOUNTER
Schedule pt for an appt this week to discuss options. Make this a half hour appt so we can do a MMSE.

## 2022-03-10 ENCOUNTER — OFFICE VISIT (OUTPATIENT)
Dept: FAMILY MEDICINE CLINIC | Age: 82
End: 2022-03-10
Payer: MEDICARE

## 2022-03-10 VITALS
RESPIRATION RATE: 20 BRPM | BODY MASS INDEX: 31.37 KG/M2 | HEART RATE: 84 BPM | DIASTOLIC BLOOD PRESSURE: 64 MMHG | WEIGHT: 188.5 LBS | SYSTOLIC BLOOD PRESSURE: 112 MMHG

## 2022-03-10 DIAGNOSIS — R41.3 MEMORY DIFFICULTIES: Primary | ICD-10-CM

## 2022-03-10 PROBLEM — M17.12 ARTHRITIS OF LEFT KNEE: Status: ACTIVE | Noted: 2022-03-10
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
## 2022-03-10 PROBLEM — F41.1 ANXIETY STATE: Status: ACTIVE | Noted: 2022-03-10

## 2022-03-10 PROCEDURE — 99214 OFFICE O/P EST MOD 30 MIN: CPT | Performed by: FAMILY MEDICINE

## 2022-03-10 PROCEDURE — G8484 FLU IMMUNIZE NO ADMIN: HCPCS | Performed by: FAMILY MEDICINE

## 2022-03-10 PROCEDURE — G8417 CALC BMI ABV UP PARAM F/U: HCPCS | Performed by: FAMILY MEDICINE

## 2022-03-10 PROCEDURE — G8399 PT W/DXA RESULTS DOCUMENT: HCPCS | Performed by: FAMILY MEDICINE

## 2022-03-10 PROCEDURE — 1090F PRES/ABSN URINE INCON ASSESS: CPT | Performed by: FAMILY MEDICINE

## 2022-03-10 PROCEDURE — 1036F TOBACCO NON-USER: CPT | Performed by: FAMILY MEDICINE

## 2022-03-10 PROCEDURE — G8427 DOCREV CUR MEDS BY ELIG CLIN: HCPCS | Performed by: FAMILY MEDICINE

## 2022-03-10 PROCEDURE — 1123F ACP DISCUSS/DSCN MKR DOCD: CPT | Performed by: FAMILY MEDICINE

## 2022-03-10 PROCEDURE — 4040F PNEUMOC VAC/ADMIN/RCVD: CPT | Performed by: FAMILY MEDICINE

## 2022-03-10 RX ORDER — MULTIVIT-MIN/IRON/FOLIC ACID/K 18-600-40
1000 CAPSULE ORAL DAILY
COMMUNITY

## 2022-03-10 RX ORDER — VITAMIN E 268 MG
400 CAPSULE ORAL DAILY
COMMUNITY

## 2022-03-10 RX ORDER — HYDROXYZINE HYDROCHLORIDE 10 MG/1
1 TABLET, FILM COATED ORAL NIGHTLY PRN
COMMUNITY
Start: 2021-12-31

## 2022-03-10 RX ORDER — VITAMIN B COMPLEX
1 CAPSULE ORAL DAILY
COMMUNITY

## 2022-03-10 ASSESSMENT — ENCOUNTER SYMPTOMS
GASTROINTESTINAL NEGATIVE: 1
RESPIRATORY NEGATIVE: 1
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
## 2022-03-14 ENCOUNTER — HOSPITAL ENCOUNTER (OUTPATIENT)
Dept: INTERVENTIONAL RADIOLOGY/VASCULAR | Age: 82
Discharge: HOME | End: 2022-03-14
Payer: MEDICARE

## 2022-03-14 DIAGNOSIS — I73.9 PERIPHERAL VASCULAR DISEASE, UNSPECIFIED (HCC): ICD-10-CM

## 2022-03-14 PROCEDURE — 93923 UPR/LXTR ART STDY 3+ LVLS: CPT

## 2022-04-01 RX ORDER — OMEPRAZOLE 40 MG/1
CAPSULE, DELAYED RELEASE ORAL
Qty: 90 CAPSULE | Refills: 3 | Status: SHIPPED | OUTPATIENT
Start: 2022-04-01

## 2022-05-23 RX ORDER — SPIRONOLACTONE 25 MG/1
TABLET ORAL
Qty: 90 TABLET | OUTPATIENT
Start: 2022-05-23

## 2022-06-15 ENCOUNTER — OFFICE VISIT (OUTPATIENT)
Dept: FAMILY MEDICINE CLINIC | Age: 82
End: 2022-06-15
Payer: MEDICARE

## 2022-06-15 VITALS
WEIGHT: 186.4 LBS | SYSTOLIC BLOOD PRESSURE: 108 MMHG | RESPIRATION RATE: 20 BRPM | DIASTOLIC BLOOD PRESSURE: 70 MMHG | BODY MASS INDEX: 31.02 KG/M2 | HEART RATE: 96 BPM

## 2022-06-15 DIAGNOSIS — I10 ESSENTIAL HYPERTENSION: ICD-10-CM

## 2022-06-15 DIAGNOSIS — E66.9 OBESITY (BMI 30-39.9): ICD-10-CM

## 2022-06-15 DIAGNOSIS — I89.0 LYMPHEDEMA: ICD-10-CM

## 2022-06-15 DIAGNOSIS — I48.0 PAROXYSMAL ATRIAL FIBRILLATION (HCC): ICD-10-CM

## 2022-06-15 DIAGNOSIS — M46.1 SACROILIAC INFLAMMATION (HCC): ICD-10-CM

## 2022-06-15 DIAGNOSIS — I20.9 ANGINA PECTORIS (HCC): ICD-10-CM

## 2022-06-15 DIAGNOSIS — Z99.89 OBSTRUCTIVE SLEEP APNEA ON CPAP: ICD-10-CM

## 2022-06-15 DIAGNOSIS — E03.8 OTHER SPECIFIED HYPOTHYROIDISM: ICD-10-CM

## 2022-06-15 DIAGNOSIS — E11.40 TYPE 2 DIABETES MELLITUS WITH DIABETIC NEUROPATHY, WITHOUT LONG-TERM CURRENT USE OF INSULIN (HCC): Primary | ICD-10-CM

## 2022-06-15 DIAGNOSIS — E78.00 PURE HYPERCHOLESTEROLEMIA: ICD-10-CM

## 2022-06-15 DIAGNOSIS — G47.33 OBSTRUCTIVE SLEEP APNEA ON CPAP: ICD-10-CM

## 2022-06-15 PROCEDURE — 99214 OFFICE O/P EST MOD 30 MIN: CPT | Performed by: FAMILY MEDICINE

## 2022-06-15 PROCEDURE — G8399 PT W/DXA RESULTS DOCUMENT: HCPCS | Performed by: FAMILY MEDICINE

## 2022-06-15 PROCEDURE — G8417 CALC BMI ABV UP PARAM F/U: HCPCS | Performed by: FAMILY MEDICINE

## 2022-06-15 PROCEDURE — 1123F ACP DISCUSS/DSCN MKR DOCD: CPT | Performed by: FAMILY MEDICINE

## 2022-06-15 PROCEDURE — G8427 DOCREV CUR MEDS BY ELIG CLIN: HCPCS | Performed by: FAMILY MEDICINE

## 2022-06-15 PROCEDURE — 1090F PRES/ABSN URINE INCON ASSESS: CPT | Performed by: FAMILY MEDICINE

## 2022-06-15 PROCEDURE — 1036F TOBACCO NON-USER: CPT | Performed by: FAMILY MEDICINE

## 2022-06-15 ASSESSMENT — PATIENT HEALTH QUESTIONNAIRE - PHQ9
SUM OF ALL RESPONSES TO PHQ9 QUESTIONS 1 & 2: 2
SUM OF ALL RESPONSES TO PHQ QUESTIONS 1-9: 2
SUM OF ALL RESPONSES TO PHQ QUESTIONS 1-9: 2
1. LITTLE INTEREST OR PLEASURE IN DOING THINGS: 0
2. FEELING DOWN, DEPRESSED OR HOPELESS: 2
SUM OF ALL RESPONSES TO PHQ QUESTIONS 1-9: 2
SUM OF ALL RESPONSES TO PHQ QUESTIONS 1-9: 2

## 2022-06-15 ASSESSMENT — ENCOUNTER SYMPTOMS
RESPIRATORY NEGATIVE: 1
GASTROINTESTINAL NEGATIVE: 1
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
## 2022-06-20 RX ORDER — METOPROLOL SUCCINATE 50 MG/1
TABLET, EXTENDED RELEASE ORAL
Qty: 90 TABLET | OUTPATIENT
Start: 2022-06-20

## 2022-07-12 ENCOUNTER — TELEPHONE (OUTPATIENT)
Dept: FAMILY MEDICINE CLINIC | Age: 82
End: 2022-07-12

## 2022-07-12 RX ORDER — ORAL SEMAGLUTIDE 3 MG/1
TABLET ORAL
Qty: 30 TABLET | Refills: 5 | Status: SHIPPED | OUTPATIENT
Start: 2022-07-12

## 2022-07-12 NOTE — TELEPHONE ENCOUNTER
----- Message from Karon Cogan sent at 7/12/2022  2:12 PM EDT -----  Subject: Refill Request    QUESTIONS  Name of Medication? RYBELSUS 3 MG TABS  Patient-reported dosage and instructions? 1 time per day  How many days do you have left? 1  [id number]  Pharmacy phone number (if available)? [phone]  ---------------------------------------------------------------------------  --------------  CALL BACK INFO  What is the best way for the office to contact you? OK to leave message on   voicemail  Preferred Call Back Phone Number? [phone]  ---------------------------------------------------------------------------  --------------  SCRIPT ANSWERS  Relationship to Patient?  Self

## 2022-07-29 RX ORDER — METFORMIN HYDROCHLORIDE 500 MG/1
TABLET, EXTENDED RELEASE ORAL
Qty: 180 TABLET | Refills: 3 | Status: SHIPPED | OUTPATIENT
Start: 2022-07-29

## 2022-08-01 DIAGNOSIS — L85.3 XEROSIS OF SKIN: ICD-10-CM

## 2022-08-01 NOTE — TELEPHONE ENCOUNTER
Ry Roman called requesting a refill of the below medication which has been pended for you:     Requested Prescriptions     Pending Prescriptions Disp Refills    hydrocortisone (HYTONE) 2.5 % lotion 118 mL 0     Sig: APPLY TOPICALLY TO THE AFFECTED AREA TWICE DAILY       Last Appointment Date: 6/15/2022  Next Appointment Date: 12/21/2022    Allergies   Allergen Reactions    Ranolazine Hives     Pt was on brand Ranexa

## 2022-08-01 NOTE — TELEPHONE ENCOUNTER
----- Message from Cammie Brandenloves sent at 8/1/2022  3:36 PM EDT -----  Subject: Refill Request    QUESTIONS  Name of Medication? hydrocortisone (HYTONE) 2.5 % lotion  Patient-reported dosage and instructions? 2.5% lotion, apply topically to   the affected area twice daily  How many days do you have left? 0  Preferred Pharmacy? [id number]  Pharmacy phone number (if available)? [phone]  ---------------------------------------------------------------------------  --------------  CALL BACK INFO  What is the best way for the office to contact you? OK to leave message on   voicemail  Preferred Call Back Phone Number? [phone]  ---------------------------------------------------------------------------  --------------  SCRIPT ANSWERS  Relationship to Patient?  Self

## 2022-08-02 RX ORDER — HYDROCORTISONE 25 MG/ML
LOTION TOPICAL
Qty: 118 ML | Refills: 0 | Status: SHIPPED | OUTPATIENT
Start: 2022-08-02

## 2022-10-05 NOTE — PROGRESS NOTES
Alena Olea (:  1940) is a 79 y.o. female,Established patient, here for evaluation of the following chief complaint(s):  Abnormal Lab      SUBJECTIVE/OBJECTIVE:  HPI:    Chief Complaint   Patient presents with    Abnormal Lab     Pt presents for VV today due to abnormal lab. A1C jumped to 11.7. Lab Results   Component Value Date    LABA1C 11.7 (H) 2021    LABA1C 7.0 (H) 2020    LABA1C 5.4 06/10/2020     Lab Results   Component Value Date    LABMICR < 1.20 2019    LDLCALC 60 2020    CREATININE 0.9 2020     Pt is not checking her sugars. She is compliant with the Januvia. Weight stable, no major dietary changes. She did recently have a dental infection and continues to follow with Dr. Keturah Apodaca for this, this may explain the elevated A1C. No recent steroids. Patient Active Problem List   Diagnosis    Hyperlipidemia    HTN (hypertension)    Osteopenia    GERD (gastroesophageal reflux disease)    Reactive airway disease    OA (osteoarthritis)    Atypical chest pain    Diabetes mellitus ([address]    Obesity (BMI 30-39. 9)    Chronic low back pain    Neuropathy    ANITA on CPAP    Controlled type 2 diabetes mellitus without complication (HCC)    Persistent atrial fibrillation (HCC)    Paroxysmal atrial fibrillation (HCC)    Abnormal nuclear stress test    Influenza    Atrial fibrillation with rapid ventricular response (HCC)    Sacroiliac inflammation (HCC)    Morbid obesity (HCC)    Chest pain, moderate coronary artery risk    Angina pectoris (HCC)    Chronic atrial fibrillation (HCC)    Peripheral edema    Hypercalcemia    Hyperkalemia    Hypothyroidism     Past Surgical History:   Procedure Laterality Date    APPENDECTOMY      CARDIAC CATHETERIZATION      two cath    COLONOSCOPY  2013    COLONOSCOPY N/A 2019    COLONOSCOPY DIAGNOSTIC performed by Shira Marks MD at [place] [address] [facility]. INJECTION PROCEDURE FOR SACROILIAC JOINT Left 1/24/2020    LEFT SI MBB #1 - NO STEROID performed by Shell Soto MD at [facility] Left 6/17/2020    MOHS DEFECT REPAIR SCC LEFT MEDIAL HAND WITH SKIN GRAFT FROM LEFT UPPER ARM performed by Mark Apple MD at [facility]  03/2017    on face, left side    UPPER GASTROINTESTINAL ENDOSCOPY N/A 5/17/2019    EGD BIOPSY performed by Rafaela Gant MD at [facility]     Social History     Tobacco Use    Smoking status: Never Smoker    Smokeless tobacco: Never Used   Substance Use Topics    Alcohol use: No     Alcohol/week: 0.0 standard drinks    Drug use: No       Review of Systems   Constitutional: Negative. HENT: Negative. Respiratory: Negative. Cardiovascular: Negative. Gastrointestinal: Negative. Musculoskeletal: Negative. All other systems reviewed and are negative. Physical Exam  Constitutional:       General: She is not in acute distress. Appearance: Normal appearance. She is well-developed. She is not ill-appearing. HENT:      Head: Normocephalic and atraumatic. Right Ear: External ear normal.      Left Ear: External ear normal.   Eyes:      Conjunctiva/sclera: Conjunctivae normal.   Pulmonary:      Effort: Pulmonary effort is normal. No respiratory distress. Skin:     Findings: No rash (on exposed surfaces). Neurological:      Mental Status: She is alert and oriented to person, place, and time. Psychiatric:         Mood and Affect: Mood normal.         Behavior: Behavior normal.         Thought Content: Thought content normal.         Judgment: Judgment normal.         ASSESSMENT/PLAN:  1. Uncontrolled type 2 diabetes mellitus, without long-term current use of insulin ([facility])  -     Blood Glucose Monitoring Suppl ([facility]) w/Device KIT; Disp-1 kit, R-0, NormalCheck sugars daily.   Dx: E11.9  -     blood glucose test strips (ONETOUCH VERIO) strip; Check sugars daily. Dx: E11.9, Disp-100 each, R-3Normal  -     ONE TOUCH ULTRASOFT LANCETS MISC; Disp-100 each, R-3, NormalCheck sugars daily. Dx: E11.9  2. Essential hypertension  3. Obesity (BMI 30-39.9)  4. Paroxysmal atrial fibrillation (HCC)  5. Obstructive sleep apnea on CPAP    -  A1C jumped > 11  -  Question if related to recent dental infection  -  Will add metformin  -  rx for diabetic supplies sent, check sugars daily and record    Return in about 2 weeks (around 3/22/2021) for DM2. An electronic signature was used to authenticate this note.     --Jesica Fuentes, DO Doxepin Pregnancy And Lactation Text: This medication is Pregnancy Category C and it isn't known if it is safe during pregnancy. It is also excreted in breast milk and breast feeding isn't recommended.

## 2022-10-10 RX ORDER — PRAVASTATIN SODIUM 80 MG/1
80 TABLET ORAL DAILY
Qty: 90 TABLET | Refills: 3 | Status: SHIPPED | OUTPATIENT
Start: 2022-10-10 | End: 2022-10-19 | Stop reason: SDUPTHER

## 2022-10-11 RX ORDER — APIXABAN 5 MG/1
TABLET, FILM COATED ORAL
Qty: 180 TABLET | Refills: 0 | Status: SHIPPED | OUTPATIENT
Start: 2022-10-11 | End: 2022-10-19 | Stop reason: SDUPTHER

## 2022-10-11 NOTE — TELEPHONE ENCOUNTER
ELIQUIS 5 MG TWICE DAILY    NEXT APPOINTMENT 10/19/2022  LAST APPOINTMENT 05/18/2022    LABS 12/16/2021

## 2022-10-19 ENCOUNTER — OFFICE VISIT (OUTPATIENT)
Dept: CARDIOLOGY CLINIC | Age: 82
End: 2022-10-19
Payer: MEDICARE

## 2022-10-19 VITALS
RESPIRATION RATE: 18 BRPM | BODY MASS INDEX: 29.25 KG/M2 | SYSTOLIC BLOOD PRESSURE: 116 MMHG | HEART RATE: 80 BPM | DIASTOLIC BLOOD PRESSURE: 64 MMHG | WEIGHT: 182 LBS | HEIGHT: 66 IN

## 2022-10-19 DIAGNOSIS — I48.19 PERSISTENT ATRIAL FIBRILLATION (HCC): Primary | ICD-10-CM

## 2022-10-19 DIAGNOSIS — E78.5 DYSLIPIDEMIA (HIGH LDL; LOW HDL): ICD-10-CM

## 2022-10-19 PROCEDURE — 1036F TOBACCO NON-USER: CPT | Performed by: INTERNAL MEDICINE

## 2022-10-19 PROCEDURE — G8417 CALC BMI ABV UP PARAM F/U: HCPCS | Performed by: INTERNAL MEDICINE

## 2022-10-19 PROCEDURE — G8399 PT W/DXA RESULTS DOCUMENT: HCPCS | Performed by: INTERNAL MEDICINE

## 2022-10-19 PROCEDURE — 1090F PRES/ABSN URINE INCON ASSESS: CPT | Performed by: INTERNAL MEDICINE

## 2022-10-19 PROCEDURE — G8427 DOCREV CUR MEDS BY ELIG CLIN: HCPCS | Performed by: INTERNAL MEDICINE

## 2022-10-19 PROCEDURE — 1123F ACP DISCUSS/DSCN MKR DOCD: CPT | Performed by: INTERNAL MEDICINE

## 2022-10-19 PROCEDURE — 99214 OFFICE O/P EST MOD 30 MIN: CPT | Performed by: INTERNAL MEDICINE

## 2022-10-19 PROCEDURE — G8484 FLU IMMUNIZE NO ADMIN: HCPCS | Performed by: INTERNAL MEDICINE

## 2022-10-19 PROCEDURE — 93000 ELECTROCARDIOGRAM COMPLETE: CPT | Performed by: INTERNAL MEDICINE

## 2022-10-19 RX ORDER — PRAVASTATIN SODIUM 80 MG/1
80 TABLET ORAL DAILY
Qty: 90 TABLET | Refills: 4 | Status: SHIPPED | OUTPATIENT
Start: 2022-10-19

## 2022-10-19 RX ORDER — SPIRONOLACTONE 50 MG/1
50 TABLET, FILM COATED ORAL DAILY
Qty: 90 TABLET | Refills: 4 | Status: SHIPPED | OUTPATIENT
Start: 2022-10-19

## 2022-10-19 RX ORDER — DIGOXIN 125 MCG
125 TABLET ORAL DAILY
Qty: 90 TABLET | Refills: 4 | Status: SHIPPED | OUTPATIENT
Start: 2022-10-19

## 2022-10-19 RX ORDER — METOPROLOL SUCCINATE 50 MG/1
50 TABLET, EXTENDED RELEASE ORAL DAILY
COMMUNITY
End: 2022-10-19 | Stop reason: SDUPTHER

## 2022-10-19 RX ORDER — DILTIAZEM HYDROCHLORIDE 180 MG/1
180 CAPSULE, COATED, EXTENDED RELEASE ORAL DAILY
Qty: 90 CAPSULE | Refills: 4 | Status: SHIPPED | OUTPATIENT
Start: 2022-10-19

## 2022-10-19 RX ORDER — NITROGLYCERIN 0.4 MG/1
TABLET SUBLINGUAL
Qty: 25 TABLET | Refills: 3 | Status: SHIPPED | OUTPATIENT
Start: 2022-10-19

## 2022-10-19 RX ORDER — RAMIPRIL 5 MG/1
CAPSULE ORAL
Qty: 90 CAPSULE | Refills: 4 | Status: SHIPPED | OUTPATIENT
Start: 2022-10-19

## 2022-10-19 RX ORDER — METOPROLOL SUCCINATE 50 MG/1
50 TABLET, EXTENDED RELEASE ORAL DAILY
Qty: 90 TABLET | Refills: 4 | Status: SHIPPED | OUTPATIENT
Start: 2022-10-19

## 2022-10-19 ASSESSMENT — ENCOUNTER SYMPTOMS
APNEA: 0
CHOKING: 0
SHORTNESS OF BREATH: 0
NAUSEA: 0
STRIDOR: 0
ABDOMINAL DISTENTION: 0
COLOR CHANGE: 0
BLOOD IN STOOL: 0
VOICE CHANGE: 0
COUGH: 0
VOMITING: 0
ABDOMINAL PAIN: 0
ANAL BLEEDING: 0
CHEST TIGHTNESS: 0
WHEEZING: 0
TROUBLE SWALLOWING: 0
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
## 2022-11-16 ENCOUNTER — TELEPHONE (OUTPATIENT)
Dept: FAMILY MEDICINE CLINIC | Age: 82
End: 2022-11-16

## 2022-11-16 RX ORDER — METHYLPREDNISOLONE 4 MG/1
TABLET ORAL
Qty: 1 KIT | Refills: 0 | Status: SHIPPED | OUTPATIENT
Start: 2022-11-16 | End: 2022-11-22

## 2022-11-16 NOTE — TELEPHONE ENCOUNTER
Patient is here with her  for an appt. She is having a lot of pain in her back, denies injury. Hurting for a couple of days now. No appts available, wants pain medication.

## 2022-12-02 ENCOUNTER — TELEPHONE (OUTPATIENT)
Dept: FAMILY MEDICINE CLINIC | Age: 82
End: 2022-12-02

## 2022-12-02 NOTE — TELEPHONE ENCOUNTER
Pt called office stating she has had itching anywhere and everywhere for the past 1-2yrs. She is asking for something to be prescribed that will not interfere with her other medications. She uses [facility]. Call pt back. Please advise.

## 2022-12-21 ENCOUNTER — OFFICE VISIT (OUTPATIENT)
Dept: FAMILY MEDICINE CLINIC | Age: 82
End: 2022-12-21
Payer: MEDICARE

## 2022-12-21 VITALS
WEIGHT: 182.9 LBS | HEART RATE: 68 BPM | SYSTOLIC BLOOD PRESSURE: 122 MMHG | RESPIRATION RATE: 16 BRPM | HEIGHT: 65 IN | BODY MASS INDEX: 30.47 KG/M2 | DIASTOLIC BLOOD PRESSURE: 60 MMHG

## 2022-12-21 DIAGNOSIS — E78.00 PURE HYPERCHOLESTEROLEMIA: ICD-10-CM

## 2022-12-21 DIAGNOSIS — G47.33 OBSTRUCTIVE SLEEP APNEA ON CPAP: ICD-10-CM

## 2022-12-21 DIAGNOSIS — E11.40 TYPE 2 DIABETES MELLITUS WITH DIABETIC NEUROPATHY, WITHOUT LONG-TERM CURRENT USE OF INSULIN (HCC): ICD-10-CM

## 2022-12-21 DIAGNOSIS — E03.9 HYPOTHYROIDISM, UNSPECIFIED TYPE: ICD-10-CM

## 2022-12-21 DIAGNOSIS — Z00.00 MEDICARE ANNUAL WELLNESS VISIT, SUBSEQUENT: Primary | ICD-10-CM

## 2022-12-21 DIAGNOSIS — M25.512 ACUTE PAIN OF LEFT SHOULDER: ICD-10-CM

## 2022-12-21 DIAGNOSIS — I48.0 PAROXYSMAL ATRIAL FIBRILLATION (HCC): ICD-10-CM

## 2022-12-21 DIAGNOSIS — K21.9 GASTROESOPHAGEAL REFLUX DISEASE, UNSPECIFIED WHETHER ESOPHAGITIS PRESENT: ICD-10-CM

## 2022-12-21 DIAGNOSIS — Z99.89 OBSTRUCTIVE SLEEP APNEA ON CPAP: ICD-10-CM

## 2022-12-21 DIAGNOSIS — I89.0 LYMPHEDEMA: ICD-10-CM

## 2022-12-21 DIAGNOSIS — I10 ESSENTIAL HYPERTENSION: ICD-10-CM

## 2022-12-21 DIAGNOSIS — E66.9 OBESITY (BMI 30-39.9): ICD-10-CM

## 2022-12-21 LAB
AVERAGE GLUCOSE: 126 MG/DL
CREATINE, URINE: 59.2 MG/DL
HBA1C MFR BLD: 6 % (ref 4.2–5.6)
MICROALBUMIN/CREAT 24H UR: <1.2 MG/DL
MICROALBUMIN/CREAT UR-RTO: NORMAL MG/G

## 2022-12-21 PROCEDURE — G8399 PT W/DXA RESULTS DOCUMENT: HCPCS | Performed by: FAMILY MEDICINE

## 2022-12-21 PROCEDURE — 99213 OFFICE O/P EST LOW 20 MIN: CPT | Performed by: FAMILY MEDICINE

## 2022-12-21 PROCEDURE — 3078F DIAST BP <80 MM HG: CPT | Performed by: FAMILY MEDICINE

## 2022-12-21 PROCEDURE — 1123F ACP DISCUSS/DSCN MKR DOCD: CPT | Performed by: FAMILY MEDICINE

## 2022-12-21 PROCEDURE — 3074F SYST BP LT 130 MM HG: CPT | Performed by: FAMILY MEDICINE

## 2022-12-21 PROCEDURE — 1036F TOBACCO NON-USER: CPT | Performed by: FAMILY MEDICINE

## 2022-12-21 PROCEDURE — G0439 PPPS, SUBSEQ VISIT: HCPCS | Performed by: FAMILY MEDICINE

## 2022-12-21 PROCEDURE — 1090F PRES/ABSN URINE INCON ASSESS: CPT | Performed by: FAMILY MEDICINE

## 2022-12-21 PROCEDURE — G8417 CALC BMI ABV UP PARAM F/U: HCPCS | Performed by: FAMILY MEDICINE

## 2022-12-21 PROCEDURE — G8482 FLU IMMUNIZE ORDER/ADMIN: HCPCS | Performed by: FAMILY MEDICINE

## 2022-12-21 PROCEDURE — G8427 DOCREV CUR MEDS BY ELIG CLIN: HCPCS | Performed by: FAMILY MEDICINE

## 2022-12-21 SDOH — ECONOMIC STABILITY: FOOD INSECURITY: WITHIN THE PAST 12 MONTHS, THE FOOD YOU BOUGHT JUST DIDN'T LAST AND YOU DIDN'T HAVE MONEY TO GET MORE.: NEVER TRUE

## 2022-12-21 SDOH — ECONOMIC STABILITY: FOOD INSECURITY: WITHIN THE PAST 12 MONTHS, YOU WORRIED THAT YOUR FOOD WOULD RUN OUT BEFORE YOU GOT MONEY TO BUY MORE.: NEVER TRUE

## 2022-12-21 ASSESSMENT — PATIENT HEALTH QUESTIONNAIRE - PHQ9
1. LITTLE INTEREST OR PLEASURE IN DOING THINGS: 0
SUM OF ALL RESPONSES TO PHQ QUESTIONS 1-9: 0
SUM OF ALL RESPONSES TO PHQ9 QUESTIONS 1 & 2: 0
SUM OF ALL RESPONSES TO PHQ QUESTIONS 1-9: 0
2. FEELING DOWN, DEPRESSED OR HOPELESS: 0

## 2022-12-21 ASSESSMENT — ENCOUNTER SYMPTOMS
RESPIRATORY NEGATIVE: 1
GASTROINTESTINAL NEGATIVE: 1

## 2022-12-21 ASSESSMENT — LIFESTYLE VARIABLES
HOW MANY STANDARD DRINKS CONTAINING ALCOHOL DO YOU HAVE ON A TYPICAL DAY: PATIENT DOES NOT DRINK
HOW OFTEN DO YOU HAVE A DRINK CONTAINING ALCOHOL: NEVER

## 2022-12-21 ASSESSMENT — SOCIAL DETERMINANTS OF HEALTH (SDOH): HOW HARD IS IT FOR YOU TO PAY FOR THE VERY BASICS LIKE FOOD, HOUSING, MEDICAL CARE, AND HEATING?: NOT HARD AT ALL

## 2022-12-21 NOTE — PATIENT INSTRUCTIONS
You may receive a survey about your visit with us today. The feedback from our patients helps us identify what is working well and where the service to all patients can be enhanced. Thank you! Learning About Being Active as an Older Adult  Why is being active important as you get older? Being active is one of the best things you can do for your health. And it's never too late to start. Being active--or getting active, if you aren't already--has definite benefits. It can:  Give you more energy,  Keep your mind sharp. Improve balance to reduce your risk of falls. Help you manage chronic illness with fewer medicines. No matter how old you are, how fit you are, or what health problems you have, there is a form of activity that will work for you. And the more physical activity you can do, the better your overall health will be. What kinds of activity can help you stay healthy? Being more active will make your daily activities easier. Physical activity includes planned exercise and things you do in daily life. There are four types of activity:  Aerobic. Doing aerobic activity makes your heart and lungs strong. Includes walking, dancing, and gardening. Aim for at least 2½ hours spread throughout the week. It improves your energy and can help you sleep better. Muscle-strengthening. This type of activity can help maintain muscle and strengthen bones. Includes climbing stairs, using resistance bands, and lifting or carrying heavy loads. Aim for at least twice a week. It can help protect the knees and other joints. Stretching. Stretching gives you better range of motion in joints and muscles. Includes upper arm stretches, calf stretches, and gentle yoga. Aim for at least twice a week, preferably after your muscles are warmed up from other activities. It can help you function better in daily life. Balancing. This helps you stay coordinated and have good posture.   Includes heel-to-toe walking, percy chi, and certain types of yoga. Aim for at least 3 days a week. It can reduce your risk of falling. Even if you have a hard time meeting the recommendations, it's better to be more active than less active. All activity done in each category counts toward your weekly total. You'd be surprised how daily things like carrying groceries, keeping up with grandchildren, and taking the stairs can add up. What keeps you from being active? If you've had a hard time being more active, you're not alone. Maybe you remember being able to do more. Or maybe you've never thought of yourself as being active. It's frustrating when you can't do the things you want. Being more active can help. What's holding you back? Getting started. Have a goal, but break it into easy tasks. Small steps build into big accomplishments. Staying motivated. If you feel like skipping your activity, remember your goal. Maybe you want to move better and stay independent. Every activity gets you one step closer. Not feeling your best.  Start with 5 minutes of an activity you enjoy. Prove to yourself you can do it. As you get comfortable, increase your time. You may not be where you want to be. But you're in the process of getting there. Everyone starts somewhere. How can you find safe ways to stay active? Talk with your doctor about any physical challenges you're facing. Make a plan with your doctor if you have a health problem or aren't sure how to get started with activity. If you're already active, ask your doctor if there is anything you should change to stay safe as your body and health change. If you tend to feel dizzy after you take medicine, avoid activity at that time. Try being active before you take your medicine. This will reduce your risk of falls. If you plan to be active at home, make sure to clear your space before you get started. Remove things like TV cords, coffee tables, and throw rugs.  It's safest to have plenty of space to move freely. The key to getting more active is to take it slow and steady. Try to improve only a little bit at a time. Pick just one area to improve on at first. And if an activity hurts, stop and talk to your doctor. Where can you learn more? Go to http://[employer].harris.com/ and enter P600 to learn more about \"Learning About Being Active as an Older Adult. \"  Current as of: October 10, 2022               Content Version: 13.5  © 4800-1028 [employer]. Care instructions adapted under license by [facility] ([facility]). If you have questions about a medical condition or this instruction, always ask your healthcare professional. [place] [address] any warranty or liability for your use of this information. Learning About Dental Care for Older Adults  Dental care for older adults: Overview  Dental care for older people is much the same as for younger adults. But older adults do have concerns that younger adults do not. Older adults may have problems with gum disease and decay on the roots of their teeth. They may need missing teeth replaced or broken fillings fixed. Or they may have dentures that need to be cared for. Some older adults may have trouble holding a toothbrush. You can help remind the person you are caring for to brush and floss their teeth or to clean their dentures. In some cases, you may need to do the brushing and other dental care tasks. People who have trouble using their hands or who have dementia may need this extra help. How can you help with dental care? Normal dental care  To keep the teeth and gums healthy:  Brush the teeth with fluoride toothpaste twice a day--in the morning and at night--and floss at least once a day. Plaque can quickly build up on the teeth of older adults. Watch for the signs of gum disease. These signs include gums that bleed after brushing or after eating hard foods, such as apples. See a dentist regularly.  Many experts recommend checkups every 6 months. Keep the dentist up to date on any new medications the person is taking. Encourage a balanced diet that includes whole grains, vegetables, and fruits, and that is low in saturated fat and sodium. Encourage the person you're caring for not to use tobacco products. They can affect dental and general health. Many older adults have a fixed income and feel that they can't afford dental care. But most towns and cities have programs in which dentists help older adults by lowering fees. Contact your area's public health offices or  for information about dental care in your area. Using a toothbrush  Older adults with arthritis sometimes have trouble brushing their teeth because they can't easily hold the toothbrush. Their hands and fingers may be stiff, painful, or weak. If this is the case, you can: Offer an electric toothbrush. Enlarge the handle of a non-electric toothbrush by wrapping a sponge, an elastic bandage, or adhesive tape around it. Push the toothbrush handle through a ball made of rubber or soft foam.  Make the handle longer and thicker by taping Popsicle sticks or tongue depressors to it. You may also be able to buy special toothbrushes, toothpaste dispensers, and floss holders. Your doctor may recommend a soft-bristle toothbrush if the person you care for bleeds easily. Bleeding can happen because of a health problem or from certain medicines. A toothpaste for sensitive teeth may help if the person you care for has sensitive teeth. How do you brush and floss someone's teeth? If the person you are caring for has a hard time cleaning their teeth on their own, you may need to brush and floss their teeth for them. It may be easiest to have the person sit and face away from you, and to sit or stand behind them. That way you can steady their head against your arm as you reach around to floss and brush their teeth.  Choose a place that has good lighting and is comfortable for both of you. Before you begin, gather your supplies. You will need gloves, floss, a toothbrush, and a container to hold water if you are not near a sink. Wash and dry your hands well and put on gloves. Start by flossing:  Gently work a piece of floss between each of the teeth toward the gums. A plastic flossing tool may make this easier, and they are available at most [facility]. Curve the floss around each tooth into a U-shape and gently slide it under the gum line. Move the floss firmly up and down several times to scrape off the plaque. After you've finished flossing, throw away the used floss and begin brushing:  Wet the brush and apply toothpaste. Place the brush at a 45-degree angle where the teeth meet the gums. Press firmly, and move the brush in small circles over the surface of the teeth. Be careful not to brush too hard. Vigorous brushing can make the gums pull away from the teeth and can scratch the tooth enamel. Brush all surfaces of the teeth, on the tongue side and on the cheek side. Pay special attention to the front teeth and all surfaces of the back teeth. Brush chewing surfaces with short back-and-forth strokes. After you've finished, help the person rinse the remaining toothpaste from their mouth. Where can you learn more? Go to http://www.woods.com/ and enter F944 to learn more about \"Learning About Dental Care for Older Adults. \"  Current as of: June 16, 2022               Content Version: 13.5  © 2006-2022 Health[employer]. Care instructions adapted under license by [facility] ([facility]). If you have questions about a medical condition or this instruction, always ask your healthcare professional. [place] [address] any warranty or liability for your use of this information. Advance Directives: Care Instructions  Overview  An advance directive is a legal way to state your wishes at the end of your life.  It tells your family and your doctor what to do if you can't say what you want. There are two main types of advance directives. You can change them any time your wishes change. Living will. This form tells your family and your doctor your wishes about life support and other treatment. The form is also called a declaration. Medical power of . This form lets you name a person to make treatment decisions for you when you can't speak for yourself. This person is called a health care agent (health care proxy, health care surrogate). The form is also called a durable power of  for health care. If you do not have an advance directive, decisions about your medical care may be made by a family member, or by a doctor or a  who doesn't know you. It may help to think of an advance directive as a gift to the people who care for you. If you have one, they won't have to make tough decisions by themselves. For more information, including forms for your state, see the [address] website (www.caringinfo.org/planning/advance-directives/). Follow-up care is a key part of your treatment and safety. Be sure to make and go to all appointments, and call your doctor if you are having problems. It's also a good idea to know your test results and keep a list of the medicines you take. What should you include in an advance directive? Many states have a unique advance directive form. (It may ask you to address specific issues.) Or you might use a universal form that's approved by many states. If your form doesn't tell you what to address, it may be hard to know what to include in your advance directive. Use the questions below to help you get started. Who do you want to make decisions about your medical care if you are not able to? What life-support measures do you want if you have a serious illness that gets worse over time or can't be cured? What are you most afraid of that might happen?  (Maybe you're afraid of having pain, losing your independence, or being kept alive by machines.)  Where would you prefer to die? (Your home? A hospital? A nursing home?)  Do you want to donate your organs when you die? Do you want certain Jehovah's witness practices performed before you die? When should you call for help? Be sure to contact your doctor if you have any questions. Where can you learn more? Go to http://www.harris.com/ and enter R264 to learn more about \"Advance Directives: Care Instructions. \"  Current as of: June 16, 2022               Content Version: 13.5  © 8400-2378 [employer]. Care instructions adapted under license by [facility] ([facility]). If you have questions about a medical condition or this instruction, always ask your healthcare professional. [address] any warranty or liability for your use of this information. Personalized Preventive Plan for Chrissy Galan - 12/21/2022  Medicare offers a range of preventive health benefits. Some of the tests and screenings are paid in full while other may be subject to a deductible, co-insurance, and/or copay. Some of these benefits include a comprehensive review of your medical history including lifestyle, illnesses that may run in your family, and various assessments and screenings as appropriate. After reviewing your medical record and screening and assessments performed today your provider may have ordered immunizations, labs, imaging, and/or referrals for you. A list of these orders (if applicable) as well as your Preventive Care list are included within your After Visit Summary for your review. Other Preventive Recommendations:    A preventive eye exam performed by an eye specialist is recommended every 1-2 years to screen for glaucoma; cataracts, macular degeneration, and other eye disorders. A preventive dental visit is recommended every 6 months.   Try to get at least 150 minutes of exercise per week or 10,000 steps per day on a pedometer . Order or download the FREE \"Exercise & Physical Activity: Your Everyday Guide\" from [employer]. Call 1-548.306.9611 or search [employer] online. You need 6352-3963 mg of calcium and 4113-4604 IU of vitamin D per day. It is possible to meet your calcium requirement with diet alone, but a vitamin D supplement is usually necessary to meet this goal.  When exposed to the sun, use a sunscreen that protects against both UVA and UVB radiation with an SPF of 30 or greater. Reapply every 2 to 3 hours or after sweating, drying off with a towel, or swimming. Always wear a seat belt when traveling in a car. Always wear a helmet when riding a bicycle or motorcycle.
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
## 2022-12-22 LAB
ABSOLUTE BASO #: 0.05 K/UL (ref 0–0.2)
ABSOLUTE EOS #: 0.23 K/UL (ref 0–0.5)
ABSOLUTE LYMPH #: 1.1 K/UL (ref 1–4)
ABSOLUTE MONO #: 0.66 K/UL (ref 0.2–1)
ABSOLUTE NEUT #: 5.83 K/UL (ref 1.5–7.5)
ALBUMIN SERPL-MCNC: 4.8 G/DL (ref 3.5–5.2)
ALK PHOSPHATASE: 51 U/L (ref 40–142)
ALT SERPL-CCNC: 20 U/L (ref 5–40)
ANION GAP SERPL CALCULATED.3IONS-SCNC: 13 MEQ/L (ref 7–16)
AST SERPL-CCNC: 21 U/L (ref 9–40)
BASOPHILS RELATIVE PERCENT: 0.6 %
BILIRUB SERPL-MCNC: 0.6 MG/DL
BUN BLDV-MCNC: 9 MG/DL (ref 8–23)
CALCIUM SERPL-MCNC: 10.2 MG/DL (ref 8.5–10.5)
CHLORIDE BLD-SCNC: 103 MEQ/L (ref 95–107)
CHOLESTEROL/HDL RATIO: 2.3 RATIO
CHOLESTEROL: 130 MG/DL
CO2: 27 MEQ/L (ref 19–31)
CREAT SERPL-MCNC: 0.76 MG/DL (ref 0.6–1.3)
EGFR IF NONAFRICAN AMERICAN: 79 ML/MIN/1.73
EOSINOPHILS RELATIVE PERCENT: 2.9 %
GLUCOSE: 129 MG/DL (ref 70–99)
HCT VFR BLD CALC: 34.3 % (ref 34–45)
HDLC SERPL-MCNC: 56 MG/DL
HEMOGLOBIN: 11.4 G/DL (ref 11.5–15.5)
LDL CHOLESTEROL CALCULATED: 58 MG/DL
LDL/HDL RATIO: 1 RATIO
LYMPHOCYTE %: 13.9 %
MCH RBC QN AUTO: 29.8 PG (ref 25–33)
MCHC RBC AUTO-ENTMCNC: 33.2 G/DL (ref 31–36)
MCV RBC AUTO: 89.6 FL (ref 80–99)
MONOCYTES # BLD: 8.4 %
NEUTROPHILS RELATIVE PERCENT: 73.9 %
PDW BLD-RTO: 12.9 % (ref 11.5–15)
PLATELETS: 272 K/UL (ref 130–400)
PMV BLD AUTO: 11.2 FL (ref 9.3–13)
POTASSIUM SERPL-SCNC: 4.4 MEQ/L (ref 3.5–5.4)
RBC: 3.83 M/UL (ref 3.8–5.4)
SODIUM BLD-SCNC: 143 MEQ/L (ref 133–146)
TOTAL PROTEIN: 6.9 G/DL (ref 6.1–8.3)
TRIGL SERPL-MCNC: 81 MG/DL
TSH SERPL DL<=0.05 MIU/L-ACNC: 1.41 UIU/ML (ref 0.4–4.1)
VLDLC SERPL CALC-MCNC: 16 MG/DL
WBC: 7.9 K/UL (ref 3.5–11)

## 2023-01-10 ENCOUNTER — TELEPHONE (OUTPATIENT)
Dept: FAMILY MEDICINE CLINIC | Age: 83
End: 2023-01-10

## 2023-01-10 NOTE — TELEPHONE ENCOUNTER
Fax received from [address] stating that the patients Rybelsus 3mg is not on the patients formulary and requires a PA. Do you want to proceed with the PA?

## 2023-01-11 NOTE — TELEPHONE ENCOUNTER
Nothing, will see how her sugars do off the medication. Sugars well controlled and she is on a low dose of the Rybelsus.   Lab Results   Component Value Date    LABA1C 6.0 (H) 12/21/2022    LABA1C 6.3 12/16/2021    LABA1C 6.5 (H) 09/15/2021     Lab Results   Component Value Date    LABMICR < 1.20 12/16/2021    LDLCALC 58 12/21/2022    CREATININE 0.76 12/21/2022

## 2023-01-11 NOTE — TELEPHONE ENCOUNTER
Pt was notified and voiced understanding. She is asking what she is to take in place of that? Please advise.

## 2023-01-30 RX ORDER — LEVOTHYROXINE SODIUM 0.03 MG/1
25 TABLET ORAL DAILY
Qty: 90 TABLET | Refills: 3 | Status: SHIPPED | OUTPATIENT
Start: 2023-01-30

## 2023-02-09 ENCOUNTER — TELEPHONE (OUTPATIENT)
Dept: FAMILY MEDICINE CLINIC | Age: 83
End: 2023-02-09

## 2023-02-09 NOTE — TELEPHONE ENCOUNTER
Patient calling concerned she is no longer taking Rybelsus since her insurance will not cover it and she was not given an alternative. Reviewed telephone call regarding this on 1/10/23. Will re-evaluate A1C in 6 months. She voices understanding.

## 2023-02-23 RX ORDER — SPIRONOLACTONE 25 MG/1
TABLET ORAL
Qty: 90 TABLET | OUTPATIENT
Start: 2023-02-23

## 2023-03-15 RX ORDER — DILTIAZEM HYDROCHLORIDE 180 MG/1
180 CAPSULE, COATED, EXTENDED RELEASE ORAL DAILY
Qty: 90 CAPSULE | Refills: 4 | Status: SHIPPED | OUTPATIENT
Start: 2023-03-15

## 2023-03-15 RX ORDER — METOPROLOL SUCCINATE 50 MG/1
50 TABLET, EXTENDED RELEASE ORAL DAILY
Qty: 90 TABLET | Refills: 4 | OUTPATIENT
Start: 2023-03-15

## 2023-05-01 ENCOUNTER — TELEPHONE (OUTPATIENT)
Dept: FAMILY MEDICINE CLINIC | Age: 83
End: 2023-05-01

## 2023-05-01 DIAGNOSIS — J45.20 MILD INTERMITTENT REACTIVE AIRWAY DISEASE WITHOUT COMPLICATION: Primary | ICD-10-CM

## 2023-05-01 NOTE — TELEPHONE ENCOUNTER
Marisol with the [address] in [facility] [place] called office stating pt and her  are there currently.  brought his order for a handicap placard in and Hubert Tamie said she was just going to use his. Nilsa Harris advised pt she is not able to use other peoples handicap placard. Pt is going to stop by the office here soon to  an order for herself. Please advise.

## 2023-06-01 ENCOUNTER — APPOINTMENT (OUTPATIENT)
Dept: GENERAL RADIOLOGY | Age: 83
End: 2023-06-01
Payer: MEDICARE

## 2023-06-01 ENCOUNTER — HOSPITAL ENCOUNTER (EMERGENCY)
Age: 83
Discharge: HOME | End: 2023-06-01
Attending: EMERGENCY MEDICINE
Payer: MEDICARE

## 2023-06-01 ENCOUNTER — APPOINTMENT (OUTPATIENT)
Dept: CT IMAGING | Age: 83
End: 2023-06-01
Payer: MEDICARE

## 2023-06-01 VITALS
HEART RATE: 78 BPM | HEIGHT: 65 IN | SYSTOLIC BLOOD PRESSURE: 107 MMHG | WEIGHT: 160 LBS | OXYGEN SATURATION: 99 % | DIASTOLIC BLOOD PRESSURE: 93 MMHG | TEMPERATURE: 97.4 F | RESPIRATION RATE: 16 BRPM | BODY MASS INDEX: 26.66 KG/M2

## 2023-06-01 DIAGNOSIS — M19.019 SHOULDER ARTHRITIS: ICD-10-CM

## 2023-06-01 DIAGNOSIS — M47.812 CERVICAL ARTHRITIS: Primary | ICD-10-CM

## 2023-06-01 LAB
ALBUMIN SERPL BCG-MCNC: 4.4 G/DL (ref 3.5–5.1)
ALP SERPL-CCNC: 53 U/L (ref 38–126)
ALT SERPL W/O P-5'-P-CCNC: 17 U/L (ref 11–66)
ANION GAP SERPL CALC-SCNC: 12 MEQ/L (ref 8–16)
APTT PPP: 36.3 SECONDS (ref 22–38)
AST SERPL-CCNC: 19 U/L (ref 5–40)
BASOPHILS ABSOLUTE: 0.1 THOU/MM3 (ref 0–0.1)
BASOPHILS NFR BLD AUTO: 0.7 %
BILIRUB CONJ SERPL-MCNC: < 0.2 MG/DL (ref 0–0.3)
BILIRUB SERPL-MCNC: 0.5 MG/DL (ref 0.3–1.2)
BUN SERPL-MCNC: 8 MG/DL (ref 7–22)
CALCIUM SERPL-MCNC: 9.6 MG/DL (ref 8.5–10.5)
CHLORIDE SERPL-SCNC: 97 MEQ/L (ref 98–111)
CO2 SERPL-SCNC: 24 MEQ/L (ref 23–33)
CREAT SERPL-MCNC: 0.8 MG/DL (ref 0.4–1.2)
DEPRECATED RDW RBC AUTO: 41.3 FL (ref 35–45)
EOSINOPHIL NFR BLD AUTO: 0.9 %
EOSINOPHILS ABSOLUTE: 0.1 THOU/MM3 (ref 0–0.4)
ERYTHROCYTE [DISTWIDTH] IN BLOOD BY AUTOMATED COUNT: 12.6 % (ref 11.5–14.5)
GFR SERPL CREATININE-BSD FRML MDRD: > 60 ML/MIN/1.73M2
GLUCOSE SERPL-MCNC: 189 MG/DL (ref 70–108)
HCT VFR BLD AUTO: 37.7 % (ref 37–47)
HGB BLD-MCNC: 12.6 GM/DL (ref 12–16)
IMM GRANULOCYTES # BLD AUTO: 0.03 THOU/MM3 (ref 0–0.07)
IMM GRANULOCYTES NFR BLD AUTO: 0.4 %
INR PPP: 1.68 (ref 0.85–1.13)
LIPASE SERPL-CCNC: 21.4 U/L (ref 5.6–51.3)
LYMPHOCYTES ABSOLUTE: 1 THOU/MM3 (ref 1–4.8)
LYMPHOCYTES NFR BLD AUTO: 12.5 %
MAGNESIUM SERPL-MCNC: 1.2 MG/DL (ref 1.6–2.4)
MCH RBC QN AUTO: 30.3 PG (ref 26–33)
MCHC RBC AUTO-ENTMCNC: 33.4 GM/DL (ref 32.2–35.5)
MCV RBC AUTO: 90.6 FL (ref 81–99)
MONOCYTES ABSOLUTE: 0.6 THOU/MM3 (ref 0.4–1.3)
MONOCYTES NFR BLD AUTO: 8.2 %
NEUTROPHILS NFR BLD AUTO: 77.3 %
NRBC BLD AUTO-RTO: 0 /100 WBC
NT-PROBNP SERPL IA-MCNC: 293.4 PG/ML (ref 0–449)
OSMOLALITY SERPL CALC.SUM OF ELEC: 269.7 MOSMOL/KG (ref 275–300)
PLATELET # BLD AUTO: 286 THOU/MM3 (ref 130–400)
PMV BLD AUTO: 10.7 FL (ref 9.4–12.4)
POTASSIUM SERPL-SCNC: 4.9 MEQ/L (ref 3.5–5.2)
PROT SERPL-MCNC: 6.9 G/DL (ref 6.1–8)
RBC # BLD AUTO: 4.16 MILL/MM3 (ref 4.2–5.4)
REASON FOR REJECTION: NORMAL
REJECTED TEST: NORMAL
SEGMENTED NEUTROPHILS ABSOLUTE COUNT: 6 THOU/MM3 (ref 1.8–7.7)
SODIUM SERPL-SCNC: 133 MEQ/L (ref 135–145)
TROPONIN T: < 0.01 NG/ML
WBC # BLD AUTO: 7.7 THOU/MM3 (ref 4.8–10.8)

## 2023-06-01 PROCEDURE — 96374 THER/PROPH/DIAG INJ IV PUSH: CPT

## 2023-06-01 PROCEDURE — 83690 ASSAY OF LIPASE: CPT

## 2023-06-01 PROCEDURE — 2580000003 HC RX 258: Performed by: EMERGENCY MEDICINE

## 2023-06-01 PROCEDURE — 85610 PROTHROMBIN TIME: CPT

## 2023-06-01 PROCEDURE — 80053 COMPREHEN METABOLIC PANEL: CPT

## 2023-06-01 PROCEDURE — 83880 ASSAY OF NATRIURETIC PEPTIDE: CPT

## 2023-06-01 PROCEDURE — 85025 COMPLETE CBC W/AUTO DIFF WBC: CPT

## 2023-06-01 PROCEDURE — 73030 X-RAY EXAM OF SHOULDER: CPT

## 2023-06-01 PROCEDURE — 83735 ASSAY OF MAGNESIUM: CPT

## 2023-06-01 PROCEDURE — 6370000000 HC RX 637 (ALT 250 FOR IP): Performed by: EMERGENCY MEDICINE

## 2023-06-01 PROCEDURE — 82248 BILIRUBIN DIRECT: CPT

## 2023-06-01 PROCEDURE — 93010 ELECTROCARDIOGRAM REPORT: CPT | Performed by: NUCLEAR MEDICINE

## 2023-06-01 PROCEDURE — 71046 X-RAY EXAM CHEST 2 VIEWS: CPT

## 2023-06-01 PROCEDURE — 85730 THROMBOPLASTIN TIME PARTIAL: CPT

## 2023-06-01 PROCEDURE — 72125 CT NECK SPINE W/O DYE: CPT

## 2023-06-01 PROCEDURE — 2500000003 HC RX 250 WO HCPCS: Performed by: EMERGENCY MEDICINE

## 2023-06-01 PROCEDURE — 99285 EMERGENCY DEPT VISIT HI MDM: CPT

## 2023-06-01 PROCEDURE — 36415 COLL VENOUS BLD VENIPUNCTURE: CPT

## 2023-06-01 PROCEDURE — 84484 ASSAY OF TROPONIN QUANT: CPT

## 2023-06-01 PROCEDURE — 93005 ELECTROCARDIOGRAM TRACING: CPT | Performed by: EMERGENCY MEDICINE

## 2023-06-01 RX ORDER — HYDROCODONE BITARTRATE AND ACETAMINOPHEN 5; 325 MG/1; MG/1
1 TABLET ORAL EVERY 6 HOURS PRN
Qty: 12 TABLET | Refills: 0 | Status: SHIPPED | OUTPATIENT
Start: 2023-06-01 | End: 2023-06-04

## 2023-06-01 RX ORDER — DILTIAZEM HYDROCHLORIDE 5 MG/ML
10 INJECTION INTRAVENOUS ONCE
Status: COMPLETED | OUTPATIENT
Start: 2023-06-01 | End: 2023-06-01

## 2023-06-01 RX ORDER — HYDROCODONE BITARTRATE AND ACETAMINOPHEN 5; 325 MG/1; MG/1
1 TABLET ORAL ONCE
Status: COMPLETED | OUTPATIENT
Start: 2023-06-01 | End: 2023-06-01

## 2023-06-01 RX ORDER — 0.9 % SODIUM CHLORIDE 0.9 %
1000 INTRAVENOUS SOLUTION INTRAVENOUS ONCE
Status: COMPLETED | OUTPATIENT
Start: 2023-06-01 | End: 2023-06-01

## 2023-06-01 RX ADMIN — DILTIAZEM HYDROCHLORIDE 10 MG: 5 INJECTION INTRAVENOUS at 12:01

## 2023-06-01 RX ADMIN — HYDROCODONE BITARTRATE AND ACETAMINOPHEN 1 TABLET: 5; 325 TABLET ORAL at 12:01

## 2023-06-01 RX ADMIN — SODIUM CHLORIDE 1000 ML: 9 INJECTION, SOLUTION INTRAVENOUS at 11:59

## 2023-06-01 ASSESSMENT — ENCOUNTER SYMPTOMS
VOICE CHANGE: 0
PHOTOPHOBIA: 0
EYE DISCHARGE: 0
EYE REDNESS: 0
ABDOMINAL DISTENTION: 0
BACK PAIN: 0
SHORTNESS OF BREATH: 0
ABDOMINAL PAIN: 0
SINUS PRESSURE: 0
CHOKING: 0
NAUSEA: 0
WHEEZING: 0
BLOOD IN STOOL: 0
CHEST TIGHTNESS: 0
COUGH: 0
EYE PAIN: 0
EYE ITCHING: 0
TROUBLE SWALLOWING: 0
CONSTIPATION: 0
VOMITING: 0
RHINORRHEA: 0
SORE THROAT: 0
DIARRHEA: 0

## 2023-06-01 ASSESSMENT — PAIN SCALES - GENERAL
PAINLEVEL_OUTOF10: 5
PAINLEVEL_OUTOF10: 5

## 2023-06-01 NOTE — ED NOTES
Patient resting in bed. Respirations easy and unlabored. No distress noted. Call light within reach.      Beverly Clifford RN  06/01/23 [id number]

## 2023-06-01 NOTE — DISCHARGE INSTRUCTIONS
Patient has what appears to be arthritis in the neck and shoulder. Patient is instructed to use anti-inflammatories she has been given a short course of pain medication she is instructed take that as prescribed. She is instructed take all other medications as prescribed. She is instructed to follow-up with her primary care physician and do so within the next 1 to 2 days. She is instructed return to the nearest emergency room immediately for any new or worsening complaints.

## 2023-06-01 NOTE — ED TRIAGE NOTES
Pt presents to the ED through triage with c.c neck pain and left sided shoulder pain. Pt reports that pain started yesterday. Denies injury. Pt strength equal bilaterally. No decrease in sensation. Vitals stable. Rates pain 4/10 at this time.

## 2023-06-01 NOTE — ED NOTES
IV access established. Labs collected. Patient medicated per MAR. Patient resting in bed. Respirations easy and unlabored. No distress noted. Call light within reach.      Kathryn Muller RN  06/01/23 [phone]

## 2023-06-01 NOTE — ED PROVIDER NOTES
Weight: 160 lb (72.6 kg)      Height: 5' 5\" (1.651 m)        Patient was assessed at bedside Labs and imaging were ordered. This appears to be musculoskeletal.  She did not have any signs of radiculopathy, however she did have pain in her shoulder with lateral distraction and rotation compression. There is most likely coming opponent of degenerative joint disease involved in this. Patient has had a clean cardiac catheterization in 2018. And there is no overt chest pain. Patient was given Norco for her discomfort. Here today I reviewed all labs and imaging all within normal limits. I went back and discussed case with the patient. I went through all labs and imaging. Gave the patient opportunity ask questions all questions were answered to the best of my ability. I feel that the patient be safely discharged home. She is instructed to use anti-inflammatories for her pain. She is instructed to follow-up with the primary care physician and do so within the next 1 to 2 days. She is given a short course of pain medication for at home to use if the pain gets out of control. Patient understood and agreed with the plan. Patient is subsequently discharged home in stable condition. Patient has what appears to be arthritis in the neck and shoulder. Patient is instructed to use anti-inflammatories she has been given a short course of pain medication she is instructed take that as prescribed. She is instructed take all other medications as prescribed. She is instructed to follow-up with her primary care physician and do so within the next 1 to 2 days. She is instructed return to the nearest emergency room immediately for any new or worsening complaints. CRITICAL CARE:   None    CONSULTS:  None    PROCEDURES:  None    FINAL IMPRESSION      1. Cervical arthritis    2.  Shoulder arthritis          DISPOSITION/PLAN   Discharge    PATIENT REFERRED TO:  [facility] [address], [address]

## 2023-06-02 ENCOUNTER — TELEPHONE (OUTPATIENT)
Dept: FAMILY MEDICINE CLINIC | Age: 83
End: 2023-06-02

## 2023-06-02 LAB
EKG Q-T INTERVAL: 338 MS
EKG QRS DURATION: 74 MS
EKG QTC CALCULATION (BAZETT): 446 MS
EKG R AXIS: 31 DEGREES
EKG T AXIS: 63 DEGREES
EKG VENTRICULAR RATE: 105 BPM

## 2023-06-21 ENCOUNTER — NURSE ONLY (OUTPATIENT)
Dept: LAB | Age: 83
End: 2023-06-21

## 2023-06-21 ENCOUNTER — OFFICE VISIT (OUTPATIENT)
Dept: FAMILY MEDICINE CLINIC | Age: 83
End: 2023-06-21
Payer: MEDICARE

## 2023-06-21 VITALS
SYSTOLIC BLOOD PRESSURE: 118 MMHG | RESPIRATION RATE: 16 BRPM | DIASTOLIC BLOOD PRESSURE: 64 MMHG | WEIGHT: 177.2 LBS | HEART RATE: 76 BPM | BODY MASS INDEX: 29.52 KG/M2 | HEIGHT: 65 IN

## 2023-06-21 DIAGNOSIS — G47.33 OBSTRUCTIVE SLEEP APNEA ON CPAP: ICD-10-CM

## 2023-06-21 DIAGNOSIS — M19.012 OSTEOARTHRITIS OF LEFT SHOULDER, UNSPECIFIED OSTEOARTHRITIS TYPE: ICD-10-CM

## 2023-06-21 DIAGNOSIS — I89.0 LYMPHEDEMA: ICD-10-CM

## 2023-06-21 DIAGNOSIS — M46.1 SACROILIAC INFLAMMATION (HCC): ICD-10-CM

## 2023-06-21 DIAGNOSIS — E87.1 HYPONATREMIA: ICD-10-CM

## 2023-06-21 DIAGNOSIS — E83.42 HYPOMAGNESEMIA: ICD-10-CM

## 2023-06-21 DIAGNOSIS — K21.9 GASTROESOPHAGEAL REFLUX DISEASE, UNSPECIFIED WHETHER ESOPHAGITIS PRESENT: ICD-10-CM

## 2023-06-21 DIAGNOSIS — E78.00 PURE HYPERCHOLESTEROLEMIA: ICD-10-CM

## 2023-06-21 DIAGNOSIS — I20.9 ANGINA PECTORIS (HCC): ICD-10-CM

## 2023-06-21 DIAGNOSIS — E11.40 TYPE 2 DIABETES MELLITUS WITH DIABETIC NEUROPATHY, WITHOUT LONG-TERM CURRENT USE OF INSULIN (HCC): ICD-10-CM

## 2023-06-21 DIAGNOSIS — I48.0 PAROXYSMAL ATRIAL FIBRILLATION (HCC): ICD-10-CM

## 2023-06-21 DIAGNOSIS — I10 ESSENTIAL HYPERTENSION: ICD-10-CM

## 2023-06-21 DIAGNOSIS — J45.20 MILD INTERMITTENT REACTIVE AIRWAY DISEASE WITHOUT COMPLICATION: ICD-10-CM

## 2023-06-21 DIAGNOSIS — E66.9 OBESITY (BMI 30-39.9): ICD-10-CM

## 2023-06-21 DIAGNOSIS — M54.2 CERVICALGIA: Primary | ICD-10-CM

## 2023-06-21 DIAGNOSIS — Z99.89 OBSTRUCTIVE SLEEP APNEA ON CPAP: ICD-10-CM

## 2023-06-21 LAB
ANION GAP SERPL CALC-SCNC: 16 MEQ/L (ref 8–16)
BUN SERPL-MCNC: 10 MG/DL (ref 7–22)
CALCIUM SERPL-MCNC: 10.3 MG/DL (ref 8.5–10.5)
CHLORIDE SERPL-SCNC: 90 MEQ/L (ref 98–111)
CO2 SERPL-SCNC: 19 MEQ/L (ref 23–33)
CREAT SERPL-MCNC: 0.7 MG/DL (ref 0.4–1.2)
DEPRECATED MEAN GLUCOSE BLD GHB EST-ACNC: 159 MG/DL (ref 70–126)
GFR SERPL CREATININE-BSD FRML MDRD: > 60 ML/MIN/1.73M2
GLUCOSE SERPL-MCNC: 165 MG/DL (ref 70–108)
HBA1C MFR BLD HPLC: 7.3 % (ref 4.4–6.4)
MAGNESIUM SERPL-MCNC: 1.2 MG/DL (ref 1.6–2.4)
POTASSIUM SERPL-SCNC: 4.8 MEQ/L (ref 3.5–5.2)
SODIUM SERPL-SCNC: 125 MEQ/L (ref 135–145)

## 2023-06-21 PROCEDURE — G8417 CALC BMI ABV UP PARAM F/U: HCPCS | Performed by: FAMILY MEDICINE

## 2023-06-21 PROCEDURE — 3074F SYST BP LT 130 MM HG: CPT | Performed by: FAMILY MEDICINE

## 2023-06-21 PROCEDURE — G8427 DOCREV CUR MEDS BY ELIG CLIN: HCPCS | Performed by: FAMILY MEDICINE

## 2023-06-21 PROCEDURE — 1090F PRES/ABSN URINE INCON ASSESS: CPT | Performed by: FAMILY MEDICINE

## 2023-06-21 PROCEDURE — 1036F TOBACCO NON-USER: CPT | Performed by: FAMILY MEDICINE

## 2023-06-21 PROCEDURE — 99214 OFFICE O/P EST MOD 30 MIN: CPT | Performed by: FAMILY MEDICINE

## 2023-06-21 PROCEDURE — G8399 PT W/DXA RESULTS DOCUMENT: HCPCS | Performed by: FAMILY MEDICINE

## 2023-06-21 PROCEDURE — 3078F DIAST BP <80 MM HG: CPT | Performed by: FAMILY MEDICINE

## 2023-06-21 PROCEDURE — 1123F ACP DISCUSS/DSCN MKR DOCD: CPT | Performed by: FAMILY MEDICINE

## 2023-06-21 SDOH — ECONOMIC STABILITY: INCOME INSECURITY: HOW HARD IS IT FOR YOU TO PAY FOR THE VERY BASICS LIKE FOOD, HOUSING, MEDICAL CARE, AND HEATING?: NOT HARD AT ALL

## 2023-06-21 SDOH — ECONOMIC STABILITY: FOOD INSECURITY: WITHIN THE PAST 12 MONTHS, YOU WORRIED THAT YOUR FOOD WOULD RUN OUT BEFORE YOU GOT MONEY TO BUY MORE.: NEVER TRUE

## 2023-06-21 SDOH — ECONOMIC STABILITY: HOUSING INSECURITY
IN THE LAST 12 MONTHS, WAS THERE A TIME WHEN YOU DID NOT HAVE A STEADY PLACE TO SLEEP OR SLEPT IN A SHELTER (INCLUDING NOW)?: NO

## 2023-06-21 SDOH — ECONOMIC STABILITY: FOOD INSECURITY: WITHIN THE PAST 12 MONTHS, THE FOOD YOU BOUGHT JUST DIDN'T LAST AND YOU DIDN'T HAVE MONEY TO GET MORE.: NEVER TRUE

## 2023-06-21 ASSESSMENT — PATIENT HEALTH QUESTIONNAIRE - PHQ9
SUM OF ALL RESPONSES TO PHQ QUESTIONS 1-9: 0
SUM OF ALL RESPONSES TO PHQ QUESTIONS 1-9: 0
2. FEELING DOWN, DEPRESSED OR HOPELESS: 0
1. LITTLE INTEREST OR PLEASURE IN DOING THINGS: 0
SUM OF ALL RESPONSES TO PHQ9 QUESTIONS 1 & 2: 0
SUM OF ALL RESPONSES TO PHQ QUESTIONS 1-9: 0
SUM OF ALL RESPONSES TO PHQ QUESTIONS 1-9: 0

## 2023-06-21 ASSESSMENT — ENCOUNTER SYMPTOMS
RESPIRATORY NEGATIVE: 1
GASTROINTESTINAL NEGATIVE: 1
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
## 2023-06-26 ENCOUNTER — OFFICE VISIT (OUTPATIENT)
Dept: PULMONOLOGY | Age: 83
End: 2023-06-26
Payer: MEDICARE

## 2023-06-26 VITALS
HEIGHT: 65 IN | DIASTOLIC BLOOD PRESSURE: 60 MMHG | OXYGEN SATURATION: 98 % | HEART RATE: 71 BPM | TEMPERATURE: 98 F | BODY MASS INDEX: 28.79 KG/M2 | SYSTOLIC BLOOD PRESSURE: 102 MMHG | WEIGHT: 172.8 LBS

## 2023-06-26 DIAGNOSIS — G47.33 OSA ON CPAP: Primary | ICD-10-CM

## 2023-06-26 DIAGNOSIS — Z99.89 OSA ON CPAP: Primary | ICD-10-CM

## 2023-06-26 PROCEDURE — 1123F ACP DISCUSS/DSCN MKR DOCD: CPT | Performed by: NURSE PRACTITIONER

## 2023-06-26 PROCEDURE — 1036F TOBACCO NON-USER: CPT | Performed by: NURSE PRACTITIONER

## 2023-06-26 PROCEDURE — 1090F PRES/ABSN URINE INCON ASSESS: CPT | Performed by: NURSE PRACTITIONER

## 2023-06-26 PROCEDURE — 99213 OFFICE O/P EST LOW 20 MIN: CPT | Performed by: NURSE PRACTITIONER

## 2023-06-26 PROCEDURE — 3078F DIAST BP <80 MM HG: CPT | Performed by: NURSE PRACTITIONER

## 2023-06-26 PROCEDURE — 3074F SYST BP LT 130 MM HG: CPT | Performed by: NURSE PRACTITIONER

## 2023-06-26 PROCEDURE — G8417 CALC BMI ABV UP PARAM F/U: HCPCS | Performed by: NURSE PRACTITIONER

## 2023-06-26 PROCEDURE — G8399 PT W/DXA RESULTS DOCUMENT: HCPCS | Performed by: NURSE PRACTITIONER

## 2023-06-26 PROCEDURE — G8427 DOCREV CUR MEDS BY ELIG CLIN: HCPCS | Performed by: NURSE PRACTITIONER

## 2023-06-26 ASSESSMENT — ENCOUNTER SYMPTOMS
ALLERGIC/IMMUNOLOGIC NEGATIVE: 1
WHEEZING: 0
EYES NEGATIVE: 1
SHORTNESS OF BREATH: 0
GASTROINTESTINAL NEGATIVE: 1
COUGH: 0
RESPIRATORY NEGATIVE: 1

## 2023-07-03 RX ORDER — METFORMIN HYDROCHLORIDE 500 MG/1
TABLET, EXTENDED RELEASE ORAL
Qty: 180 TABLET | Refills: 3 | Status: SHIPPED | OUTPATIENT
Start: 2023-07-03

## 2023-07-05 ENCOUNTER — TELEPHONE (OUTPATIENT)
Dept: FAMILY MEDICINE CLINIC | Age: 83
End: 2023-07-05

## 2023-07-10 ENCOUNTER — NURSE ONLY (OUTPATIENT)
Dept: LAB | Age: 83
End: 2023-07-10

## 2023-07-10 ENCOUNTER — TELEPHONE (OUTPATIENT)
Dept: FAMILY MEDICINE CLINIC | Age: 83
End: 2023-07-10

## 2023-07-10 DIAGNOSIS — E87.1 HYPONATREMIA: ICD-10-CM

## 2023-07-10 LAB
ANION GAP SERPL CALC-SCNC: 16 MEQ/L (ref 8–16)
BUN SERPL-MCNC: 11 MG/DL (ref 7–22)
CALCIUM SERPL-MCNC: 10.3 MG/DL (ref 8.5–10.5)
CHLORIDE SERPL-SCNC: 98 MEQ/L (ref 98–111)
CO2 SERPL-SCNC: 23 MEQ/L (ref 23–33)
CREAT SERPL-MCNC: 0.8 MG/DL (ref 0.4–1.2)
GFR SERPL CREATININE-BSD FRML MDRD: > 60 ML/MIN/1.73M2
GLUCOSE SERPL-MCNC: 179 MG/DL (ref 70–108)
POTASSIUM SERPL-SCNC: 4.4 MEQ/L (ref 3.5–5.2)
SODIUM SERPL-SCNC: 137 MEQ/L (ref 135–145)

## 2023-07-10 NOTE — TELEPHONE ENCOUNTER
Labs look much better off the spironolactone. Continue to hold the spironolactone at this time and monitor for LE swelling and/or unintended weight gain.

## 2023-07-10 NOTE — TELEPHONE ENCOUNTER
Patient called and stated that she just had the blood work completed and wanted to know if she should start back on her spirolactone. Informed patient once we get the results of her BMP we will give her a call with the results and if Dr. Jalen Gallegos would like to have her start the spirolactone again. She verbalized understanding.

## 2023-07-18 RX ORDER — PRAVASTATIN SODIUM 80 MG/1
80 TABLET ORAL DAILY
Qty: 90 TABLET | Refills: 4 | Status: SHIPPED | OUTPATIENT
Start: 2023-07-18

## 2023-09-20 ENCOUNTER — APPOINTMENT (OUTPATIENT)
Dept: GENERAL RADIOLOGY | Age: 83
End: 2023-09-20
Payer: MEDICARE

## 2023-09-20 ENCOUNTER — HOSPITAL ENCOUNTER (EMERGENCY)
Age: 83
Discharge: HOME | End: 2023-09-20
Attending: STUDENT IN AN ORGANIZED HEALTH CARE EDUCATION/TRAINING PROGRAM
Payer: MEDICARE

## 2023-09-20 ENCOUNTER — APPOINTMENT (OUTPATIENT)
Dept: CT IMAGING | Age: 83
End: 2023-09-20
Payer: MEDICARE

## 2023-09-20 VITALS
HEIGHT: 66 IN | WEIGHT: 181 LBS | DIASTOLIC BLOOD PRESSURE: 72 MMHG | TEMPERATURE: 98.3 F | RESPIRATION RATE: 16 BRPM | SYSTOLIC BLOOD PRESSURE: 122 MMHG | HEART RATE: 74 BPM | OXYGEN SATURATION: 99 % | BODY MASS INDEX: 29.09 KG/M2

## 2023-09-20 DIAGNOSIS — R07.81 RIB PAIN ON LEFT SIDE: Primary | ICD-10-CM

## 2023-09-20 PROCEDURE — 99215 OFFICE O/P EST HI 40 MIN: CPT

## 2023-09-20 PROCEDURE — 93005 ELECTROCARDIOGRAM TRACING: CPT | Performed by: STUDENT IN AN ORGANIZED HEALTH CARE EDUCATION/TRAINING PROGRAM

## 2023-09-20 PROCEDURE — 99284 EMERGENCY DEPT VISIT MOD MDM: CPT

## 2023-09-20 PROCEDURE — 71250 CT THORAX DX C-: CPT

## 2023-09-20 PROCEDURE — 93005 ELECTROCARDIOGRAM TRACING: CPT | Performed by: NURSE PRACTITIONER

## 2023-09-20 ASSESSMENT — PAIN - FUNCTIONAL ASSESSMENT
PAIN_FUNCTIONAL_ASSESSMENT: 0-10
PAIN_FUNCTIONAL_ASSESSMENT: 0-10

## 2023-09-20 ASSESSMENT — PAIN SCALES - GENERAL
PAINLEVEL_OUTOF10: 6
PAINLEVEL_OUTOF10: 3

## 2023-09-20 ASSESSMENT — PAIN DESCRIPTION - DESCRIPTORS: DESCRIPTORS: ACHING

## 2023-09-20 ASSESSMENT — PAIN DESCRIPTION - LOCATION
LOCATION: RIB CAGE
LOCATION: RIB CAGE

## 2023-09-20 ASSESSMENT — ENCOUNTER SYMPTOMS
COUGH: 0
SHORTNESS OF BREATH: 0
ABDOMINAL PAIN: 0

## 2023-09-20 ASSESSMENT — PAIN DESCRIPTION - ORIENTATION
ORIENTATION: LEFT
ORIENTATION: LEFT

## 2023-09-20 NOTE — ED TRIAGE NOTES
Pt from lobby via wheelchair with c/o left rib pain. Pt states she fell on Monday and landed on her side. Pt denies head injury or LOC. Pt taking Eliquis. Pt reports taking Tylenol this morning for pain. Pt reports her EKG at  was reading irregular.  Pt states she has a hx Afib, pt taking cardizem

## 2023-09-20 NOTE — ED NOTES
Verneta Holstein CNP calls report to [address] regarding transfer     Steven Simmons RN  09/20/23 3338

## 2023-09-20 NOTE — ED TRIAGE NOTES
To room 4  c/o let rib pain s/p fall at home. Pt alert and oriented but is forgetful about med list and diagnosis. STates \" you know I dont know\" Multiple times.  Grandchristie reports \" SHe does forget somethings some times\"

## 2023-09-20 NOTE — ED PROVIDER NOTES
[facility] EMERGENCY DEPT  UrgentCare Encounter      [address]       Chief Complaint   Patient presents with    Fall     Pt unsure cause of fall \" I thought I tripped but not sure\"    Rib Pain (injury)     Left breast rib pain Denies LOC and neck pain       Nurses Notes reviewed and I agree except as noted in the HPI. HISTORY OF PRESENT ILLNESS   Geoff Branch is a 81 y.o. female who presents to the urgent care for evaluation. She presents for evaluation of left sided rib pain after falling Monday at home. She is unsure why she fell she thinks maybe she tripped over something but is not sure. Grandson who is present states \"perry saw her fall, she tripped over the corner of the fireplace or cabinet. \"   She denies hitting her head or loss of consciousness. She is on Eliquis, follows with [facility] cardiology, cannot recall provider name or why. Poor historian. The patient/patient representative has no other acute complaints at this time. REVIEW OF SYSTEMS     Review of Systems   Constitutional:  Negative for chills and fever. Respiratory:  Negative for cough and shortness of breath. Cardiovascular:  Negative for chest pain. Gastrointestinal:  Negative for abdominal pain. Musculoskeletal:  Positive for arthralgias (left ribs). Skin:  Negative for rash. Allergic/Immunologic: Negative for environmental allergies and food allergies. PAST MEDICAL HISTORY         Diagnosis Date    Arthritis     Cancer ([address])     skin ca    Diabetes mellitus ([address])     Hyperlipidemia     Hypertension     Osteoporosis 2017    Sleep apnea     has CPAP       SURGICAL HISTORY     Patient  has a past surgical history that includes Appendectomy; Cardiac catheterization; Dilation and curettage of uterus; Colonoscopy (01/08/2013); Skin cancer excision (03/2017); Colonoscopy (N/A, 5/17/2019); Upper gastrointestinal endoscopy (N/A, 5/17/2019);  Injection Procedure For Sacroiliac Joint (Left, 1/24/2020); and Although every effort was made to ensure the accuracy of this automated transcription, some errors in transcription may have occurred.          BOWEN Rodríguez - CNP  09/20/23 7004

## 2023-09-21 ENCOUNTER — PATIENT MESSAGE (OUTPATIENT)
Dept: FAMILY MEDICINE CLINIC | Age: 83
End: 2023-09-21

## 2023-09-21 PROCEDURE — 93010 ELECTROCARDIOGRAM REPORT: CPT | Performed by: NUCLEAR MEDICINE

## 2023-09-23 LAB
EKG ATRIAL RATE: 120 BPM
EKG Q-T INTERVAL: 356 MS
EKG Q-T INTERVAL: 366 MS
EKG QRS DURATION: 76 MS
EKG QRS DURATION: 82 MS
EKG QTC CALCULATION (BAZETT): 417 MS
EKG QTC CALCULATION (BAZETT): 428 MS
EKG R AXIS: -5 DEGREES
EKG R AXIS: 30 DEGREES
EKG T AXIS: 37 DEGREES
EKG T AXIS: 45 DEGREES
EKG VENTRICULAR RATE: 78 BPM
EKG VENTRICULAR RATE: 87 BPM

## 2023-10-23 ENCOUNTER — OFFICE VISIT (OUTPATIENT)
Dept: CARDIOLOGY CLINIC | Age: 83
End: 2023-10-23
Payer: MEDICARE

## 2023-10-23 VITALS
RESPIRATION RATE: 20 BRPM | HEART RATE: 75 BPM | WEIGHT: 181 LBS | DIASTOLIC BLOOD PRESSURE: 65 MMHG | BODY MASS INDEX: 29.09 KG/M2 | SYSTOLIC BLOOD PRESSURE: 120 MMHG | HEIGHT: 66 IN

## 2023-10-23 DIAGNOSIS — I10 PRIMARY HYPERTENSION: ICD-10-CM

## 2023-10-23 DIAGNOSIS — E78.5 HYPERLIPIDEMIA LDL GOAL <70: ICD-10-CM

## 2023-10-23 DIAGNOSIS — I48.11 LONGSTANDING PERSISTENT ATRIAL FIBRILLATION (HCC): Primary | ICD-10-CM

## 2023-10-23 PROCEDURE — G8427 DOCREV CUR MEDS BY ELIG CLIN: HCPCS | Performed by: NURSE PRACTITIONER

## 2023-10-23 PROCEDURE — 3074F SYST BP LT 130 MM HG: CPT | Performed by: NURSE PRACTITIONER

## 2023-10-23 PROCEDURE — 3078F DIAST BP <80 MM HG: CPT | Performed by: NURSE PRACTITIONER

## 2023-10-23 PROCEDURE — 1123F ACP DISCUSS/DSCN MKR DOCD: CPT | Performed by: NURSE PRACTITIONER

## 2023-10-23 PROCEDURE — 99214 OFFICE O/P EST MOD 30 MIN: CPT | Performed by: NURSE PRACTITIONER

## 2023-10-23 PROCEDURE — G8399 PT W/DXA RESULTS DOCUMENT: HCPCS | Performed by: NURSE PRACTITIONER

## 2023-10-23 PROCEDURE — 1036F TOBACCO NON-USER: CPT | Performed by: NURSE PRACTITIONER

## 2023-10-23 PROCEDURE — 93000 ELECTROCARDIOGRAM COMPLETE: CPT | Performed by: INTERNAL MEDICINE

## 2023-10-23 PROCEDURE — 1090F PRES/ABSN URINE INCON ASSESS: CPT | Performed by: NURSE PRACTITIONER

## 2023-10-23 PROCEDURE — G8484 FLU IMMUNIZE NO ADMIN: HCPCS | Performed by: NURSE PRACTITIONER

## 2023-10-23 PROCEDURE — G8417 CALC BMI ABV UP PARAM F/U: HCPCS | Performed by: NURSE PRACTITIONER

## 2023-10-23 RX ORDER — METOPROLOL SUCCINATE 50 MG/1
50 TABLET, EXTENDED RELEASE ORAL DAILY
Qty: 90 TABLET | Refills: 4 | Status: SHIPPED | OUTPATIENT
Start: 2023-10-23

## 2023-10-23 RX ORDER — NITROGLYCERIN 0.4 MG/1
TABLET SUBLINGUAL
Qty: 25 TABLET | Refills: 3 | Status: SHIPPED | OUTPATIENT
Start: 2023-10-23

## 2023-10-23 RX ORDER — DILTIAZEM HYDROCHLORIDE 180 MG/1
180 CAPSULE, COATED, EXTENDED RELEASE ORAL DAILY
Qty: 90 CAPSULE | Refills: 4 | Status: SHIPPED | OUTPATIENT
Start: 2023-10-23

## 2023-10-23 RX ORDER — RAMIPRIL 5 MG/1
CAPSULE ORAL
Qty: 90 CAPSULE | Refills: 4 | Status: SHIPPED | OUTPATIENT
Start: 2023-10-23

## 2023-10-23 ASSESSMENT — ENCOUNTER SYMPTOMS
RESPIRATORY NEGATIVE: 1
GASTROINTESTINAL NEGATIVE: 1

## 2023-10-23 NOTE — PROGRESS NOTES
the  walls of the coronary arteries, nonobstructive in nature. HTN -stable  HLP LDL 58   12/2022      ANITA- on pap  Family history of premature coronary artery disease      Chi Ralph is doing very well from a cardiac standpoint at the time. I will see her back in 1 years time or sooner if she should have any problems that we did discuss. Orders Placed This Encounter   Procedures    Lipid Panel     Standing Status:   Future     Standing Expiration Date:   10/23/2024     Order Specific Question:   Is Patient Fasting?/# of Hours     Answer:   15     Order Specific Question:   Has the patient fasted? Answer:   Yes    Hepatic Function Panel     Standing Status:   Future     Standing Expiration Date:   30/61/5420    Basic Metabolic Panel     Standing Status:   Future     Standing Expiration Date:   10/23/2024    CBC     Standing Status:   Future     Standing Expiration Date:   10/23/2024    165[address] - [place] ELECTROCARDIOGRAM, COMPLETE     Continue Dr Demond Fabian current treatment plan    There are no Patient Instructions on file for this visit. Return in about 1 year (around 10/23/2024), or if symptoms worsen or fail to improve.     Follow up with Dr Ramya Luna as scheduled or sooner if needed    Celso Antoine, APRN - CNP

## 2023-12-07 RX ORDER — DIGOXIN 125 MCG
125 TABLET ORAL DAILY
Qty: 90 TABLET | Refills: 4 | OUTPATIENT
Start: 2023-12-07

## 2023-12-07 RX ORDER — SPIRONOLACTONE 50 MG/1
50 TABLET, FILM COATED ORAL DAILY
Qty: 90 TABLET | Refills: 4 | OUTPATIENT
Start: 2023-12-07

## 2023-12-16 DIAGNOSIS — I48.11 LONGSTANDING PERSISTENT ATRIAL FIBRILLATION (HCC): ICD-10-CM

## 2023-12-18 RX ORDER — METOPROLOL SUCCINATE 50 MG/1
50 TABLET, EXTENDED RELEASE ORAL DAILY
Qty: 90 TABLET | Refills: 4 | OUTPATIENT
Start: 2023-12-18

## 2024-01-04 ENCOUNTER — APPOINTMENT (OUTPATIENT)
Dept: GENERAL RADIOLOGY | Age: 84
End: 2024-01-04
Payer: MEDICARE

## 2024-01-04 ENCOUNTER — HOSPITAL ENCOUNTER (INPATIENT)
Age: 84
LOS: 2 days | Discharge: HOME | End: 2024-01-06
Attending: EMERGENCY MEDICINE | Admitting: INTERNAL MEDICINE
Payer: MEDICARE

## 2024-01-04 ENCOUNTER — OFFICE VISIT (OUTPATIENT)
Dept: FAMILY MEDICINE CLINIC | Age: 84
End: 2024-01-04
Payer: MEDICARE

## 2024-01-04 VITALS
HEART RATE: 96 BPM | DIASTOLIC BLOOD PRESSURE: 70 MMHG | BODY MASS INDEX: 29.51 KG/M2 | WEIGHT: 183.6 LBS | HEIGHT: 66 IN | RESPIRATION RATE: 16 BRPM | SYSTOLIC BLOOD PRESSURE: 124 MMHG

## 2024-01-04 DIAGNOSIS — I48.0 PAROXYSMAL ATRIAL FIBRILLATION (HCC): ICD-10-CM

## 2024-01-04 DIAGNOSIS — I48.91 ATRIAL FIBRILLATION WITH RVR (HCC): ICD-10-CM

## 2024-01-04 DIAGNOSIS — I48.91 ATRIAL FIBRILLATION WITH RAPID VENTRICULAR RESPONSE (HCC): Primary | ICD-10-CM

## 2024-01-04 DIAGNOSIS — I10 PRIMARY HYPERTENSION: ICD-10-CM

## 2024-01-04 DIAGNOSIS — J45.20 MILD INTERMITTENT REACTIVE AIRWAY DISEASE WITHOUT COMPLICATION: ICD-10-CM

## 2024-01-04 DIAGNOSIS — E66.9 OBESITY (BMI 30-39.9): ICD-10-CM

## 2024-01-04 DIAGNOSIS — M46.1 SACROILIAC INFLAMMATION (HCC): ICD-10-CM

## 2024-01-04 DIAGNOSIS — G47.33 OBSTRUCTIVE SLEEP APNEA ON CPAP: ICD-10-CM

## 2024-01-04 DIAGNOSIS — I48.11 LONGSTANDING PERSISTENT ATRIAL FIBRILLATION (HCC): ICD-10-CM

## 2024-01-04 DIAGNOSIS — R00.0 TACHYCARDIA: ICD-10-CM

## 2024-01-04 DIAGNOSIS — E11.40 TYPE 2 DIABETES MELLITUS WITH DIABETIC NEUROPATHY, WITHOUT LONG-TERM CURRENT USE OF INSULIN (HCC): Primary | ICD-10-CM

## 2024-01-04 DIAGNOSIS — I20.9 ANGINA PECTORIS (HCC): ICD-10-CM

## 2024-01-04 PROBLEM — M51.37 DDD (DEGENERATIVE DISC DISEASE), LUMBOSACRAL: Status: ACTIVE | Noted: 2024-01-04

## 2024-01-04 PROBLEM — M54.16 RADICULOPATHY, LUMBAR REGION: Status: ACTIVE | Noted: 2024-01-04

## 2024-01-04 PROBLEM — M51.379 DDD (DEGENERATIVE DISC DISEASE), LUMBOSACRAL: Status: ACTIVE | Noted: 2024-01-04

## 2024-01-04 LAB
ALBUMIN SERPL BCG-MCNC: 4.1 G/DL (ref 3.5–5.1)
ALP SERPL-CCNC: 72 U/L (ref 38–126)
ALT SERPL W/O P-5'-P-CCNC: 13 U/L (ref 11–66)
ANION GAP SERPL CALC-SCNC: 16 MEQ/L (ref 8–16)
AST SERPL-CCNC: 19 U/L (ref 5–40)
BASOPHILS ABSOLUTE: 0 THOU/MM3 (ref 0–0.1)
BASOPHILS NFR BLD AUTO: 0.4 %
BILIRUB CONJ SERPL-MCNC: 0.3 MG/DL (ref 0–0.3)
BILIRUB SERPL-MCNC: 0.9 MG/DL (ref 0.3–1.2)
BUN SERPL-MCNC: 9 MG/DL (ref 7–22)
CALCIUM SERPL-MCNC: 10.3 MG/DL (ref 8.5–10.5)
CHLORIDE SERPL-SCNC: 95 MEQ/L (ref 98–111)
CO2 SERPL-SCNC: 23 MEQ/L (ref 23–33)
CREAT SERPL-MCNC: 0.7 MG/DL (ref 0.4–1.2)
DEPRECATED RDW RBC AUTO: 43.4 FL (ref 35–45)
DIGOXIN SERPL-MCNC: 0.4 NG/ML (ref 0.5–2)
EKG Q-T INTERVAL: 258 MS
EKG Q-T INTERVAL: 322 MS
EKG QRS DURATION: 70 MS
EKG QRS DURATION: 72 MS
EKG QTC CALCULATION (BAZETT): 427 MS
EKG QTC CALCULATION (BAZETT): 433 MS
EKG R AXIS: -10 DEGREES
EKG R AXIS: 5 DEGREES
EKG T AXIS: -129 DEGREES
EKG T AXIS: -169 DEGREES
EKG VENTRICULAR RATE: 109 BPM
EKG VENTRICULAR RATE: 165 BPM
EOSINOPHIL NFR BLD AUTO: 0.8 %
EOSINOPHILS ABSOLUTE: 0.1 THOU/MM3 (ref 0–0.4)
ERYTHROCYTE [DISTWIDTH] IN BLOOD BY AUTOMATED COUNT: 13.5 % (ref 11.5–14.5)
GFR SERPL CREATININE-BSD FRML MDRD: > 60 ML/MIN/1.73M2
GLUCOSE SERPL-MCNC: 157 MG/DL (ref 70–108)
HCT VFR BLD AUTO: 37 % (ref 37–47)
HGB BLD-MCNC: 12.4 GM/DL (ref 12–16)
IMM GRANULOCYTES # BLD AUTO: 0.04 THOU/MM3 (ref 0–0.07)
IMM GRANULOCYTES NFR BLD AUTO: 0.4 %
LIPASE SERPL-CCNC: 15.2 U/L (ref 5.6–51.3)
LYMPHOCYTES ABSOLUTE: 1.5 THOU/MM3 (ref 1–4.8)
LYMPHOCYTES NFR BLD AUTO: 17.1 %
MAGNESIUM SERPL-MCNC: 1.9 MG/DL (ref 1.6–2.4)
MCH RBC QN AUTO: 29.2 PG (ref 26–33)
MCHC RBC AUTO-ENTMCNC: 33.5 GM/DL (ref 32.2–35.5)
MCV RBC AUTO: 87.3 FL (ref 81–99)
MONOCYTES ABSOLUTE: 0.9 THOU/MM3 (ref 0.4–1.3)
MONOCYTES NFR BLD AUTO: 9.7 %
NEUTROPHILS NFR BLD AUTO: 71.6 %
NRBC BLD AUTO-RTO: 0 /100 WBC
OSMOLALITY SERPL CALC.SUM OF ELEC: 270.2 MOSMOL/KG (ref 275–300)
PHOSPHATE SERPL-MCNC: 3.1 MG/DL (ref 2.4–4.7)
PLATELET # BLD AUTO: 352 THOU/MM3 (ref 130–400)
PMV BLD AUTO: 10.1 FL (ref 9.4–12.4)
POTASSIUM SERPL-SCNC: 3.8 MEQ/L (ref 3.5–5.2)
PROT SERPL-MCNC: 7.9 G/DL (ref 6.1–8)
RBC # BLD AUTO: 4.24 MILL/MM3 (ref 4.2–5.4)
SEGMENTED NEUTROPHILS ABSOLUTE COUNT: 6.4 THOU/MM3 (ref 1.8–7.7)
SODIUM SERPL-SCNC: 134 MEQ/L (ref 135–145)
T4 FREE SERPL-MCNC: 1.36 NG/DL (ref 0.93–1.76)
TROPONIN, HIGH SENSITIVITY: 15 NG/L (ref 0–12)
TROPONIN, HIGH SENSITIVITY: 16 NG/L (ref 0–12)
TROPONIN, HIGH SENSITIVITY: 17 NG/L (ref 0–12)
TSH SERPL DL<=0.005 MIU/L-ACNC: 1.25 UIU/ML (ref 0.4–4.2)
WBC # BLD AUTO: 9 THOU/MM3 (ref 4.8–10.8)

## 2024-01-04 PROCEDURE — 96374 THER/PROPH/DIAG INJ IV PUSH: CPT

## 2024-01-04 PROCEDURE — 99285 EMERGENCY DEPT VISIT HI MDM: CPT

## 2024-01-04 PROCEDURE — G8427 DOCREV CUR MEDS BY ELIG CLIN: HCPCS | Performed by: FAMILY MEDICINE

## 2024-01-04 PROCEDURE — 93010 ELECTROCARDIOGRAM REPORT: CPT | Performed by: INTERNAL MEDICINE

## 2024-01-04 PROCEDURE — 80162 ASSAY OF DIGOXIN TOTAL: CPT

## 2024-01-04 PROCEDURE — 93005 ELECTROCARDIOGRAM TRACING: CPT

## 2024-01-04 PROCEDURE — 99215 OFFICE O/P EST HI 40 MIN: CPT | Performed by: FAMILY MEDICINE

## 2024-01-04 PROCEDURE — 2500000003 HC RX 250 WO HCPCS

## 2024-01-04 PROCEDURE — 80053 COMPREHEN METABOLIC PANEL: CPT

## 2024-01-04 PROCEDURE — 3074F SYST BP LT 130 MM HG: CPT | Performed by: FAMILY MEDICINE

## 2024-01-04 PROCEDURE — 85025 COMPLETE CBC W/AUTO DIFF WBC: CPT

## 2024-01-04 PROCEDURE — 1200000003 HC TELEMETRY R&B

## 2024-01-04 PROCEDURE — 3078F DIAST BP <80 MM HG: CPT | Performed by: FAMILY MEDICINE

## 2024-01-04 PROCEDURE — 71045 X-RAY EXAM CHEST 1 VIEW: CPT

## 2024-01-04 PROCEDURE — 93005 ELECTROCARDIOGRAM TRACING: CPT | Performed by: STUDENT IN AN ORGANIZED HEALTH CARE EDUCATION/TRAINING PROGRAM

## 2024-01-04 PROCEDURE — 6370000000 HC RX 637 (ALT 250 FOR IP): Performed by: STUDENT IN AN ORGANIZED HEALTH CARE EDUCATION/TRAINING PROGRAM

## 2024-01-04 PROCEDURE — 83690 ASSAY OF LIPASE: CPT

## 2024-01-04 PROCEDURE — 1123F ACP DISCUSS/DSCN MKR DOCD: CPT | Performed by: FAMILY MEDICINE

## 2024-01-04 PROCEDURE — 2580000003 HC RX 258

## 2024-01-04 PROCEDURE — 84443 ASSAY THYROID STIM HORMONE: CPT

## 2024-01-04 PROCEDURE — G8399 PT W/DXA RESULTS DOCUMENT: HCPCS | Performed by: FAMILY MEDICINE

## 2024-01-04 PROCEDURE — 36415 COLL VENOUS BLD VENIPUNCTURE: CPT

## 2024-01-04 PROCEDURE — G8417 CALC BMI ABV UP PARAM F/U: HCPCS | Performed by: FAMILY MEDICINE

## 2024-01-04 PROCEDURE — 84439 ASSAY OF FREE THYROXINE: CPT

## 2024-01-04 PROCEDURE — 1036F TOBACCO NON-USER: CPT | Performed by: FAMILY MEDICINE

## 2024-01-04 PROCEDURE — 83735 ASSAY OF MAGNESIUM: CPT

## 2024-01-04 PROCEDURE — 99223 1ST HOSP IP/OBS HIGH 75: CPT | Performed by: INTERNAL MEDICINE

## 2024-01-04 PROCEDURE — 84484 ASSAY OF TROPONIN QUANT: CPT

## 2024-01-04 PROCEDURE — 93000 ELECTROCARDIOGRAM COMPLETE: CPT | Performed by: FAMILY MEDICINE

## 2024-01-04 PROCEDURE — G8484 FLU IMMUNIZE NO ADMIN: HCPCS | Performed by: FAMILY MEDICINE

## 2024-01-04 PROCEDURE — 82248 BILIRUBIN DIRECT: CPT

## 2024-01-04 PROCEDURE — 1090F PRES/ABSN URINE INCON ASSESS: CPT | Performed by: FAMILY MEDICINE

## 2024-01-04 PROCEDURE — 84100 ASSAY OF PHOSPHORUS: CPT

## 2024-01-04 RX ORDER — 0.9 % SODIUM CHLORIDE 0.9 %
1000 INTRAVENOUS SOLUTION INTRAVENOUS ONCE
Status: COMPLETED | OUTPATIENT
Start: 2024-01-04 | End: 2024-01-04

## 2024-01-04 RX ORDER — ONDANSETRON 2 MG/ML
4 INJECTION INTRAMUSCULAR; INTRAVENOUS EVERY 6 HOURS PRN
Status: DISCONTINUED | OUTPATIENT
Start: 2024-01-04 | End: 2024-01-06 | Stop reason: HOSPADM

## 2024-01-04 RX ORDER — LEVOTHYROXINE SODIUM 0.03 MG/1
25 TABLET ORAL DAILY
Qty: 90 TABLET | Refills: 3 | Status: SHIPPED | OUTPATIENT
Start: 2024-01-04

## 2024-01-04 RX ORDER — LISINOPRIL 20 MG/1
20 TABLET ORAL DAILY
Status: DISCONTINUED | OUTPATIENT
Start: 2024-01-04 | End: 2024-01-06 | Stop reason: HOSPADM

## 2024-01-04 RX ORDER — PRAVASTATIN SODIUM 80 MG/1
80 TABLET ORAL NIGHTLY
Status: DISCONTINUED | OUTPATIENT
Start: 2024-01-04 | End: 2024-01-06 | Stop reason: HOSPADM

## 2024-01-04 RX ORDER — PRAVASTATIN SODIUM 80 MG/1
80 TABLET ORAL DAILY
Qty: 90 TABLET | Refills: 3 | Status: SHIPPED | OUTPATIENT
Start: 2024-01-04

## 2024-01-04 RX ORDER — SODIUM CHLORIDE 9 MG/ML
INJECTION, SOLUTION INTRAVENOUS PRN
Status: DISCONTINUED | OUTPATIENT
Start: 2024-01-04 | End: 2024-01-06 | Stop reason: HOSPADM

## 2024-01-04 RX ORDER — METOPROLOL SUCCINATE 50 MG/1
50 TABLET, EXTENDED RELEASE ORAL DAILY
Qty: 90 TABLET | Refills: 3 | Status: SHIPPED | OUTPATIENT
Start: 2024-01-04

## 2024-01-04 RX ORDER — PANTOPRAZOLE SODIUM 40 MG/1
40 TABLET, DELAYED RELEASE ORAL
Status: DISCONTINUED | OUTPATIENT
Start: 2024-01-05 | End: 2024-01-06 | Stop reason: HOSPADM

## 2024-01-04 RX ORDER — POLYETHYLENE GLYCOL 3350 17 G/17G
17 POWDER, FOR SOLUTION ORAL DAILY PRN
Status: DISCONTINUED | OUTPATIENT
Start: 2024-01-04 | End: 2024-01-06 | Stop reason: HOSPADM

## 2024-01-04 RX ORDER — LEVOTHYROXINE SODIUM 0.03 MG/1
25 TABLET ORAL DAILY
Status: DISCONTINUED | OUTPATIENT
Start: 2024-01-04 | End: 2024-01-06 | Stop reason: HOSPADM

## 2024-01-04 RX ORDER — OMEPRAZOLE 40 MG/1
40 CAPSULE, DELAYED RELEASE ORAL DAILY
Qty: 90 CAPSULE | Refills: 3 | Status: SHIPPED | OUTPATIENT
Start: 2024-01-04

## 2024-01-04 RX ORDER — METFORMIN HYDROCHLORIDE 500 MG/1
500 TABLET, EXTENDED RELEASE ORAL 2 TIMES DAILY
Qty: 180 TABLET | Refills: 3 | Status: SHIPPED | OUTPATIENT
Start: 2024-01-04

## 2024-01-04 RX ORDER — ACETAMINOPHEN 650 MG/1
650 SUPPOSITORY RECTAL EVERY 6 HOURS PRN
Status: DISCONTINUED | OUTPATIENT
Start: 2024-01-04 | End: 2024-01-06 | Stop reason: HOSPADM

## 2024-01-04 RX ORDER — SODIUM CHLORIDE 0.9 % (FLUSH) 0.9 %
5-40 SYRINGE (ML) INJECTION PRN
Status: DISCONTINUED | OUTPATIENT
Start: 2024-01-04 | End: 2024-01-06 | Stop reason: HOSPADM

## 2024-01-04 RX ORDER — DIGOXIN 250 MCG
125 TABLET ORAL DAILY
Status: DISCONTINUED | OUTPATIENT
Start: 2024-01-04 | End: 2024-01-06 | Stop reason: HOSPADM

## 2024-01-04 RX ORDER — METOPROLOL SUCCINATE 50 MG/1
50 TABLET, EXTENDED RELEASE ORAL DAILY
Status: DISCONTINUED | OUTPATIENT
Start: 2024-01-04 | End: 2024-01-06 | Stop reason: HOSPADM

## 2024-01-04 RX ORDER — NITROGLYCERIN 0.4 MG/1
TABLET SUBLINGUAL
Qty: 25 TABLET | Refills: 3 | Status: SHIPPED | OUTPATIENT
Start: 2024-01-04

## 2024-01-04 RX ORDER — DILTIAZEM HYDROCHLORIDE 180 MG/1
180 CAPSULE, COATED, EXTENDED RELEASE ORAL DAILY
Status: DISCONTINUED | OUTPATIENT
Start: 2024-01-04 | End: 2024-01-06 | Stop reason: HOSPADM

## 2024-01-04 RX ORDER — DEXTROSE MONOHYDRATE 100 MG/ML
INJECTION, SOLUTION INTRAVENOUS CONTINUOUS PRN
Status: DISCONTINUED | OUTPATIENT
Start: 2024-01-04 | End: 2024-01-06 | Stop reason: HOSPADM

## 2024-01-04 RX ORDER — ONDANSETRON 4 MG/1
4 TABLET, ORALLY DISINTEGRATING ORAL EVERY 8 HOURS PRN
Status: DISCONTINUED | OUTPATIENT
Start: 2024-01-04 | End: 2024-01-06 | Stop reason: HOSPADM

## 2024-01-04 RX ORDER — DILTIAZEM HYDROCHLORIDE 180 MG/1
180 CAPSULE, COATED, EXTENDED RELEASE ORAL DAILY
Qty: 90 CAPSULE | Refills: 4 | Status: ON HOLD | OUTPATIENT
Start: 2024-01-04 | End: 2024-01-06 | Stop reason: HOSPADM

## 2024-01-04 RX ORDER — ACETAMINOPHEN 325 MG/1
650 TABLET ORAL EVERY 6 HOURS PRN
Status: DISCONTINUED | OUTPATIENT
Start: 2024-01-04 | End: 2024-01-06 | Stop reason: HOSPADM

## 2024-01-04 RX ORDER — RAMIPRIL 5 MG/1
CAPSULE ORAL
Qty: 90 CAPSULE | Refills: 3 | Status: ON HOLD | OUTPATIENT
Start: 2024-01-04 | End: 2024-01-06 | Stop reason: HOSPADM

## 2024-01-04 RX ORDER — SODIUM CHLORIDE 0.9 % (FLUSH) 0.9 %
5-40 SYRINGE (ML) INJECTION EVERY 12 HOURS SCHEDULED
Status: DISCONTINUED | OUTPATIENT
Start: 2024-01-04 | End: 2024-01-06 | Stop reason: HOSPADM

## 2024-01-04 RX ORDER — DIGOXIN 125 MCG
125 TABLET ORAL DAILY
Qty: 90 TABLET | Refills: 3 | Status: SHIPPED | OUTPATIENT
Start: 2024-01-04

## 2024-01-04 RX ORDER — IBUPROFEN 600 MG/1
1 TABLET ORAL PRN
Status: DISCONTINUED | OUTPATIENT
Start: 2024-01-04 | End: 2024-01-06 | Stop reason: HOSPADM

## 2024-01-04 RX ADMIN — METOPROLOL SUCCINATE 50 MG: 50 TABLET, EXTENDED RELEASE ORAL at 20:43

## 2024-01-04 RX ADMIN — LEVOTHYROXINE SODIUM 25 MCG: 0.03 TABLET ORAL at 20:42

## 2024-01-04 RX ADMIN — DIGOXIN 125 MCG: 0.25 TABLET ORAL at 20:42

## 2024-01-04 RX ADMIN — DILTIAZEM HYDROCHLORIDE 5 MG/HR: 5 INJECTION INTRAVENOUS at 15:14

## 2024-01-04 RX ADMIN — PRAVASTATIN SODIUM 80 MG: 80 TABLET ORAL at 20:43

## 2024-01-04 RX ADMIN — SODIUM CHLORIDE 1000 ML: 9 INJECTION, SOLUTION INTRAVENOUS at 14:41

## 2024-01-04 RX ADMIN — APIXABAN 5 MG: 5 TABLET, FILM COATED ORAL at 20:42

## 2024-01-04 ASSESSMENT — PATIENT HEALTH QUESTIONNAIRE - PHQ9
SUM OF ALL RESPONSES TO PHQ QUESTIONS 1-9: 0
SUM OF ALL RESPONSES TO PHQ9 QUESTIONS 1 & 2: 0
SUM OF ALL RESPONSES TO PHQ QUESTIONS 1-9: 0
SUM OF ALL RESPONSES TO PHQ QUESTIONS 1-9: 0
1. LITTLE INTEREST OR PLEASURE IN DOING THINGS: 0
SUM OF ALL RESPONSES TO PHQ QUESTIONS 1-9: 0
2. FEELING DOWN, DEPRESSED OR HOPELESS: 0

## 2024-01-04 ASSESSMENT — ENCOUNTER SYMPTOMS
SHORTNESS OF BREATH: 0
EYE DISCHARGE: 0
EYE ITCHING: 0
ABDOMINAL PAIN: 0
CONSTIPATION: 0
COLOR CHANGE: 0
EYE REDNESS: 0
BACK PAIN: 0
RESPIRATORY NEGATIVE: 1
NAUSEA: 0
GASTROINTESTINAL NEGATIVE: 1
EYE PAIN: 0
DIARRHEA: 0
VOMITING: 0
SORE THROAT: 0

## 2024-01-04 ASSESSMENT — PAIN - FUNCTIONAL ASSESSMENT
PAIN_FUNCTIONAL_ASSESSMENT: NONE - DENIES PAIN

## 2024-01-04 NOTE — ED PROVIDER NOTES
I performed a history and physical examination of the patient and discussed management with the resident. I reviewed the resident’s note and agree with the documented findings and plan of care. Any areas of disagreement are noted on the chart. I was personally present for the key portions of any procedures. I have documented in the chart those procedures where I was not present during the key portions. I have reviewed the emergency nurses triage note. I agree with the chief complaint, past medical history, past surgical history, allergies, medications, social and family history as documented unless otherwise noted below. Documentation of the HPI, Physical Exam and Medical Decision Making performed by medical students or scribes is based on my personal performance of the HPI, PE and MDM. For Phys Assistant/ Nurse Practitioner cases/documentation I have personally evaluated this patient and have completed at least one if not all key elements of the E/M (history, physical exam, and MDM). My findings are as noted below.    In other words, I personally saw and examined the patient I have reviewed and agreed with the resident findings including all diagnostic interpretations and treatment plans as written.  I was present for the key portion of any procedures performed and the inclusive time noted in any critical care statement.    Patient presents today from the primary care physician's office for atrial fibrillation.  This is an 83-year-old female with a history of atrial fibrillation who is on Eliquis and digoxin and metoprolol.  She apparently went to her primary care physician's office today for checkup.  Was found for having A-fib with RVR and was sent immediately to the emergency room.  Patient has mild case of dementia.  She lives with her family.  She states she just stopped taking her medications she is unsure when but she thinks is around Zayda.  Patient also has some thyroid issues.  Patient is not

## 2024-01-04 NOTE — PROGRESS NOTES
At home medication list reviewed with pt. And family at bedside. Pt. Unsure of medications taken at home and last dose. Daughter in law at bedside and is unaware of medications pt. Takes.

## 2024-01-04 NOTE — ED PROVIDER NOTES
Weight - Scale   (!) 143/99 98.7 °F (37.1 °C) -- (!) 147 20 98 % 1.676 m (5' 6\") 83 kg (183 lb)     Initial vital signs and nursing assessment reviewed and abnormal from hypertension and tachycardia . Body mass index is 29.54 kg/m².     Additional Vital Signs:  Vitals:    01/04/24 1432   BP:    Pulse: (!) 175   Resp: 18   Temp:    SpO2: 99%       Physical Exam  Constitutional:       General: She is not in acute distress.     Appearance: Normal appearance. She is obese. She is not ill-appearing.   HENT:      Head: Normocephalic and atraumatic.      Nose: Nose normal.      Mouth/Throat:      Mouth: Mucous membranes are moist.   Eyes:      Extraocular Movements: Extraocular movements intact.      Pupils: Pupils are equal, round, and reactive to light.   Cardiovascular:      Rate and Rhythm: Tachycardia present. Rhythm irregular.      Pulses: Normal pulses.      Heart sounds: Normal heart sounds.   Pulmonary:      Effort: Pulmonary effort is normal.      Breath sounds: Normal breath sounds.   Abdominal:      Palpations: Abdomen is soft.   Musculoskeletal:      Right lower leg: No edema.      Left lower leg: No edema.   Skin:     General: Skin is warm.      Capillary Refill: Capillary refill takes less than 2 seconds.   Neurological:      General: No focal deficit present.      Mental Status: She is alert and oriented to person, place, and time. Mental status is at baseline.   Psychiatric:         Mood and Affect: Mood normal.         ED RESULTS   Laboratory results (none if blank):  Labs Reviewed   BASIC METABOLIC PANEL - Abnormal; Notable for the following components:       Result Value    Sodium 134 (*)     Chloride 95 (*)     Glucose 157 (*)     All other components within normal limits   TROPONIN - Abnormal; Notable for the following components:    Troponin, High Sensitivity 17 (*)     All other components within normal limits   OSMOLALITY - Abnormal; Notable for the following components:    Osmolality Calc 270.2  unremarkable patient has not been taking her medications for at least 2 weeks  Please see ED course and MDM sections below for continuation and resolution of this initial assessment if applicable.    Initial plan:   Valsalva maneuvers  Lab work, digoxin level, TSH  IV Cardizem       Comorbid conditions pertinent to this ED encounter:  Atrial fibrillation, dementia      Differential Diagnosis includes but is not limited to:  A-fib with RVR  SVT         Decision Rules/Clinical Scores utilized:  Not Applicable       Code Status:  Not addressed during this ED visit    Social determinants of health impacting treatment or disposition:  Considered and not applicable     MIPS documentation:  N/A    Medical Decision Making    Patient was referred from her PCPs office due to RVR. She has a past medical history of atrial fibrillation and is on metoprolol, Eliquis, digoxin. The patient has not been taking her medications for the past 2 weeks at least and does not recall an exact date. the patient has mild dementia. That may be underlying why she missed her medications. Heart rate presentation was around 165-180, blood pressure was 143/99 Lab work showing mildly elevated troponin of 17, TSH within normal limits digoxin level pending. Patient was given IV fluids and started on IV Cardizem after failure of Valsalva maneuver to convert her.  In light of the patient not taking medications for unknown amount of time especially her Eliquis it was decided to place her on IV Cardizem for rate control.  Plan is to admit the patient for anticoagulation and then consideration of conversion to sinus rhythm.  I explained all of the previous findings to the patient and the need for admission she expressed understanding and agreement with the treatment plan and will be admitted.    ED COURSE   ED Medications administered this visit (None if left blank):   Medications   sodium chloride 0.9 % bolus 1,000 mL (1,000 mLs IntraVENous New Bag 1/4/24

## 2024-01-04 NOTE — H&P
Medicine Admission History & Physical      Patient:  Alena Olea  YOB: 1940  Date of Service: 1/4/2024  MRN: [id number]   Acct: [id number]   Primary Care Physician: Abiel Serrato DO    Admitting Faculty MD: Mateus Pacheco MD    Code Status: Full Code No additional code details    Date of Service: Pt seen/examined in consultation on 1/4/2024     Assessment / Plan     Briefly, pt Alena Olea is a 83 y.o. female with a PMH of atrial fibrillation, HTN, HLD, DM, Hypothyroidism, ANITA on CPAP, and GERD who presented with tachycardia.     #Atrial fibrillation with RVR: [id number] score 5. Patient with history of chronic atrial fibrillation on Eliquis 5 mg oral BID, Digoxin 125 mcg oral daily, Toprol XL 50 mg oral daily, and Cardizem 180 mg oral daily at home. Follows with Dr. Troy for Cardiology. However, patient reports she has not been taking any of her atrial fibrillation medications for the last 2 weeks. She needed more refills but kept forgetting to get them. She went to PCP and was found to be in RVR and sent to ED. EKG shows Atrial fibrillation with RVR, ventricular rate 165, QTc 427. Troponin 17. TSH 1.250 and Free T4 1.36. Mag 1.9, K 3.8. Patient started on Cardizem drip in ED and HR now in the 110-120s.  Patient denies having any chest pain, palpitations, lightheadedness.    Plan:   -Repeat EKG  -Continue Cardizem drip, will plan to switch to oral diltiazem tomorrow   -Restart Eliquis, Digoxin, and Toprol XL   -Ordered echocardiogram  -Keep K >4 and Mag >2  -Continuous telemetry   -If symptoms worsen, will consider cardiology consult    #?Dementia: Patient's daughter-in-law reports patient has been forgetting things the past few weeks.  Patient is currently alert and oriented x 4.  Will continue monitor symptoms.    #Elevated troponin: Secondary to demand ischemia. Initial troponin 17. EKG shows atrial fibrillation with RVR, vent rate was 85, QTc 437. Patient denies having  Range    WBC 9.0 4.8 - 10.8 thou/mm3    RBC 4.24 4.20 - 5.40 mill/mm3    Hemoglobin 12.4 12.0 - 16.0 gm/dl    Hematocrit 37.0 37.0 - 47.0 %    MCV 87.3 81.0 - 99.0 fL    MCH 29.2 26.0 - 33.0 pg    MCHC 33.5 32.2 - 35.5 gm/dl    RDW-CV 13.5 11.5 - 14.5 %    RDW-SD 43.4 35.0 - 45.0 fL    Platelets 352 130 - 400 thou/mm3    MPV 10.1 9.4 - 12.4 fL    Seg Neutrophils 71.6 %    Lymphocytes 17.1 %    Monocytes 9.7 %    Eosinophils 0.8 %    Basophils 0.4 %    Immature Granulocytes 0.4 %    Segs Absolute 6.4 1.8 - 7.7 thou/mm3    Lymphocytes Absolute 1.5 1.0 - 4.8 thou/mm3    Monocytes Absolute 0.9 0.4 - 1.3 thou/mm3    Eosinophils Absolute 0.1 0.0 - 0.4 thou/mm3    Basophils Absolute 0.0 0.0 - 0.1 thou/mm3    Immature Grans (Abs) 0.04 0.00 - 0.07 thou/mm3    nRBC 0 /100 wbc   Hepatic Function Panel   Result Value Ref Range    Albumin 4.1 3.5 - 5.1 g/dL    Total Bilirubin 0.9 0.3 - 1.2 mg/dL    Bilirubin, Direct 0.3 0.0 - 0.3 mg/dL    Alkaline Phosphatase 72 38 - 126 U/L    AST 19 5 - 40 U/L    ALT 13 11 - 66 U/L    Total Protein 7.9 6.1 - 8.0 g/dL   Magnesium   Result Value Ref Range    Magnesium 1.9 1.6 - 2.4 mg/dL   Lipase   Result Value Ref Range    Lipase 15.2 5.6 - 51.3 U/L   Troponin   Result Value Ref Range    Troponin, High Sensitivity 17 (H) 0 - 12 ng/L   TSH   Result Value Ref Range    TSH 1.250 0.400 - 4.200 uIU/mL   T4, Free   Result Value Ref Range    T4 Free 1.36 0.93 - 1.76 ng/dL   Anion Gap   Result Value Ref Range    Anion Gap 16.0 8.0 - 16.0 meq/L   Glomerular Filtration Rate, Estimated   Result Value Ref Range    Est, Glom Filt Rate >60 >60 ml/min/1.73m2   Osmolality   Result Value Ref Range    Osmolality Calc 270.2 (L) 275.0 - 300.0 mOsmol/kg   Digoxin Level   Result Value Ref Range    Digoxin Lvl 0.4 (L) 0.5 - 2.0 ng/mL   Troponin   Result Value Ref Range    Troponin, High Sensitivity 16 (H) 0 - 12 ng/L   Phosphorus   Result Value Ref Range    Phosphorus 3.1 2.4 - 4.7 mg/dL   EKG 12 Lead   Result Value

## 2024-01-04 NOTE — ED NOTES
Provides tried valsalva remover. But unsuccessful.   
Pt presents to the ED from Dr. Serrato office with complaints of afib. Pt has dementia and lives with family. Pt just stopped taking medications and is unsure of when last dose of medications was. Pt is alert and oriented times 4. Pt appears confused, though asking daughter about Dr. Serrato office visit. PT states she still drives and drove herself here from PCP office because she refused EMS. Pt has hx of afib. Pt pulse is ranging from 180-140s. BP on arrival was 143/99. Pt denies any chest pain, SOB, and dizziness.Pt respirations are even and unlabored.   
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 WITH SKIN GRAFT FROM LEFT UPPER ARM performed by Clinton Fairchild MD at [facility] [place] [facility]    SKIN CANCER EXCISION  03/2017    on face, left side    UPPER GASTROINTESTINAL ENDOSCOPY N/A 5/17/2019    EGD BIOPSY performed by Lucas Kirk MD at [facility]       PAST MEDICAL HISTORY       Past Medical History:   Diagnosis Date    Arthritis     Cancer (HCC)     skin ca    Diabetes mellitus (HCC)     Hyperlipidemia     Hypertension     Osteoporosis 2017    Sleep apnea     has CPAP           Electronically signed by Geoff Macario RN on 1/4/2024 at 4:11 PM

## 2024-01-04 NOTE — PATIENT INSTRUCTIONS
You may receive a survey about your visit with us today. The feedback from our patients helps us identify what is working well and where the service to all patients can be enhanced. Thank you!

## 2024-01-05 ENCOUNTER — APPOINTMENT (OUTPATIENT)
Age: 84
End: 2024-01-05
Attending: STUDENT IN AN ORGANIZED HEALTH CARE EDUCATION/TRAINING PROGRAM
Payer: MEDICARE

## 2024-01-05 ENCOUNTER — APPOINTMENT (OUTPATIENT)
Dept: GENERAL RADIOLOGY | Age: 84
End: 2024-01-05
Payer: MEDICARE

## 2024-01-05 LAB
ANION GAP SERPL CALC-SCNC: 10 MEQ/L (ref 8–16)
BUN SERPL-MCNC: 8 MG/DL (ref 7–22)
CALCIUM SERPL-MCNC: 9.5 MG/DL (ref 8.5–10.5)
CHLORIDE SERPL-SCNC: 99 MEQ/L (ref 98–111)
CO2 SERPL-SCNC: 25 MEQ/L (ref 23–33)
CREAT SERPL-MCNC: 0.6 MG/DL (ref 0.4–1.2)
DEPRECATED RDW RBC AUTO: 43.7 FL (ref 35–45)
ECHO AR MAX VEL PISA: 4.2 M/S
ECHO AV CUSP MM: 1.7 CM
ECHO AV PEAK GRADIENT: 5 MMHG
ECHO AV PEAK VELOCITY: 1.1 M/S
ECHO AV REGURGITANT PHT: 659 MS
ECHO AV VELOCITY RATIO: 0.82
ECHO BSA: 1.97 M2
ECHO EST RA PRESSURE: 5 MMHG
ECHO LA AREA 2C: 21.8 CM2
ECHO LA AREA 4C: 24.3 CM2
ECHO LA DIAMETER INDEX: 2.28 CM/M2
ECHO LA DIAMETER: 4.4 CM
ECHO LA MAJOR AXIS: 6.2 CM
ECHO LA MINOR AXIS: 6.1 CM
ECHO LA VOL BP: 69 ML (ref 22–52)
ECHO LA VOL MOD A2C: 64 ML (ref 22–52)
ECHO LA VOL MOD A4C: 74 ML (ref 22–52)
ECHO LA VOL/BSA BIPLANE: 36 ML/M2 (ref 16–34)
ECHO LA VOLUME INDEX MOD A2C: 33 ML/M2 (ref 16–34)
ECHO LA VOLUME INDEX MOD A4C: 38 ML/M2 (ref 16–34)
ECHO LV FRACTIONAL SHORTENING: 29 % (ref 28–44)
ECHO LV INTERNAL DIMENSION DIASTOLE INDEX: 2.18 CM/M2
ECHO LV INTERNAL DIMENSION DIASTOLIC: 4.2 CM (ref 3.9–5.3)
ECHO LV INTERNAL DIMENSION SYSTOLIC INDEX: 1.55 CM/M2
ECHO LV INTERNAL DIMENSION SYSTOLIC: 3 CM
ECHO LV IVSD: 1 CM (ref 0.6–0.9)
ECHO LV MASS 2D: 127.8 G (ref 67–162)
ECHO LV MASS INDEX 2D: 66.2 G/M2 (ref 43–95)
ECHO LV POSTERIOR WALL DIASTOLIC: 0.9 CM (ref 0.6–0.9)
ECHO LV RELATIVE WALL THICKNESS RATIO: 0.43
ECHO LVOT PEAK GRADIENT: 4 MMHG
ECHO LVOT PEAK VELOCITY: 0.9 M/S
ECHO MV E DECELERATION TIME (DT): 246 MS
ECHO MV E VELOCITY: 1.14 M/S
ECHO MV REGURGITANT PEAK GRADIENT: 36 MMHG
ECHO MV REGURGITANT PEAK VELOCITY: 3 M/S
ECHO PV MAX VELOCITY: 0.7 M/S
ECHO PV PEAK GRADIENT: 2 MMHG
ECHO RIGHT VENTRICULAR SYSTOLIC PRESSURE (RVSP): 31 MMHG
ECHO RV INTERNAL DIMENSION: 2.2 CM
ECHO RV TAPSE: 1.8 CM (ref 1.7–?)
ECHO TV E WAVE: 0.6 M/S
ECHO TV REGURGITANT MAX VELOCITY: 2.54 M/S
ECHO TV REGURGITANT PEAK GRADIENT: 26 MMHG
ERYTHROCYTE [DISTWIDTH] IN BLOOD BY AUTOMATED COUNT: 13.4 % (ref 11.5–14.5)
GFR SERPL CREATININE-BSD FRML MDRD: > 60 ML/MIN/1.73M2
GLUCOSE SERPL-MCNC: 180 MG/DL (ref 70–108)
HCT VFR BLD AUTO: 33.9 % (ref 37–47)
HGB BLD-MCNC: 11.2 GM/DL (ref 12–16)
MCH RBC QN AUTO: 29.2 PG (ref 26–33)
MCHC RBC AUTO-ENTMCNC: 33 GM/DL (ref 32.2–35.5)
MCV RBC AUTO: 88.5 FL (ref 81–99)
OSMOLALITY SERPL CALC.SUM OF ELEC: 271.1 MOSMOL/KG (ref 275–300)
PLATELET # BLD AUTO: 298 THOU/MM3 (ref 130–400)
PMV BLD AUTO: 10.2 FL (ref 9.4–12.4)
POTASSIUM SERPL-SCNC: 3.8 MEQ/L (ref 3.5–5.2)
RBC # BLD AUTO: 3.83 MILL/MM3 (ref 4.2–5.4)
SODIUM SERPL-SCNC: 134 MEQ/L (ref 135–145)
WBC # BLD AUTO: 6.5 THOU/MM3 (ref 4.8–10.8)

## 2024-01-05 PROCEDURE — 80048 BASIC METABOLIC PNL TOTAL CA: CPT

## 2024-01-05 PROCEDURE — 6370000000 HC RX 637 (ALT 250 FOR IP): Performed by: INTERNAL MEDICINE

## 2024-01-05 PROCEDURE — 6370000000 HC RX 637 (ALT 250 FOR IP): Performed by: STUDENT IN AN ORGANIZED HEALTH CARE EDUCATION/TRAINING PROGRAM

## 2024-01-05 PROCEDURE — 93306 TTE W/DOPPLER COMPLETE: CPT

## 2024-01-05 PROCEDURE — 85027 COMPLETE CBC AUTOMATED: CPT

## 2024-01-05 PROCEDURE — 2500000003 HC RX 250 WO HCPCS

## 2024-01-05 PROCEDURE — 36415 COLL VENOUS BLD VENIPUNCTURE: CPT

## 2024-01-05 PROCEDURE — 2580000003 HC RX 258: Performed by: STUDENT IN AN ORGANIZED HEALTH CARE EDUCATION/TRAINING PROGRAM

## 2024-01-05 PROCEDURE — 1200000003 HC TELEMETRY R&B

## 2024-01-05 PROCEDURE — 99222 1ST HOSP IP/OBS MODERATE 55: CPT | Performed by: INTERNAL MEDICINE

## 2024-01-05 PROCEDURE — 93306 TTE W/DOPPLER COMPLETE: CPT | Performed by: INTERNAL MEDICINE

## 2024-01-05 PROCEDURE — 2580000003 HC RX 258

## 2024-01-05 PROCEDURE — 73502 X-RAY EXAM HIP UNI 2-3 VIEWS: CPT

## 2024-01-05 RX ORDER — DILTIAZEM HYDROCHLORIDE 300 MG/1
300 CAPSULE, COATED, EXTENDED RELEASE ORAL DAILY
Status: DISCONTINUED | OUTPATIENT
Start: 2024-01-05 | End: 2024-01-06 | Stop reason: HOSPADM

## 2024-01-05 RX ORDER — IBUPROFEN 600 MG/1
600 TABLET ORAL ONCE
Status: COMPLETED | OUTPATIENT
Start: 2024-01-05 | End: 2024-01-05

## 2024-01-05 RX ADMIN — PANTOPRAZOLE SODIUM 40 MG: 40 TABLET, DELAYED RELEASE ORAL at 06:34

## 2024-01-05 RX ADMIN — DIGOXIN 125 MCG: 0.25 TABLET ORAL at 09:17

## 2024-01-05 RX ADMIN — ACETAMINOPHEN 650 MG: 325 TABLET ORAL at 09:17

## 2024-01-05 RX ADMIN — LEVOTHYROXINE SODIUM 25 MCG: 0.03 TABLET ORAL at 06:34

## 2024-01-05 RX ADMIN — DILTIAZEM HYDROCHLORIDE 10 MG/HR: 5 INJECTION INTRAVENOUS at 04:11

## 2024-01-05 RX ADMIN — DICLOFENAC SODIUM 2 G: 10 GEL TOPICAL at 20:52

## 2024-01-05 RX ADMIN — SODIUM CHLORIDE, PRESERVATIVE FREE 10 ML: 5 INJECTION INTRAVENOUS at 20:52

## 2024-01-05 RX ADMIN — PRAVASTATIN SODIUM 80 MG: 80 TABLET ORAL at 20:52

## 2024-01-05 RX ADMIN — APIXABAN 5 MG: 5 TABLET, FILM COATED ORAL at 20:52

## 2024-01-05 RX ADMIN — IBUPROFEN 600 MG: 600 TABLET, FILM COATED ORAL at 16:39

## 2024-01-05 RX ADMIN — METOPROLOL SUCCINATE 50 MG: 50 TABLET, EXTENDED RELEASE ORAL at 09:17

## 2024-01-05 RX ADMIN — APIXABAN 5 MG: 5 TABLET, FILM COATED ORAL at 09:17

## 2024-01-05 RX ADMIN — DILTIAZEM HYDROCHLORIDE 300 MG: 300 CAPSULE, EXTENDED RELEASE ORAL at 10:38

## 2024-01-05 RX ADMIN — ACETAMINOPHEN 650 MG: 325 TABLET ORAL at 01:59

## 2024-01-05 ASSESSMENT — PAIN DESCRIPTION - DESCRIPTORS
DESCRIPTORS: ACHING
DESCRIPTORS: ACHING

## 2024-01-05 ASSESSMENT — PAIN DESCRIPTION - LOCATION: LOCATION: OTHER (COMMENT)

## 2024-01-05 ASSESSMENT — PAIN SCALES - GENERAL
PAINLEVEL_OUTOF10: 3
PAINLEVEL_OUTOF10: 3

## 2024-01-05 NOTE — PROGRESS NOTES
Hospitalist Progress Note      Patient:  Alena Olea    Unit/Bed:8B-30/030-A  YOB: 1940  MRN: [id number]   Acct: [id number]   PCP: Abiel Serrato DO  Date of Admission: 1/4/2024    Assessment/Plan:  #A-fib with rvr.Probably from non compliance of her medications.  -Patient was started on Cardizem drip yesterday.Rate controlled.  -Discontinue Cardizem drip.Start po diltiazem.  -Continue with  Toprol-xl ,digoxin.Digoxin levels.  -Continue with eliquis for anticoagulation.  -Continue to monitor on telemetry.  -Primary cardiologist Dr Troy.  -SW consult.    #Elevated troponin.  -Patient has no chest pain.  -Troponin 17->16->15  -EKG A-fib with rvr  -Outpatient stress test    #HTN.  -Continue with home dose toprol.Will resume ramipril if BP elevated.    #HLD.  -Continue with statin.    #DM-2.  -Metformin.Hypoglycemia protocol    #Hypothyroidism.  -Continue with home dose synthroid.    #ANITA.  -Continue with home setting CPAP.    #GERD.  -Continue with PPI.              Subjective (past 24 hours):   Patient seen and examined at bed side.Not a good source of history due to underlying dementia.States feeling ok.No acute distress noted.Sometimes her answers to questions are not appropriate.      Past medical history, family history, social history and allergies reviewed again and is unchanged since admission.    ROS (All review of systems completed.  Pertinent positives noted. Otherwise All other systems reviewed and negative.)     Medications:  Reviewed    Infusion Medications    dilTIAZem 125 mg in sodium chloride 0.9 % 125 mL infusion Stopped (01/05/24 1200)    sodium chloride      dextrose       Scheduled Medications    dilTIAZem  300 mg Oral Daily    apixaban  5 mg Oral BID    digoxin  125 mcg Oral Daily    [Held by provider] dilTIAZem  180 mg Oral Daily    levothyroxine  25 mcg Oral Daily    metoprolol succinate  50 mg

## 2024-01-05 NOTE — CARE COORDINATION
Case Management Assessment  Initial Evaluation    Date/Time of Evaluation: 1/5/2024 11:45 AM  Assessment Completed by: Danitza Zhou RN    If patient is discharged prior to next notation, then this note serves as note for discharge by case management.    Patient Name: Alena Olea                   YOB: 1940  Diagnosis: Atrial fibrillation with rapid ventricular response (HCC) [I48.91]  Atrial fibrillation with RVR (HCC) [I48.91]                   Date / Time: 1/4/2024  2:17 PM  Location: [facility]     Patient Admission Status: Inpatient   Readmission Risk Low 0-14, Mod 15-19), High > 20: Readmission Risk Score: 11.2    Current PCP: Abiel Serrato, DO  PCP verified by CM? Yes    Chart Reviewed: Yes      History Provided by: Patient  Patient Orientation: Alert and Oriented    Patient Cognition: Alert    Hospitalization in the last 30 days (Readmission):  No    If yes, Readmission Assessment in CM Navigator will be completed.    Advance Directives:      Code Status: Full Code   Patient's Primary Decision Maker is: Patient Declined (Legal Next of Kin Remains as Decision Maker)    Primary Decision Maker: Henrique Olea  - Spouse - [phone]    Secondary Decision Maker: Yesi Olea - Child - [phone]    Discharge Planning:    Patient lives with: Spouse/Significant Other, Children Type of Home: House  Primary Care Giver: Self  Patient Support Systems include: Spouse/Significant Other, Children, Family Members   Current Financial resources: Medicare, Other (Comment) (secondary BCBS)  Current community resources: None  Current services prior to admission: Durable Medical Equipment            Current DME: Walker, Cane, Wheelchair, Cpap (CPAP from  [facility])            Type of Home Care services:  None    ADLS  Prior functional level: Independent in ADLs/IADLs (daughter assists PRN)  Current functional level: Other (see comment) (unchanged)    Family can provide assistance at DC:

## 2024-01-06 VITALS
SYSTOLIC BLOOD PRESSURE: 135 MMHG | HEART RATE: 80 BPM | WEIGHT: 180.7 LBS | DIASTOLIC BLOOD PRESSURE: 68 MMHG | TEMPERATURE: 97.6 F | OXYGEN SATURATION: 99 % | BODY MASS INDEX: 29.04 KG/M2 | HEIGHT: 66 IN | RESPIRATION RATE: 18 BRPM

## 2024-01-06 LAB
ANION GAP SERPL CALC-SCNC: 12 MEQ/L (ref 8–16)
BUN SERPL-MCNC: 12 MG/DL (ref 7–22)
CALCIUM SERPL-MCNC: 9.4 MG/DL (ref 8.5–10.5)
CHLORIDE SERPL-SCNC: 99 MEQ/L (ref 98–111)
CO2 SERPL-SCNC: 23 MEQ/L (ref 23–33)
CREAT SERPL-MCNC: 0.7 MG/DL (ref 0.4–1.2)
DEPRECATED RDW RBC AUTO: 46.8 FL (ref 35–45)
ERYTHROCYTE [DISTWIDTH] IN BLOOD BY AUTOMATED COUNT: 13.5 % (ref 11.5–14.5)
GFR SERPL CREATININE-BSD FRML MDRD: > 60 ML/MIN/1.73M2
GLUCOSE SERPL-MCNC: 194 MG/DL (ref 70–108)
HCT VFR BLD AUTO: 36.7 % (ref 37–47)
HGB BLD-MCNC: 11.3 GM/DL (ref 12–16)
MCH RBC QN AUTO: 29 PG (ref 26–33)
MCHC RBC AUTO-ENTMCNC: 30.8 GM/DL (ref 32.2–35.5)
MCV RBC AUTO: 94.3 FL (ref 81–99)
PLATELET # BLD AUTO: 316 THOU/MM3 (ref 130–400)
PMV BLD AUTO: 9.9 FL (ref 9.4–12.4)
POTASSIUM SERPL-SCNC: 4.2 MEQ/L (ref 3.5–5.2)
RBC # BLD AUTO: 3.89 MILL/MM3 (ref 4.2–5.4)
SODIUM SERPL-SCNC: 134 MEQ/L (ref 135–145)
WBC # BLD AUTO: 5.4 THOU/MM3 (ref 4.8–10.8)

## 2024-01-06 PROCEDURE — 85027 COMPLETE CBC AUTOMATED: CPT

## 2024-01-06 PROCEDURE — 36415 COLL VENOUS BLD VENIPUNCTURE: CPT

## 2024-01-06 PROCEDURE — 80048 BASIC METABOLIC PNL TOTAL CA: CPT

## 2024-01-06 PROCEDURE — 2580000003 HC RX 258: Performed by: STUDENT IN AN ORGANIZED HEALTH CARE EDUCATION/TRAINING PROGRAM

## 2024-01-06 PROCEDURE — 6370000000 HC RX 637 (ALT 250 FOR IP): Performed by: STUDENT IN AN ORGANIZED HEALTH CARE EDUCATION/TRAINING PROGRAM

## 2024-01-06 PROCEDURE — 6370000000 HC RX 637 (ALT 250 FOR IP): Performed by: INTERNAL MEDICINE

## 2024-01-06 PROCEDURE — 99238 HOSP IP/OBS DSCHRG MGMT 30/<: CPT | Performed by: INTERNAL MEDICINE

## 2024-01-06 RX ORDER — DILTIAZEM HYDROCHLORIDE 300 MG/1
300 CAPSULE, COATED, EXTENDED RELEASE ORAL DAILY
Qty: 30 CAPSULE | Refills: 3 | Status: SHIPPED | OUTPATIENT
Start: 2024-01-06

## 2024-01-06 RX ADMIN — DICLOFENAC SODIUM 2 G: 10 GEL TOPICAL at 09:29

## 2024-01-06 RX ADMIN — DIGOXIN 125 MCG: 0.25 TABLET ORAL at 09:29

## 2024-01-06 RX ADMIN — LEVOTHYROXINE SODIUM 25 MCG: 0.03 TABLET ORAL at 06:06

## 2024-01-06 RX ADMIN — PANTOPRAZOLE SODIUM 40 MG: 40 TABLET, DELAYED RELEASE ORAL at 06:07

## 2024-01-06 RX ADMIN — APIXABAN 5 MG: 5 TABLET, FILM COATED ORAL at 09:28

## 2024-01-06 RX ADMIN — SODIUM CHLORIDE, PRESERVATIVE FREE 10 ML: 5 INJECTION INTRAVENOUS at 09:31

## 2024-01-06 RX ADMIN — ACETAMINOPHEN 650 MG: 325 TABLET ORAL at 03:38

## 2024-01-06 RX ADMIN — METOPROLOL SUCCINATE 50 MG: 50 TABLET, EXTENDED RELEASE ORAL at 09:29

## 2024-01-06 RX ADMIN — DILTIAZEM HYDROCHLORIDE 300 MG: 300 CAPSULE, EXTENDED RELEASE ORAL at 09:29

## 2024-01-06 ASSESSMENT — PAIN SCALES - GENERAL
PAINLEVEL_OUTOF10: 6
PAINLEVEL_OUTOF10: 7

## 2024-01-06 ASSESSMENT — PAIN DESCRIPTION - DESCRIPTORS
DESCRIPTORS: ACHING
DESCRIPTORS: ACHING

## 2024-01-06 ASSESSMENT — PAIN DESCRIPTION - LOCATION
LOCATION: HIP
LOCATION: HIP

## 2024-01-06 ASSESSMENT — PAIN DESCRIPTION - ORIENTATION: ORIENTATION: LEFT

## 2024-01-06 NOTE — PROGRESS NOTES
Patient Discharged today.  Patient Discharge paperwork gone over with patient while grandson who lives with her was in the room.  Patient going home with grandson.  Patients questions answered and transport came up with wheel chair to take her to discharge lobby.  All LDAs removed before discharge.

## 2024-01-06 NOTE — PROGRESS NOTES
Physician Progress Note      PATIENT:               ALEC MAZARIEGOS  CSN #:                  [id number]  :                       1940  ADMIT DATE:       2024 2:17 PM  DISCH DATE:  RESPONDING  PROVIDER #:        Mateus Pacheco MD          QUERY TEXT:    Patient admitted with Atrial fibrillation with RVR and is maintained on   Eliquis. If possible, please document in progress notes and discharge summary   if you are evaluating and/or treating any of the following:    The medical record reflects the following:    Risk Factors: 84 yo female, HTN, DM  Clinical Indicators: maintained on Eliquis, [id number] score 5  Treatment: Bleeding precautions    Thank you!    Ishaan Baker, BSN,RN, CRCR  RN Clinical   Options provided:  -- Secondary hypercoagulable state in a patient with atrial fibrillation  -- Other - I will add my own diagnosis  -- Disagree - Not applicable / Not valid  -- Disagree - Clinically unable to determine / Unknown  -- Refer to Clinical Documentation Reviewer    PROVIDER RESPONSE TEXT:    This patient has secondary hypercoagulable state in a patient with atrial   fibrillation.    Query created by: Ishaan Baker on 2024 10:54 AM      Electronically signed by:  Mateus Pacheco MD 2024 11:40 AM

## 2024-01-06 NOTE — DISCHARGE SUMMARY
Final  LA Volume Index MOD A4C                       Date: 01/05/2024  Value: 38 (A)      Ref range: 16 - 34 ml/m2      Status: Final  LA Size Index                                 Date: 01/05/2024  Value: 2.28        Ref range: cm/m2              Status: Final  AV Velocity Ratio                             Date: 01/05/2024  Value: 0.82          Status: Final  RVSP                                          Date: 01/05/2024  Value: 31          Ref range: mmHg               Status: Final  Troponin, High Sensitivity                    Date: 01/04/2024  Value: 16 (H)      Ref range: 0 - 12 ng/L        Status: Final                Comment:     The high-sensitivity troponin T result should not be compared with other  troponin methodologies.  Rising or falling high-sensitivity troponin T is significant if >= 6.  [place]formed [place] [address]    Phosphorus                                    Date: 01/04/2024  Value: 3.1         Ref range: 2.4 - 4.7 mg/dL    Status: Final                Comment: [place]formed [place] [address]  Troponin, High Sensitivity                    Date: 01/04/2024  Value: 15 (H)      Ref range: 0 - 12 ng/L        Status: Final                Comment:     The high-sensitivity troponin T result should not be compared with other  troponin methodologies.  Rising or falling high-sensitivity troponin T is significant if >= 6.  [place]formed [place] [address]    WBC                                           Date: 01/05/2024  Value: 6.5         Ref range: 4.8 - 10.8 thou/*  Status: Final  RBC                                           Date: 01/05/2024  Value: 3.83 (L)    Ref range: 4.20 - 5.40 mill*  Status: Final  Hemoglobin                                    Date: 01/05/2024  Value: 11.2 (L)    Ref range: 12.0 - 16.0 gm/dl  Status: Final  Hematocrit                                    Date:                      Date: 01/06/2024  Value: 36.7 (L)    Ref range: 37.0 - 47.0 %      Status: Final  MCV                                           Date: 01/06/2024  Value: 94.3        Ref range: 81.0 - 99.0 fL     Status: Final  MCH                                           Date: 01/06/2024  Value: 29.0        Ref range: 26.0 - 33.0 pg     Status: Final  MCHC                                          Date: 01/06/2024  Value: 30.8 (L)    Ref range: 32.2 - 35.5 gm/dl  Status: Final  RDW-CV                                        Date: 01/06/2024  Value: 13.5        Ref range: 11.5 - 14.5 %      Status: Final  RDW-SD                                        Date: 01/06/2024  Value: 46.8 (H)    Ref range: 35.0 - 45.0 fL     Status: Final  Platelets                                     Date: 01/06/2024  Value: 316         Ref range: 130 - 400 thou/m*  Status: Final  MPV                                           Date: 01/06/2024  Value: 9.9         Ref range: 9.4 - 12.4 fL      Status: Final                Comment: Performed at [facility] [address] [place] [address]  Sodium                                        Date: 01/06/2024  Value: 134 (L)     Ref range: 135 - 145 meq/L    Status: Final  Potassium                                     Date: 01/06/2024  Value: 4.2         Ref range: 3.5 - 5.2 meq/L    Status: Final  Chloride                                      Date: 01/06/2024  Value: 99          Ref range: 98 - 111 meq/L     Status: Final  CO2                                           Date: 01/06/2024  Value: 23          Ref range: 23 - 33 meq/L      Status: Final  Glucose                                       Date: 01/06/2024  Value: 194 (H)     Ref range: 70 - 108 mg/dL     Status: Final  BUN                                           Date: 01/06/2024  Value: 12          Ref range: 7 - 22 mg/dL       Status: Final  Creatinine                                    Date: 01/06/2024  Value: 0.7         Ref

## 2024-01-08 ENCOUNTER — TELEPHONE (OUTPATIENT)
Dept: FAMILY MEDICINE CLINIC | Age: 84
End: 2024-01-08

## 2024-01-08 ENCOUNTER — CARE COORDINATION (OUTPATIENT)
Dept: CASE MANAGEMENT | Age: 84
End: 2024-01-08

## 2024-01-08 NOTE — TELEPHONE ENCOUNTER
Care Transitions Initial Follow Up Call    Outreach made within 2 business days of discharge: Yes    Patient: Alena Olea Patient : 1940   MRN: [id number]  Reason for Admission: There are no discharge diagnoses documented for the most recent discharge.  Discharge Date: 24       Spoke with: Patient    Discharge department/facility: [facility]    TCM Interactive Patient Contact:  Was patient able to fill all prescriptions: Yes  Was patient instructed to bring all medications to the follow-up visit: Yes  Is patient taking all medications as directed in the discharge summary? Yes  Does patient understand their discharge instructions: Yes  Does patient have questions or concerns that need addressed prior to 7-14 day follow up office visit: no    Scheduled appointment with PCP within 7-14 days    Follow Up  Future Appointments   Date Time Provider Department Center   1/15/2024  1:00 PM Abiel Serrato, DO [facility] [place]   2024 10:20 AM Nellie Garvey, APRN - CNP N Pulm Med [facility] [place]   10/28/2024 11:00 AM Brayden Troy MD [facility] [place]       Aruna Santa LPN

## 2024-01-08 NOTE — CARE COORDINATION
Care Transitions Outreach Attempt    Call within 2 business days of discharge: Yes   Attempted to reach patient for transitions of care follow up. Unable to reach patient.    Patient: Alena Olea Patient : 1940 MRN: [id number]    Last Discharge Facility       Date Complaint Diagnosis Description Type Department Provider    24 Atrial Fibrillation Atrial fibrillation with rapid ventricular response (HCC) ... ED to Hosp-Admission (Discharged) (ADMITTED) SHAI LUNDBERGB Narendra Walker MD; Randy Peña,...              Was this an external facility discharge? No Discharge Facility Name: [facility]    Noted following upcoming appointments from discharge chart review:   [facility] follow up appointment(s):   Future Appointments   Date Time Provider Department Center   1/15/2024  1:00 PM Abiel Serrato, DO [facility] [place]   2024 10:20 AM Nellie Garvey, APRN - CNP N Pulm Med [facility] [place]   10/28/2024 11:00 AM Brayden Troy MD [facility] [place]     Non-[facility]  follow up appointment(s):     1st attempt to contact pt for initial care transition follow up.  Unable to reach pt.  Left message with contact information and request for call back.      Hannah Henry RN

## 2024-01-09 ENCOUNTER — CARE COORDINATION (OUTPATIENT)
Dept: CASE MANAGEMENT | Age: 84
End: 2024-01-09

## 2024-01-09 NOTE — CARE COORDINATION
Care Transitions Outreach Attempt    Call within 2 business days of discharge: Yes   Attempted to reach patient for transitions of care follow up. Unable to reach patient.    Patient: Alena Olea Patient : 1940 MRN: [id number]    Last Discharge Facility       Date Complaint Diagnosis Description Type Department Provider    24 Atrial Fibrillation Atrial fibrillation with rapid ventricular response (HCC) ... ED to Hosp-Admission (Discharged) (ADMITTED) SHAI LUNDBERGB Narendra Walker MD; Randy Peña,...              Was this an external facility discharge? No Discharge Facility Name: [facility]    Noted following upcoming appointments from discharge chart review:   [facility] follow up appointment(s):   Future Appointments   Date Time Provider Department Center   1/15/2024  1:00 PM Abiel Serrato, DO [facility] [place]   2024 10:20 AM Nellie Garvey, APRN - CNP N Pulm Med [facility] [place]   10/28/2024 11:00 AM Brayden Troy MD [facility] [place]     Non-[facility]  follow up appointment(s):     2nd attempt to contact pt for initial care transition follow up.  Unable to reach pt.  Left message with contact information and request for call back.       Episode to be resolved and CTN to sign off if return call is not received from the patient.      Hannah Henry RN

## 2024-01-15 ENCOUNTER — TELEPHONE (OUTPATIENT)
Dept: CARDIOLOGY CLINIC | Age: 84
End: 2024-01-15

## 2024-01-15 ENCOUNTER — OFFICE VISIT (OUTPATIENT)
Dept: FAMILY MEDICINE CLINIC | Age: 84
End: 2024-01-15
Payer: MEDICARE

## 2024-01-15 VITALS
BODY MASS INDEX: 29.6 KG/M2 | WEIGHT: 183.4 LBS | DIASTOLIC BLOOD PRESSURE: 60 MMHG | HEART RATE: 94 BPM | SYSTOLIC BLOOD PRESSURE: 124 MMHG | RESPIRATION RATE: 16 BRPM

## 2024-01-15 DIAGNOSIS — R79.89 ELEVATED TROPONIN: ICD-10-CM

## 2024-01-15 DIAGNOSIS — I48.91 ATRIAL FIBRILLATION WITH RAPID VENTRICULAR RESPONSE (HCC): Primary | ICD-10-CM

## 2024-01-15 DIAGNOSIS — I48.20 CHRONIC ATRIAL FIBRILLATION (HCC): ICD-10-CM

## 2024-01-15 DIAGNOSIS — I10 PRIMARY HYPERTENSION: ICD-10-CM

## 2024-01-15 DIAGNOSIS — Z09 HOSPITAL DISCHARGE FOLLOW-UP: ICD-10-CM

## 2024-01-15 DIAGNOSIS — E11.40 TYPE 2 DIABETES MELLITUS WITH DIABETIC NEUROPATHY, WITHOUT LONG-TERM CURRENT USE OF INSULIN (HCC): ICD-10-CM

## 2024-01-15 DIAGNOSIS — G47.33 OBSTRUCTIVE SLEEP APNEA ON CPAP: ICD-10-CM

## 2024-01-15 DIAGNOSIS — E66.9 OBESITY (BMI 30-39.9): ICD-10-CM

## 2024-01-15 PROCEDURE — 3074F SYST BP LT 130 MM HG: CPT | Performed by: FAMILY MEDICINE

## 2024-01-15 PROCEDURE — G8427 DOCREV CUR MEDS BY ELIG CLIN: HCPCS | Performed by: FAMILY MEDICINE

## 2024-01-15 PROCEDURE — 1090F PRES/ABSN URINE INCON ASSESS: CPT | Performed by: FAMILY MEDICINE

## 2024-01-15 PROCEDURE — 1111F DSCHRG MED/CURRENT MED MERGE: CPT | Performed by: FAMILY MEDICINE

## 2024-01-15 PROCEDURE — 1124F ACP DISCUSS-NO DSCNMKR DOCD: CPT | Performed by: FAMILY MEDICINE

## 2024-01-15 PROCEDURE — G8484 FLU IMMUNIZE NO ADMIN: HCPCS | Performed by: FAMILY MEDICINE

## 2024-01-15 PROCEDURE — G8417 CALC BMI ABV UP PARAM F/U: HCPCS | Performed by: FAMILY MEDICINE

## 2024-01-15 PROCEDURE — 1036F TOBACCO NON-USER: CPT | Performed by: FAMILY MEDICINE

## 2024-01-15 PROCEDURE — 3078F DIAST BP <80 MM HG: CPT | Performed by: FAMILY MEDICINE

## 2024-01-15 PROCEDURE — G8399 PT W/DXA RESULTS DOCUMENT: HCPCS | Performed by: FAMILY MEDICINE

## 2024-01-15 PROCEDURE — 99214 OFFICE O/P EST MOD 30 MIN: CPT | Performed by: FAMILY MEDICINE

## 2024-01-15 NOTE — TELEPHONE ENCOUNTER
Service Details  Procedure Modifiers Provider Requested Approved   [id number] - AMB REFERRAL TO CARDIOLOGY Paolo Ramirez MD 1 1     Diagnosis Information    Diagnosis   I48.91 (ICD-10-CM) - Atrial fibrillation with rapid ventricular response (HCC)   I48.20 (ICD-10-CM) - Chronic atrial fibrillation (HCC)       Call to patient, VM left for her to return call to the clinic to set up NP appt. Patient's next appt was scheduled in 10/2024 with Dr. Woodrow HOROWITZ.

## 2024-01-15 NOTE — PROGRESS NOTES
Post-Discharge Transitional Care Management Services      Alena Olea   YOB: 1940    Date of Visit:  1/15/2024  30 Day Post-Discharge Date: 2/6/24  Chief Complaint   Patient presents with    Follow-Up from Hospital     D/C'd from [facility] on 1/9 Afib with RVR    Health Maintenance     Needs a lipid panel and a A1C     Admit Date: 1/4/2024  Discharge Date: 1/6/2024  Discharge Condition: Good     Admission Diagnosis  Atrial fibrillation with rapid ventricular response (HCC) [I48.91];Atrial fibrillation with RVR (HCC) [I48.91]       Discharge Diagnosis  Principal Problem:    Atrial fibrillation with RVR (HCC)  Resolved Problems:    * No resolved hospital problems. *        Hospital Stay  Narrative of Hospital Course:  Alena Olea is a 83 y.o. female with a PMH of atrial fibrillation, HTN, HLD, DM, Hypothyroidism, ANITA on CPAP, and GERD who presented with tachycardia. Patient states she went to her PCP today for a follow-up visit and was noted to be in A-fib RVR.  She was sent to the ED. Patient states she has not been taking her home medications for A-fib including Eliquis, diltiazem, digoxin, and Toprol-XL for the last 2 weeks. She has had some issues with memory per daughter-in-law. Patient reports she thought she needed refills and kept forgetting to get them. Patient states she is not having any chest pain, abdominal pain, palpitations, lightheadedness, nausea, vomiting, fever or chills.      #A-fib with rvr.Probably from non compliance of her medications.  -Patient was started on Cardizem drip and later transitioned to po diltiazem 300 mg .Heart rate stable on current dosage.  -Continue with  Toprol-xl ,digoxin.Digoxin levels.  -Continue with eliquis for anticoagulation.  -Monitored on telemetry.Stayed in A-fib  -Primary cardiologist Dr Troy.Needs to follow up with him as out patient.     #Left hip pain.  -Xary done showed degenerative changes.Topical diclofenac prescribed.

## 2024-01-29 RX ORDER — LEVOTHYROXINE SODIUM 0.03 MG/1
25 TABLET ORAL DAILY
Qty: 90 TABLET | Refills: 3 | Status: SHIPPED | OUTPATIENT
Start: 2024-01-29

## 2024-02-08 NOTE — TELEPHONE ENCOUNTER
Noted.
Patient calling back with questions regarding 6/21/23 labs. She has been taking her Aldactone since we spoke with her. She was confused and only knew this medication by the name of spirolactone. She will stop this medication today. She will start on magnesium 250mg daily and repeat her BMP in 1 week at [facility].
Universal Safety Interventions

## 2024-02-27 ENCOUNTER — OFFICE VISIT (OUTPATIENT)
Dept: CARDIOLOGY CLINIC | Age: 84
End: 2024-02-27
Payer: MEDICARE

## 2024-02-27 VITALS
DIASTOLIC BLOOD PRESSURE: 62 MMHG | HEART RATE: 76 BPM | BODY MASS INDEX: 29.57 KG/M2 | HEIGHT: 66 IN | SYSTOLIC BLOOD PRESSURE: 118 MMHG | WEIGHT: 184 LBS

## 2024-02-27 DIAGNOSIS — R00.2 PALPITATIONS: Primary | ICD-10-CM

## 2024-02-27 PROCEDURE — 99204 OFFICE O/P NEW MOD 45 MIN: CPT | Performed by: INTERNAL MEDICINE

## 2024-02-27 PROCEDURE — G8427 DOCREV CUR MEDS BY ELIG CLIN: HCPCS | Performed by: INTERNAL MEDICINE

## 2024-02-27 PROCEDURE — 1090F PRES/ABSN URINE INCON ASSESS: CPT | Performed by: INTERNAL MEDICINE

## 2024-02-27 PROCEDURE — 1036F TOBACCO NON-USER: CPT | Performed by: INTERNAL MEDICINE

## 2024-02-27 PROCEDURE — G8484 FLU IMMUNIZE NO ADMIN: HCPCS | Performed by: INTERNAL MEDICINE

## 2024-02-27 PROCEDURE — 1124F ACP DISCUSS-NO DSCNMKR DOCD: CPT | Performed by: INTERNAL MEDICINE

## 2024-02-27 PROCEDURE — G8417 CALC BMI ABV UP PARAM F/U: HCPCS | Performed by: INTERNAL MEDICINE

## 2024-02-27 PROCEDURE — 3074F SYST BP LT 130 MM HG: CPT | Performed by: INTERNAL MEDICINE

## 2024-02-27 PROCEDURE — 3078F DIAST BP <80 MM HG: CPT | Performed by: INTERNAL MEDICINE

## 2024-02-27 PROCEDURE — G8399 PT W/DXA RESULTS DOCUMENT: HCPCS | Performed by: INTERNAL MEDICINE

## 2024-02-27 NOTE — PROGRESS NOTES
EKG completed in 01/2024. Med list up to date and pharmacy verified. Denies any cardiac concerns today. Reports she is here to est a cardiologist since Dr. Troy retired.   
atrium is moderately dilated.    Right Atrium: Right atrium is mildly dilated.    Image quality is technically difficult. Procedure performed with the patient in a sitting position.    Cath:2018  .  Preserved systolic function of the left ventricle, ejection fraction  was 55%.  No mitral regurgitation.  No gradient across aortic valve.  2.  Diastolic dysfunction of the left ventricle.  3.  No mitral regurgitation.  No gradient across the aortic valve.  4.  Essentially patent coronary arteries with some calcification in the  walls of the coronary arteries, nonobstructive in nature.    Ekg:   EKG Interpretation:          Narrative & Impression    Atrial fibrillation with rapid ventricular response  Minimal voltage criteria for LVH, may be normal variant ( R in aVL )  Nonspecific ST and T wave abnormality  Abnormal ECG  When compared with ECG of 04-JAN-2024 14:15,  Ventricular rate has decreased BY  56 BPM  ST less depressed in Anterior leads  Nonspecific T wave abnormality is worse in Inferior leads  Nonspecific T wave abnormality has replaced inverted T waves in Lateral leads  Confirmed by SHANE ARMENDARIZ MDKELE ([id number]) on 1/4/2024 10:48:56 PM         .    AssessmentPlan:     Mild POP  Permanent atrial fibrillation   Atrial enlargement   Nonobstructive coronary artery disease   Hypertension   Dyslipidemia   ANITA      Has h/o ANITA, on CPAP   She has h/o permanent atrial fibrillation, rate controlled  Continue on Eliquis   No bleeding complaints  Denies falls   HR controlled  On digoxin  Cardizem  Metoprolol   Rate is controlled   /62  LHC in 2018 revealed an EF of 55%, nonobstructive CAD with mild calcification  Continue risk factor modification and medical management  The patient was instructed to check the blood pressure at home, and record the readings. Patient will call office with blood pressure readings, will adjust patient's antihypertensive medications as needed accordingly   Pravastatin   Hyperlipidemia: on

## 2024-03-11 ENCOUNTER — TELEPHONE (OUTPATIENT)
Dept: PULMONOLOGY | Age: 84
End: 2024-03-11

## 2024-03-11 DIAGNOSIS — G47.33 OSA ON CPAP: Primary | ICD-10-CM

## 2024-03-11 NOTE — TELEPHONE ENCOUNTER
Patient called and her pap machine is saying motor life has exceeded. Can you please place new order for pap?  Thanks!

## 2024-03-13 ENCOUNTER — TELEPHONE (OUTPATIENT)
Dept: FAMILY MEDICINE CLINIC | Age: 84
End: 2024-03-13

## 2024-03-13 DIAGNOSIS — R29.898 WEAKNESS OF BOTH LOWER EXTREMITIES: Primary | ICD-10-CM

## 2024-03-13 DIAGNOSIS — R26.81 UNSTABLE GAIT: ICD-10-CM

## 2024-03-13 NOTE — TELEPHONE ENCOUNTER
Patient requesting to do PT for leg weakness at [facility] with KATHY.  Patient aware they will contact her to schedule appt.  Please advise.

## 2024-03-18 ENCOUNTER — TELEPHONE (OUTPATIENT)
Dept: PULMONOLOGY | Age: 84
End: 2024-03-18

## 2024-03-18 NOTE — TELEPHONE ENCOUNTER
Pt called in requesting an order for a new CPAP machine. She stated that hers in showing and error message that her machine has exceeded its motor life. Please advise, thank you.

## 2024-04-19 ENCOUNTER — HOSPITAL ENCOUNTER (INPATIENT)
Age: 84
LOS: 2 days | Discharge: HOME | DRG: 309 | End: 2024-04-21
Attending: STUDENT IN AN ORGANIZED HEALTH CARE EDUCATION/TRAINING PROGRAM | Admitting: PHYSICIAN ASSISTANT
Payer: MEDICARE

## 2024-04-19 ENCOUNTER — APPOINTMENT (OUTPATIENT)
Dept: GENERAL RADIOLOGY | Age: 84
DRG: 309 | End: 2024-04-19
Payer: MEDICARE

## 2024-04-19 DIAGNOSIS — M17.12 ARTHRITIS OF LEFT KNEE: ICD-10-CM

## 2024-04-19 DIAGNOSIS — M51.37 DDD (DEGENERATIVE DISC DISEASE), LUMBOSACRAL: ICD-10-CM

## 2024-04-19 DIAGNOSIS — M54.16 RADICULOPATHY, LUMBAR REGION: ICD-10-CM

## 2024-04-19 DIAGNOSIS — I48.91 ATRIAL FIBRILLATION WITH CONTROLLED VENTRICULAR RATE (HCC): ICD-10-CM

## 2024-04-19 LAB
ALBUMIN SERPL BCG-MCNC: 4 G/DL (ref 3.5–5.1)
ALP SERPL-CCNC: 72 U/L (ref 38–126)
ALT SERPL W/O P-5'-P-CCNC: 22 U/L (ref 11–66)
ANION GAP SERPL CALC-SCNC: 17 MEQ/L (ref 8–16)
AST SERPL-CCNC: 23 U/L (ref 5–40)
BACTERIA: ABNORMAL
BASOPHILS ABSOLUTE: 0 THOU/MM3 (ref 0–0.1)
BASOPHILS NFR BLD AUTO: 0.5 %
BILIRUB SERPL-MCNC: 0.5 MG/DL (ref 0.3–1.2)
BILIRUB UR QL STRIP: NEGATIVE
BUN SERPL-MCNC: 9 MG/DL (ref 7–22)
CALCIUM SERPL-MCNC: 9.1 MG/DL (ref 8.5–10.5)
CASTS #/AREA URNS LPF: ABNORMAL /LPF
CASTS #/AREA URNS LPF: ABNORMAL /LPF
CHARACTER UR: CLEAR
CHARCOAL URNS QL MICRO: ABNORMAL
CHLORIDE SERPL-SCNC: 97 MEQ/L (ref 98–111)
CO2 SERPL-SCNC: 21 MEQ/L (ref 23–33)
COLOR UR: YELLOW
CREAT SERPL-MCNC: 0.8 MG/DL (ref 0.4–1.2)
CRYSTALS URNS QL MICRO: ABNORMAL
DEPRECATED RDW RBC AUTO: 43.5 FL (ref 35–45)
DIGOXIN SERPL-MCNC: 0.3 NG/ML (ref 0.5–2)
EOSINOPHIL NFR BLD AUTO: 2 %
EOSINOPHILS ABSOLUTE: 0.2 THOU/MM3 (ref 0–0.4)
EPITHELIAL CELLS, UA: ABNORMAL /HPF
ERYTHROCYTE [DISTWIDTH] IN BLOOD BY AUTOMATED COUNT: 13.8 % (ref 11.5–14.5)
GFR SERPL CREATININE-BSD FRML MDRD: 73 ML/MIN/1.73M2
GLUCOSE SERPL-MCNC: 183 MG/DL (ref 70–108)
GLUCOSE UR QL STRIP.AUTO: NEGATIVE MG/DL
HCT VFR BLD AUTO: 37.4 % (ref 37–47)
HGB BLD-MCNC: 12.6 GM/DL (ref 12–16)
HGB UR QL STRIP.AUTO: NEGATIVE
IMM GRANULOCYTES # BLD AUTO: 0.05 THOU/MM3 (ref 0–0.07)
IMM GRANULOCYTES NFR BLD AUTO: 0.6 %
KETONES UR QL STRIP.AUTO: NEGATIVE
LEUKOCYTE ESTERASE UR QL STRIP.AUTO: ABNORMAL
LYMPHOCYTES ABSOLUTE: 1.7 THOU/MM3 (ref 1–4.8)
LYMPHOCYTES NFR BLD AUTO: 20.5 %
MCH RBC QN AUTO: 29.1 PG (ref 26–33)
MCHC RBC AUTO-ENTMCNC: 33.7 GM/DL (ref 32.2–35.5)
MCV RBC AUTO: 86.4 FL (ref 81–99)
MONOCYTES ABSOLUTE: 0.6 THOU/MM3 (ref 0.4–1.3)
MONOCYTES NFR BLD AUTO: 7.7 %
NEUTROPHILS NFR BLD AUTO: 68.7 %
NITRITE UR QL STRIP.AUTO: NEGATIVE
NRBC BLD AUTO-RTO: 0 /100 WBC
NT-PROBNP SERPL IA-MCNC: 536.8 PG/ML (ref 0–449)
OSMOLALITY SERPL CALC.SUM OF ELEC: 273.5 MOSMOL/KG (ref 275–300)
PH UR STRIP.AUTO: 6 [PH] (ref 5–9)
PLATELET # BLD AUTO: 292 THOU/MM3 (ref 130–400)
PMV BLD AUTO: 10.3 FL (ref 9.4–12.4)
POTASSIUM SERPL-SCNC: 4 MEQ/L (ref 3.5–5.2)
PROT SERPL-MCNC: 7 G/DL (ref 6.1–8)
PROT UR STRIP.AUTO-MCNC: NEGATIVE MG/DL
RBC # BLD AUTO: 4.33 MILL/MM3 (ref 4.2–5.4)
RBC #/AREA URNS HPF: ABNORMAL /HPF
RENAL EPI CELLS #/AREA URNS HPF: ABNORMAL /[HPF]
SEGMENTED NEUTROPHILS ABSOLUTE COUNT: 5.6 THOU/MM3 (ref 1.8–7.7)
SODIUM SERPL-SCNC: 135 MEQ/L (ref 135–145)
SPECIFIC GRAVITY UA: 1 (ref 1–1.03)
TROPONIN, HIGH SENSITIVITY: 12 NG/L (ref 0–12)
TSH SERPL DL<=0.005 MIU/L-ACNC: 2.04 UIU/ML (ref 0.4–4.2)
UROBILINOGEN, URINE: 0.2 EU/DL (ref 0–1)
WBC # BLD AUTO: 8.1 THOU/MM3 (ref 4.8–10.8)
WBC #/AREA URNS HPF: ABNORMAL /HPF
YEAST LIKE FUNGI URNS QL MICRO: ABNORMAL

## 2024-04-19 PROCEDURE — 2580000003 HC RX 258: Performed by: STUDENT IN AN ORGANIZED HEALTH CARE EDUCATION/TRAINING PROGRAM

## 2024-04-19 PROCEDURE — 99285 EMERGENCY DEPT VISIT HI MDM: CPT

## 2024-04-19 PROCEDURE — 85025 COMPLETE CBC W/AUTO DIFF WBC: CPT

## 2024-04-19 PROCEDURE — 6370000000 HC RX 637 (ALT 250 FOR IP): Performed by: PHYSICIAN ASSISTANT

## 2024-04-19 PROCEDURE — 2580000003 HC RX 258: Performed by: PHYSICIAN ASSISTANT

## 2024-04-19 PROCEDURE — 99223 1ST HOSP IP/OBS HIGH 75: CPT | Performed by: PHYSICIAN ASSISTANT

## 2024-04-19 PROCEDURE — 81001 URINALYSIS AUTO W/SCOPE: CPT

## 2024-04-19 PROCEDURE — 2140000000 HC CCU INTERMEDIATE R&B

## 2024-04-19 PROCEDURE — 93005 ELECTROCARDIOGRAM TRACING: CPT | Performed by: STUDENT IN AN ORGANIZED HEALTH CARE EDUCATION/TRAINING PROGRAM

## 2024-04-19 PROCEDURE — 83880 ASSAY OF NATRIURETIC PEPTIDE: CPT

## 2024-04-19 PROCEDURE — 2500000003 HC RX 250 WO HCPCS: Performed by: STUDENT IN AN ORGANIZED HEALTH CARE EDUCATION/TRAINING PROGRAM

## 2024-04-19 PROCEDURE — 71045 X-RAY EXAM CHEST 1 VIEW: CPT

## 2024-04-19 PROCEDURE — 96374 THER/PROPH/DIAG INJ IV PUSH: CPT

## 2024-04-19 PROCEDURE — 96376 TX/PRO/DX INJ SAME DRUG ADON: CPT

## 2024-04-19 PROCEDURE — 80053 COMPREHEN METABOLIC PANEL: CPT

## 2024-04-19 PROCEDURE — 84443 ASSAY THYROID STIM HORMONE: CPT

## 2024-04-19 PROCEDURE — 36415 COLL VENOUS BLD VENIPUNCTURE: CPT

## 2024-04-19 PROCEDURE — 80162 ASSAY OF DIGOXIN TOTAL: CPT

## 2024-04-19 PROCEDURE — 93010 ELECTROCARDIOGRAM REPORT: CPT | Performed by: INTERNAL MEDICINE

## 2024-04-19 PROCEDURE — 84484 ASSAY OF TROPONIN QUANT: CPT

## 2024-04-19 RX ORDER — DILTIAZEM HYDROCHLORIDE 5 MG/ML
10 INJECTION INTRAVENOUS ONCE
Status: COMPLETED | OUTPATIENT
Start: 2024-04-19 | End: 2024-04-19

## 2024-04-19 RX ORDER — ONDANSETRON 2 MG/ML
4 INJECTION INTRAMUSCULAR; INTRAVENOUS EVERY 6 HOURS PRN
Status: DISCONTINUED | OUTPATIENT
Start: 2024-04-19 | End: 2024-04-21 | Stop reason: HOSPADM

## 2024-04-19 RX ORDER — SODIUM CHLORIDE 0.9 % (FLUSH) 0.9 %
5-40 SYRINGE (ML) INJECTION EVERY 12 HOURS SCHEDULED
Status: DISCONTINUED | OUTPATIENT
Start: 2024-04-19 | End: 2024-04-21 | Stop reason: HOSPADM

## 2024-04-19 RX ORDER — ACETAMINOPHEN 325 MG/1
650 TABLET ORAL EVERY 6 HOURS PRN
Status: DISCONTINUED | OUTPATIENT
Start: 2024-04-19 | End: 2024-04-21 | Stop reason: HOSPADM

## 2024-04-19 RX ORDER — ACETAMINOPHEN 650 MG/1
650 SUPPOSITORY RECTAL EVERY 6 HOURS PRN
Status: DISCONTINUED | OUTPATIENT
Start: 2024-04-19 | End: 2024-04-21 | Stop reason: HOSPADM

## 2024-04-19 RX ORDER — SODIUM CHLORIDE 0.9 % (FLUSH) 0.9 %
5-40 SYRINGE (ML) INJECTION PRN
Status: DISCONTINUED | OUTPATIENT
Start: 2024-04-19 | End: 2024-04-21 | Stop reason: HOSPADM

## 2024-04-19 RX ORDER — SODIUM CHLORIDE 9 MG/ML
INJECTION, SOLUTION INTRAVENOUS PRN
Status: DISCONTINUED | OUTPATIENT
Start: 2024-04-19 | End: 2024-04-21 | Stop reason: HOSPADM

## 2024-04-19 RX ORDER — LEVOTHYROXINE SODIUM 0.03 MG/1
25 TABLET ORAL DAILY
Status: DISCONTINUED | OUTPATIENT
Start: 2024-04-19 | End: 2024-04-21 | Stop reason: HOSPADM

## 2024-04-19 RX ORDER — ONDANSETRON 4 MG/1
4 TABLET, ORALLY DISINTEGRATING ORAL EVERY 8 HOURS PRN
Status: DISCONTINUED | OUTPATIENT
Start: 2024-04-19 | End: 2024-04-21 | Stop reason: HOSPADM

## 2024-04-19 RX ORDER — LANOLIN ALCOHOL/MO/W.PET/CERES
3 CREAM (GRAM) TOPICAL NIGHTLY
Status: DISCONTINUED | OUTPATIENT
Start: 2024-04-19 | End: 2024-04-21 | Stop reason: HOSPADM

## 2024-04-19 RX ORDER — DIGOXIN 125 MCG
125 TABLET ORAL DAILY
Status: DISCONTINUED | OUTPATIENT
Start: 2024-04-19 | End: 2024-04-21 | Stop reason: HOSPADM

## 2024-04-19 RX ORDER — POTASSIUM CHLORIDE 20 MEQ/1
40 TABLET, EXTENDED RELEASE ORAL PRN
Status: DISCONTINUED | OUTPATIENT
Start: 2024-04-19 | End: 2024-04-21 | Stop reason: HOSPADM

## 2024-04-19 RX ORDER — DILTIAZEM HYDROCHLORIDE 300 MG/1
300 CAPSULE, COATED, EXTENDED RELEASE ORAL DAILY
Status: DISCONTINUED | OUTPATIENT
Start: 2024-04-19 | End: 2024-04-20

## 2024-04-19 RX ORDER — POTASSIUM CHLORIDE 7.45 MG/ML
10 INJECTION INTRAVENOUS PRN
Status: DISCONTINUED | OUTPATIENT
Start: 2024-04-19 | End: 2024-04-21 | Stop reason: HOSPADM

## 2024-04-19 RX ORDER — PANTOPRAZOLE SODIUM 40 MG/1
40 TABLET, DELAYED RELEASE ORAL
Status: DISCONTINUED | OUTPATIENT
Start: 2024-04-20 | End: 2024-04-21 | Stop reason: HOSPADM

## 2024-04-19 RX ORDER — MAGNESIUM SULFATE IN WATER 40 MG/ML
2000 INJECTION, SOLUTION INTRAVENOUS PRN
Status: DISCONTINUED | OUTPATIENT
Start: 2024-04-19 | End: 2024-04-21 | Stop reason: HOSPADM

## 2024-04-19 RX ORDER — 0.9 % SODIUM CHLORIDE 0.9 %
1000 INTRAVENOUS SOLUTION INTRAVENOUS ONCE
Status: COMPLETED | OUTPATIENT
Start: 2024-04-19 | End: 2024-04-19

## 2024-04-19 RX ORDER — PRAVASTATIN SODIUM 80 MG/1
80 TABLET ORAL NIGHTLY
Status: DISCONTINUED | OUTPATIENT
Start: 2024-04-19 | End: 2024-04-21 | Stop reason: HOSPADM

## 2024-04-19 RX ORDER — POLYETHYLENE GLYCOL 3350 17 G/17G
17 POWDER, FOR SOLUTION ORAL DAILY PRN
Status: DISCONTINUED | OUTPATIENT
Start: 2024-04-19 | End: 2024-04-21 | Stop reason: HOSPADM

## 2024-04-19 RX ORDER — METOPROLOL SUCCINATE 50 MG/1
50 TABLET, EXTENDED RELEASE ORAL DAILY
Status: DISCONTINUED | OUTPATIENT
Start: 2024-04-19 | End: 2024-04-21 | Stop reason: HOSPADM

## 2024-04-19 RX ADMIN — DIGOXIN 125 MCG: 0.12 TABLET ORAL at 11:22

## 2024-04-19 RX ADMIN — SODIUM CHLORIDE, PRESERVATIVE FREE 10 ML: 5 INJECTION INTRAVENOUS at 20:34

## 2024-04-19 RX ADMIN — APIXABAN 5 MG: 5 TABLET, FILM COATED ORAL at 09:42

## 2024-04-19 RX ADMIN — PRAVASTATIN SODIUM 80 MG: 80 TABLET ORAL at 20:33

## 2024-04-19 RX ADMIN — DILTIAZEM HYDROCHLORIDE 10 MG: 5 INJECTION, SOLUTION INTRAVENOUS at 04:48

## 2024-04-19 RX ADMIN — DILTIAZEM HYDROCHLORIDE 5 MG/HR: 5 INJECTION INTRAVENOUS at 16:59

## 2024-04-19 RX ADMIN — SODIUM CHLORIDE 1000 ML: 9 INJECTION, SOLUTION INTRAVENOUS at 04:47

## 2024-04-19 RX ADMIN — LEVOTHYROXINE SODIUM 25 MCG: 0.03 TABLET ORAL at 11:22

## 2024-04-19 RX ADMIN — APIXABAN 5 MG: 5 TABLET, FILM COATED ORAL at 20:33

## 2024-04-19 RX ADMIN — METOPROLOL SUCCINATE 50 MG: 50 TABLET, EXTENDED RELEASE ORAL at 11:22

## 2024-04-19 RX ADMIN — DILTIAZEM HYDROCHLORIDE 5 MG/HR: 5 INJECTION INTRAVENOUS at 05:34

## 2024-04-19 ASSESSMENT — PAIN - FUNCTIONAL ASSESSMENT
PAIN_FUNCTIONAL_ASSESSMENT: NONE - DENIES PAIN

## 2024-04-19 ASSESSMENT — LIFESTYLE VARIABLES
HOW OFTEN DO YOU HAVE A DRINK CONTAINING ALCOHOL: NEVER
HOW MANY STANDARD DRINKS CONTAINING ALCOHOL DO YOU HAVE ON A TYPICAL DAY: PATIENT DOES NOT DRINK

## 2024-04-19 NOTE — H&P
Hospitalist - History & Physical      Patient: Alena Olea    Unit/Bed:20/020A  YOB: 1940  MRN: [id number]   Acct: [id number]   PCP: Abiel Serrato,       Assessment and Plan:        Atrial fibrillation with RVR:   Hx of PAF, last hospitalized 1/4/24  Troponin 12, .8, Digoxin level 0.3  EF ~55-60% as of 1/5/24  Started diltiazem drip in ED - continue inpatient  Continue home medications - apixaban 5mg bid and digoxin 125 mcg daily  Continuous cardiac monitoring   Consult cardiology if not resolved in 48 hours  Follow up with cardiologist outpatient   Secondary hypercoaguable state:  Continue apixaban 5 mg BID  Essential HTN:   Chronic, uncontrolled  156/93 on arrival, improving   Continue home medication - metoprolol succinate 50mg daily  HLD:   Chronic, controlled  Continue home medication - pravastatin 80mg daily   NIDDM Type II:   Chronic, controlled  183 today  Continue home medication - metformin 500mg bid  Hypoglycemia protocol  ANITA:   Chronic, controlled  Continue with CPAP level 9 at night   Hypothyroidism:   Chronic, controlled  TSH 2.04  Continue home medication - levothyroxine 25mcg daily    CC:  palpitations     HPI: Pt presents to the ED today for palpitations. Pt states she was awoken by 2 episodes of palpitations overnight so came in. She is not sure if she took her medications yesterday. Pt states she's \"pretty good\" at taking them but admits to forgetting occasionally. Pt forgetful about which medications she takes on a daily basis but states she takes them from a weekly pill container her daughter sets up for her. Pt states her legs do feel a little swollen to her and her mouth is dry. Pt denies any CP, SOB, leg pain, dizziness, or HA.   Pt is being admitted for atrial fibrillation with RVR for rate, rhythm, and BP control.     ROS: Review of Systems   Constitutional:  Negative for diaphoresis and fever.   HENT: Negative.  Negative for drooling and facial

## 2024-04-19 NOTE — CARE COORDINATION
Case Management Assessment Initial Evaluation    Date/Time of Evaluation: 2024 8:08 AM  Assessment Completed by: Kristin Valdez RN    If patient is discharged prior to next notation, then this note serves as note for discharge by case management.    Patient Name: Alena Olea                   YOB: 1940  Diagnosis: Atrial fibrillation with RVR (HCC) [I48.91]                   Date / Time: 2024  4:16 AM  Location: [address] [place] [address]     Patient Admission Status: Inpatient   Readmission Risk Low 0-14, Mod 15-19), High > 20: Readmission Risk Score: 11.2    Current PCP: Abiel Serrato, DO    Additional Case Management Notes: To ED with palpitations and hypertension. Hospitalist following. Cardizem gtt @15mg/hr. Eliquis. Digoxin. Toprol XL. Telemetry w/ Afib.     Procedures: none     Imagin/19 CXR: Negative     Patient Goals/Plan/Treatment Preferences: Met w/ Xochitl and her daughter. Xochitl lives at home w/ her , daughter, daughter-in-law and 3 grandkids. She is mostly independent and actively drives. Her daughter-in-law assists w/ the cooking and housework. Has DME at home. Home WC accessible[facility] Requesting Outpatient PT/OT at discharge. Provider notified.      [id number]   Service Assessment   Patient Orientation Alert and Oriented   Cognition Alert   History Provided By Patient   Primary Caregiver Self   Accompanied By/Relationship daughter   Support Systems Spouse/Significant Other;Family Members;Children   Patient's Healthcare Decision Maker is: Legal Next of Kin   PCP Verified by CM Yes   Last Visit to PCP Within last year   Prior Functional Level Assistance with the following:;Housework;Cooking   Current Functional Level Cooking;Housework;Assistance with the following:   Can patient return to prior living arrangement Yes   Ability to make needs known: Good   Family able to assist with home care needs: Yes   Would you like for me to discuss the discharge plan with

## 2024-04-19 NOTE — H&P
Hospitalist - History & Physical      Patient: Alena Olea    Unit/Bed:20/020A  YOB: 1940  MRN: [id number]   Acct: [id number]   PCP: Abiel Serrato,       Assessment and Plan:        Atrial fibrillation with RVR:   Hx of PAF, last hospitalized 1/4/24  Troponin 12, .8, Digoxin level 0.3  EF ~55-60% as of 1/5/24  Started diltiazem gtt started in ED - continue this inpatient  Continue home medications - apixaban 5mg bid and digoxin 125 mcg daily  Continuous cardiac monitoring   Consult cardiology if not resolved in 48 hours  Follow up with cardiologist outpatient   Essential HTN:   Chronic, uncontrolled  156/93 on arrival, improving   Continue home medication - metoprolol succinate 50mg daily  HLD:   Chronic, controlled  Continue home medication - pravastatin 80mg daily   NIDDM Type II:   Chronic, controlled  183 today  Continue home medication - metformin 500mg bid  Hypoglycemia protocol  ANITA:   Chronic, controlled  Continue with CPAP level 9 at night   Hypothyroidism:   Chronic, controlled  TSH 2.04  Continue home medication - levothyroxine 25mcg daily    CC:  palpitations     HPI: Pt presents to the ED today for palpitations. Pt states she was awoken by 2 episodes of palpitations overnight so came in. She is not sure if she took her medications yesterday. Pt states she's \"pretty good\" at taking them but admits to forgetting occasionally. Pt forgetful about which medications she takes on a daily basis but states she takes them from a weekly pill container her daughter sets up for her. Pt states her legs do feel a little swollen to her and her mouth is dry. Pt denies any CP, SOB, leg pain, dizziness, or HA.   Pt is being admitted for atrial fibrillation with RVR for rate, rhythm, and BP control.     ROS: Review of Systems   Constitutional:  Negative for diaphoresis and fever.   HENT: Negative.  Negative for drooling and facial swelling.    Eyes: Negative.  Negative for

## 2024-04-19 NOTE — CARE COORDINATION
Patient eligible for RPM program on discharge. RPM program was identified as beneficial while in ED, not offered at this time related to being admitted.

## 2024-04-19 NOTE — ED NOTES
ED to inpatient nurses report      Chief Complaint:  Chief Complaint   Patient presents with    Hypertension     Present to ED from: Home     MOA:     LOC: alert and orientated to name, place, date  Mobility: Requires assistance * 1  Oxygen Baseline: Room air     Current needs required: Room air      Code Status:   Prior    What abnormal results were found and what did you give/do to treat them? See Labs   Any procedures or intervention occur? See MAR    Mental Status:  Level of Consciousness: Alert (0)    Psych Assessment:        Vitals:  Patient Vitals for the past 24 hrs:   BP Temp Temp src Pulse Resp SpO2 Height Weight   04/19/24 0535 120/74 -- -- (!) 119 18 98 % -- --   04/19/24 0454 (!) 145/96 -- -- (!) 128 18 97 % -- --   04/19/24 0421 (!) 156/93 98 °F (36.7 °C) Oral (!) 151 18 98 % 1.676 m (5' 6\") 81.6 kg (180 lb)        LDAs:   Peripheral IV 04/19/24 Right Antecubital (Active)   Site Assessment Clean, dry & intact 04/19/24 0455   Line Status Infusing 04/19/24 0455   Phlebitis Assessment No symptoms 04/19/24 0455   Infiltration Assessment 0 04/19/24 0455       Ambulatory Status:  No data recorded    Diagnosis:  DISPOSITION Admitted 04/19/2024 05:42:13 AM   Final diagnoses:   Atrial fibrillation with controlled ventricular rate (HCC)        Consults:  None     Pain Score:  Pain Assessment  Pain Assessment: None - Denies Pain    C-SSRS:   Risk of Suicide: No Risk    Sepsis Screening:  Sepsis Risk Score: 2.75    [place] Fall Risk:       Swallow Screening        Preferred Language:   English      ALLERGIES     Ranolazine    SURGICAL HISTORY       Past Surgical History:   Procedure Laterality Date    APPENDECTOMY      CARDIAC CATHETERIZATION      two cath    COLONOSCOPY  01/08/2013    COLONOSCOPY N/A 5/17/2019    COLONOSCOPY DIAGNOSTIC performed by Lucas Kirk MD at [facility]    DILATION AND CURETTAGE OF UTERUS      HC INJECTION PROCEDURE FOR SACROILIAC JOINT Left 1/24/2020    LEFT SI MBB #1 - NO STEROID

## 2024-04-19 NOTE — ACP (ADVANCE CARE PLANNING)
Advance Care Planning     Advance Care Planning Inpatient Note  [facility] Department    Today's Date: 4/19/2024  Unit: [facility]B    Received request from IDT Member.  Upon review of chart and communication with care team, patient's decision making abilities are not in question.. Patient was/were present in the room during visit.    Goals of ACP Conversation:  Discuss advance care planning documents    Health Care Decision Makers:       Primary Decision Maker: JoriyeseniaYesi north - Child - [phone]    Secondary Decision Maker: EmmieHenrique T - Spouse - [phone]  Summary:  Completed New Documents    Advance Care Planning Documents (Patient Wishes):  Healthcare Power of /Advance Directive Appointment of Health Care Agent  Living Will/Advance Directive     Assessment:  Advanced Directives Consult; It was completed and filed. A copy sent to medical records. Prayer was appreciated.     Interventions:  Assisted in the completion of documents according to patient's wishes at this time        Outcomes/Plan:  New advance directive completed.    Electronically signed by BERENICE Salcedo on 4/19/2024 at 5:41 PM

## 2024-04-19 NOTE — PLAN OF CARE
Problem: Discharge Planning  Goal: Discharge to home or other facility with appropriate resources  Outcome: Progressing  Flowsheets  Taken 4/19/2024 1544  Discharge to home or other facility with appropriate resources:   Identify barriers to discharge with patient and caregiver   Arrange for needed discharge resources and transportation as appropriate   Identify discharge learning needs (meds, wound care, etc)  Taken 4/19/2024 0930  Discharge to home or other facility with appropriate resources:   Identify barriers to discharge with patient and caregiver   Identify discharge learning needs (meds, wound care, etc)   Refer to discharge planning if patient needs post-hospital services based on physician order or complex needs related to functional status, cognitive ability or social support system     Problem: ABCDS Injury Assessment  Goal: Absence of physical injury  Outcome: Progressing  Flowsheets (Taken 4/19/2024 1544)  Absence of Physical Injury: Implement safety measures based on patient assessment     Problem: Chronic Conditions and Co-morbidities  Goal: Patient's chronic conditions and co-morbidity symptoms are monitored and maintained or improved  Outcome: Progressing  Flowsheets (Taken 4/19/2024 1544)  Care Plan - Patient's Chronic Conditions and Co-Morbidity Symptoms are Monitored and Maintained or Improved:   Monitor and assess patient's chronic conditions and comorbid symptoms for stability, deterioration, or improvement   Collaborate with multidisciplinary team to address chronic and comorbid conditions and prevent exacerbation or deterioration     Problem: Safety - Adult  Goal: Free from fall injury  Outcome: Progressing  Flowsheets (Taken 4/19/2024 1544)  Free From Fall Injury: Based on caregiver fall risk screen, instruct family/caregiver to ask for assistance with transferring infant if caregiver noted to have fall risk factors   Care plan reviewed with patient.  Patient verbalizes understanding of

## 2024-04-19 NOTE — ED TRIAGE NOTES
Pt to ED c/o hypertension. Pt states she got up around 1am and her daughter took her BP and it was over 160 systolic. Pt states it felt like her heart was racing. Pt states she has a HX of A-FIB. Pt states she does take Eliquis and took her nightly medication. On arrival pt denies any CP or SOB. EKG and labs obtained. VS stable

## 2024-04-19 NOTE — ED PROVIDER NOTES
[facility] EMERGENCY DEPT  EMERGENCY DEPARTMENT ENCOUNTER          Pt Name: Alena Olea  MRN: [id number]  Birthdate 1940  Date of evaluation: 4/19/2024  Physician: Abraham Thurston MD  Supervising Attending Physician: Radhames Angelo DO       CHIEF COMPLAINT       Chief Complaint   Patient presents with    Hypertension         HISTORY OF PRESENT ILLNESS    HPI  Alena Olea is a 83 y.o. female with a history of obstructive sleep apnea on CPAP, persistent atrial fibrillation, hypertension, hyperlipidemia, diabetes who presents to the ED for evaluation of palpitations.  Patient states that she began experiencing palpitations in her chest and called for her daughter this morning.  At that time blood pressure was slightly high.  After some time had passed, took a pressure measurement again and was still high in the patient and daughter decided to come in for evaluation.  No chest pain, shortness of breath, abdominal pain, back pain, headache.  In the ED, patient's pulse is 151.  Patient confirms taking medications appropriately yesterday.    TTE 1/5/2024:  Left Ventricle: Normal left ventricular systolic function with a visually estimated EF of 55 - 60%. Left ventricle size is normal. Normal wall thickness. Normal wall motion.    Right Ventricle: Right ventricle is mildly dilated. Normal systolic function.    Aortic Valve: Mild regurgitation.    Left Atrium: Left atrium is moderately dilated.    Right Atrium: Right atrium is mildly dilated.    Image quality is technically difficult. Procedure performed with the patient in a sitting position.    REVIEW OF SYSTEMS   Review of Systems  As above in HPI    PAST MEDICAL AND SURGICAL HISTORY     Past Medical History:   Diagnosis Date    Arthritis     Cancer (HCC)     skin ca    Diabetes mellitus (HCC)     Hyperlipidemia     Hypertension     Osteoporosis 2017    Sleep apnea     has CPAP     Past Surgical History:   Procedure Laterality

## 2024-04-20 LAB
ANION GAP SERPL CALC-SCNC: 11 MEQ/L (ref 8–16)
BASOPHILS ABSOLUTE: 0.1 THOU/MM3 (ref 0–0.1)
BASOPHILS NFR BLD AUTO: 0.8 %
BUN SERPL-MCNC: 10 MG/DL (ref 7–22)
CALCIUM SERPL-MCNC: 8.8 MG/DL (ref 8.5–10.5)
CHLORIDE SERPL-SCNC: 98 MEQ/L (ref 98–111)
CO2 SERPL-SCNC: 23 MEQ/L (ref 23–33)
CREAT SERPL-MCNC: 0.6 MG/DL (ref 0.4–1.2)
DEPRECATED RDW RBC AUTO: 45.5 FL (ref 35–45)
EOSINOPHIL NFR BLD AUTO: 2.9 %
EOSINOPHILS ABSOLUTE: 0.2 THOU/MM3 (ref 0–0.4)
ERYTHROCYTE [DISTWIDTH] IN BLOOD BY AUTOMATED COUNT: 14.2 % (ref 11.5–14.5)
GFR SERPL CREATININE-BSD FRML MDRD: 89 ML/MIN/1.73M2
GLUCOSE SERPL-MCNC: 184 MG/DL (ref 70–108)
HCT VFR BLD AUTO: 35.6 % (ref 37–47)
HGB BLD-MCNC: 11.8 GM/DL (ref 12–16)
IMM GRANULOCYTES # BLD AUTO: 0.03 THOU/MM3 (ref 0–0.07)
IMM GRANULOCYTES NFR BLD AUTO: 0.5 %
LYMPHOCYTES ABSOLUTE: 1.2 THOU/MM3 (ref 1–4.8)
LYMPHOCYTES NFR BLD AUTO: 19.6 %
MCH RBC QN AUTO: 29.2 PG (ref 26–33)
MCHC RBC AUTO-ENTMCNC: 33.1 GM/DL (ref 32.2–35.5)
MCV RBC AUTO: 88.1 FL (ref 81–99)
MONOCYTES ABSOLUTE: 0.6 THOU/MM3 (ref 0.4–1.3)
MONOCYTES NFR BLD AUTO: 10.3 %
NEUTROPHILS NFR BLD AUTO: 65.9 %
NRBC BLD AUTO-RTO: 0 /100 WBC
PLATELET # BLD AUTO: 266 THOU/MM3 (ref 130–400)
PMV BLD AUTO: 10.3 FL (ref 9.4–12.4)
POTASSIUM SERPL-SCNC: 4.2 MEQ/L (ref 3.5–5.2)
RBC # BLD AUTO: 4.04 MILL/MM3 (ref 4.2–5.4)
SEGMENTED NEUTROPHILS ABSOLUTE COUNT: 4.2 THOU/MM3 (ref 1.8–7.7)
SODIUM SERPL-SCNC: 132 MEQ/L (ref 135–145)
WBC # BLD AUTO: 6.3 THOU/MM3 (ref 4.8–10.8)

## 2024-04-20 PROCEDURE — 85025 COMPLETE CBC W/AUTO DIFF WBC: CPT

## 2024-04-20 PROCEDURE — 80048 BASIC METABOLIC PNL TOTAL CA: CPT

## 2024-04-20 PROCEDURE — 2580000003 HC RX 258: Performed by: PHYSICIAN ASSISTANT

## 2024-04-20 PROCEDURE — 99223 1ST HOSP IP/OBS HIGH 75: CPT | Performed by: INTERNAL MEDICINE

## 2024-04-20 PROCEDURE — 6370000000 HC RX 637 (ALT 250 FOR IP): Performed by: INTERNAL MEDICINE

## 2024-04-20 PROCEDURE — 36415 COLL VENOUS BLD VENIPUNCTURE: CPT

## 2024-04-20 PROCEDURE — 6370000000 HC RX 637 (ALT 250 FOR IP): Performed by: PHYSICIAN ASSISTANT

## 2024-04-20 PROCEDURE — 2140000000 HC CCU INTERMEDIATE R&B

## 2024-04-20 RX ORDER — DILTIAZEM HYDROCHLORIDE 300 MG/1
300 CAPSULE, COATED, EXTENDED RELEASE ORAL DAILY
Status: DISCONTINUED | OUTPATIENT
Start: 2024-04-20 | End: 2024-04-20

## 2024-04-20 RX ORDER — DILTIAZEM HYDROCHLORIDE 300 MG/1
300 CAPSULE, COATED, EXTENDED RELEASE ORAL DAILY
Status: DISCONTINUED | OUTPATIENT
Start: 2024-04-20 | End: 2024-04-21 | Stop reason: HOSPADM

## 2024-04-20 RX ADMIN — LEVOTHYROXINE SODIUM 25 MCG: 0.03 TABLET ORAL at 05:37

## 2024-04-20 RX ADMIN — SODIUM CHLORIDE, PRESERVATIVE FREE 10 ML: 5 INJECTION INTRAVENOUS at 10:08

## 2024-04-20 RX ADMIN — METOPROLOL SUCCINATE 50 MG: 50 TABLET, EXTENDED RELEASE ORAL at 10:07

## 2024-04-20 RX ADMIN — APIXABAN 5 MG: 5 TABLET, FILM COATED ORAL at 10:07

## 2024-04-20 RX ADMIN — PANTOPRAZOLE SODIUM 40 MG: 40 TABLET, DELAYED RELEASE ORAL at 05:37

## 2024-04-20 RX ADMIN — DILTIAZEM HYDROCHLORIDE 300 MG: 300 CAPSULE, EXTENDED RELEASE ORAL at 12:55

## 2024-04-20 RX ADMIN — DIGOXIN 125 MCG: 0.12 TABLET ORAL at 10:07

## 2024-04-20 RX ADMIN — APIXABAN 5 MG: 5 TABLET, FILM COATED ORAL at 21:10

## 2024-04-20 RX ADMIN — PRAVASTATIN SODIUM 80 MG: 80 TABLET ORAL at 21:10

## 2024-04-20 RX ADMIN — Medication 3 MG: at 21:10

## 2024-04-20 RX ADMIN — SODIUM CHLORIDE, PRESERVATIVE FREE 10 ML: 5 INJECTION INTRAVENOUS at 21:10

## 2024-04-20 NOTE — CONSULTS
Atrial fib RVR    Plan    Rate control  Wean off cardizem drip  Start po cardizem CD  [id number]    Juan Luu MD

## 2024-04-20 NOTE — PLAN OF CARE
Problem: Discharge Planning  Goal: Discharge to home or other facility with appropriate resources  4/19/2024 2300 by America Stringer RN  Outcome: Progressing  Flowsheets (Taken 4/19/2024 2300)  Discharge to home or other facility with appropriate resources:   Identify barriers to discharge with patient and caregiver   Arrange for needed discharge resources and transportation as appropriate   Identify discharge learning needs (meds, wound care, etc)   Refer to discharge planning if patient needs post-hospital services based on physician order or complex needs related to functional status, cognitive ability or social support system  4/19/2024 1544 by Ban Tao RN  Outcome: Progressing  Flowsheets  Taken 4/19/2024 1544  Discharge to home or other facility with appropriate resources:   Identify barriers to discharge with patient and caregiver   Arrange for needed discharge resources and transportation as appropriate   Identify discharge learning needs (meds, wound care, etc)  Taken 4/19/2024 0930  Discharge to home or other facility with appropriate resources:   Identify barriers to discharge with patient and caregiver   Identify discharge learning needs (meds, wound care, etc)   Refer to discharge planning if patient needs post-hospital services based on physician order or complex needs related to functional status, cognitive ability or social support system     Problem: ABCDS Injury Assessment  Goal: Absence of physical injury  4/19/2024 2300 by America Stringer RN  Outcome: Progressing  Flowsheets (Taken 4/19/2024 1544 by Ban Tao, RN)  Absence of Physical Injury: Implement safety measures based on patient assessment  4/19/2024 1544 by Ban Tao RN  Outcome: Progressing  Flowsheets (Taken 4/19/2024 1544)  Absence of Physical Injury: Implement safety measures based on patient assessment     Problem: Chronic Conditions and Co-morbidities  Goal: Patient's chronic conditions and co-morbidity

## 2024-04-20 NOTE — CONSULTS
[place] [id number]                              CONSULTATION      PATIENT NAME: ALEC MAZARIEGOS           : 1940  MED REC NO: [id number]                       ROOM: [facility]  ACCOUNT NO: [id number]                       ADMIT DATE: 2024  PROVIDER: Juan Luu MD      CONSULT DATE:  2024    REASON FOR CONSULTATION:  Recurrence of atrial fibrillation with rapid ventricular response.    PRIMARY CARDIOLOGIST:  Dr. Rodriguez.  Used to see Dr. Troy before.    HISTORY OF PRESENT ILLNESS:  This is an 83-year-old  female patient with known past medical history of atrial fibrillation, permanent, on rate control; hypertension; hyperlipidemia; diabetes type 2; obstructive sleep apnea; and hypothyroidism, presented on 2024 because of sudden onset of palpitation.  The patient had 2 episodes of palpitation overnight and so the palpitation was bothering her and so she came in.  No chest pain.  She has some shortness of breath with palpitation.  Otherwise, denied having any chest pain.  The patient admitted that she did not take her rate-controlling medication Cardizem particularly for a couple of days.  Otherwise, rate was well controlled while she was taking her medication.  Denied any chest pain.  No fever or chills.  No nausea or vomiting.  No leg edema.  No dizziness.  No syncope.  So, came to the emergency room, found to be in atrial fibrillation with rapid ventricular response and so was started on Cardizem drip and admitted for further evaluation and management.    The patient's rate is controlled today on Cardizem drip and cardiology evaluation was sought for further evaluation and management.    REVIEW OF SYSTEMS:  Negative except the above-mentioned ones.    PAST MEDICAL HISTORY:  History of permanent atrial fibrillation, hypertension, hyperlipidemia, diabetes, sleep apnea, and hypothyroidism.    PAST

## 2024-04-20 NOTE — PLAN OF CARE
Problem: Discharge Planning  Goal: Discharge to home or other facility with appropriate resources  Outcome: Progressing  Flowsheets (Taken 4/20/2024 1303)  Discharge to home or other facility with appropriate resources: Identify barriers to discharge with patient and caregiver     Problem: ABCDS Injury Assessment  Goal: Absence of physical injury  Outcome: Progressing  Flowsheets (Taken 4/20/2024 1303)  Absence of Physical Injury: Implement safety measures based on patient assessment     Problem: Chronic Conditions and Co-morbidities  Goal: Patient's chronic conditions and co-morbidity symptoms are monitored and maintained or improved  Outcome: Progressing  Flowsheets (Taken 4/20/2024 1303)  Care Plan - Patient's Chronic Conditions and Co-Morbidity Symptoms are Monitored and Maintained or Improved: Monitor and assess patient's chronic conditions and comorbid symptoms for stability, deterioration, or improvement     Problem: Safety - Adult  Goal: Free from fall injury  Outcome: Progressing  Flowsheets (Taken 4/20/2024 1303)  Free From Fall Injury: Instruct family/caregiver on patient safety   Care plan reviewed with patient.  Patient verbalizes understanding of the care plan and contributed to goal setting.

## 2024-04-21 VITALS
OXYGEN SATURATION: 99 % | HEART RATE: 65 BPM | TEMPERATURE: 97.6 F | SYSTOLIC BLOOD PRESSURE: 108 MMHG | HEIGHT: 66 IN | RESPIRATION RATE: 18 BRPM | DIASTOLIC BLOOD PRESSURE: 59 MMHG | BODY MASS INDEX: 28.49 KG/M2 | WEIGHT: 177.25 LBS

## 2024-04-21 PROCEDURE — 6370000000 HC RX 637 (ALT 250 FOR IP): Performed by: PHYSICIAN ASSISTANT

## 2024-04-21 PROCEDURE — 2580000003 HC RX 258: Performed by: PHYSICIAN ASSISTANT

## 2024-04-21 PROCEDURE — 99232 SBSQ HOSP IP/OBS MODERATE 35: CPT | Performed by: STUDENT IN AN ORGANIZED HEALTH CARE EDUCATION/TRAINING PROGRAM

## 2024-04-21 PROCEDURE — 6370000000 HC RX 637 (ALT 250 FOR IP): Performed by: INTERNAL MEDICINE

## 2024-04-21 RX ADMIN — DIGOXIN 125 MCG: 0.12 TABLET ORAL at 09:18

## 2024-04-21 RX ADMIN — LEVOTHYROXINE SODIUM 25 MCG: 0.03 TABLET ORAL at 05:51

## 2024-04-21 RX ADMIN — APIXABAN 5 MG: 5 TABLET, FILM COATED ORAL at 09:18

## 2024-04-21 RX ADMIN — PANTOPRAZOLE SODIUM 40 MG: 40 TABLET, DELAYED RELEASE ORAL at 05:51

## 2024-04-21 RX ADMIN — METOPROLOL SUCCINATE 50 MG: 50 TABLET, EXTENDED RELEASE ORAL at 09:50

## 2024-04-21 RX ADMIN — DILTIAZEM HYDROCHLORIDE 300 MG: 300 CAPSULE, EXTENDED RELEASE ORAL at 09:18

## 2024-04-21 RX ADMIN — SODIUM CHLORIDE, PRESERVATIVE FREE 10 ML: 5 INJECTION INTRAVENOUS at 09:18

## 2024-04-21 NOTE — PROGRESS NOTES
Hospitalist Progress Note    Patient:  Alena Urena      Unit/Bed:3B-28/028-A    YOB: 1940    MRN: [id number]       Acct: [id number]     PCP: Abiel Serrato DO    Date of Admission: 4/19/2024    Active Hospital Problems    Diagnosis Date Noted    Atrial fibrillation with RVR ([facility]) [I48.91] 01/04/2024     Assessment and Plan:        Atrial fibrillation with RVR:   Hx of PAF, last hospitalized 1/4/24  Troponin 12, .8, Digoxin level 0.3  EF ~55-60% as of 1/5/24  Wean off Diltiazem drip today, start PO Cardizem. Still in A. Fib, consult Cardiology.   Continue home medications - apixaban 5mg bid and digoxin 125 mcg daily  Continuous cardiac monitoring   Secondary hypercoaguable state:  Continue apixaban 5 mg BID  Essential HTN:   Chronic, uncontrolled  156/93 on arrival, improving   Continue home medication - metoprolol succinate 50mg daily  HLD:   Chronic, controlled  Continue home medication - pravastatin 80mg daily   NIDDM Type II:   Chronic, controlled  183 today  Continue home medication - metformin 500mg bid  Hypoglycemia protocol  ANITA:   Chronic, controlled  Continue with CPAP level 9 at night   Hypothyroidism:   Chronic, controlled  TSH 2.04  Continue home medication - levothyroxine 25mcg daily      Chief Complaint: palpitations     Hospital Course: Pt presents to the ED today for palpitations. Pt states she was awoken by 2 episodes of palpitations overnight so came in. She is not sure if she took her medications yesterday. Pt states she's \"pretty good\" at taking them but admits to forgetting occasionally. Pt forgetful about which medications she takes on a daily basis but states she takes them from a weekly pill container her daughter sets up for her. Pt states her legs do feel a little swollen to her and her mouth is dry. Pt denies any CP, SOB, leg pain, dizziness, or HA.     Pt is being admitted for atrial fibrillation with RVR for rate, rhythm, and BP control. 
Discussed discharge summary with patient. Instructed patient about medications & follow up appointments. Patient denies any additional questions or concerns. Patient was discharged with all belongings. No distress noted. Patient discharged to home in good spirits. Taken down to the vehicle by patient care tech, per wheelchair.     
Patient arrived per cart to 3B. Heart monitor applied and vitals taken.  Admission paperwork completed.  Explained to patient that [facility] is not responsible for any lost or stolen items.  Patient verbalized understanding. Oriented to room and use of call light and bed controls.  Patient denies pain or needs. No signs of distress noted.  Bed locked & in low position, side-rails up x2.  Call light in reach.  Reminded patient to call nurse if any needs arise.          
rhythm, normal S1 and S2, no murmurs, rubs, clicks, or gallops, distal pulses intact, no carotid bruits, no JVD  Pulmonary/Chest: clear to auscultation bilaterally- no wheezes, rales or rhonchi, normal air movement, no respiratory distress  Abdomen: soft, non-tender, non-distended, normal bowel sounds, no masses   Extremities: no cyanosis, clubbing or edema, pulse   Skin: warm and dry, no rash or erythema  Neurological: alert, oriented, normal speech, no focal findings or movement disorder noted    Medications:    dilTIAZem  300 mg Oral Daily    apixaban  5 mg Oral BID    digoxin  125 mcg Oral Daily    levothyroxine  25 mcg Oral Daily    metoprolol succinate  50 mg Oral Daily    pantoprazole  40 mg Oral QAM AC    pravastatin  80 mg Oral Nightly    sodium chloride flush  5-40 mL IntraVENous 2 times per day    melatonin  3 mg Oral Nightly      dilTIAZem Stopped (04/20/24 1345)    sodium chloride       sodium chloride flush, 5-40 mL, PRN  sodium chloride, , PRN  potassium chloride, 40 mEq, PRN   Or  potassium alternative oral replacement, 40 mEq, PRN   Or  potassium chloride, 10 mEq, PRN  magnesium sulfate, 2,000 mg, PRN  ondansetron, 4 mg, Q8H PRN   Or  ondansetron, 4 mg, Q6H PRN  polyethylene glycol, 17 g, Daily PRN  acetaminophen, 650 mg, Q6H PRN   Or  acetaminophen, 650 mg, Q6H PRN        Diagnostics:  EKG:     Echo:     Stress:     Left Heart Cath:     Lab Data:    Cardiac Enzymes:  No results for input(s): \"CKTOTAL\", \"CKMB\", \"CKMBINDEX\", \"TROPONINI\" in the last 72 hours.    CBC:   Lab Results   Component Value Date/Time    WBC 6.3 04/20/2024 04:37 AM    RBC 4.04 04/20/2024 04:37 AM    RBC 3.83 12/21/2022 11:13 AM    HGB 11.8 04/20/2024 04:37 AM    HCT 35.6 04/20/2024 04:37 AM     04/20/2024 04:37 AM       CMP:    Lab Results   Component Value Date/Time     04/20/2024 04:37 AM    K 4.2 04/20/2024 04:37 AM    CL 98 04/20/2024 04:37 AM    CO2 23 04/20/2024 04:37 AM    BUN 10 04/20/2024 04:37 AM

## 2024-04-21 NOTE — PLAN OF CARE
Problem: Discharge Planning  Goal: Discharge to home or other facility with appropriate resources  4/20/2024 2335 by Madisyn Barcenas RN  Outcome: Progressing     Problem: ABCDS Injury Assessment  Goal: Absence of physical injury  4/20/2024 2335 by Madisyn Barcenas RN  Outcome: Progressing     Problem: Chronic Conditions and Co-morbidities  Goal: Patient's chronic conditions and co-morbidity symptoms are monitored and maintained or improved  4/20/2024 2335 by Madisyn Barcenas RN  Outcome: Progressing     Problem: Safety - Adult  Goal: Free from fall injury  4/20/2024 2335 by Madisyn Barcenas RN  Outcome: Progressing     Problem: Infection - Adult  Goal: Absence of infection during hospitalization  Outcome: Progressing   Care plan reviewed with patient and verbalize understanding of the plan of care and contribute to goal setting.

## 2024-04-21 NOTE — DISCHARGE SUMMARY
Hospital Medicine Discharge Summary      Patient Identification:   Alena Olea   : 1940  MRN: [id number]   Account: [id number]      Patient's PCP: Abiel Serrato DO    Admit Date: 2024     Discharge Date:   24    Admitting Physician: No admitting provider for patient encounter.     Discharge Physician: Jadiel Lake MD     Discharge Diagnoses: Atrial fibrillation with RVR     Active Hospital Problems    Diagnosis Date Noted    Atrial fibrillation with controlled ventricular rate (HCC) [I48.91] 2024       The patient was seen and examined on day of discharge and this discharge summary is in conjunction with any daily progress note from day of discharge.    Hospital Course:   Alena Olea is a 83 y.o. female admitted to [facility] on 2024 for atrial fibrillation with RVR. Has a history of PAF, last hospitalized 24. Pt states she was awoken by 2 episodes of palpitations overnight so came in. She is not sure if she took her medications yesterday. Pt states she's \"pretty good\" at taking them but admits to forgetting occasionally. In the ED found to be in atrial fibrillation with 's. Troponin 12, .8, Digoxin level 0.3. No evidence of infection, no SIRS or sepsis. Lytes WNL.  Admitted and started on Cardizem drip, Cardiology consulted. Patient converted back to NSR. Home medications resumed (toprol, digoxin, cardizem), has been doing great. Educated on taking her medications daily. Ambulating in hallways with no issues, no chest pain/sob or palpitations. Tolerating PO intake. Will be discharged with close PCP and Cardiology follow-up.       Exam:     Vitals:  Vitals:    24 0431 24 0915 24 0945 24 1157   BP: 108/70 102/60 106/64 (!) 108/59   Pulse: 69 80  65   Resp: 18 18  18   Temp: 97.3 °F (36.3 °C) 97.7 °F (36.5 °C)  97.6 °F (36.4 °C)   TempSrc: Oral Oral  Oral   SpO2: 100% 99%  99%   Weight:       Height:

## 2024-04-22 ENCOUNTER — TELEPHONE (OUTPATIENT)
Dept: FAMILY MEDICINE CLINIC | Age: 84
End: 2024-04-22

## 2024-04-22 ENCOUNTER — CARE COORDINATION (OUTPATIENT)
Dept: CASE MANAGEMENT | Age: 84
End: 2024-04-22

## 2024-04-22 ENCOUNTER — CARE COORDINATION (OUTPATIENT)
Dept: CARE COORDINATION | Age: 84
End: 2024-04-22

## 2024-04-22 DIAGNOSIS — I48.91 ATRIAL FIBRILLATION WITH CONTROLLED VENTRICULAR RATE (HCC): Primary | ICD-10-CM

## 2024-04-22 PROCEDURE — 1111F DSCHRG MED/CURRENT MED MERGE: CPT | Performed by: FAMILY MEDICINE

## 2024-04-22 NOTE — CARE COORDINATION
Request from CTN to schedule hospital f/u.  Cardiology appointment has been scheduled.  PCP office has reached out to patient & left voicemail's to schedule appointment.     Patient has been notified of appointments and agreed.     Future Appointments   Date Time Provider Department Center   5/1/2024 10:15 AM Abiel Serrato, DO [facility]   5/28/2024  8:00 AM Paolo Rodriguez MD N DANYELL Heart [facility]   6/26/2024 10:20 AM Nellie Garvey, APRN - CNP N Pulm Med [facility]   2/18/2025 10:00 AM Paolo Rodriguez MD N BOR[facility] Heart [facility]

## 2024-04-22 NOTE — TELEPHONE ENCOUNTER
Care Transitions Initial Follow Up Call    Outreach made within 2 business days of discharge: Yes    Patient: Alena Olea Patient : 1940   MRN: [id number]  Reason for Admission: There are no discharge diagnoses documented for the most recent discharge.  Discharge Date: 24       Spoke with: left voice message    Discharge department/facility: [facility]        Scheduled appointment with PCP within 7-14 days    Follow Up  Future Appointments   Date Time Provider Department Center   2024 10:20 AM Nellie Garvey APRN - CNP N Pulm Med [facility] [place]   2025 10:00 AM Paolo Rodriguez MD N SRPX Heart [facility] [place]       COOKIE HECTOR LPN

## 2024-04-22 NOTE — CARE COORDINATION
Denies urinary or bowel issues currently.  Reviewed medication list.  No questions regarding her medications.  Pt does not have any of her follow up appts scheduled.  She requests CTN schedule the appts.  Any day and time will work for her.  She plans to drive herself.  Informed her that she will request that  schedule her appts.  Will have  contact pt with dates and times.  She is agreeable to follow up calls.      Sent staff message to Zo Ramirez,  to schedule pt's PCP and cardiology appts.      Care Transition Nurse reviewed discharge instructions, medical action plan, and red flags with patient. The patient was given an opportunity to ask questions; all questions answered at this time.. The patient verbalized understanding.   Were discharge instructions available to patient? Yes.   Reviewed appropriate site of care based on symptoms and resources available to patient including: PCP  Specialist  When to call 911. The patient agrees to contact the primary care provider and/or specialist office for questions related to their healthcare.      Advance Care Planning:   Does patient have an Advance Directive:  on file .    Medication Reconciliation:  Medication reconciliation was performed with patient,1111F entered: yes.     Remote Patient Monitoring:  Offered patient enrollment in the Remote Patient Monitoring (RPM) program for in-home monitoring:  did not discuss during initial call .    Assessments:  Care Transitions 24 Hour Call    Do you have a copy of your discharge instructions?: Yes  Do you have all of your prescriptions and are they filled?: Yes  Have you been contacted by a [facility] Pharmacist?: No  Have you scheduled your follow up appointment?: No  Do you feel like you have everything you need to keep you well at home?: Yes  Care Transitions Interventions          Follow Up Appointment:   Patient does not have a follow up appointment scheduled at time

## 2024-04-23 NOTE — TELEPHONE ENCOUNTER
Care Transitions Initial Follow Up Call    Outreach made within 2 business days of discharge: Yes    Patient: Alena Olea Patient : 1940   MRN: [id number]  Reason for Admission: There are no discharge diagnoses documented for the most recent discharge.  Discharge Date: 24       Spoke with: left a voice message    Discharge department/facility: [facility]        Scheduled appointment with PCP within 7-14 days    Follow Up  Future Appointments   Date Time Provider Department Center   2024  8:00 AM Paolo Rodriguez MD N SRPX Heart [facility] [place]   2024 10:20 AM Nellie Garvey, APRN - CNP N Pulm Med [facility] [place]   2025 10:00 AM Paolo Rodriguez MD N SRPX Heart [facility] [place]       COOKIE HECTOR LPN

## 2024-04-24 LAB
EKG ATRIAL RATE: 111 BPM
EKG Q-T INTERVAL: 316 MS
EKG QRS DURATION: 78 MS
EKG QTC CALCULATION (BAZETT): 470 MS
EKG R AXIS: -6 DEGREES
EKG T AXIS: 162 DEGREES
EKG VENTRICULAR RATE: 133 BPM

## 2024-04-25 ENCOUNTER — CARE COORDINATION (OUTPATIENT)
Dept: CASE MANAGEMENT | Age: 84
End: 2024-04-25

## 2024-04-25 NOTE — CARE COORDINATION
medications?: No  Do you currently have any active services?: No  Do you have any needs or concerns that I can assist you with?: No  Care Transitions Interventions  Other Interventions:              Follow Up Appointment:   Reviewed upcoming appointment(s).  Future Appointments         Provider Specialty Dept Phone    5/1/2024 10:15 AM Abiel Serrato DO Family Medicine [phone]    5/28/2024 8:00 AM Paolo Rodriguez MD Cardiology [phone]    6/26/2024 10:20 AM Nellie Garvey, APRN - CNP Pulmonology [phone]    2/18/2025 10:00 AM Paolo Rodriguez MD Cardiology [phone]            Care Transition Nurse provided contact information.  Plan for follow-up call in 6-10 days based on severity of symptoms and risk factors.  Plan for next call: symptom management-CP, SOB, palpitations, BP, review PCP appt, any new or worsening symptoms      Hannah Henry RN

## 2024-05-01 ENCOUNTER — OFFICE VISIT (OUTPATIENT)
Dept: FAMILY MEDICINE CLINIC | Age: 84
End: 2024-05-01

## 2024-05-01 VITALS
DIASTOLIC BLOOD PRESSURE: 64 MMHG | WEIGHT: 186.4 LBS | SYSTOLIC BLOOD PRESSURE: 106 MMHG | BODY MASS INDEX: 30.09 KG/M2 | HEART RATE: 92 BPM | RESPIRATION RATE: 18 BRPM

## 2024-05-01 DIAGNOSIS — E11.40 TYPE 2 DIABETES MELLITUS WITH DIABETIC NEUROPATHY, WITHOUT LONG-TERM CURRENT USE OF INSULIN (HCC): ICD-10-CM

## 2024-05-01 DIAGNOSIS — E66.9 OBESITY (BMI 30-39.9): ICD-10-CM

## 2024-05-01 DIAGNOSIS — G47.33 OBSTRUCTIVE SLEEP APNEA ON CPAP: ICD-10-CM

## 2024-05-01 DIAGNOSIS — I48.91 ATRIAL FIBRILLATION WITH RAPID VENTRICULAR RESPONSE (HCC): Primary | ICD-10-CM

## 2024-05-01 DIAGNOSIS — I10 PRIMARY HYPERTENSION: ICD-10-CM

## 2024-05-01 DIAGNOSIS — Z09 HOSPITAL DISCHARGE FOLLOW-UP: ICD-10-CM

## 2024-05-01 NOTE — PROGRESS NOTES
Post-Discharge Transitional Care Management Services      Alena Olea   YOB: 1940    Date of Visit:  5/1/2024  30 Day Post-Discharge Date: 5/21/24  Chief Complaint   Patient presents with    Follow-Up from Hospital     D/C'd from [facility] on 4/21 - Afib    Health Maintenance     Needs a A1C     Admit Date: 4/19/2024      Discharge Date:   04/21/24     Admitting Physician: No admitting provider for patient encounter.     Discharge Physician: Jadiel Lake MD      Discharge Diagnoses: Atrial fibrillation with RVR           Active Hospital Problems     Diagnosis Date Noted    Atrial fibrillation with controlled ventricular rate (HCC) [I48.91] 01/04/2024         The patient was seen and examined on day of discharge and this discharge summary is in conjunction with any daily progress note from day of discharge.     Hospital Course:   Alena Olea is a 83 y.o. female admitted to [facility] on 4/19/2024 for atrial fibrillation with RVR. Has a history of PAF, last hospitalized 1/4/24. Pt states she was awoken by 2 episodes of palpitations overnight so came in. She is not sure if she took her medications yesterday. Pt states she's \"pretty good\" at taking them but admits to forgetting occasionally. In the ED found to be in atrial fibrillation with 's. Troponin 12, .8, Digoxin level 0.3. No evidence of infection, no SIRS or sepsis. Lytes WNL.  Admitted and started on Cardizem drip, Cardiology consulted. Patient converted back to NSR. Home medications resumed (toprol, digoxin, cardizem), has been doing great. Educated on taking her medications daily. Ambulating in hallways with no issues, no chest pain/sob or palpitations. Tolerating PO intake. Will be discharged with close PCP and Cardiology follow-up.     Assessment:  PeAF with rvr-  HTN  HLD  DM  ANITA  Hypothyroid  Noncompliance with medication     Plan:  Patient has been off cardizem gtt since yesterday afternoon. HR

## 2024-05-02 ENCOUNTER — CARE COORDINATION (OUTPATIENT)
Dept: CASE MANAGEMENT | Age: 84
End: 2024-05-02

## 2024-05-02 NOTE — CARE COORDINATION
Care Transitions Note    Follow Up Call      Attempted to reach patient for transitions of care follow up.  Unable to reach patient.      Outreach Attempts:   HIPAA compliant voicemail left for patient.     Care Summary Note: 1st attempt to contact pt for care transition follow up.  Unable to reach pt.  Left message with contact information and request for call back.      Follow Up Appointment:   Future Appointments         Provider Specialty Dept Phone    5/28/2024 8:00 AM Paolo Rodriguez MD Cardiology [phone]    6/26/2024 10:20 AM Nellie Garvey, APRN - CNP Pulmonology [phone]    11/5/2024 11:00 AM Abiel Serrato,  Family Medicine [phone]    2/18/2025 10:00 AM Paolo Rodriguez MD Cardiology [phone]            Plan for follow-up call in 6-10 days based on severity of symptoms and risk factors. Plan for next call:  symptom management-CP, SOB, palpitations, BP, review PCP appt, any new or worsening symptoms         Hannah Henry RN

## 2024-05-07 ENCOUNTER — CARE COORDINATION (OUTPATIENT)
Dept: CASE MANAGEMENT | Age: 84
End: 2024-05-07

## 2024-05-07 NOTE — CARE COORDINATION
Care Transitions Note    Follow Up Call      Patient Current Location:  Home: [address] [place] [address]    Care Transition Nurse contacted the patient by telephone. Verified name and  as identifiers.    Additional needs identified to be addressed with provider   No needs identified                 Method of communication with provider: none.    Care Summary Note: Contacted pt for care transition follow up.  Xochitl states she is \"status quo\".  Denies having any c/o chest pain/discomfort, palpitations that she is aware of, shortness of breath, dizziness/lightheadedness.  Admits to not checking her BP.  States she didn't have batteries but finally bought some.  Readdressed RPM and benefits of the program.  Pt declines enrollment at this time.  States she does not think she needs it right now but will let CTN know if she changes her mind in the future.  She agrees to start monitoring her BP daily.  We reviewed he BP medications.  She wrote down what each medication is for.  Pt reports she still has not been overexerting herself.  She is sitting and resting majority of the time although she is up and moving around.  Daughter and DIL help with preparing meals.  Daughter is currently with pt.  We discussed importance of monitoring BP and s/s and when to contact providers.  No questions or concerns at this time.      Plan of care updates since last contact:  Education: monitoring BP and s/s       Advance Care Planning:   Does patient have an Advance Directive:  on file .    Medication Review:  No changes since last call.     Remote Patient Monitoring:  Offered patient enrollment in the Remote Patient Monitoring (RPM) program for in-home monitoring: Yes, but did not enroll at this time: declined and will continue to self monitor at this time.  Pt will let CTN know  if she changes her mind .    Assessments:  Care Transitions Subsequent and Final Call    Subsequent and Final Calls  Do you have any ongoing symptoms?:

## 2024-05-15 ENCOUNTER — CARE COORDINATION (OUTPATIENT)
Dept: CASE MANAGEMENT | Age: 84
End: 2024-05-15

## 2024-05-15 NOTE — CARE COORDINATION
Care Transitions Note    Follow Up Call      Patient Current Location:  Home: [address] [place] [address]    Care Transition Nurse contacted the patient by telephone. Verified name and  as identifiers.    Additional needs identified to be addressed with provider   No needs identified                 Method of communication with provider: none.    Care Summary Note:   Spoke with pt for care transition follow up.  Xochitl states she is feeling okay.  Reports she does not have as much energy as she use to have.  Reports having an episode of palpitations approximately one week at night.  She does not remember how long the episode lasted but states it was a short period.  Denies having any chest pain, shortness of breath, dizziness/lightheadedness, swelling.  She is monitoring her BP and pulse every once in a while.  Reports SBP running around 137.  She couldn't remember the diastolic reading.  Pulse running 70-80 bpm.  Encouraged to monitor daily.  Pt reports that she sets up her own medications.  She uses a pill box.  States she doesn't forget to take her medications because she is forgetful or having memory issues.  She admits to forgetting to take her medications a few times d/t being so busy and on the go all the time.  We discussed trying to get her into a routine of taking her medications at the same time each day.  She has a few medications that she should be taking twice a day.  Explained she should take those medications at the same time in the evening as well.  Pt has a device with Minerva on it.  Encouraged to set up an alarm for Minerva to remind her when it is time to take her medications.  She agrees to try this.  No other questions or needs at this time.      Plan of care updates since last contact:  Education: monitor BP, HR, s/s       Advance Care Planning:   Does patient have an Advance Directive:  on file .    Medication Review:  No changes since last call.     Remote Patient Monitoring:  Offered

## 2024-05-21 ENCOUNTER — CARE COORDINATION (OUTPATIENT)
Dept: CASE MANAGEMENT | Age: 84
End: 2024-05-21

## 2024-05-21 NOTE — CARE COORDINATION
Care Transitions Note    Follow Up Call      Attempted to reach patient for transitions of care follow up.  Unable to reach patient.      Outreach Attempts:   HIPAA compliant voicemail left for patient.     Care Summary Note: 1st attempt to contact pt for care transition follow up.  Unable to reach pt.  Left message with contact information and request for call back.      Follow Up Appointment:   Future Appointments         Provider Specialty Dept Phone    5/28/2024 8:00 AM Paolo Rodriguez MD Cardiology [phone]    6/26/2024 10:20 AM Nellie Garvey, APRN - CNP Pulmonology [phone]    11/5/2024 11:00 AM Abiel Serrato,  Family Medicine [phone]    2/18/2025 10:00 AM Paolo Rodriguez MD Cardiology [phone]            Plan for follow-up call in 2-5 days based on severity of symptoms and risk factors. Plan for next call: symptom management-BP, HR, palpitations, taking medications, any new or worsening symptoms         Hannah Henry RN

## 2024-05-23 ENCOUNTER — CARE COORDINATION (OUTPATIENT)
Dept: CASE MANAGEMENT | Age: 84
End: 2024-05-23

## 2024-05-23 NOTE — CARE COORDINATION
Care Transitions Note    Final Call      Patient Current Location:  [place] (in parking lot waiting on her DIL)    Care Transition Nurse contacted the patient by telephone. Verified name and  as identifiers.    Patient graduated from the Care Transitions program on 24.  Patient/family progressing towards self management. .      Advance Care Planning:   Does patient have an Advance Directive:  on file .    Handoff:   Patient/family agreeable to [facility] outreach.      Care Summary Note:  Contacted pt for care transition follow up.  Xochitl states she is doing pretty good.  Denies having any c/o chest pain/discomfort, pressure, tightness, palpitations,  shortness of breath, dizziness/lightheadedness.  Reports she has noticed soreness in her left arm which she thinks is from stretching.  Denies any radiating soreness/pain, numbness, tingling.  She admits to not checking her BP or HR.  Explained reasoning for monitoring her BP and HR.  She informed CTN that she will try to remember.  States she is just very busy.  She informed CTN that she has taken her medications every day since we last spoke.  She states she set up Minerva to remind her but she doesn't pay attention to it or is not home to get the reminder.  Reports does get extra tired on days and reminds herself to slow down and take it easy.  We discussed s/s and when to contact providers as well as when to report to the ED.  Informed her that she has an appt with Dr. Rodriguez on 24 at 8 am.  She made a note of her appt.  No questions or concerns at this time.  Final call.  Handing off to [facility].  Pt is agreeable to [facility] follow up calls.      Sent staff message to Bonny Quijano with update.      Assessments:  Care Transitions Subsequent and Final Call    Subsequent and Final Calls  Do you have any ongoing symptoms?: Yes  Patient-reported symptoms: Other  Have your medications changed?: No  Do you have any questions related to your medications?: No  Do you currently

## 2024-05-27 NOTE — PROGRESS NOTES
[facility] [place] SPECIALTY  [facility] CARDIOLOGY  [address]  [address]  [place] [address]  Dept: [phone]  Dept Fax: [phone]  Loc: [phone]    Visit Date: 5/28/2024    Ms. Olea is a 83 y.o. female  who presented for:  Atrial fibrillation   Post admission for recurrent atrial fibrillation with RVR  HPI:   HPI   Alena Olea is a pleasant 83 year old female patient who  has a past medical history of Arthritis, Cancer (HCC), Diabetes mellitus (HCC), Hyperlipidemia, Hypertension, Osteoporosis, and Sleep apnea. She has h/o atrial fibrillation, on Eliquis. LHC in 2018 revealed an EF of 55%, nonobstructive CAD with mild calcification. Echocardiogram 1/2024 revealed an EF of 55-60%, bi-atrial enlargement. The patient had recent admissions for recurrent atrial fibrillation with RVR (1/2024 and more recently on 4/2024). The patient had been compliant with her medications before being admitted to the hospital. Patient denies chest pain, shortness of breath, dyspnea on exertion. She had one episode of mild palpitations since she was discharged from hospital. She denies dizziness, syncope.       Current Outpatient Medications:     levothyroxine (SYNTHROID) 25 MCG tablet, TAKE 1 TABLET BY MOUTH DAILY, Disp: 90 tablet, Rfl: 3    diclofenac sodium (VOLTAREN) 1 % GEL, Apply 2 g topically 2 times daily, Disp: 1 g, Rfl: 1    dilTIAZem (CARDIZEM CD) 300 MG extended release capsule, Take 1 capsule by mouth daily, Disp: 30 capsule, Rfl: 3    apixaban (ELIQUIS) 5 MG TABS tablet, TAKE 1 TABLET BY MOUTH EVERY MORNING AND EVERY EVENING, Disp: 180 tablet, Rfl: 3    digoxin (LANOXIN) 125 MCG tablet, Take 1 tablet by mouth daily, Disp: 90 tablet, Rfl: 3    metFORMIN (GLUCOPHAGE-XR) 500 MG extended release tablet, Take 1 tablet by mouth 2 times daily, Disp: 180 tablet, Rfl: 3    metoprolol succinate (TOPROL XL) 50 MG extended release tablet, Take 1 tablet by mouth daily, Disp: 90 tablet,

## 2024-05-28 ENCOUNTER — CARE COORDINATION (OUTPATIENT)
Dept: CARE COORDINATION | Age: 84
End: 2024-05-28

## 2024-05-28 ENCOUNTER — OFFICE VISIT (OUTPATIENT)
Dept: CARDIOLOGY CLINIC | Age: 84
End: 2024-05-28
Payer: MEDICARE

## 2024-05-28 VITALS
WEIGHT: 186.8 LBS | HEIGHT: 66 IN | HEART RATE: 80 BPM | SYSTOLIC BLOOD PRESSURE: 122 MMHG | BODY MASS INDEX: 30.02 KG/M2 | DIASTOLIC BLOOD PRESSURE: 60 MMHG

## 2024-05-28 DIAGNOSIS — I48.19 PERSISTENT ATRIAL FIBRILLATION (HCC): Primary | ICD-10-CM

## 2024-05-28 PROCEDURE — G8399 PT W/DXA RESULTS DOCUMENT: HCPCS | Performed by: INTERNAL MEDICINE

## 2024-05-28 PROCEDURE — 1036F TOBACCO NON-USER: CPT | Performed by: INTERNAL MEDICINE

## 2024-05-28 PROCEDURE — 1123F ACP DISCUSS/DSCN MKR DOCD: CPT | Performed by: INTERNAL MEDICINE

## 2024-05-28 PROCEDURE — 99214 OFFICE O/P EST MOD 30 MIN: CPT | Performed by: INTERNAL MEDICINE

## 2024-05-28 PROCEDURE — 1090F PRES/ABSN URINE INCON ASSESS: CPT | Performed by: INTERNAL MEDICINE

## 2024-05-28 PROCEDURE — G8417 CALC BMI ABV UP PARAM F/U: HCPCS | Performed by: INTERNAL MEDICINE

## 2024-05-28 PROCEDURE — 3078F DIAST BP <80 MM HG: CPT | Performed by: INTERNAL MEDICINE

## 2024-05-28 PROCEDURE — 3074F SYST BP LT 130 MM HG: CPT | Performed by: INTERNAL MEDICINE

## 2024-05-28 PROCEDURE — G8427 DOCREV CUR MEDS BY ELIG CLIN: HCPCS | Performed by: INTERNAL MEDICINE

## 2024-05-28 RX ORDER — FLECAINIDE ACETATE 50 MG/1
50 TABLET ORAL 2 TIMES DAILY
Qty: 60 TABLET | Refills: 3 | Status: SHIPPED | OUTPATIENT
Start: 2024-05-28

## 2024-05-28 SDOH — ECONOMIC STABILITY: FOOD INSECURITY: WITHIN THE PAST 12 MONTHS, THE FOOD YOU BOUGHT JUST DIDN'T LAST AND YOU DIDN'T HAVE MONEY TO GET MORE.: NEVER TRUE

## 2024-05-28 SDOH — HEALTH STABILITY: PHYSICAL HEALTH: ON AVERAGE, HOW MANY DAYS PER WEEK DO YOU ENGAGE IN MODERATE TO STRENUOUS EXERCISE (LIKE A BRISK WALK)?: 0 DAYS

## 2024-05-28 SDOH — HEALTH STABILITY: PHYSICAL HEALTH: ON AVERAGE, HOW MANY MINUTES DO YOU ENGAGE IN EXERCISE AT THIS LEVEL?: 0 MIN

## 2024-05-28 SDOH — ECONOMIC STABILITY: FOOD INSECURITY: WITHIN THE PAST 12 MONTHS, YOU WORRIED THAT YOUR FOOD WOULD RUN OUT BEFORE YOU GOT MONEY TO BUY MORE.: NEVER TRUE

## 2024-05-28 SDOH — ECONOMIC STABILITY: INCOME INSECURITY: IN THE LAST 12 MONTHS, WAS THERE A TIME WHEN YOU WERE NOT ABLE TO PAY THE MORTGAGE OR RENT ON TIME?: NO

## 2024-05-28 SDOH — SOCIAL STABILITY: SOCIAL NETWORK
DO YOU BELONG TO ANY CLUBS OR ORGANIZATIONS SUCH AS CHURCH GROUPS UNIONS, FRATERNAL OR ATHLETIC GROUPS, OR SCHOOL GROUPS?: YES

## 2024-05-28 SDOH — SOCIAL STABILITY: SOCIAL NETWORK: ARE YOU MARRIED, WIDOWED, DIVORCED, SEPARATED, NEVER MARRIED, OR LIVING WITH A PARTNER?: MARRIED

## 2024-05-28 SDOH — ECONOMIC STABILITY: INCOME INSECURITY: HOW HARD IS IT FOR YOU TO PAY FOR THE VERY BASICS LIKE FOOD, HOUSING, MEDICAL CARE, AND HEATING?: NOT HARD AT ALL

## 2024-05-28 SDOH — HEALTH STABILITY: MENTAL HEALTH
STRESS IS WHEN SOMEONE FEELS TENSE, NERVOUS, ANXIOUS, OR CAN'T SLEEP AT NIGHT BECAUSE THEIR MIND IS TROUBLED. HOW STRESSED ARE YOU?: ONLY A LITTLE

## 2024-05-28 SDOH — SOCIAL STABILITY: SOCIAL NETWORK: HOW OFTEN DO YOU ATTENT MEETINGS OF THE CLUB OR ORGANIZATION YOU BELONG TO?: MORE THAN 4 TIMES PER YEAR

## 2024-05-28 SDOH — SOCIAL STABILITY: SOCIAL NETWORK: HOW OFTEN DO YOU GET TOGETHER WITH FRIENDS OR RELATIVES?: MORE THAN THREE TIMES A WEEK

## 2024-05-28 SDOH — SOCIAL STABILITY: SOCIAL NETWORK: HOW OFTEN DO YOU ATTEND CHURCH OR RELIGIOUS SERVICES?: MORE THAN 4 TIMES PER YEAR

## 2024-05-28 SDOH — SOCIAL STABILITY: SOCIAL NETWORK
IN A TYPICAL WEEK, HOW MANY TIMES DO YOU TALK ON THE PHONE WITH FAMILY, FRIENDS, OR NEIGHBORS?: MORE THAN THREE TIMES A WEEK

## 2024-05-28 SDOH — ECONOMIC STABILITY: HOUSING INSECURITY: IN THE LAST 12 MONTHS, HOW MANY PLACES HAVE YOU LIVED?: 1

## 2024-05-28 NOTE — CARE COORDINATION
Ambulatory Care Coordination Note  2024    Patient Current Location:  Home: [address] [place] [address]    ACM contacted the patient by telephone. Verified name and  with patient as identifiers. Provided introduction to self, and explanation of the ACM role.     ACM: Bonny Quijano RN    Challenges to be reviewed by the provider   Additional needs identified to be addressed with provider: No  none               Method of communication with provider: none.    Alena was referred to care coordination following recent hospitalization for A-fib.  Spoke with Xochitl today.    Pt has h/o: a-fib, HTN, DM, ANITA  ANITA-follows with sleep medicine.  Has appt in July.  Wears CPAP  DM: A1C 7.3.  on metformin.  Does not check BS's  A-fib-on Eliquis.  Was seen by cardiology today.  Digoxin was stopped.  Pt started on Tambacor.  Reports that pharmacy had to order it in.  She will  tomorrow.   Very independent.  Helps care for her  with parkinson's.  Will use walking stick at times if going far distances.   Daughter also lives with pt. Family assists with household chores  Pt drives. Informed her of [facility] as option for transport if ever needed.    Pt has BP cuff and monitors bp on occasion.   Plan of care:  Weekly ACM calls  Pt to obtain Tambacor tomorrow.   Encouraged to stay active  Reinforce/encourage My Chart use and utilization of same day appts at PCP for acute concerns.       Offered patient enrollment in the Remote Patient Monitoring (RPM) program for in-home monitoring: Patient is not eligible for RPM program because: deferred at this time; will discuss at future followup.    Ambulatory Care Coordination Assessment    Care Coordination Protocol  Referral from Primary Care Provider: No  Week 1 - Initial Assessment     Do you have all of your prescriptions and are they filled?: Yes  Are you able to afford your medications?: Yes  How often do you have trouble taking your medications the way you have been

## 2024-05-29 RX ORDER — FLECAINIDE ACETATE 50 MG/1
50 TABLET ORAL 2 TIMES DAILY
Qty: 180 TABLET | OUTPATIENT
Start: 2024-05-29

## 2024-06-05 ENCOUNTER — CARE COORDINATION (OUTPATIENT)
Dept: CARE COORDINATION | Age: 84
End: 2024-06-05

## 2024-06-05 NOTE — CARE COORDINATION
Ambulatory Care Coordination Note  2024    Patient Current Location:  Home: [address] [place] [address]     ACM contacted the patient by telephone. Verified name and  with patient as identifiers. Provided introduction to self, and explanation of the ACM role.     Challenges to be reviewed by the provider   Additional needs identified to be addressed with provider: No  none               Method of communication with provider: none.    ACM: Bonny Quijano RN    Alena was referred to care coordination following recent hospitalization for A-fib.  Spoke with Xochitl today.    Pt has h/o: a-fib, HTN, DM, ANITA,  spoke with Xochitl today.   Pt reports that she has lymphedema pumps.  Admits that she has not been wearing them recently and  an in turn has noticed some increased edema in ble. Pt states that she is going to start using pumps again.  Encouraged to do so as instructed.   A-fib-pt had cardiology last wk.  Digoxin changed to flecainide. Pt has started medication. Tolerating it w/out difficulty  Has BP cuff but has not been monitoring.   DM: A1C 7.3.  on metformin.  Does not check BS's  A-fib-on Eliquis.    Very independent. Helps care for her  with parkinson's. Will use walking stick at times if going far distances.   Plan of care:  Reviewed time of day to be taking twice daily meds.  Pt forgets if taking Eliquis at HS.  Suggested take it at evening meal when she is taking other twice daily meds.   Enrolled in RPM  Resume use of lymphedema pumps  Work to stay active  Reinforce safety precautions  Start monitoring wts.   Offered patient enrollment in the Remote Patient Monitoring (RPM) program for in-home monitoring: Yes, patient enrolled; current status is preactivatedenrolled in program today.   Hypertension - Encounter Level    Symptoms: hypertension associated anxiety: Neg, hypertension associated blurred vision: Neg, hypertension associated chest pain: Neg, hypertension associated headaches: Neg,

## 2024-06-13 ENCOUNTER — CARE COORDINATION (OUTPATIENT)
Dept: CARE COORDINATION | Age: 84
End: 2024-06-13

## 2024-06-13 NOTE — CARE COORDINATION
Attempted to reach patient for continued Care Coordination follow up and education.  Patient was unavailable at the time of my call, and a generic voicemail message was left asking patient to return my call at [phone].

## 2024-06-21 ENCOUNTER — CARE COORDINATION (OUTPATIENT)
Dept: CARE COORDINATION | Age: 84
End: 2024-06-21

## 2024-06-21 NOTE — CARE COORDINATION
Ambulatory Care Coordination Note     6/21/2024 12:03 PM     Patient outreach attempt by this ACM today to perform care management follow up . AC was unable to reach the patient by telephone today; left voice message requesting a return phone call to this ACM.     ACM: Bonny Qiujano RN     Care Summary Note: Alena was referred to care coordination following recent hospitalization for A-fib.  Spoke with Xochitl today.    Pt has h/o: a-fib, HTN, DM, ANITA,  spoke with Xochitl today.     PCP/Specialist follow up:   Future Appointments         Provider Specialty Dept Phone    6/26/2024 10:20 AM Nellie Garvey, APRN - CNP Pulmonology [phone]    11/5/2024 11:00 AM Abiel Serrato,  Family Medicine [phone]    5/27/2025 9:00 AM Paolo Rodriguez MD Cardiology [phone]            Follow Up:   Plan for next AC outreach in approximately 1 week to complete:  - CC Protocol assessments  - disease specific assessments  - RPM.

## 2024-06-27 ENCOUNTER — CARE COORDINATION (OUTPATIENT)
Dept: CARE COORDINATION | Age: 84
End: 2024-06-27

## 2024-06-27 NOTE — CARE COORDINATION
Ambulatory Care Coordination Note     6/27/2024 12:48 PM     Patient outreach attempt by this ACM today to perform care management follow up . AC was unable to reach the patient by telephone today; left voice message requesting a return phone call to this ACM.     ACM: Bonny Quijano RN     Care Summary Note: Alena was referred to care coordination following recent hospitalization for A-fib.  Spoke with Xochitl today.    Pt has h/o: a-fib, HTN, DM, ANITA,  spoke with Xochitl today.     PCP/Specialist follow up:   Future Appointments         Provider Specialty Dept Phone    11/5/2024 11:00 AM Abiel Serrato DO Family Medicine [phone]    5/27/2025 9:00 AM Paolo Rodriguez MD Cardiology [phone]            Follow Up:   Plan for next AC outreach in approximately 1 week to complete:  - CC Protocol assessments  - disease specific assessments  - advance care planning  - goal progression  - RPM.

## 2024-07-03 ENCOUNTER — CARE COORDINATION (OUTPATIENT)
Dept: CARE COORDINATION | Age: 84
End: 2024-07-03

## 2024-07-03 NOTE — CARE COORDINATION
Attempted to reach patient for continued Care Coordination follow up and education.  Patient was unavailable at the time of my call, and a generic voicemail message was left asking patient to return my call at [phone].

## 2024-07-10 ENCOUNTER — CARE COORDINATION (OUTPATIENT)
Dept: CARE COORDINATION | Age: 84
End: 2024-07-10

## 2024-07-10 DIAGNOSIS — E11.40 TYPE 2 DIABETES MELLITUS WITH DIABETIC NEUROPATHY, WITHOUT LONG-TERM CURRENT USE OF INSULIN (HCC): Primary | ICD-10-CM

## 2024-07-10 NOTE — CARE COORDINATION
Ambulatory Care Coordination Note     7/10/2024 11:18 AM     Patient outreach attempt by this ACM today to perform care management follow up . ACM was unable to reach the patient by telephone today; left voice message requesting a return phone call to this ACM.     ACM: Bonny Quijano RN     Care Summary Note: Alena was referred to care coordination following recent hospitalization for A-fib.      Pt has h/o: a-fib, HTN, DM, ANITA,  spoke with Xochitl today.     PCP/Specialist follow up:   Future Appointments         Provider Specialty Dept Phone    11/5/2024 11:00 AM Abiel Serrato DO Family Medicine [phone]    5/27/2025 9:00 AM Paolo Rodriguez MD Cardiology [phone]            Follow Up:   Plan for next ACM outreach in approximately 1-2 days  to complete:  - CC Protocol assessments  - disease specific assessments  - goal progression  - education   - RPM.

## 2024-07-10 NOTE — CARE COORDINATION
Ambulatory Care Coordination Note     7/10/2024 12:00 PM     Patient Current Location:  Home: [address] [place] [address]     ACM contacted the patient by telephone. Verified name and  with patient as identifiers.         ACM: Bonny Quijano RN     Challenges to be reviewed by the provider   Additional needs identified to be addressed with provider yes  Looks like pt is past due for A1C. Last A1C in computer is from 23.  Pt does not have any upcoming appts scheduled.  Please advise               Method of communication with provider: none.    Care Summary Note: Alena was referred to care coordination following recent hospitalization for A-fib.  Spoke with Xochitl today.    Pt has h/o: a-fib, HTN, DM, ANITA,  received call back from Xochitl today.     Xochitl reports fatigue but otherwise denies concerns.  Pt continues to struggle with remembering to take evening dose of Tambacor and Eliquis.  Stressed importance of getting both doses in.  Currently pt taking prior to bed but states she often falls asleep before taking it.  Suggested to pt again that she try taking it with her evening meal.  Pt states that she will try.   Pt has lymphedema pumps. Encouraged to wear.   Offered patient enrollment in the Remote Patient Monitoring (RPM) program for in-home monitoring: Yes, patient enrolled; current status is preactivated: verified that pt has received her equipment.  Offered to call activation line.  Pt requesting to wait until she has time to get equipment out.  Pt is agreeable to call back this wk for assistance with activation.  .     Assessments Completed:   Diabetes Assessment    Medic Alert ID: No  Meal Planning: Avoidance of concentrated sweets   Do you have barriers with adherence to non-pharmacologic self-management interventions? (Nutrition/Exercise/Self-Monitoring): No   Have you ever had to go to the ED for symptoms of low blood sugar?: No       Do you have hyperglycemia symptoms?: No   Do you have

## 2024-07-15 ENCOUNTER — CARE COORDINATION (OUTPATIENT)
Dept: CARE COORDINATION | Age: 84
End: 2024-07-15

## 2024-07-15 NOTE — CARE COORDINATION
Ambulatory Care Coordination Note     7/15/2024 3:41 PM     Patient outreach attempt by this ACM today to perform care management follow up . AC was unable to reach the patient by telephone today; left voice message requesting a return phone call to this ACM.     ACM: Bonny Quijano RN     Care Summary Note:  Alena was referred to care coordination following recent hospitalization for A-fib.  Spoke with Xochitl today.    Pt has h/o: a-fib, HTN, DM, ANITA,  received call back from Xochitl today.       PCP/Specialist follow up:   Future Appointments         Provider Specialty Dept Phone    11/5/2024 11:00 AM Abiel Serrato DO Family Medicine [phone]    5/27/2025 9:00 AM Paolo Rodriguez MD Cardiology [phone]          Pt has yet to activate RPM.  Spoke with Natalie Vieyra. She will have HRS reach out to pt again.    Follow Up:   Plan for next ACM outreach in approximately 1 week to complete:  - CC Protocol assessments  - disease specific assessments  - goal progression  - education   - RPM.

## 2024-07-19 ENCOUNTER — CARE COORDINATION (OUTPATIENT)
Dept: CARE COORDINATION | Age: 84
End: 2024-07-19

## 2024-07-19 NOTE — CARE COORDINATION
Ambulatory Care Coordination Note     7/19/2024 11:05 AM     Patient outreach attempt by this ACM today to perform care management follow up . [facility] was unable to reach the patient by telephone today; left voice message requesting a return phone call to this ACM.     ACM: Bonny Quijano RN     Care Summary Note: Alena was referred to care coordination following recent hospitalization for A-fib.  Spoke with Xochitl today.    Pt has h/o: a-fib, HTN, DM, ANITA,  received call back from Xochitl today.      PCP/Specialist follow up:   Future Appointments         Provider Specialty Dept Phone    11/5/2024 11:00 AM Abiel Serrato DO Family Medicine [phone]    5/27/2025 9:00 AM Paolo Rodriguez MD Cardiology [phone]            Follow Up:   Plan for next [facility] outreach in approximately 1 week to complete:  - CC Protocol assessments  - disease specific assessments  - goal progression  - education .

## 2024-07-23 ENCOUNTER — CARE COORDINATION (OUTPATIENT)
Dept: CARE COORDINATION | Age: 84
End: 2024-07-23

## 2024-07-23 NOTE — CARE COORDINATION
Attempted to reach patient for continued Care Coordination follow up and education.  Patient was unavailable at the time of my call, and a generic voicemail message was left asking patient to return my call at [phone].

## 2024-08-06 ENCOUNTER — CARE COORDINATION (OUTPATIENT)
Dept: CARE COORDINATION | Age: 84
End: 2024-08-06

## 2024-08-06 NOTE — CARE COORDINATION
Ambulatory Care Coordination Note     8/6/2024 4:13 PM     patient outreach attempt by this ACM today to perform care management follow up . ACM was unable to reach the patient by telephone today; left voice message requesting a return phone call to this ACM.       Patient closed (unable to reach patient) from the High Risk Care Management program on 8/6/2024.   No further Ambulatory Care Manager follow up scheduled.        My Chart message sent to pt.

## 2024-08-28 RX ORDER — METFORMIN HCL 500 MG
500 TABLET, EXTENDED RELEASE 24 HR ORAL 2 TIMES DAILY
Qty: 180 TABLET | Refills: 3 | Status: SHIPPED | OUTPATIENT
Start: 2024-08-28

## 2024-10-24 ENCOUNTER — TELEPHONE (OUTPATIENT)
Dept: FAMILY MEDICINE CLINIC | Age: 84
End: 2024-10-24

## 2024-10-24 NOTE — TELEPHONE ENCOUNTER
Patient called back and appointment scheduled for Monday to discuss medications she stated that she doesn't know what medications she is suppose to be on. She only had the medication and it was empty so she threw the bottle away.   She doesn't have any medication.

## 2024-10-24 NOTE — TELEPHONE ENCOUNTER
Patient called and she a very bad connection was able to get patient chart pulled up and she stated that she needed a medication refill. She was unsure what medication. Patient stated she was only on one medication. Informed patient had more then one medication. Patient seem confused. Phone call was still going in and out. She stated that she will try calling back for a better connection.     Patient didn't call back for 5 minutes. Call back and had to leave a message to call the office again.

## 2024-10-28 ENCOUNTER — OFFICE VISIT (OUTPATIENT)
Dept: FAMILY MEDICINE CLINIC | Age: 84
End: 2024-10-28

## 2024-10-28 VITALS
HEIGHT: 66 IN | SYSTOLIC BLOOD PRESSURE: 128 MMHG | DIASTOLIC BLOOD PRESSURE: 70 MMHG | HEART RATE: 80 BPM | BODY MASS INDEX: 30.71 KG/M2 | RESPIRATION RATE: 16 BRPM | WEIGHT: 191.1 LBS

## 2024-10-28 DIAGNOSIS — I10 PRIMARY HYPERTENSION: ICD-10-CM

## 2024-10-28 DIAGNOSIS — F03.93 DEMENTIA WITH MOOD DISTURBANCE, UNSPECIFIED DEMENTIA SEVERITY, UNSPECIFIED DEMENTIA TYPE (HCC): ICD-10-CM

## 2024-10-28 DIAGNOSIS — G47.33 OBSTRUCTIVE SLEEP APNEA ON CPAP: ICD-10-CM

## 2024-10-28 DIAGNOSIS — E11.40 TYPE 2 DIABETES MELLITUS WITH DIABETIC NEUROPATHY, WITHOUT LONG-TERM CURRENT USE OF INSULIN (HCC): ICD-10-CM

## 2024-10-28 DIAGNOSIS — E03.9 HYPOTHYROIDISM, UNSPECIFIED TYPE: ICD-10-CM

## 2024-10-28 DIAGNOSIS — E66.9 OBESITY (BMI 30-39.9): ICD-10-CM

## 2024-10-28 DIAGNOSIS — I48.91 ATRIAL FIBRILLATION WITH RAPID VENTRICULAR RESPONSE (HCC): ICD-10-CM

## 2024-10-28 DIAGNOSIS — E78.00 PURE HYPERCHOLESTEROLEMIA: ICD-10-CM

## 2024-10-28 DIAGNOSIS — Z00.00 MEDICARE ANNUAL WELLNESS VISIT, SUBSEQUENT: Primary | ICD-10-CM

## 2024-10-28 DIAGNOSIS — I48.20 CHRONIC ATRIAL FIBRILLATION (HCC): ICD-10-CM

## 2024-10-28 DIAGNOSIS — K21.9 GASTROESOPHAGEAL REFLUX DISEASE, UNSPECIFIED WHETHER ESOPHAGITIS PRESENT: ICD-10-CM

## 2024-10-28 DIAGNOSIS — I48.11 LONGSTANDING PERSISTENT ATRIAL FIBRILLATION (HCC): ICD-10-CM

## 2024-10-28 ASSESSMENT — PATIENT HEALTH QUESTIONNAIRE - PHQ9
2. FEELING DOWN, DEPRESSED OR HOPELESS: NOT AT ALL
1. LITTLE INTEREST OR PLEASURE IN DOING THINGS: NOT AT ALL
SUM OF ALL RESPONSES TO PHQ9 QUESTIONS 1 & 2: 0
SUM OF ALL RESPONSES TO PHQ QUESTIONS 1-9: 0

## 2024-10-28 NOTE — PROGRESS NOTES
Future  Recommendations for Preventive Services Due: see orders and patient instructions/AVS.  Recommended screening schedule for the next 5-10 years is provided to the patient in written form: see Patient Instructions/AVS.     Return for as needed.     Subjective       Patient's complete Health Risk Assessment and screening values have been reviewed and are found in Flowsheets. The following problems were reviewed today and where indicated follow up appointments were made and/or referrals ordered.    Positive Risk Factor Screenings with Interventions:    Fall Risk:  Do you feel unsteady or are you worried about falling? : (!) yes  2 or more falls in past year?: no  Fall with injury in past year?: no     Interventions:    Reviewed medications, home hazards, visual acuity, and co-morbidities that can increase risk for falls  See AVS for additional education material             Inactivity:  On average, how many days per week do you engage in moderate to strenuous exercise (like a brisk walk)?: 0 days (!) Abnormal  On average, how many minutes do you engage in exercise at this level?: 0 min  Interventions:  See AVS for additional education material     Abnormal BMI (obese):  Body mass index is 30.84 kg/m². (!) Abnormal  Interventions:  See AVS for additional education material        Dentist Screen:  Have you seen the dentist within the past year?: (!) No    Intervention:  Advised to schedule with their dentist     Vision Screen:  Do you have difficulty driving, watching TV, or doing any of your daily activities because of your eyesight?: (!) Yes  Have you had an eye exam within the past year?: (!) No  Interventions:   Patient encouraged to make appointment with their eye specialist    Safety:  Do you have any tripping hazards - loose or unsecured carpets or rugs?: (!) Yes  Interventions:  See AVS for additional education material    ADL's:   Patient reports needing help with:  Select all that apply: (!) Laundry,

## 2024-10-28 NOTE — PATIENT INSTRUCTIONS
August 6, 2023  Content Version: 14.2  © 2024 [employer].   Care instructions adapted under license by [facility]. If you have questions about a medical condition or this instruction, always ask your healthcare professional. [employer] disclaims any warranty or liability for your use of this information.           Learning About Vision Tests  What are vision tests?     The four most common vision tests are visual acuity tests, refraction, visual field tests, and color vision tests.  Visual acuity (sharpness) tests  These tests are used:  To see if you need glasses or contact lenses.  To monitor an eye problem.  To check an eye injury.  Visual acuity tests are done as part of routine exams. You may also have this test when you get your 's license or apply for some types of jobs.  Visual field tests  These tests are used:  To check for vision loss in any area of your range of vision.  To screen for certain eye diseases.  To look for nerve damage after a stroke, head injury, or other problem that could reduce blood flow to the brain.  Refraction and color tests  A refraction test is done to find the right prescription for glasses and contact lenses.  A color vision test is done to check for color blindness.  Color vision is often tested as part of a routine exam. You may also have this test when you apply for a job where recognizing different colors is important, such as , electronics, or the .  How are vision tests done?  Visual acuity test   You cover one eye at a time.  You read aloud from a wall chart across the room.  You read aloud from a small card that you hold in your hand.  Refraction   You look into a special device.  The device puts lenses of different strengths in front of each eye to see how strong your glasses or contact lenses need to be.  Visual field tests   Your doctor may have you look through special machines.  Or your doctor may simply have

## 2024-10-29 PROBLEM — F03.93 DEMENTIA WITH MOOD DISTURBANCE (HCC): Status: ACTIVE | Noted: 2024-10-29

## 2024-10-29 RX ORDER — METFORMIN HYDROCHLORIDE 500 MG/1
500 TABLET, EXTENDED RELEASE ORAL 2 TIMES DAILY
Qty: 180 TABLET | Refills: 3
Start: 2024-10-29

## 2024-10-29 RX ORDER — OMEPRAZOLE 40 MG/1
40 CAPSULE, DELAYED RELEASE ORAL DAILY
Qty: 90 CAPSULE | Refills: 3
Start: 2024-10-29

## 2024-10-29 RX ORDER — LEVOTHYROXINE SODIUM 25 UG/1
25 TABLET ORAL DAILY
Qty: 90 TABLET | Refills: 3
Start: 2024-10-29

## 2024-10-29 RX ORDER — FLECAINIDE ACETATE 50 MG/1
50 TABLET ORAL 2 TIMES DAILY
Qty: 60 TABLET | Refills: 3
Start: 2024-10-29

## 2024-10-29 RX ORDER — DILTIAZEM HYDROCHLORIDE 300 MG/1
300 CAPSULE, COATED, EXTENDED RELEASE ORAL DAILY
Qty: 30 CAPSULE | Refills: 3
Start: 2024-10-29

## 2024-10-29 RX ORDER — METOPROLOL SUCCINATE 50 MG/1
50 TABLET, EXTENDED RELEASE ORAL DAILY
Qty: 90 TABLET | Refills: 3
Start: 2024-10-29

## 2024-10-29 RX ORDER — PRAVASTATIN SODIUM 80 MG/1
80 TABLET ORAL DAILY
Qty: 90 TABLET | Refills: 3
Start: 2024-10-29

## 2024-10-29 ASSESSMENT — ENCOUNTER SYMPTOMS
RESPIRATORY NEGATIVE: 1
GASTROINTESTINAL NEGATIVE: 1

## 2024-12-17 ENCOUNTER — TELEPHONE (OUTPATIENT)
Dept: FAMILY MEDICINE CLINIC | Age: 84
End: 2024-12-17

## 2024-12-17 NOTE — TELEPHONE ENCOUNTER
Pt called office stating she called [facility] and \"they won't fill any of my prescriptions!\" Pt is asking \"is there a problem??\". The pharmacy told her to call her PCP. Advised pt I do not see in her chart where we had spoken to her pharmacy about any medication issues recently. Pt will call her pharmacy back to clarify what the issue is with getting her medication filled.

## 2024-12-30 DIAGNOSIS — I48.11 LONGSTANDING PERSISTENT ATRIAL FIBRILLATION (HCC): ICD-10-CM

## 2024-12-30 RX ORDER — FLECAINIDE ACETATE 50 MG/1
50 TABLET ORAL 2 TIMES DAILY
Qty: 60 TABLET | Refills: 3 | Status: SHIPPED | OUTPATIENT
Start: 2024-12-30

## 2024-12-30 NOTE — TELEPHONE ENCOUNTER
Daughter Yesi on HIPAA called office stating she is at pt home right now trying to get her medications in order. Yesi says pt is very confused and with her memory, she forgets what medications she is to take. Yesi says she stopped by the office in November to get a copy of her med list, she is there today getting her meds in order. Yesi found 2 bottles of Ramipril at pt home but says it's not on her med list. So she will dispose of the two bottles.     She saw pt is to be on Flecainide BID but pt does not have any of that at home. She is asking for a refill to be sent to [facility]. If no call back she will check with them after 4pm.

## 2025-03-06 DIAGNOSIS — I48.11 LONGSTANDING PERSISTENT ATRIAL FIBRILLATION (HCC): ICD-10-CM

## 2025-03-06 DIAGNOSIS — K21.9 GASTROESOPHAGEAL REFLUX DISEASE, UNSPECIFIED WHETHER ESOPHAGITIS PRESENT: ICD-10-CM

## 2025-03-06 RX ORDER — METOPROLOL SUCCINATE 50 MG/1
50 TABLET, EXTENDED RELEASE ORAL DAILY
Qty: 90 TABLET | Refills: 3 | Status: SHIPPED | OUTPATIENT
Start: 2025-03-06

## 2025-03-06 RX ORDER — DILTIAZEM HYDROCHLORIDE 300 MG/1
300 CAPSULE, COATED, EXTENDED RELEASE ORAL DAILY
Qty: 90 CAPSULE | Refills: 3 | Status: SHIPPED | OUTPATIENT
Start: 2025-03-06

## 2025-03-06 RX ORDER — DILTIAZEM HYDROCHLORIDE 300 MG/1
300 CAPSULE, COATED, EXTENDED RELEASE ORAL DAILY
Qty: 30 CAPSULE | Refills: 3 | Status: SHIPPED | OUTPATIENT
Start: 2025-03-06 | End: 2025-03-06

## 2025-03-06 RX ORDER — OMEPRAZOLE 40 MG/1
40 CAPSULE, DELAYED RELEASE ORAL DAILY
Qty: 90 CAPSULE | Refills: 3 | Status: SHIPPED | OUTPATIENT
Start: 2025-03-06

## 2025-03-06 NOTE — TELEPHONE ENCOUNTER
Patient's daughter, Yesi, on  HIPAA calling to state she went to refill Rxs for patient and pharmacy told her we cancelled Rxs via text message.  She will go to pharmacy and speak with pharmacist directly regarding.  She was trying to refill Omeprazole 40mg, Metoprolol Succinate 50mg and Diltiazem 180mg XR.  Aware last Rx for Diltiazem was for 300mg XR on 10/29/24.  Daughter will call back with any other issues.

## 2025-03-06 NOTE — TELEPHONE ENCOUNTER
Patient's daughter, Yesi returned call to the office. She went to [facility] n her lunch break, the pharmacist told her the prescriptions they had on file were  and new scripts would be needed., Scripts pending if appropriate.

## 2025-04-03 ENCOUNTER — TELEPHONE (OUTPATIENT)
Dept: FAMILY MEDICINE CLINIC | Age: 85
End: 2025-04-03

## 2025-04-03 DIAGNOSIS — I48.11 LONGSTANDING PERSISTENT ATRIAL FIBRILLATION (HCC): ICD-10-CM

## 2025-04-05 ENCOUNTER — APPOINTMENT (OUTPATIENT)
Dept: GENERAL RADIOLOGY | Age: 85
End: 2025-04-05
Payer: MEDICARE

## 2025-04-05 ENCOUNTER — APPOINTMENT (OUTPATIENT)
Dept: CT IMAGING | Age: 85
End: 2025-04-05
Payer: MEDICARE

## 2025-04-05 ENCOUNTER — HOSPITAL ENCOUNTER (EMERGENCY)
Age: 85
Discharge: HOME | End: 2025-04-05
Attending: EMERGENCY MEDICINE
Payer: MEDICARE

## 2025-04-05 VITALS
HEART RATE: 100 BPM | DIASTOLIC BLOOD PRESSURE: 64 MMHG | OXYGEN SATURATION: 97 % | RESPIRATION RATE: 16 BRPM | TEMPERATURE: 97.7 F | SYSTOLIC BLOOD PRESSURE: 134 MMHG

## 2025-04-05 DIAGNOSIS — S92.301A CLOSED NONDISPLACED FRACTURE OF METATARSAL BONE OF RIGHT FOOT, UNSPECIFIED METATARSAL, INITIAL ENCOUNTER: Primary | ICD-10-CM

## 2025-04-05 DIAGNOSIS — N39.0 URINARY TRACT INFECTION WITHOUT HEMATURIA, SITE UNSPECIFIED: ICD-10-CM

## 2025-04-05 LAB
ALBUMIN SERPL BCG-MCNC: 4 G/DL (ref 3.4–4.9)
ALP SERPL-CCNC: 87 U/L (ref 38–126)
ALT SERPL W/O P-5'-P-CCNC: 18 U/L (ref 10–35)
ANION GAP SERPL CALC-SCNC: 12 MEQ/L (ref 8–16)
AST SERPL-CCNC: 21 U/L (ref 10–35)
BACTERIA URNS QL MICRO: ABNORMAL /HPF
BASOPHILS ABSOLUTE: 0 THOU/MM3 (ref 0–0.1)
BASOPHILS NFR BLD AUTO: 0.4 %
BILIRUB SERPL-MCNC: 0.6 MG/DL (ref 0.3–1.2)
BILIRUB UR QL STRIP.AUTO: NEGATIVE
BUN SERPL-MCNC: 11 MG/DL (ref 8–23)
CALCIUM SERPL-MCNC: 9.8 MG/DL (ref 8.8–10.2)
CASTS #/AREA URNS LPF: ABNORMAL /LPF
CASTS 2: ABNORMAL /LPF
CHARACTER UR: CLEAR
CHLORIDE SERPL-SCNC: 99 MEQ/L (ref 98–111)
CO2 SERPL-SCNC: 24 MEQ/L (ref 22–29)
COLOR, UA: YELLOW
CREAT SERPL-MCNC: 0.8 MG/DL (ref 0.5–0.9)
CRYSTALS URNS MICRO: ABNORMAL
DEPRECATED RDW RBC AUTO: 42.1 FL (ref 35–45)
EOSINOPHIL NFR BLD AUTO: 1.3 %
EOSINOPHILS ABSOLUTE: 0.1 THOU/MM3 (ref 0–0.4)
EPITHELIAL CELLS, UA: ABNORMAL /HPF
ERYTHROCYTE [DISTWIDTH] IN BLOOD BY AUTOMATED COUNT: 13.4 % (ref 11.5–14.5)
GFR SERPL CREATININE-BSD FRML MDRD: 73 ML/MIN/1.73M2
GLUCOSE SERPL-MCNC: 167 MG/DL (ref 74–109)
GLUCOSE UR QL STRIP.AUTO: NEGATIVE MG/DL
HCT VFR BLD AUTO: 37.2 % (ref 37–47)
HGB BLD-MCNC: 12.5 GM/DL (ref 12–16)
HGB UR QL STRIP.AUTO: NEGATIVE
IMM GRANULOCYTES # BLD AUTO: 0.04 THOU/MM3 (ref 0–0.07)
IMM GRANULOCYTES NFR BLD AUTO: 0.4 %
KETONES UR QL STRIP.AUTO: NEGATIVE
LYMPHOCYTES ABSOLUTE: 1.1 THOU/MM3 (ref 1–4.8)
LYMPHOCYTES NFR BLD AUTO: 12.6 %
MCH RBC QN AUTO: 28.7 PG (ref 26–33)
MCHC RBC AUTO-ENTMCNC: 33.6 GM/DL (ref 32.2–35.5)
MCV RBC AUTO: 85.5 FL (ref 81–99)
MISCELLANEOUS 2: ABNORMAL
MONOCYTES ABSOLUTE: 0.9 THOU/MM3 (ref 0.4–1.3)
MONOCYTES NFR BLD AUTO: 9.8 %
NEUTROPHILS ABSOLUTE: 6.7 THOU/MM3 (ref 1.8–7.7)
NEUTROPHILS NFR BLD AUTO: 75.5 %
NITRITE UR QL STRIP: NEGATIVE
NRBC BLD AUTO-RTO: 0 /100 WBC
OSMOLALITY SERPL CALC.SUM OF ELEC: 273.3 MOSMOL/KG (ref 275–300)
PH UR STRIP.AUTO: 7 [PH] (ref 5–9)
PLATELET # BLD AUTO: 267 THOU/MM3 (ref 130–400)
PMV BLD AUTO: 10 FL (ref 9.4–12.4)
POTASSIUM SERPL-SCNC: 4.7 MEQ/L (ref 3.5–5.2)
PROT SERPL-MCNC: 7 G/DL (ref 6.4–8.3)
PROT UR STRIP.AUTO-MCNC: NEGATIVE MG/DL
RBC # BLD AUTO: 4.35 MILL/MM3 (ref 4.2–5.4)
RBC URINE: ABNORMAL /HPF
RENAL EPI CELLS #/AREA URNS HPF: ABNORMAL /[HPF]
SODIUM SERPL-SCNC: 135 MEQ/L (ref 135–145)
SP GR UR REFRACT.AUTO: 1.01 (ref 1–1.03)
UROBILINOGEN, URINE: 0.2 EU/DL (ref 0–1)
WBC # BLD AUTO: 8.9 THOU/MM3 (ref 4.8–10.8)
WBC #/AREA URNS HPF: ABNORMAL /HPF
WBC #/AREA URNS HPF: ABNORMAL /[HPF]
YEAST LIKE FUNGI URNS QL MICRO: ABNORMAL

## 2025-04-05 PROCEDURE — 87077 CULTURE AEROBIC IDENTIFY: CPT

## 2025-04-05 PROCEDURE — 70450 CT HEAD/BRAIN W/O DYE: CPT

## 2025-04-05 PROCEDURE — 87186 SC STD MICRODIL/AGAR DIL: CPT

## 2025-04-05 PROCEDURE — 87086 URINE CULTURE/COLONY COUNT: CPT

## 2025-04-05 PROCEDURE — 85025 COMPLETE CBC W/AUTO DIFF WBC: CPT

## 2025-04-05 PROCEDURE — 80053 COMPREHEN METABOLIC PANEL: CPT

## 2025-04-05 PROCEDURE — 6360000002 HC RX W HCPCS

## 2025-04-05 PROCEDURE — 81001 URINALYSIS AUTO W/SCOPE: CPT

## 2025-04-05 PROCEDURE — 72125 CT NECK SPINE W/O DYE: CPT

## 2025-04-05 PROCEDURE — 96372 THER/PROPH/DIAG INJ SC/IM: CPT

## 2025-04-05 PROCEDURE — 73610 X-RAY EXAM OF ANKLE: CPT

## 2025-04-05 PROCEDURE — 99284 EMERGENCY DEPT VISIT MOD MDM: CPT

## 2025-04-05 PROCEDURE — 36415 COLL VENOUS BLD VENIPUNCTURE: CPT

## 2025-04-05 PROCEDURE — 73630 X-RAY EXAM OF FOOT: CPT

## 2025-04-05 RX ORDER — FENTANYL CITRATE 50 UG/ML
25 INJECTION, SOLUTION INTRAMUSCULAR; INTRAVENOUS ONCE
Status: COMPLETED | OUTPATIENT
Start: 2025-04-05 | End: 2025-04-05

## 2025-04-05 RX ORDER — FENTANYL CITRATE 50 UG/ML
25 INJECTION, SOLUTION INTRAMUSCULAR; INTRAVENOUS ONCE
Refills: 0 | Status: DISCONTINUED | OUTPATIENT
Start: 2025-04-05 | End: 2025-04-05

## 2025-04-05 RX ORDER — CEPHALEXIN 500 MG/1
500 CAPSULE ORAL 2 TIMES DAILY
Qty: 14 CAPSULE | Refills: 0 | Status: SHIPPED | OUTPATIENT
Start: 2025-04-05 | End: 2025-04-12

## 2025-04-05 RX ORDER — HYDROCODONE BITARTRATE AND ACETAMINOPHEN 5; 325 MG/1; MG/1
1 TABLET ORAL EVERY 8 HOURS PRN
Qty: 9 TABLET | Refills: 0 | Status: SHIPPED | OUTPATIENT
Start: 2025-04-05 | End: 2025-04-08

## 2025-04-05 RX ADMIN — FENTANYL CITRATE 25 MCG: 50 INJECTION, SOLUTION INTRAMUSCULAR; INTRAVENOUS at 12:10

## 2025-04-05 ASSESSMENT — PAIN SCALES - GENERAL: PAINLEVEL_OUTOF10: 1

## 2025-04-05 ASSESSMENT — PAIN - FUNCTIONAL ASSESSMENT: PAIN_FUNCTIONAL_ASSESSMENT: 0-10

## 2025-04-05 ASSESSMENT — PAIN DESCRIPTION - ORIENTATION: ORIENTATION: RIGHT

## 2025-04-05 ASSESSMENT — PAIN DESCRIPTION - LOCATION: LOCATION: ANKLE

## 2025-04-05 NOTE — ED TRIAGE NOTES
Pt to ER via EMS with c/o R ankle pain. Pt state she tripped while going into her kitchen last evening and hurt her ankle. Swelling noted. Pt rates pain 1/10 at rest. Pt denies taking anything for her pain today.

## 2025-04-05 NOTE — ED PROVIDER NOTES
[facility] EMERGENCY DEPARTMENT  EMERGENCY DEPARTMENT ENCOUNTER          Pt Name: Alena Olea  MRN: [id number]  Birthdate 1940  Date of evaluation: 4/5/2025  Physician: Karmen Abarca MD  Supervising Attending Physician: Randy Peña DO       CHIEF COMPLAINT       Chief Complaint   Patient presents with    Ankle Pain         HISTORY OF PRESENT ILLNESS    HPI  Alena Olea is a 84 y.o. female who presents to the emergency department from home by EMS for evaluation of right foot and ankle pain.  Patient states that she tripped over the rug in the kitchen and twisted her ankle, fell forward and hit her nose and her upper lip on a kitchen block, and I cannot state whether she hit her head or not but states that she \" thinks\" she did not since she went briefly \" in and out\".  There was no presyncopal episode, no lightheadedness or dizziness and states that it was thought to be mechanical fall.  Patient is on multiple medications including Eliquis, alert and oriented upon exam.    Denies fever, chills, headache, lightheadedness, dizziness, vision changes, tinnitus, cough, congestion, sore throat, neck pain/stiffness, chest pain, shortness of breath, abdominal pain, nausea, vomiting, constipation, diarrhea, dysuria, blood in the urine or stool, numbness/tingling/weakness in extremities.    The patient has no other acute complaints at this time.      PAST MEDICAL AND SURGICAL HISTORY     Past Medical History:   Diagnosis Date    Arthritis     Cancer (HCC)     skin ca    Diabetes mellitus (HCC)     Hyperlipidemia     Hypertension     Osteoporosis 2017    Sleep apnea     has CPAP     Past Surgical History:   Procedure Laterality Date    APPENDECTOMY      CARDIAC CATHETERIZATION      two cath    COLONOSCOPY  01/08/2013    COLONOSCOPY N/A 5/17/2019    COLONOSCOPY DIAGNOSTIC performed by Lucas Kirk MD at [facility]    DILATION AND CURETTAGE OF UTERUS      HC INJECTION PROCEDURE  RESULTS   Laboratory results (none if blank):  Labs Reviewed   CULTURE, REFLEXED, URINE - Abnormal; Notable for the following components:       Result Value    Organism gram negative bacilli (*)     All other components within normal limits    Narrative:     Source: urine       Site:           Current Antibiotics: not stated   COMPREHENSIVE METABOLIC PANEL - Abnormal; Notable for the following components:    Glucose 167 (*)     All other components within normal limits   OSMOLALITY - Abnormal; Notable for the following components:    Osmolality Calc 273.3 (*)     All other components within normal limits   URINE WITH REFLEXED MICRO - Abnormal; Notable for the following components:    Leukocyte Esterase, Urine SMALL (*)     All other components within normal limits   CBC WITH AUTO DIFFERENTIAL   ANION GAP   GLOMERULAR FILTRATION RATE, ESTIMATED     All laboratory results are individually reviewed and interpreted by me in the clinical context of this patient.  See ED course below for results interpretation if applicable.  (A negative COVID-19 test should be interpreted as COVID no longer suspected unless otherwise noted in this encounter documentation note)  (Any cultures that may have been sent were not resulted at the time of this patient ED visit)      Radiologic studies results available at the moment of this note (None if blank):  CT HEAD WO CONTRAST   Final Result    No evidence of an acute process.               **This report has been created using voice recognition software. It may contain   minor errors which are inherent in voice recognition technology.**      Electronically signed by Dr. Yareli Adams      CT CERVICAL SPINE WO CONTRAST   Final Result      1. Stable CT scan of the cervical spine, no interval change since previous study   dated 6/1/2023.   2. No acute fracture noted..               **This report has been created using voice recognition software. It may contain   minor errors which are inherent

## 2025-04-05 NOTE — ED PROVIDER NOTES
I performed a history and physical examination of the patient and discussed management with the resident. I reviewed the resident’s note and agree with the documented findings and plan of care. Any areas of disagreement are noted on the chart. I was personally present for the key portions of any procedures. I have documented in the chart those procedures where I was not present during the key portions. I have reviewed the emergency nurses triage note. I agree with the chief complaint, past medical history, past surgical history, allergies, medications, social and family history as documented unless otherwise noted below. Documentation of the HPI, Physical Exam and Medical Decision Making performed by medical students or scribes is based on my personal performance of the HPI, PE and MDM. For Phys Assistant/ Nurse Practitioner cases/documentation I have personally evaluated this patient and have completed at least one if not all key elements of the E/M (history, physical exam, and MDM). My findings are as noted below.    In other words, I personally saw and examined the patient I have reviewed and agreed with the resident findings including all diagnostic interpretations and treatment plans as written.  I was present for the key portion of any procedures performed and the inclusive time noted in any critical care statement.    Patient presents today for right foot pain.  She has right foot and ankle pain.  Patient states she tripped over a rug twisted her ankle.  Patient has pain on the dorsum of her foot.  She has minor swelling there.  She also has pain at the right lateral malleolus.  Patient is able to move the toes.  Capillary refills within normal limits sensation is grossly intact.  There is no obvious deformities.  Patient also has an abrasion on the bridge of her nose.  She does state that she struck her face.  She does not think she lost consciousness.  She has no presyncopal episode.  No lightheadedness or

## 2025-04-06 LAB
BACTERIA UR CULT: ABNORMAL
ORGANISM: ABNORMAL

## 2025-04-07 LAB
BACTERIA UR CULT: ABNORMAL
ORGANISM: ABNORMAL

## 2025-04-08 NOTE — PROGRESS NOTES
Pharmacy Note  ED Culture Follow-up    Alena Olea is a 84 y.o. female.     Allergies: Ranolazine     Current antimicrobials:   Reviewed patient's urine culture - culture positive for Klebsiella oxytoca.  Patient was discharged on cephalexin, and culture is sensitive to prescribed medication.  Antibiotic prescribed at discharge is appropriate - no changes made to antibiotic regimen.    Please call with any questions. Ext. [phone]    Mora Raymond RPH, PharmD 2:24 PM 4/8/2025

## 2025-04-09 DIAGNOSIS — E11.40 TYPE 2 DIABETES MELLITUS WITH DIABETIC NEUROPATHY, WITHOUT LONG-TERM CURRENT USE OF INSULIN (HCC): ICD-10-CM

## 2025-04-09 RX ORDER — PRAVASTATIN SODIUM 80 MG/1
80 TABLET ORAL DAILY
Qty: 90 TABLET | Refills: 3 | Status: SHIPPED | OUTPATIENT
Start: 2025-04-09

## 2025-04-09 RX ORDER — METFORMIN HYDROCHLORIDE 500 MG/1
500 TABLET, EXTENDED RELEASE ORAL 2 TIMES DAILY
Qty: 180 TABLET | Refills: 3 | Status: SHIPPED | OUTPATIENT
Start: 2025-04-09

## 2025-04-09 NOTE — TELEPHONE ENCOUNTER
Daughter Yesi on old HIPAA called office saying she is at [facility] to  the Pravastatin 80mg. They were told PCP canceled the rx and it would have to be resent. If no call back she will check with them after 1pm.

## 2025-04-21 NOTE — PROGRESS NOTES
[facility] [place] [facility]  [facility] CARDIOLOGY  [address]  [address]  [place] [address]  Dept: [phone]  Dept Fax: [phone]  Loc: [phone]    Visit Date: 4/24/2025  Ms. Olea is a 84 y.o. female who presented for:  Atrial fibrillation   HPI:   Alena Olea is a pleasant 84 y.o. female who  has a past medical history of Arthritis, Cancer (HCC), Diabetes mellitus (HCC), Hyperlipidemia, Hypertension, Osteoporosis, and Sleep apnea.  She has h/o atrial fibrillation, on Eliquis. LHC in 2018 revealed an EF of 55%, nonobstructive CAD with mild calcification. Echocardiogram 1/2024 revealed an EF of 55-60%, bi-atrial enlargement. The patient presents for follow up. Patient denies chest pain, shortness of breath, dyspnea on exertion, palpitations. No dizziness, syncope, leg swelling or weight gain.       Current Outpatient Medications:     pravastatin (PRAVACHOL) 80 MG tablet, Take 1 tablet by mouth daily, Disp: 90 tablet, Rfl: 3    metFORMIN (GLUCOPHAGE-XR) 500 MG extended release tablet, TAKE 1 TABLET BY MOUTH TWICE DAILY, Disp: 180 tablet, Rfl: 3    apixaban (ELIQUIS) 5 MG TABS tablet, TAKE 1 TABLET BY MOUTH EVERY MORNING AND EVERY EVENING, Disp: 180 tablet, Rfl: 3    omeprazole (PRILOSEC) 40 MG delayed release capsule, Take 1 capsule by mouth daily, Disp: 90 capsule, Rfl: 3    metoprolol succinate (TOPROL XL) 50 MG extended release tablet, Take 1 tablet by mouth daily, Disp: 90 tablet, Rfl: 3    dilTIAZem (CARDIZEM CD) 300 MG extended release capsule, TAKE 1 CAPSULE BY MOUTH DAILY, Disp: 90 capsule, Rfl: 3    flecainide (TAMBOCOR) 50 MG tablet, Take 1 tablet by mouth 2 times daily, Disp: 60 tablet, Rfl: 3    levothyroxine (SYNTHROID) 25 MCG tablet, Take 1 tablet by mouth Daily, Disp: 90 tablet, Rfl: 3    CPAP Machine MISC, by Does not apply route, Disp: , Rfl:     Past Medical History  Alena  has a past medical history of Arthritis, Cancer (HCC), Diabetes mellitus

## 2025-04-24 ENCOUNTER — OFFICE VISIT (OUTPATIENT)
Dept: CARDIOLOGY CLINIC | Age: 85
End: 2025-04-24
Payer: COMMERCIAL

## 2025-04-24 VITALS
WEIGHT: 204.8 LBS | SYSTOLIC BLOOD PRESSURE: 118 MMHG | HEART RATE: 80 BPM | HEIGHT: 66 IN | DIASTOLIC BLOOD PRESSURE: 70 MMHG | BODY MASS INDEX: 32.92 KG/M2

## 2025-04-24 DIAGNOSIS — R00.2 PALPITATIONS: ICD-10-CM

## 2025-04-24 DIAGNOSIS — I48.19 PERSISTENT ATRIAL FIBRILLATION (HCC): Primary | ICD-10-CM

## 2025-04-24 PROCEDURE — 99214 OFFICE O/P EST MOD 30 MIN: CPT | Performed by: INTERNAL MEDICINE

## 2025-04-24 PROCEDURE — 93000 ELECTROCARDIOGRAM COMPLETE: CPT | Performed by: INTERNAL MEDICINE

## 2025-04-24 PROCEDURE — 3074F SYST BP LT 130 MM HG: CPT | Performed by: INTERNAL MEDICINE

## 2025-04-24 PROCEDURE — 1123F ACP DISCUSS/DSCN MKR DOCD: CPT | Performed by: INTERNAL MEDICINE

## 2025-04-24 PROCEDURE — 3078F DIAST BP <80 MM HG: CPT | Performed by: INTERNAL MEDICINE

## 2025-04-24 NOTE — PATIENT INSTRUCTIONS
Your nurses today were Amaya  Your provider today was Dr. Rodriguez   Phone number: [phone]

## 2025-04-28 DIAGNOSIS — E03.9 HYPOTHYROIDISM, UNSPECIFIED TYPE: ICD-10-CM

## 2025-04-28 RX ORDER — LEVOTHYROXINE SODIUM 25 UG/1
25 TABLET ORAL DAILY
Qty: 90 TABLET | Refills: 3 | Status: SHIPPED | OUTPATIENT
Start: 2025-04-28

## 2025-06-06 ENCOUNTER — TELEPHONE (OUTPATIENT)
Dept: PULMONOLOGY | Age: 85
End: 2025-06-06

## 2025-06-06 NOTE — TELEPHONE ENCOUNTER
Called patient , vm full unable to lvm. Received notificaiton from [facility] - scanned to encounter. States patient was not taken under care for her cpap / supplies I also see she missed [facility] set up. Was calling to see if we could get patient f/u here for further assistance.

## 2025-07-16 DIAGNOSIS — I48.11 LONGSTANDING PERSISTENT ATRIAL FIBRILLATION (HCC): ICD-10-CM

## 2025-07-17 DIAGNOSIS — I48.11 LONGSTANDING PERSISTENT ATRIAL FIBRILLATION (HCC): ICD-10-CM

## 2025-07-17 RX ORDER — FLECAINIDE ACETATE 50 MG/1
50 TABLET ORAL 2 TIMES DAILY
Qty: 180 TABLET | Refills: 3 | Status: SHIPPED | OUTPATIENT
Start: 2025-07-17

## 2025-07-17 RX ORDER — FLECAINIDE ACETATE 50 MG/1
50 TABLET ORAL 2 TIMES DAILY
Qty: 60 TABLET | Refills: 3 | OUTPATIENT
Start: 2025-07-17

## 2025-07-17 NOTE — TELEPHONE ENCOUNTER
Patient has an appt in October would like to know if there is any blood work needed prior to. Also script pending for Flecainide. Script pending if appropriate.

## (undated) DEVICE — FORCEPS BX L240CM JAW DIA3.2MM L CAP W/ NDL MIC MESH TOOTH

## (undated) DEVICE — ENDO KIT: Brand: MEDLINE INDUSTRIES, INC.

## (undated) DEVICE — SOLUTION IV 1000ML 0.9% SOD CHL PH 5 INJ USP VIAFLX PLAS

## (undated) DEVICE — GAUZE,SPONGE,4"X4",12PLY,STERILE,LF,2'S: Brand: MEDLINE

## (undated) DEVICE — TUBING IV STOPCOCK 48 CM 3 W

## (undated) DEVICE — SET ADMIN 25ML L117IN PMP MOD CK VLV RLER CLMP 2 SMRTSITE

## (undated) DEVICE — SYRINGE MED 3ML CLR PLAS STD N CTRL LUERLOCK TIP DISP

## (undated) DEVICE — CONMED SCOPE SAVER BITE BLOCK, 20X27 MM: Brand: SCOPE SAVER

## (undated) DEVICE — IV START KIT: Brand: MEDLINE INDUSTRIES, INC.

## (undated) DEVICE — BANDAGE,GAUZE,4.5"X4.1YD,STERILE,LF: Brand: MEDLINE

## (undated) DEVICE — HYPODERMIC SAFETY NEEDLE: Brand: MAGELLAN

## (undated) DEVICE — SPONGE GZ W4XL4IN COT 12 PLY TYP VII WVN C FLD DSGN

## (undated) DEVICE — NEEDLE SYR 18GA L1.5IN RED PLAS HUB S STL BLNT FILL W/O

## (undated) DEVICE — PENCIL SMK EVAC 10 FT BLADE ELECTRD ROCKER FOR TELSCP

## (undated) DEVICE — LINER SUCT CANSTR 1500CC SEMI RIG W/ POR HYDROPHOBIC SHUT

## (undated) DEVICE — SYRINGE MED 5ML STD CLR PLAS LUERLOCK TIP N CTRL DISP

## (undated) DEVICE — SNARE POLYP SM W13MMXL240CM SHTH DIA2.4MM OVL FLX DISP

## (undated) DEVICE — PACK PROCEDURE SURG PLAS SC MIN SRHP LF

## (undated) DEVICE — CONNECTOR TBNG AUX H2O JET DISP FOR OLY 160/180 SER

## (undated) DEVICE — NEEDLE SPNL 22GA L3.5IN BLK HUB S STL REG WALL FIT STYL W/

## (undated) DEVICE — SET LNR RED GRN W/ BASE CLEANASCOPE

## (undated) DEVICE — SOLUTION IV 1000ML 0.45% SOD CHL PH 5 INJ USP VIAFLX PLAS

## (undated) DEVICE — YANKAUER,BULB TIP,W/O VENT,RIGID,STERILE: Brand: MEDLINE

## (undated) DEVICE — APPLICATOR MEDICATED 26 CC SOLUTION CLR STRL CHLORAPREP

## (undated) DEVICE — SHEET,DRAPE,3/4,53X77,STERILE: Brand: MEDLINE

## (undated) DEVICE — CATHETER ETER IV 22GA L1IN POLYUR STR RADPQ INTROCAN SFTY

## (undated) DEVICE — SYRINGE MED 10ML LUERLOCK TIP W/O SFTY DISP

## (undated) DEVICE — BANDAGE COMPR M W4INXL10YD WHT BGE VELC E MTRX HK AND LOOP

## (undated) DEVICE — GLOVE SURG SZ 8 L11.77IN FNGR THK9.8MIL STRW LTX POLYMER

## (undated) DEVICE — TOWEL,OR,DSP,ST,BLUE,STD,4/PK,20PK/CS: Brand: MEDLINE

## (undated) DEVICE — GLOVE ORANGE PI 7   MSG9070